# Patient Record
Sex: FEMALE | Race: WHITE | NOT HISPANIC OR LATINO | Employment: UNEMPLOYED | URBAN - METROPOLITAN AREA
[De-identification: names, ages, dates, MRNs, and addresses within clinical notes are randomized per-mention and may not be internally consistent; named-entity substitution may affect disease eponyms.]

---

## 2017-03-20 ENCOUNTER — HOSPITAL ENCOUNTER (OUTPATIENT)
Dept: RADIOLOGY | Facility: OTHER | Age: 62
Discharge: HOME | End: 2017-03-20
Payer: COMMERCIAL

## 2017-03-20 ENCOUNTER — ALLSCRIPTS OFFICE VISIT (OUTPATIENT)
Dept: OTHER | Facility: OTHER | Age: 62
End: 2017-03-20

## 2017-03-20 DIAGNOSIS — M25.551 PAIN IN RIGHT HIP: ICD-10-CM

## 2017-03-20 DIAGNOSIS — M22.2X1 PATELLOFEMORAL DISORDER OF RIGHT KNEE: ICD-10-CM

## 2017-03-20 DIAGNOSIS — M76.31 ILIOTIBIAL BAND SYNDROME OF RIGHT SIDE: ICD-10-CM

## 2017-03-20 DIAGNOSIS — M70.61 TROCHANTERIC BURSITIS OF RIGHT HIP: ICD-10-CM

## 2017-03-20 PROCEDURE — 73502 X-RAY EXAM HIP UNI 2-3 VIEWS: CPT

## 2017-03-27 ENCOUNTER — APPOINTMENT (OUTPATIENT)
Dept: PHYSICAL THERAPY | Age: 62
End: 2017-03-27
Payer: COMMERCIAL

## 2017-03-27 ENCOUNTER — GENERIC CONVERSION - ENCOUNTER (OUTPATIENT)
Dept: OTHER | Facility: OTHER | Age: 62
End: 2017-03-27

## 2017-03-27 DIAGNOSIS — M76.31 ILIOTIBIAL BAND SYNDROME OF RIGHT SIDE: ICD-10-CM

## 2017-03-27 DIAGNOSIS — M22.2X1 PATELLOFEMORAL DISORDER OF RIGHT KNEE: ICD-10-CM

## 2017-03-27 DIAGNOSIS — M70.61 TROCHANTERIC BURSITIS OF RIGHT HIP: ICD-10-CM

## 2017-03-27 DIAGNOSIS — M25.551 PAIN IN RIGHT HIP: ICD-10-CM

## 2017-03-27 PROCEDURE — 97162 PT EVAL MOD COMPLEX 30 MIN: CPT

## 2017-03-30 ENCOUNTER — APPOINTMENT (OUTPATIENT)
Dept: PHYSICAL THERAPY | Age: 62
End: 2017-03-30
Payer: COMMERCIAL

## 2017-03-30 PROCEDURE — 97110 THERAPEUTIC EXERCISES: CPT

## 2017-04-03 ENCOUNTER — APPOINTMENT (OUTPATIENT)
Dept: PHYSICAL THERAPY | Age: 62
End: 2017-04-03
Payer: COMMERCIAL

## 2017-04-03 PROCEDURE — 97110 THERAPEUTIC EXERCISES: CPT

## 2017-04-06 ENCOUNTER — APPOINTMENT (OUTPATIENT)
Dept: PHYSICAL THERAPY | Age: 62
End: 2017-04-06
Payer: COMMERCIAL

## 2017-04-11 ENCOUNTER — APPOINTMENT (OUTPATIENT)
Dept: PHYSICAL THERAPY | Age: 62
End: 2017-04-11
Payer: COMMERCIAL

## 2017-04-11 PROCEDURE — 97110 THERAPEUTIC EXERCISES: CPT

## 2017-04-13 ENCOUNTER — APPOINTMENT (OUTPATIENT)
Dept: PHYSICAL THERAPY | Age: 62
End: 2017-04-13
Payer: COMMERCIAL

## 2017-04-18 ENCOUNTER — APPOINTMENT (OUTPATIENT)
Dept: PHYSICAL THERAPY | Age: 62
End: 2017-04-18
Payer: COMMERCIAL

## 2017-04-18 ENCOUNTER — GENERIC CONVERSION - ENCOUNTER (OUTPATIENT)
Dept: OTHER | Facility: OTHER | Age: 62
End: 2017-04-18

## 2017-04-18 PROCEDURE — 97110 THERAPEUTIC EXERCISES: CPT

## 2017-04-20 ENCOUNTER — APPOINTMENT (OUTPATIENT)
Dept: PHYSICAL THERAPY | Age: 62
End: 2017-04-20
Payer: COMMERCIAL

## 2017-04-25 ENCOUNTER — GENERIC CONVERSION - ENCOUNTER (OUTPATIENT)
Dept: OTHER | Facility: OTHER | Age: 62
End: 2017-04-25

## 2017-04-25 ENCOUNTER — APPOINTMENT (OUTPATIENT)
Dept: PHYSICAL THERAPY | Age: 62
End: 2017-04-25
Payer: COMMERCIAL

## 2017-04-25 PROCEDURE — 97110 THERAPEUTIC EXERCISES: CPT

## 2017-04-27 ENCOUNTER — APPOINTMENT (OUTPATIENT)
Dept: PHYSICAL THERAPY | Age: 62
End: 2017-04-27
Payer: COMMERCIAL

## 2017-05-02 ENCOUNTER — APPOINTMENT (OUTPATIENT)
Dept: PHYSICAL THERAPY | Age: 62
End: 2017-05-02
Payer: COMMERCIAL

## 2017-05-02 PROCEDURE — 97110 THERAPEUTIC EXERCISES: CPT

## 2017-05-09 ENCOUNTER — APPOINTMENT (OUTPATIENT)
Dept: PHYSICAL THERAPY | Age: 62
End: 2017-05-09
Payer: COMMERCIAL

## 2017-05-09 PROCEDURE — 97110 THERAPEUTIC EXERCISES: CPT

## 2017-05-14 ENCOUNTER — OFFICE VISIT (OUTPATIENT)
Dept: URGENT CARE | Age: 62
End: 2017-05-14
Payer: COMMERCIAL

## 2017-05-14 ENCOUNTER — TRANSCRIBE ORDERS (OUTPATIENT)
Dept: URGENT CARE | Age: 62
End: 2017-05-14

## 2017-05-14 ENCOUNTER — HOSPITAL ENCOUNTER (OUTPATIENT)
Dept: RADIOLOGY | Age: 62
Discharge: HOME | End: 2017-05-14
Attending: PHYSICIAN ASSISTANT | Admitting: FAMILY MEDICINE
Payer: COMMERCIAL

## 2017-05-14 DIAGNOSIS — R07.81 PLEURODYNIA: ICD-10-CM

## 2017-05-14 PROCEDURE — 99203 OFFICE O/P NEW LOW 30 MIN: CPT | Performed by: FAMILY MEDICINE

## 2017-05-14 PROCEDURE — 71101 X-RAY EXAM UNILAT RIBS/CHEST: CPT

## 2017-05-22 ENCOUNTER — GENERIC CONVERSION - ENCOUNTER (OUTPATIENT)
Dept: OTHER | Facility: OTHER | Age: 62
End: 2017-05-22

## 2017-06-08 ENCOUNTER — ALLSCRIPTS OFFICE VISIT (OUTPATIENT)
Dept: OTHER | Facility: OTHER | Age: 62
End: 2017-06-08

## 2017-08-08 ENCOUNTER — ALLSCRIPTS OFFICE VISIT (OUTPATIENT)
Dept: OTHER | Facility: OTHER | Age: 62
End: 2017-08-08

## 2017-09-26 DIAGNOSIS — I10 ESSENTIAL (PRIMARY) HYPERTENSION: ICD-10-CM

## 2017-09-26 DIAGNOSIS — E78.5 HYPERLIPIDEMIA: ICD-10-CM

## 2017-09-26 DIAGNOSIS — R53.83 OTHER FATIGUE: ICD-10-CM

## 2017-09-26 DIAGNOSIS — M81.0 AGE-RELATED OSTEOPOROSIS WITHOUT CURRENT PATHOLOGICAL FRACTURE: ICD-10-CM

## 2017-09-30 ENCOUNTER — APPOINTMENT (OUTPATIENT)
Dept: LAB | Age: 62
End: 2017-09-30
Payer: COMMERCIAL

## 2017-09-30 ENCOUNTER — TRANSCRIBE ORDERS (OUTPATIENT)
Dept: ADMINISTRATIVE | Age: 62
End: 2017-09-30

## 2017-09-30 DIAGNOSIS — E78.5 HYPERLIPIDEMIA: ICD-10-CM

## 2017-09-30 DIAGNOSIS — I10 ESSENTIAL (PRIMARY) HYPERTENSION: ICD-10-CM

## 2017-09-30 DIAGNOSIS — R53.83 OTHER FATIGUE: ICD-10-CM

## 2017-09-30 LAB
ALBUMIN SERPL BCP-MCNC: 3.8 G/DL (ref 3.5–5)
ALP SERPL-CCNC: 30 U/L (ref 46–116)
ALT SERPL W P-5'-P-CCNC: 30 U/L (ref 12–78)
ANION GAP SERPL CALCULATED.3IONS-SCNC: 6 MMOL/L (ref 4–13)
AST SERPL W P-5'-P-CCNC: 29 U/L (ref 5–45)
BILIRUB SERPL-MCNC: 0.37 MG/DL (ref 0.2–1)
BUN SERPL-MCNC: 16 MG/DL (ref 5–25)
CALCIUM SERPL-MCNC: 9.7 MG/DL (ref 8.3–10.1)
CHLORIDE SERPL-SCNC: 107 MMOL/L (ref 100–108)
CHOLEST SERPL-MCNC: 159 MG/DL (ref 50–200)
CO2 SERPL-SCNC: 28 MMOL/L (ref 21–32)
CREAT SERPL-MCNC: 0.88 MG/DL (ref 0.6–1.3)
ERYTHROCYTE [DISTWIDTH] IN BLOOD BY AUTOMATED COUNT: 15.4 % (ref 11.6–15.1)
GFR SERPL CREATININE-BSD FRML MDRD: 70 ML/MIN/1.73SQ M
GLUCOSE P FAST SERPL-MCNC: 94 MG/DL (ref 65–99)
HCT VFR BLD AUTO: 40.5 % (ref 34.8–46.1)
HDLC SERPL-MCNC: 57 MG/DL (ref 40–60)
HGB BLD-MCNC: 12.7 G/DL (ref 11.5–15.4)
LDLC SERPL CALC-MCNC: 90 MG/DL (ref 0–100)
MCH RBC QN AUTO: 26.1 PG (ref 26.8–34.3)
MCHC RBC AUTO-ENTMCNC: 31.4 G/DL (ref 31.4–37.4)
MCV RBC AUTO: 83 FL (ref 82–98)
PLATELET # BLD AUTO: 397 THOUSANDS/UL (ref 149–390)
PMV BLD AUTO: 9.9 FL (ref 8.9–12.7)
POTASSIUM SERPL-SCNC: 4.4 MMOL/L (ref 3.5–5.3)
PROT SERPL-MCNC: 7.6 G/DL (ref 6.4–8.2)
RBC # BLD AUTO: 4.86 MILLION/UL (ref 3.81–5.12)
SODIUM SERPL-SCNC: 141 MMOL/L (ref 136–145)
TRIGL SERPL-MCNC: 62 MG/DL
TSH SERPL DL<=0.05 MIU/L-ACNC: 1.08 UIU/ML (ref 0.36–3.74)
WBC # BLD AUTO: 4.72 THOUSAND/UL (ref 4.31–10.16)

## 2017-09-30 PROCEDURE — 80061 LIPID PANEL: CPT

## 2017-09-30 PROCEDURE — 36415 COLL VENOUS BLD VENIPUNCTURE: CPT

## 2017-09-30 PROCEDURE — 80053 COMPREHEN METABOLIC PANEL: CPT

## 2017-09-30 PROCEDURE — 84443 ASSAY THYROID STIM HORMONE: CPT

## 2017-09-30 PROCEDURE — 85027 COMPLETE CBC AUTOMATED: CPT

## 2017-10-09 ENCOUNTER — ALLSCRIPTS OFFICE VISIT (OUTPATIENT)
Dept: OTHER | Facility: OTHER | Age: 62
End: 2017-10-09

## 2017-10-10 NOTE — PROGRESS NOTES
Assessment  1  Encounter for immunization (V03 89) (Z23)   2  Hyperlipidemia (272 4) (E78 5)   3  Hypercholesteremia (272 0) (E78 00)   4  Hypertension (401 9) (I10)   5  Osteoporosis (733 00) (M81 0)    Plan  Encounter for immunization    · Fluzone Quadrivalent Intramuscular Suspension  Osteoporosis    · GNP Calcium 600 +D3 600-800 MG-UNIT Oral Tablet; 2 tabs qd   · * DXA BONE DENSITY SPINE HIP AND PELVIS; Status:Hold For - Scheduling; Requested  for:09Oct2017;     Discussion/Summary    I'll see the patient back in 6 months but I will call her with the results of her DEXA scan  Possible side effects of new medications were reviewed with the patient/guardian today  The treatment plan was reviewed with the patient/guardian  The patient/guardian understands and agrees with the treatment plan      Chief Complaint  YEARLY WELL CHECK VISIT - OSTEOPETROSIS HTN   Patient is here today for follow up of chronic conditions described in HPI  History of Present Illness  The patient presents for follow-up of hypertension and hypercholesterolemia  She's also due for a repeat DEXA scan to determine mother her osteoporosis has been successfully treated or not  Review of Systems    Constitutional: No fever, no chills, feels well, no tiredness, no recent weight gain or weight loss  Eyes: No complaints of eye pain, no red eyes, no eyesight problems, no discharge, no dry eyes, no itching of eyes  ENT: no complaints of earache, no loss of hearing, no nose bleeds, no nasal discharge, no sore throat, no hoarseness  Cardiovascular: No complaints of slow heart rate, no fast heart rate, no chest pain, no palpitations, no leg claudication, no lower extremity edema  Respiratory: No complaints of shortness of breath, no wheezing, no cough, no SOB on exertion, no orthopnea, no PND  Gastrointestinal: No complaints of abdominal pain, no constipation, no nausea or vomiting, no diarrhea, no bloody stools     Genitourinary: No complaints of dysuria, no incontinence, no pelvic pain, no dysmenorrhea, no vaginal discharge or bleeding  Musculoskeletal: No complaints of arthralgias, no myalgias, no joint swelling or stiffness, no limb pain or swelling  Integumentary: No complaints of skin rash or lesions, no itching, no skin wounds, no breast pain or lump  Neurological: No complaints of headache, no confusion, no convulsions, no numbness, no dizziness or fainting, no tingling, no limb weakness, no difficulty walking  Psychiatric: Not suicidal, no sleep disturbance, no anxiety or depression, no change in personality, no emotional problems  Endocrine: No complaints of proptosis, no hot flashes, no muscle weakness, no deepening of the voice, no feelings of weakness  Hematologic/Lymphatic: No complaints of swollen glands, no swollen glands in the neck, does not bleed easily, does not bruise easily  Active Problems  1  Abnormality of rib determined by X-ray (756 3) (Q76 6)   2  Actinic keratosis (702 0) (L57 0)   3  Chest wall pain (786 52) (R07 89)   4  Cough, persistent (786 2) (R05)   5  Depression (311) (F32 9)   6  Encounter for gynecological examination without abnormal finding (V72 31) (Z01 419)   7  Encounter for imaging to assess osteoporosis (733 00) (Z13 820)   8  Encounter for screening mammogram for malignant neoplasm of breast (V76 12)   (Z12 31)   9  Fatigue (780 79) (R53 83)   10  Greater trochanteric bursitis of right hip (726 5) (M70 61)   11  Hypercholesteremia (272 0) (E78 00)   12  Hyperlipidemia (272 4) (E78 5)   13  Hypertension (401 9) (I10)   14  It band syndrome, right (728 89) (M76 31)   15  Lipoma (214 9) (D17 9)   16  Osteoporosis (733 00) (M81 0)   17  Patellofemoral syndrome, right (719 46) (M22 2X1)   18  Rib pain on right side (786 50) (R07 81)   19  Right hip pain (719 45) (M25 551)    Past Medical History  1  History of acute bronchitis (V12 69) (Z87 09)   2   History of urinary tract infection (V13 02) (Z87 440)   3  History of Sore throat (462) (J02 9)   4  History of Trapezius muscle strain (840 8) (M91 106Q)    The active problems and past medical history were reviewed and updated today  Surgical History  1  History of Eye Surgery   2  History of Tubal Ligation    The surgical history was reviewed and updated today  Family History  Mother    1  Family history of Breast Cancer (V16 3)   2  Family history of Colon Cancer (V16 0)  Father    3  Family history of Hypertension (V17 49)    The family history was reviewed and updated today  Social History   · Denied: History of Alcohol Use (History)   · Denied: History of Daily Coffee Consumption (___ Cups/Day)   · Daily Cola Consumption (___ Cans/Day)   · Daily Tea Consumption (___ Cups/Day)   · Denied: History of Drug Use   · Marital History - Currently    · Never A Smoker  The social history was reviewed and updated today  The social history was reviewed and is unchanged  Current Meds   1  Azo Tabs 95 MG Oral Tablet; Therapy: (Recorded:20Mar2017) to Recorded   2  [employer] Vitamin D3 1000 UNIT CAPS; TAKE 1 CAPSULE Daily Recorded   3  Fenofibrate 145 MG Oral Tablet; take 1 tablet every day; Therapy: 88LHH5280 to (Radha Recinos)  Requested for: 97NEY6698; Last   Rx:13Oct2016 Ordered   4  HydroCHLOROthiazide 25 MG Oral Tablet; take 1 tablet every day; Therapy: 57THP2156 to (Evaluate:04Lsy7924)  Requested for: 85HGM7179; Last   Rx:21Etr2105 Ordered   5  Ibandronate Sodium 150 MG Oral Tablet; TAKE 1 TAB PER MONTH W/ 8-10 OZ OF   WATER, REMAIN UPRIGHT & DONT EAT/DRINK X 1 HOUR;   Therapy: 98KJR6676 to (Evaluate:56Cks2175)  Requested for: 86Fgb2388; Last   Rx:95Cdb7727 Ordered   6  Nortriptyline HCl - 10 MG Oral Capsule; TAKE 3 CAPSULES EVERY DAY; Therapy: 21Nov2010 to (Radha Recinos)  Requested for: 75FUK6628; Last   Rx:09Oct2016 Ordered   7  Probiotic CAPS; TAKE 1 CAPSULE Daily Recorded   8   Turmeric Curcumin Oral Capsule; Therapy: (Recorded:20Mar2017) to Recorded    The medication list was reviewed and updated today  Allergies  1  Iodine SOLN    Vitals  Vital Signs    Recorded: 08LYH1731 01:00PM   Temperature 97 7 F   Heart Rate 76   Respiration 14   Systolic 470   Diastolic 74   Height 5 ft 2 in   Weight 146 lb    BMI Calculated 26 7   BSA Calculated 1 67     Physical Exam    Constitutional   General appearance: No acute distress, well appearing and well nourished  Head and Face   Head and face: Normal     Palpation of the face and sinuses: No sinus tenderness  Eyes   Conjunctiva and lids: No swelling, erythema or discharge  Pupils and irises: Equal, round, reactive to light  Ophthalmoscopic examination: Normal fundi and optic discs  Ears, Nose, Mouth, and Throat   External inspection of ears and nose: Normal     Otoscopic examination: Tympanic membranes translucent with normal light reflex  Canals patent without erythema  Hearing: Normal     Nasal mucosa, septum, and turbinates: Normal without edema or erythema  Lips, teeth, and gums: Normal, good dentition  Oropharynx: Normal with no erythema, edema, exudate or lesions  Neck   Neck: Supple, symmetric, trachea midline, no masses  Thyroid: Normal, no thyromegaly  Pulmonary   Percussion of chest: Normal     Auscultation of lungs: Clear to auscultation  Cardiovascular   Auscultation of heart: Normal rate and rhythm, normal S1 and S2, no murmurs  Carotid pulses: 2+ bilaterally  Pedal pulses: 2+ bilaterally  Peripheral vascular exam: Normal     Examination of extremities for edema and/or varicosities: Normal     Abdomen   Abdomen: Non-tender, no masses  Liver and spleen: No hepatomegaly or splenomegaly  Lymphatic   Palpation of lymph nodes in neck: No lymphadenopathy  Musculoskeletal   Digits and nails: Normal without clubbing or cyanosis  Neurologic   Reflexes: 2+ and symmetric      Psychiatric   Judgment and insight: Normal     Orientation to person, place, and time: Normal     Recent and remote memory: Intact      Mood and affect: Normal        Future Appointments    Date/Time Provider Specialty Site   08/10/2018 10:00 AM Sahil [facility] Obstetrics/Gynecology [facility]     Signatures   Electronically signed by : Sabra Lange DO; Oct  9 2017  3:17PM EST                       (Author)

## 2017-11-17 ENCOUNTER — HOSPITAL ENCOUNTER (OUTPATIENT)
Dept: RADIOLOGY | Age: 62
Discharge: HOME | End: 2017-11-17
Payer: COMMERCIAL

## 2017-11-17 DIAGNOSIS — M81.0 AGE-RELATED OSTEOPOROSIS WITHOUT CURRENT PATHOLOGICAL FRACTURE: ICD-10-CM

## 2017-11-17 PROCEDURE — 77080 DXA BONE DENSITY AXIAL: CPT

## 2017-11-20 ENCOUNTER — ALLSCRIPTS OFFICE VISIT (OUTPATIENT)
Dept: OTHER | Facility: OTHER | Age: 62
End: 2017-11-20

## 2018-01-01 ENCOUNTER — APPOINTMENT (EMERGENCY)
Dept: CT IMAGING | Facility: HOSPITAL | Age: 63
End: 2018-01-01
Payer: COMMERCIAL

## 2018-01-01 ENCOUNTER — HOSPITAL ENCOUNTER (EMERGENCY)
Facility: HOSPITAL | Age: 63
Discharge: HOME | End: 2018-01-01
Attending: EMERGENCY MEDICINE | Admitting: EMERGENCY MEDICINE
Payer: COMMERCIAL

## 2018-01-01 VITALS
HEART RATE: 74 BPM | SYSTOLIC BLOOD PRESSURE: 175 MMHG | OXYGEN SATURATION: 95 % | DIASTOLIC BLOOD PRESSURE: 79 MMHG | RESPIRATION RATE: 16 BRPM | TEMPERATURE: 97.7 F

## 2018-01-01 DIAGNOSIS — R42 VERTIGO: Primary | ICD-10-CM

## 2018-01-01 LAB
ANION GAP SERPL CALCULATED.3IONS-SCNC: 9 MMOL/L (ref 4–13)
ATRIAL RATE: 73 BPM
BASOPHILS # BLD AUTO: 0.02 THOUSANDS/ΜL (ref 0–0.1)
BASOPHILS NFR BLD AUTO: 0 % (ref 0–1)
BUN SERPL-MCNC: 22 MG/DL (ref 5–25)
CALCIUM SERPL-MCNC: 9.5 MG/DL (ref 8.3–10.1)
CHLORIDE SERPL-SCNC: 102 MMOL/L (ref 100–108)
CO2 SERPL-SCNC: 27 MMOL/L (ref 21–32)
CREAT SERPL-MCNC: 0.84 MG/DL (ref 0.6–1.3)
EOSINOPHIL # BLD AUTO: 0.21 THOUSAND/ΜL (ref 0–0.61)
EOSINOPHIL NFR BLD AUTO: 2 % (ref 0–6)
ERYTHROCYTE [DISTWIDTH] IN BLOOD BY AUTOMATED COUNT: 15.6 % (ref 11.6–15.1)
GFR SERPL CREATININE-BSD FRML MDRD: 74 ML/MIN/1.73SQ M
GLUCOSE SERPL-MCNC: 106 MG/DL (ref 65–140)
HCT VFR BLD AUTO: 41 % (ref 34.8–46.1)
HGB BLD-MCNC: 12.9 G/DL (ref 11.5–15.4)
LYMPHOCYTES # BLD AUTO: 0.93 THOUSANDS/ΜL (ref 0.6–4.47)
LYMPHOCYTES NFR BLD AUTO: 9 % (ref 14–44)
MCH RBC QN AUTO: 26.1 PG (ref 26.8–34.3)
MCHC RBC AUTO-ENTMCNC: 31.5 G/DL (ref 31.4–37.4)
MCV RBC AUTO: 83 FL (ref 82–98)
MONOCYTES # BLD AUTO: 0.42 THOUSAND/ΜL (ref 0.17–1.22)
MONOCYTES NFR BLD AUTO: 4 % (ref 4–12)
NEUTROPHILS # BLD AUTO: 8.67 THOUSANDS/ΜL (ref 1.85–7.62)
NEUTS SEG NFR BLD AUTO: 85 % (ref 43–75)
P AXIS: 21 DEGREES
PLATELET # BLD AUTO: 397 THOUSANDS/UL (ref 149–390)
PMV BLD AUTO: 9.8 FL (ref 8.9–12.7)
POTASSIUM SERPL-SCNC: 4.4 MMOL/L (ref 3.5–5.3)
PR INTERVAL: 156 MS
QRS AXIS: -3 DEGREES
QRSD INTERVAL: 90 MS
QT INTERVAL: 390 MS
QTC INTERVAL: 408 MS
RBC # BLD AUTO: 4.95 MILLION/UL (ref 3.81–5.12)
SODIUM SERPL-SCNC: 138 MMOL/L (ref 136–145)
T WAVE AXIS: 34 DEGREES
TROPONIN I SERPL-MCNC: <0.02 NG/ML
VENTRICULAR RATE: 66 BPM
WBC # BLD AUTO: 10.25 THOUSAND/UL (ref 4.31–10.16)

## 2018-01-01 PROCEDURE — 93005 ELECTROCARDIOGRAM TRACING: CPT

## 2018-01-01 PROCEDURE — 85025 COMPLETE CBC W/AUTO DIFF WBC: CPT | Performed by: EMERGENCY MEDICINE

## 2018-01-01 PROCEDURE — 93005 ELECTROCARDIOGRAM TRACING: CPT | Performed by: EMERGENCY MEDICINE

## 2018-01-01 PROCEDURE — 84484 ASSAY OF TROPONIN QUANT: CPT | Performed by: EMERGENCY MEDICINE

## 2018-01-01 PROCEDURE — 70450 CT HEAD/BRAIN W/O DYE: CPT

## 2018-01-01 PROCEDURE — 36415 COLL VENOUS BLD VENIPUNCTURE: CPT | Performed by: EMERGENCY MEDICINE

## 2018-01-01 PROCEDURE — 99284 EMERGENCY DEPT VISIT MOD MDM: CPT

## 2018-01-01 PROCEDURE — 96374 THER/PROPH/DIAG INJ IV PUSH: CPT

## 2018-01-01 PROCEDURE — 80048 BASIC METABOLIC PNL TOTAL CA: CPT | Performed by: EMERGENCY MEDICINE

## 2018-01-01 PROCEDURE — 96361 HYDRATE IV INFUSION ADD-ON: CPT

## 2018-01-01 RX ORDER — MECLIZINE HYDROCHLORIDE 25 MG/1
50 TABLET ORAL EVERY 8 HOURS PRN
Qty: 30 TABLET | Refills: 0 | Status: SHIPPED | OUTPATIENT
Start: 2018-01-01 | End: 2020-10-05 | Stop reason: SDUPTHER

## 2018-01-01 RX ORDER — DIAZEPAM 5 MG/ML
2.5 INJECTION, SOLUTION INTRAMUSCULAR; INTRAVENOUS ONCE
Status: COMPLETED | OUTPATIENT
Start: 2018-01-01 | End: 2018-01-01

## 2018-01-01 RX ORDER — MECLIZINE HCL 12.5 MG/1
50 TABLET ORAL ONCE
Status: COMPLETED | OUTPATIENT
Start: 2018-01-01 | End: 2018-01-01

## 2018-01-01 RX ADMIN — MECLIZINE 50 MG: 12.5 TABLET ORAL at 11:48

## 2018-01-01 RX ADMIN — DIAZEPAM 2.5 MG: 5 INJECTION, SOLUTION INTRAMUSCULAR; INTRAVENOUS at 13:06

## 2018-01-01 RX ADMIN — SODIUM CHLORIDE 1000 ML: 0.9 INJECTION, SOLUTION INTRAVENOUS at 11:50

## 2018-01-01 NOTE — DISCHARGE INSTRUCTIONS
Dizziness, Ambulatory Care   GENERAL INFORMATION:   Dizziness  is a term used to describe a feeling of being off balance or unsteady  Common causes of dizziness are an inner ear fluid imbalance or a lack of oxygen in your blood  Your dizziness may be acute (lasts 3 days or less) or chronic (lasts longer than 3 days)  You may have dizzy spells that last from seconds to a few hours  Common symptoms related to dizziness include the following:   · A feeling that your surroundings are moving even though you are standing still    · Ringing in your ears or hearing loss     · Feeling faint or lightheaded     · Weakness or unsteadiness     · Double vision or eye movements you cannot control    · Nausea or vomiting     · Confusion  Seek immediate care for the following symptoms:   · You have a headache that does not go away with medicine  · You have shaking chills and a fever  · You vomit over and over with no relief  · Your vomit or bowel movements are red or black  · You have pain in your chest, back, or abdomen  · You have numbness, especially in your face, arms, or legs  · You have trouble moving your arms or legs  Treatment for dizziness  depends on the cause of your dizziness  You may need medicines to decrease your dizziness or nausea  You may need to be admitted to the hospital for treatment  Manage your symptoms:  Get up slowly from sitting or lying down  Do not drive or operate machinery when you are dizzy  Follow up with your healthcare provider as directed:  Write down your questions so you remember to ask them during your visits  CARE AGREEMENT:   You have the right to help plan your care  Learn about your health condition and how it may be treated  Discuss treatment options with your caregivers to decide what care you want to receive  You always have the right to refuse treatment  The above information is an  only   It is not intended as medical advice for individual conditions or treatments  Talk to your doctor, nurse or pharmacist before following any medical regimen to see if it is safe and effective for you  © 2014 [address] is for End User's use only and may not be sold, redistributed or otherwise used for commercial purposes  All illustrations and images included in CareNotes® are the copyrighted property of A D A M , Inc  or [employer] Jw  Dizziness   WHAT YOU NEED TO KNOW:   Dizziness is a feeling of being off balance or unsteady  Common causes of dizziness are an inner ear fluid imbalance or a lack of oxygen in your blood  Dizziness may be acute (lasts 3 days or less) or chronic (lasts longer than 3 days)  You may have dizzy spells that last from seconds to a few hours  DISCHARGE INSTRUCTIONS:   Return to the emergency department if:   · You have a headache and a stiff neck  · You have shaking chills and a fever  · You vomit over and over with no relief  · Your vomit or bowel movements are red or black  · You have pain in your chest, back, or abdomen  · You have numbness, especially in your face, arms, or legs  · You have trouble moving your arms or legs  · You are confused  Contact your healthcare provider if:   · You have a fever  · Your symptoms do not get better with treatment  · You have questions or concerns about your condition or care  Manage your symptoms:   · Do not drive  or operate heavy machinery when you are dizzy  · Get up slowly  from sitting or lying down  · Drink plenty of liquids  Liquids help prevent dehydration  Ask how much liquid to drink each day and which liquids are best for you  Follow up with your healthcare provider as directed:  Write down your questions so you remember to ask them during your visits     © 2017 [address] Information is for End User's use only and may not be sold, redistributed or otherwise used for commercial purposes  All illustrations and images included in CareNotes® are the copyrighted property of A D A M , Inc  or Juan Luis Escalera  The above information is an  only  It is not intended as medical advice for individual conditions or treatments  Talk to your doctor, nurse or pharmacist before following any medical regimen to see if it is safe and effective for you

## 2018-01-01 NOTE — ED NOTES
Pt ambulated, reports "dizziness is gone" will relay to Dr Dacia Argueta        [id number] , RN  01/01/18 7522

## 2018-01-01 NOTE — ED PROVIDER NOTES
History  Chief Complaint   Patient presents with    Dizziness     pt started with dizziness this yesterday  continued with dizziness this morning and vomitted  History provided by:  Patient  Dizziness   Quality:  Room spinning and vertigo  Severity:  Moderate  Onset quality:  Sudden  Duration:  2 days  Timing:  Constant  Progression:  Worsening  Chronicity:  New  Context: head movement    Relieved by:  Nothing  Worsened by:  Standing up and turning head  Ineffective treatments:  Being still  Associated symptoms: nausea    Associated symptoms: no chest pain, no diarrhea, no headaches, no shortness of breath and no weakness        None       Past Medical History:   Diagnosis Date    Hyperlipidemia     Hypertension        Past Surgical History:   Procedure Laterality Date    TUBAL LIGATION         History reviewed  No pertinent family history  I have reviewed and agree with the history as documented  Social History   Substance Use Topics    Smoking status: Never Smoker    Smokeless tobacco: Never Used    Alcohol use No        Review of Systems   Constitutional: Negative for chills and fever  HENT: Negative for rhinorrhea, sore throat and trouble swallowing  Eyes: Negative for pain  Respiratory: Negative for cough, shortness of breath, wheezing and stridor  Cardiovascular: Negative for chest pain and leg swelling  Gastrointestinal: Positive for nausea  Negative for abdominal pain and diarrhea  Endocrine: Negative for polyuria  Genitourinary: Negative for dysuria, flank pain and urgency  Musculoskeletal: Negative for joint swelling, myalgias and neck stiffness  Skin: Negative for rash  Allergic/Immunologic: Negative for immunocompromised state  Neurological: Positive for dizziness  Negative for syncope, weakness, numbness and headaches  Psychiatric/Behavioral: Negative for confusion and suicidal ideas  All other systems reviewed and are negative        Physical Exam  ED Triage Vitals [01/01/18 1104]   Temperature Pulse Respirations Blood Pressure SpO2   97 7 °F (36 5 °C) 76 18 (!) 197/88 98 %      Temp Source Heart Rate Source Patient Position - Orthostatic VS BP Location FiO2 (%)   Oral Monitor Sitting Left arm --      Pain Score       No Pain           Orthostatic Vital Signs  Vitals:    01/01/18 1104 01/01/18 1203 01/01/18 1259 01/01/18 1330   BP: (!) 197/88 167/77 167/77 (!) 175/79   Pulse: 76 68 68 74   Patient Position - Orthostatic VS: Sitting Lying Lying        Physical Exam   Constitutional: She is oriented to person, place, and time  She appears well-developed and well-nourished  HENT:   Head: Normocephalic and atraumatic  Eyes: EOM are normal  Pupils are equal, round, and reactive to light  Neck: Normal range of motion  Neck supple  Cardiovascular: Normal rate and regular rhythm  Exam reveals no friction rub  No murmur heard  Pulmonary/Chest: Breath sounds normal  No respiratory distress  She has no wheezes  She has no rales  Abdominal: Soft  Bowel sounds are normal  She exhibits no distension  There is no tenderness  Musculoskeletal: Normal range of motion  She exhibits no edema or tenderness  Neurological: She is alert and oriented to person, place, and time  GCS 15  AAOx4  No focal neuro deficits  CN II-XII intact  PERRL  EOMI  [place] No pronator drift   strength 5/5 bilaterally  B/L UE strength 5/5 throughout  Finger to nose normal  Cerebellar function normal  Ambulates without difficulty  B/L LE strength 5/5 throughout  Gross sensation to b/l upper and lower extremities intact  Skin: Skin is warm  No rash noted  Psychiatric: She has a normal mood and affect  Nursing note and vitals reviewed        ED Medications  Medications   sodium chloride 0 9 % bolus 1,000 mL (0 mL Intravenous Stopped 1/1/18 1352)   meclizine (ANTIVERT) tablet 50 mg (50 mg Oral Given 1/1/18 1148)   diazepam (VALIUM) injection 2 5 mg (2 5 mg Intravenous Given 1/1/18 1306)       Diagnostic Studies  Results Reviewed     Procedure Component Value Units Date/Time    Troponin I [[id number]]  (Normal) Collected:  01/01/18 1149    Lab Status:  Final result Specimen:  Blood from Arm, Right Updated:  01/01/18 1219     Troponin I <0 02 ng/mL     Narrative:         [employer] Chemistry analyzer 99% cutoff is > 0 04 ng/mL in network labs    o cTnI 99% cutoff is useful only when applied to patients in the clinical setting of myocardial ischemia  o cTnI 99% cutoff should be interpreted in the context of clinical history, ECG findings and possibly cardiac imaging to establish correct diagnosis  o cTnI 99% cutoff may be suggestive but clearly not indicative of a coronary event without the clinical setting of myocardial ischemia  Basic metabolic panel [[id number]]  (Normal) Collected:  01/01/18 1149    Lab Status:  Final result Specimen:  Blood from Arm, Right Updated:  01/01/18 1216     Sodium 138 mmol/L      Potassium 4 4 mmol/L      Chloride 102 mmol/L      CO2 27 mmol/L      Anion Gap 9 mmol/L      BUN 22 mg/dL      Creatinine 0 84 mg/dL      Glucose 106 mg/dL      Calcium 9 5 mg/dL      eGFR 74 ml/min/1 73sq m     Narrative:         National Kidney Disease Education Program recommendations are as follows:  GFR calculation is accurate only with a steady state creatinine  Chronic Kidney disease less than 60 ml/min/1 73 sq  meters  Kidney failure less than 15 ml/min/1 73 sq  meters      CBC and differential [[id number]]  (Abnormal) Collected:  01/01/18 1149    Lab Status:  Final result Specimen:  Blood from Arm, Right Updated:  01/01/18 1157     WBC 10 25 (H) Thousand/uL      RBC 4 95 Million/uL      Hemoglobin 12 9 g/dL      Hematocrit 41 0 %      MCV 83 fL      MCH 26 1 (L) pg      MCHC 31 5 g/dL      RDW 15 6 (H) %      MPV 9 8 fL      Platelets 397 (H) Thousands/uL      Neutrophils Relative 85 (H) %      Lymphocytes Relative 9 (L) %      Monocytes Relative 4 %      Eosinophils Relative 2 % Basophils Relative 0 %      Neutrophils Absolute 8 67 (H) Thousands/µL      Lymphocytes Absolute 0 93 Thousands/µL      Monocytes Absolute 0 42 Thousand/µL      Eosinophils Absolute 0 21 Thousand/µL      Basophils Absolute 0 02 Thousands/µL                  CT head without contrast   Final Result by Odilia Goodwin MD (01/01 1240)      No acute intracranial abnormality  Workstation performed: [id number]                    Procedures  Procedures       Phone Contacts  ED Phone Contact    ED Course  ED Course                                MDM  Number of Diagnoses or Management Options  Vertigo: new and requires workup  Diagnosis management comments: 28-year-old female presents emergency department sudden onset of vertigo symptoms that presents started yesterday  Worsened for the past 2 days  Noted with nausea vomiting  No headache  No other focal neurological dust signs or deficits no evidence of nystagmus on examination EKG is unremarkable  Laboratory studies as well as CT of the head done in the emergency department unremarkable suspect peripheral vertigo  Plan on outpatient management improvement with meclizine as well as  Valium in the emergency department  Plan outpatient management with meclizine given strict instructions when to return back to the emergency department  Pt re-examined and evaluated after testing and treatment  Spoke with the patient and feeling improved and sxs have resolved  Will discharge home with close f/u with pcp and instructed to return to the ED if sxs worsen or continue  Pt agrees with the plan for discharge and feels comfortable to go home with proper f/u  Advised to return for worsening or additional problems  Diagnostic tests were reviewed and questions answered  Diagnosis, care plan and treatment options were discussed  The patient understand instructions and will follow up as directed           Amount and/or Complexity of Data Reviewed  Clinical lab tests: ordered and reviewed  Tests in the radiology section of CPT®: ordered and reviewed  Review and summarize past medical records: yes      CritCare Time    Disposition  Final diagnoses:   Vertigo     Time reflects when diagnosis was documented in both MDM as applicable and the Disposition within this note     Time User Action Codes Description Comment    1/1/2018  1:50 PM Monica Pennington Add [R42] Vertigo       ED Disposition     ED Disposition Condition Comment    Discharge  Venson Clear discharge to home/self care  Condition at discharge: Stable        Follow-up Information     Follow up With Specialties Details Why DO [facility] Call in 2 days If symptoms worsen [address]  [address]  [phone]          Discharge Medication List as of 1/1/2018  1:51 PM      START taking these medications    Details   meclizine (ANTIVERT) 25 mg tablet Take 2 tablets by mouth every 8 (eight) hours as needed for dizziness, Starting Mon 1/1/2018, Print           No discharge procedures on file      ED Provider  Electronically Signed by           Izzy Ireland DO  01/02/18 1529

## 2018-01-02 ENCOUNTER — ALLSCRIPTS OFFICE VISIT (OUTPATIENT)
Dept: OTHER | Facility: OTHER | Age: 63
End: 2018-01-02

## 2018-01-02 LAB
BILIRUB UR QL STRIP: NORMAL
CLARITY UR: NORMAL
COLOR UR: YELLOW
GLUCOSE (HISTORICAL): NORMAL
HGB UR QL STRIP.AUTO: NORMAL
KETONES UR STRIP-MCNC: NORMAL MG/DL
LEUKOCYTE ESTERASE UR QL STRIP: NORMAL
NITRITE UR QL STRIP: NORMAL
PH UR STRIP.AUTO: 5 [PH]
PROT UR STRIP-MCNC: NORMAL MG/DL
SP GR UR STRIP.AUTO: 1.02
UROBILINOGEN UR QL STRIP.AUTO: 0.2

## 2018-01-13 VITALS
SYSTOLIC BLOOD PRESSURE: 130 MMHG | DIASTOLIC BLOOD PRESSURE: 80 MMHG | HEIGHT: 62 IN | BODY MASS INDEX: 26.87 KG/M2 | WEIGHT: 146 LBS

## 2018-01-13 VITALS
DIASTOLIC BLOOD PRESSURE: 85 MMHG | HEIGHT: 62 IN | SYSTOLIC BLOOD PRESSURE: 166 MMHG | WEIGHT: 149.38 LBS | BODY MASS INDEX: 27.49 KG/M2 | HEART RATE: 85 BPM

## 2018-01-13 VITALS
HEART RATE: 79 BPM | HEIGHT: 62 IN | WEIGHT: 152 LBS | DIASTOLIC BLOOD PRESSURE: 83 MMHG | BODY MASS INDEX: 27.97 KG/M2 | SYSTOLIC BLOOD PRESSURE: 153 MMHG

## 2018-01-14 VITALS
HEIGHT: 62 IN | SYSTOLIC BLOOD PRESSURE: 132 MMHG | BODY MASS INDEX: 26.87 KG/M2 | TEMPERATURE: 97.7 F | DIASTOLIC BLOOD PRESSURE: 74 MMHG | WEIGHT: 146 LBS | HEART RATE: 76 BPM | RESPIRATION RATE: 14 BRPM

## 2018-01-16 NOTE — PROGRESS NOTES
Chief Complaint  Pt is here for N/V U/A dipstick due to urine frequency and burning  Pt's results show +Leukocytes & +Blood, per Dr Melendez pt is being rx'd Cipro 250mg 1bid #20-pt aware to call our office if sx not resolved/return upon completion of abx  Active Problems    1  Abnormality of rib determined by X-ray (756 3) (Q76 6)   2  Actinic keratosis (702 0) (L57 0)   3  Chest wall pain (786 52) (R07 89)   4  Cough, persistent (786 2) (R05)   5  Depression (311) (F32 9)   6  Encounter for gynecological examination without abnormal finding (V72 31) (Z01 419)   7  Encounter for imaging to assess osteoporosis (733 00) (Z13 820)   8  Encounter for immunization (V03 89) (Z23)   9  Encounter for screening mammogram for malignant neoplasm of breast (V76 12)   (Z12 31)   10  Fatigue (780 79) (R53 83)   11  Greater trochanteric bursitis of right hip (726 5) (M70 61)   12  Hypercholesteremia (272 0) (E78 00)   13  Hyperlipidemia (272 4) (E78 5)   14  Hypertension (401 9) (I10)   15  It band syndrome, right (728 89) (M76 31)   16  Lipoma (214 9) (D17 9)   17  Osteoporosis (733 00) (M81 0)   18  Patellofemoral syndrome, right (719 46) (M22 2X1)   19  Rib pain on right side (786 50) (R07 81)   20  Right hip pain (719 45) (M25 551)   21  UTI (urinary tract infection) (599 0) (N39 0)    Current Meds   1  Azo Tabs 95 MG Oral Tablet; Therapy: (Recorded:20Mar2017) to Recorded   2  [employer]S Vitamin D3 1000 UNIT CAPS; TAKE 1 CAPSULE Daily Recorded   3  Fenofibrate 145 MG Oral Tablet; take 1 tablet every day; Therapy: 46ZSA1892 to (Jacque Princeton)  Requested for: 02MZB4351; Last   Rx:11Oct2017 Ordered   4  GNP Calcium 600 +D3 600-800 MG-UNIT Oral Tablet; 2 tabs qd; Therapy: 33GIU0089 to (Last Rx:09Oct2017) Ordered   5  HydroCHLOROthiazide 25 MG Oral Tablet; take 1 tablet every day; Therapy: 08FNU3410 to (Evaluate:19Egx9775)  Requested for: 55IQI0432; Last   Rx:70Qqe1355 Ordered   6   Ibandronate Sodium 150 MG Oral Tablet; TAKE 1 TAB PER MONTH W/ 8-10 OZ OF   WATER, REMAIN UPRIGHT & DONT EAT/DRINK X 1 HOUR;   Therapy: 31VJI4001 to (Evaluate:78Wub7718)  Requested for: 48Clx1205; Last   Rx:28Aug2017 Ordered   7  Nortriptyline HCl - 10 MG Oral Capsule; TAKE 3 CAPSULES EVERY DAY; Therapy: 21Nov2010 to (Malia Donaldson)  Requested for: 92SKP2349; Last   Rx:11Oct2017 Ordered   8  Probiotic CAPS; TAKE 1 CAPSULE Daily Recorded   9  Turmeric Curcumin Oral Capsule; Therapy: (Recorded:20Mar2017) to Recorded    Allergies    1  Iodine SOLN    Plan  UTI (urinary tract infection)    · Ciprofloxacin HCl - 250 MG Oral Tablet (Cipro); Take 1 tablet twice daily    Future Appointments    Date/Time Provider Specialty Site   08/10/2018 10:00 AM Corey Fung [facility] Obstetrics/Gynecology [facility] OB     Signatures   Electronically signed by :  Alistair Nelson), ; Nov 20 2017 11:31AM EST                       (Author)    Electronically signed by : Fadi Keene DO; Nov 20 2017  1:06PM EST                       (Author)

## 2018-01-17 NOTE — PROGRESS NOTES
Chief Complaint  Patient was seen today for a urine check she was having pain and discomfort for the past 3 days  After testing her urine it was positive for a urine infection she was given medication and told to drink plenty of fluids and if symptoms get worse to give us a call  Active Problems    1  Depression (311) (F32 9)   2  Encounter for imaging to assess osteoporosis (733 00) (Z13 820,Z01 89)   3  Encounter for routine gynecological examination (V72 31) (Z01 419)   4  Hyperlipidemia (272 4) (E78 5)   5  Hypertension (401 9) (I10)   6  Lipoma (214 9) (D17 9)   7  Need for influenza vaccination (V04 81) (Z23)   8  Neoplasm of uncertain behavior of skin (238 2) (D48 5)   9  Osteoporosis (733 00) (M81 0)   10  Trapezius muscle strain (840 8) (S46 819A)   11  Urinary tract infection (599 0) (N39 0)    Current Meds   1  Azo Tabs TABS; Take 1 tablet twice daily Recorded   2  Calcium 600 MG Oral Tablet; Take 1 tablet twice daily; Last Rx:09Sep2015 Ordered   3  Ciprofloxacin HCl - 250 MG Oral Tablet Recorded   4  CVS Vitamin D3 1000 UNIT CAPS; TAKE 1 CAPSULE Daily Recorded   5  Fenofibrate 145 MG Oral Tablet; take 1 tablet every day; Therapy: 53ZLU4381 to (Hussein Sanchez)  Requested for: 0490 51 30 85; Last   Rx:21Oct2015 Ordered   6  Hydrochlorothiazide 25 MG Oral Tablet; TAKE ONE TABLET BY MOUTH HONORIO DAY; Therapy: 94IDU5216 to (Evaluate:22Jun2016)  Requested for: 51IDH2907; Last   Rx:28Jun2015 Ordered   7  Ibandronate Sodium 150 MG Oral Tablet; TAKE 1 TABLET ONCE MONTHLY WITH 8 TO   10 OUNCES OF WATER   STAY UPRIGHT AND DO NOT EAT OR DRINK FOR 1 HOUR;   Therapy: 98XVQ1262 to (Renew:03Sep2016)  Requested for: 49WUI1842; Last   Rx:09Sep2015 Ordered   8  Nortriptyline HCl - 10 MG Oral Capsule; TAKE 3 CAPSULES EVERY DAY; Therapy: 21Nov2010 to (Hussein Sanchez)  Requested for: 0490 51 30 85; Last   Rx:21Oct2015 Ordered   9  Probiotic CAPS; TAKE 1 CAPSULE Daily Recorded    Allergies    1   Iodine SOLN    Results/Data  Urine Dip Non-Automated- POC 20Jan2016 12:00AM Yarely Berry     Test Name Result Flag Reference   Color Yellow     Clarity Cloudy     Leukocytes ++     Nitrite NEG     Blood TRACE     Bilirubin NEG     Urobilinogen 0 2     Protein +     Ph 7 5     Specific Gravity 1 005     Ketone NEG     Glucose NEG         Plan  Urinary tract infection    · Levofloxacin 250 MG Oral Tablet; TAKE 1 TABLET DAILY AS DIRECTED   · Urine Dip Non-Automated- POC; Status:Complete - Retrospective By Protocol  Authorization;   Done: 82GRN5352 12:00AM    Future Appointments    Date/Time Provider Specialty Site   08/03/2016 09:20 AM BLANCA Soto Obstetrics/Gynecology [facility] [address] [place] [address]     Signatures   Electronically signed by :  Dom Gordillo, ; Jan 20 2016 11:03AM EST                       (Author)    Electronically signed by : Denny Medeiros DO; Jan 20 2016 11:11AM EST                       (Author)

## 2018-01-23 NOTE — PROGRESS NOTES
Chief Complaint  PT PRESENTED WITH SYMPTOMS OF DYSURIA ,AND URINARY FREQUENCY ,AN URINE DIP WAS DONE AND PT WAS POSITIVE FOR UTI , PT WILL BE TX'ED WITH CIPRO 250 MG WHICH WAS SENT TO PHARM AND PT AWARE      Active Problems    1  Abnormality of rib determined by X-ray (756 3) (Q76 6)   2  Actinic keratosis (702 0) (L57 0)   3  Chest wall pain (786 52) (R07 89)   4  Cough, persistent (786 2) (R05)   5  Depression (311) (F32 9)   6  Encounter for gynecological examination without abnormal finding (V72 31) (Z01 419)   7  Encounter for imaging to assess osteoporosis (733 00) (Z13 820)   8  Encounter for immunization (V03 89) (Z23)   9  Encounter for screening mammogram for malignant neoplasm of breast (V76 12)   (Z12 31)   10  Fatigue (780 79) (R53 83)   11  Greater trochanteric bursitis of right hip (726 5) (M70 61)   12  Hypercholesteremia (272 0) (E78 00)   13  Hyperlipidemia (272 4) (E78 5)   14  Hypertension (401 9) (I10)   15  It band syndrome, right (728 89) (M76 31)   16  Lipoma (214 9) (D17 9)   17  Osteoporosis (733 00) (M81 0)   18  Patellofemoral syndrome, right (719 46) (M22 2X1)   19  Rib pain on right side (786 50) (R07 81)   20  Right hip pain (719 45) (M25 551)   21  UTI (urinary tract infection) (599 0) (N39 0)    Current Meds   1  Azo Tabs 95 MG Oral Tablet; Therapy: (Recorded:20Mar2017) to Recorded   2  CVS Vitamin D3 1000 UNIT CAPS; TAKE 1 CAPSULE Daily Recorded   3  Fenofibrate 145 MG Oral Tablet; take 1 tablet every day; Therapy: 61QHV6661 to (Reba Roche)  Requested for: 17EOC4250; Last   Rx:11Oct2017 Ordered   4  GNP Calcium 600 +D3 600-800 MG-UNIT Oral Tablet; 2 tabs qd; Therapy: 84YLL9749 to (Last Rx:09Oct2017) Ordered   5  HydroCHLOROthiazide 25 MG Oral Tablet; take 1 tablet every day; Therapy: 39EDM9612 to (Evaluate:93Nvj6813)  Requested for: 08BXM3365; Last   Rx:14Hiy2976 Ordered   6   Ibandronate Sodium 150 MG Oral Tablet; TAKE 1 TAB PER MONTH W/ 8-10 OZ OF   WATER, REMAIN UPRIGHT & DONT EAT/DRINK X 1 HOUR;   Therapy: 76MFX5536 to (Evaluate:46Wxy6095)  Requested for: 37Eim2333; Last   Rx:31Lkd4840 Ordered   7  Nortriptyline HCl - 10 MG Oral Capsule; TAKE 3 CAPSULES EVERY DAY; Therapy: 21Nov2010 to (Mayme Peer)  Requested for: 87PHN8273; Last   Rx:11Oct2017 Ordered   8  Probiotic CAPS; TAKE 1 CAPSULE Daily Recorded   9  Turmeric Curcumin Oral Capsule; Therapy: (Recorded:20Mar2017) to Recorded    Allergies    1   Iodine SOLN    Results/Data  Urine Dip Automated- POC 31PYG4718 12:00AM Renee Grippe     Test Name Result Flag Reference   Color Yellow     Clarity Cloudy     Leukocytes 3 +++     Nitrite NEG     Blood 3+++     Bilirubin NEG     Urobilinogen 0 2     Protein 30+     Ph 5 0     Specific Gravity 1 025     Ketone NEG     Glucose NEG         Plan  UTI (urinary tract infection)    · Ciprofloxacin HCl - 250 MG Oral Tablet; 1 TAB BID   · Urine Dip Automated- POC; Status:Complete - Retrospective By Protocol Authorization;    Done: 32OEK5265 12:00AM    Future Appointments    Date/Time Provider Specialty Site   08/10/2018 10:00 AM Mati Vick [facility] Obstetrics/Gynecology [facility] OB     Signatures   Electronically signed by : Tushar Cui, ; Jan 2 2018  2:44PM EST                       (Author)    Electronically signed by : Nito Patel DO; Jan 2 2018  2:48PM EST                       (Author)

## 2018-01-27 ENCOUNTER — APPOINTMENT (OUTPATIENT)
Dept: LAB | Age: 63
End: 2018-01-27
Payer: COMMERCIAL

## 2018-01-27 ENCOUNTER — TRANSCRIBE ORDERS (OUTPATIENT)
Dept: ADMINISTRATIVE | Age: 63
End: 2018-01-27

## 2018-01-27 DIAGNOSIS — E55.9 AVITAMINOSIS D: ICD-10-CM

## 2018-01-27 DIAGNOSIS — M87.00 ASEPTIC NECROSIS BONE (HCC): Primary | ICD-10-CM

## 2018-01-27 DIAGNOSIS — Z79.899 NEED FOR PROPHYLACTIC CHEMOTHERAPY: ICD-10-CM

## 2018-01-27 DIAGNOSIS — M87.00 ASEPTIC NECROSIS BONE (HCC): ICD-10-CM

## 2018-01-27 DIAGNOSIS — M15.0 PRIMARY GENERALIZED HYPERTROPHIC OSTEOARTHROSIS: ICD-10-CM

## 2018-01-27 LAB
25(OH)D3 SERPL-MCNC: 24.2 NG/ML (ref 30–100)
ALBUMIN SERPL BCP-MCNC: 4.3 G/DL (ref 3.5–5)
ALP SERPL-CCNC: 30 U/L (ref 46–116)
ALT SERPL W P-5'-P-CCNC: 33 U/L (ref 12–78)
ANION GAP SERPL CALCULATED.3IONS-SCNC: 5 MMOL/L (ref 4–13)
AST SERPL W P-5'-P-CCNC: 36 U/L (ref 5–45)
BACTERIA UR QL AUTO: ABNORMAL /HPF
BASOPHILS # BLD AUTO: 0.03 THOUSANDS/ΜL (ref 0–0.1)
BASOPHILS NFR BLD AUTO: 1 % (ref 0–1)
BILIRUB SERPL-MCNC: 0.56 MG/DL (ref 0.2–1)
BILIRUB UR QL STRIP: NEGATIVE
BUN SERPL-MCNC: 19 MG/DL (ref 5–25)
CALCIUM SERPL-MCNC: 9.6 MG/DL (ref 8.3–10.1)
CHLORIDE SERPL-SCNC: 102 MMOL/L (ref 100–108)
CLARITY UR: CLEAR
CO2 SERPL-SCNC: 30 MMOL/L (ref 21–32)
COLOR UR: YELLOW
CREAT SERPL-MCNC: 0.88 MG/DL (ref 0.6–1.3)
EOSINOPHIL # BLD AUTO: 0.28 THOUSAND/ΜL (ref 0–0.61)
EOSINOPHIL NFR BLD AUTO: 7 % (ref 0–6)
ERYTHROCYTE [DISTWIDTH] IN BLOOD BY AUTOMATED COUNT: 15.6 % (ref 11.6–15.1)
ERYTHROCYTE [SEDIMENTATION RATE] IN BLOOD: 13 MM/HOUR (ref 0–20)
GFR SERPL CREATININE-BSD FRML MDRD: 70 ML/MIN/1.73SQ M
GLUCOSE P FAST SERPL-MCNC: 90 MG/DL (ref 65–99)
GLUCOSE UR STRIP-MCNC: NEGATIVE MG/DL
HCT VFR BLD AUTO: 35.8 % (ref 34.8–46.1)
HGB BLD-MCNC: 11.4 G/DL (ref 11.5–15.4)
HGB UR QL STRIP.AUTO: NEGATIVE
KETONES UR STRIP-MCNC: NEGATIVE MG/DL
LEUKOCYTE ESTERASE UR QL STRIP: ABNORMAL
LYMPHOCYTES # BLD AUTO: 0.76 THOUSANDS/ΜL (ref 0.6–4.47)
LYMPHOCYTES NFR BLD AUTO: 20 % (ref 14–44)
MCH RBC QN AUTO: 26.5 PG (ref 26.8–34.3)
MCHC RBC AUTO-ENTMCNC: 31.8 G/DL (ref 31.4–37.4)
MCV RBC AUTO: 83 FL (ref 82–98)
MONOCYTES # BLD AUTO: 0.33 THOUSAND/ΜL (ref 0.17–1.22)
MONOCYTES NFR BLD AUTO: 9 % (ref 4–12)
NEUTROPHILS # BLD AUTO: 2.38 THOUSANDS/ΜL (ref 1.85–7.62)
NEUTS SEG NFR BLD AUTO: 63 % (ref 43–75)
NITRITE UR QL STRIP: NEGATIVE
NON-SQ EPI CELLS URNS QL MICRO: ABNORMAL /HPF
NRBC BLD AUTO-RTO: 0 /100 WBCS
PH UR STRIP.AUTO: 7.5 [PH] (ref 4.5–8)
PLATELET # BLD AUTO: 474 THOUSANDS/UL (ref 149–390)
PMV BLD AUTO: 9.5 FL (ref 8.9–12.7)
POTASSIUM SERPL-SCNC: 3.4 MMOL/L (ref 3.5–5.3)
PROT SERPL-MCNC: 8.1 G/DL (ref 6.4–8.2)
PROT UR STRIP-MCNC: NEGATIVE MG/DL
PTH-INTACT SERPL-MCNC: 14.2 PG/ML (ref 14–72)
RBC # BLD AUTO: 4.31 MILLION/UL (ref 3.81–5.12)
RBC #/AREA URNS AUTO: ABNORMAL /HPF
SODIUM SERPL-SCNC: 137 MMOL/L (ref 136–145)
SP GR UR STRIP.AUTO: 1 (ref 1–1.03)
TSH SERPL DL<=0.05 MIU/L-ACNC: 1.28 UIU/ML (ref 0.36–3.74)
UROBILINOGEN UR QL STRIP.AUTO: 0.2 E.U./DL
WBC # BLD AUTO: 3.79 THOUSAND/UL (ref 4.31–10.16)
WBC #/AREA URNS AUTO: ABNORMAL /HPF

## 2018-01-27 PROCEDURE — 84165 PROTEIN E-PHORESIS SERUM: CPT

## 2018-01-27 PROCEDURE — 83516 IMMUNOASSAY NONANTIBODY: CPT

## 2018-01-27 PROCEDURE — 83970 ASSAY OF PARATHORMONE: CPT

## 2018-01-27 PROCEDURE — 85652 RBC SED RATE AUTOMATED: CPT

## 2018-01-27 PROCEDURE — 80053 COMPREHEN METABOLIC PANEL: CPT

## 2018-01-27 PROCEDURE — 36415 COLL VENOUS BLD VENIPUNCTURE: CPT

## 2018-01-27 PROCEDURE — 84080 ASSAY ALKALINE PHOSPHATASES: CPT

## 2018-01-27 PROCEDURE — 81001 URINALYSIS AUTO W/SCOPE: CPT | Performed by: INTERNAL MEDICINE

## 2018-01-27 PROCEDURE — 85025 COMPLETE CBC W/AUTO DIFF WBC: CPT

## 2018-01-27 PROCEDURE — 82306 VITAMIN D 25 HYDROXY: CPT

## 2018-01-27 PROCEDURE — 84443 ASSAY THYROID STIM HORMONE: CPT

## 2018-01-27 PROCEDURE — 82523 COLLAGEN CROSSLINKS: CPT

## 2018-01-27 PROCEDURE — 84165 PROTEIN E-PHORESIS SERUM: CPT | Performed by: PATHOLOGY

## 2018-01-29 ENCOUNTER — TELEPHONE (OUTPATIENT)
Dept: FAMILY MEDICINE CLINIC | Facility: CLINIC | Age: 63
End: 2018-01-29

## 2018-01-29 LAB
ALBUMIN SERPL ELPH-MCNC: 4.8 G/DL (ref 3.5–5)
ALBUMIN SERPL ELPH-MCNC: 60.8 % (ref 52–65)
ALPHA1 GLOB SERPL ELPH-MCNC: 0.3 G/DL (ref 0.1–0.4)
ALPHA1 GLOB SERPL ELPH-MCNC: 3.8 % (ref 2.5–5)
ALPHA2 GLOB SERPL ELPH-MCNC: 0.66 G/DL (ref 0.4–1.2)
ALPHA2 GLOB SERPL ELPH-MCNC: 8.3 % (ref 7–13)
BETA GLOB ABNORMAL SERPL ELPH-MCNC: 0.66 G/DL (ref 0.4–0.8)
BETA1 GLOB SERPL ELPH-MCNC: 8.4 % (ref 5–13)
BETA2 GLOB SERPL ELPH-MCNC: 5.9 % (ref 2–8)
BETA2+GAMMA GLOB SERPL ELPH-MCNC: 0.47 G/DL (ref 0.2–0.5)
GAMMA GLOB ABNORMAL SERPL ELPH-MCNC: 1.01 G/DL (ref 0.5–1.6)
GAMMA GLOB SERPL ELPH-MCNC: 12.8 % (ref 12–22)
IGG/ALB SER: 1.55 {RATIO} (ref 1.1–1.8)
PROT PATTERN SERPL ELPH-IMP: NORMAL
PROT SERPL-MCNC: 7.9 G/DL (ref 6.4–8.2)
TTG IGA SER-ACNC: <2 U/ML (ref 0–3)
TTG IGG SER-ACNC: 3 U/ML (ref 0–5)

## 2018-01-29 NOTE — TELEPHONE ENCOUNTER
There were some wbc in urine, but if pt has no dysuria, fever, or low back pain, then no need to be treated

## 2018-01-29 NOTE — TELEPHONE ENCOUNTER
DR CÁRDENAS ORDERED BL WK AND URINE AT [facility], SHE REC'D A CALL FROM DR CÁRDENAS OFFICE SAYING HER WHITE BLOOD COUNT ON URINE WAS HIGH AND WAS SHE HAVING ANY UTI SYMP, THE PT SAID No, THEY DIDN'T GIVE ANY OTHER DIRECTION, DR CÁRDENAS IS OUT ON A FAMILY EMERGENCY LEAVE,   CAN YOU LOOK AT RESULTS AND GIVE DIRECTION

## 2018-01-30 LAB — COLLAGEN CTX SERPL-MCNC: 148 PG/ML

## 2018-02-01 LAB — ALP BONE SERPL-MCNC: 6.6 UG/L

## 2018-02-10 ENCOUNTER — TRANSCRIBE ORDERS (OUTPATIENT)
Dept: ADMINISTRATIVE | Age: 63
End: 2018-02-10

## 2018-02-10 ENCOUNTER — APPOINTMENT (OUTPATIENT)
Dept: LAB | Age: 63
End: 2018-02-10
Payer: COMMERCIAL

## 2018-02-10 DIAGNOSIS — E87.6 HYPOPOTASSEMIA: Primary | ICD-10-CM

## 2018-02-10 DIAGNOSIS — E87.6 HYPOPOTASSEMIA: ICD-10-CM

## 2018-02-10 LAB — POTASSIUM SERPL-SCNC: 3.7 MMOL/L (ref 3.5–5.3)

## 2018-02-10 PROCEDURE — 84132 ASSAY OF SERUM POTASSIUM: CPT

## 2018-02-10 PROCEDURE — 36415 COLL VENOUS BLD VENIPUNCTURE: CPT

## 2018-08-10 ENCOUNTER — ANNUAL EXAM (OUTPATIENT)
Dept: OBGYN CLINIC | Facility: CLINIC | Age: 63
End: 2018-08-10
Payer: COMMERCIAL

## 2018-08-10 VITALS — WEIGHT: 145 LBS | SYSTOLIC BLOOD PRESSURE: 146 MMHG | DIASTOLIC BLOOD PRESSURE: 72 MMHG | BODY MASS INDEX: 26.52 KG/M2

## 2018-08-10 DIAGNOSIS — Z12.4 CERVICAL CANCER SCREENING: ICD-10-CM

## 2018-08-10 DIAGNOSIS — Z01.419 ENCOUNTER FOR GYNECOLOGICAL EXAMINATION WITHOUT ABNORMAL FINDING: Primary | ICD-10-CM

## 2018-08-10 DIAGNOSIS — Z11.51 ENCOUNTER FOR SCREENING FOR HUMAN PAPILLOMAVIRUS (HPV): ICD-10-CM

## 2018-08-10 DIAGNOSIS — Z12.39 ENCOUNTER FOR SCREENING FOR MALIGNANT NEOPLASM OF BREAST: ICD-10-CM

## 2018-08-10 PROCEDURE — S0612 ANNUAL GYNECOLOGICAL EXAMINA: HCPCS | Performed by: PHYSICIAN ASSISTANT

## 2018-08-10 PROCEDURE — G0145 SCR C/V CYTO,THINLAYER,RESCR: HCPCS | Performed by: PHYSICIAN ASSISTANT

## 2018-08-10 PROCEDURE — 87624 HPV HI-RISK TYP POOLED RSLT: CPT | Performed by: PHYSICIAN ASSISTANT

## 2018-08-10 RX ORDER — HYDROCHLOROTHIAZIDE 25 MG/1
TABLET ORAL
Refills: 3 | COMMUNITY
Start: 2018-06-28 | End: 2019-02-18 | Stop reason: ALTCHOICE

## 2018-08-10 RX ORDER — IBANDRONATE SODIUM 150 MG/1
TABLET, FILM COATED ORAL
Refills: 3 | COMMUNITY
Start: 2018-05-26 | End: 2019-02-18 | Stop reason: ALTCHOICE

## 2018-08-10 RX ORDER — PHENAZOPYRIDINE HYDROCHLORIDE 95 MG/1
TABLET ORAL
COMMUNITY
End: 2019-08-07 | Stop reason: ALTCHOICE

## 2018-08-10 RX ORDER — MULTIVITAMIN
1 CAPSULE ORAL DAILY
COMMUNITY
End: 2022-03-16

## 2018-08-10 RX ORDER — FENOFIBRATE 145 MG/1
145 TABLET, COATED ORAL DAILY
Refills: 3 | COMMUNITY
Start: 2018-07-10 | End: 2018-10-08 | Stop reason: SDUPTHER

## 2018-08-10 RX ORDER — NORTRIPTYLINE HYDROCHLORIDE 10 MG/1
30 CAPSULE ORAL DAILY
Refills: 3 | COMMUNITY
Start: 2018-07-10 | End: 2018-10-08 | Stop reason: SDUPTHER

## 2018-08-10 NOTE — PROGRESS NOTES
Syed Barrier   1955    CC:  Yearly exam    S:  58 y o  female here for yearly exam  She is postmenopausal and has had no vaginal bleeding  She denies vaginal discharge, itching, odor or dryness  She is sexually active and is tearful talking about severe pain with intercourse  This has been going on for quite some time but she never mentioned it before  She had a DEXA in 2017 and was told by her PCP to see rheumatology; she saw Dr Genie Foster who recommended high dose vitamin D but no further treatment (she has been on Boniva for the past 3 years) - she doesn't know why she was sent to rheumatology  I reviewed her DEXA from 11/2017 which shows some improvement from her previous DEXA, so I recommended continuing her Boniva for now       Last Pap 7/23/2013 neg (at [facility])  Last Mammo 7/20/16 neg  Last DEXA 2017 osteoporosis  Last Colonoscopy 2014 neg      Current Outpatient Prescriptions:     Calcium Carb-Cholecalciferol 600-800 MG-UNIT TABS, Take 2 tablets by mouth daily, Disp: , Rfl:     Multiple Vitamin (MULTIVITAMIN) capsule, Take 1 capsule by mouth daily, Disp: , Rfl:     Cholecalciferol (CVS VITAMIN D3) 1000 units capsule, Take 1 capsule by mouth daily, Disp: , Rfl:     fenofibrate (TRICOR) 145 mg tablet, Take 145 mg by mouth daily, Disp: , Rfl: 3    hydrochlorothiazide (HYDRODIURIL) 25 mg tablet, TAKE ONE TABLET BY MOUTH HONORIO DAY, Disp: , Rfl: 3    ibandronate (BONIVA) 150 MG tablet, TAKE 1 TAB PER MONTH W/ 8-10 OZ OF WATER, REMAIN UPRIGHT & DONT EAT/DRINK X 1 HOUR, Disp: , Rfl: 3    meclizine (ANTIVERT) 25 mg tablet, Take 2 tablets by mouth every 8 (eight) hours as needed for dizziness, Disp: 30 tablet, Rfl: 0    nortriptyline (PAMELOR) 10 mg capsule, Take 30 mg by mouth daily, Disp: , Rfl: 3    phenazopyridine (PYRIDIUM) 95 MG tablet, Take by mouth, Disp: , Rfl:     Probiotic Product (PROBIOTIC-10 PO), Take 1 capsule by mouth daily, Disp: , Rfl:     TURMERIC CURCUMIN PO, Take by mouth, Disp: , Rfl:   Social History     Social History    Marital status: /Civil Union     Spouse name: N/A    Number of children: N/A    Years of education: N/A     Occupational History    Not on file  Social History Main Topics    Smoking status: Never Smoker    Smokeless tobacco: Never Used    Alcohol use No    Drug use: No    Sexual activity: Yes     Partners: Male     Other Topics Concern    Not on file     Social History Narrative    No narrative on file     Family History   Problem Relation Age of Onset    Colon cancer Mother     Breast cancer Mother     Heart attack Father      Past Medical History:   Diagnosis Date    Hyperlipidemia     Hypertension     Hypertension         O:  Blood pressure 146/72, weight 65 8 kg (145 lb), not currently breastfeeding  Patient appears well and is not in distress  Neck is supple without masses  Breasts are symmetrical without mass, tenderness, nipple discharge, skin changes or adenopathy  Abdomen is soft and nontender without masses  External genitals are normal without lesions or rashes  Vagina is normal without discharge or bleeding  Cervix is normal without discharge or lesion  Uterus is normal, mobile, nontender without palpable mass  Adnexa are normal, nontender, without palpable mass  A:  Yearly exam      P:   Pap and HPV today   Mammo slip given   Moderate atrophy seen on exam today - suggested    silicone based lubricants - if not effective then    Estrace cream   RTO one year for yearly exam or sooner as needed

## 2018-08-15 LAB
HPV RRNA GENITAL QL NAA+PROBE: NORMAL
LAB AP GYN PRIMARY INTERPRETATION: NORMAL
Lab: NORMAL

## 2018-09-13 DIAGNOSIS — M81.0 OSTEOPOROSIS, UNSPECIFIED OSTEOPOROSIS TYPE, UNSPECIFIED PATHOLOGICAL FRACTURE PRESENCE: Primary | ICD-10-CM

## 2018-09-13 RX ORDER — IBANDRONATE SODIUM 150 MG/1
150 TABLET, FILM COATED ORAL
Qty: 3 TABLET | Refills: 3 | Status: SHIPPED | OUTPATIENT
Start: 2018-09-13 | End: 2018-11-13 | Stop reason: SDUPTHER

## 2018-10-08 DIAGNOSIS — F32.A DEPRESSION, UNSPECIFIED DEPRESSION TYPE: Primary | ICD-10-CM

## 2018-10-08 DIAGNOSIS — E78.00 HYPERCHOLESTEROLEMIA: ICD-10-CM

## 2018-10-09 RX ORDER — NORTRIPTYLINE HYDROCHLORIDE 10 MG/1
CAPSULE ORAL
Qty: 270 CAPSULE | Refills: 3 | Status: SHIPPED | OUTPATIENT
Start: 2018-10-09 | End: 2018-11-13 | Stop reason: SDUPTHER

## 2018-10-09 RX ORDER — FENOFIBRATE 145 MG/1
TABLET, COATED ORAL
Qty: 90 TABLET | Refills: 3 | Status: SHIPPED | OUTPATIENT
Start: 2018-10-09 | End: 2018-11-13 | Stop reason: SDUPTHER

## 2018-10-11 ENCOUNTER — OFFICE VISIT (OUTPATIENT)
Dept: FAMILY MEDICINE CLINIC | Facility: CLINIC | Age: 63
End: 2018-10-11
Payer: COMMERCIAL

## 2018-10-11 VITALS
SYSTOLIC BLOOD PRESSURE: 150 MMHG | HEART RATE: 74 BPM | HEIGHT: 63 IN | DIASTOLIC BLOOD PRESSURE: 80 MMHG | WEIGHT: 147 LBS | BODY MASS INDEX: 26.05 KG/M2 | TEMPERATURE: 97.5 F

## 2018-10-11 DIAGNOSIS — E78.00 HYPERCHOLESTEROLEMIA: ICD-10-CM

## 2018-10-11 DIAGNOSIS — Z00.00 WELL ADULT ON ROUTINE HEALTH CHECK: Primary | ICD-10-CM

## 2018-10-11 DIAGNOSIS — R53.83 OTHER FATIGUE: ICD-10-CM

## 2018-10-11 DIAGNOSIS — Z23 NEED FOR VACCINATION: ICD-10-CM

## 2018-10-11 DIAGNOSIS — E55.9 VITAMIN D DEFICIENCY: ICD-10-CM

## 2018-10-11 DIAGNOSIS — Z23 NEED FOR INFLUENZA VACCINATION: ICD-10-CM

## 2018-10-11 PROCEDURE — 99396 PREV VISIT EST AGE 40-64: CPT | Performed by: FAMILY MEDICINE

## 2018-10-11 PROCEDURE — 90471 IMMUNIZATION ADMIN: CPT

## 2018-10-11 PROCEDURE — 90686 IIV4 VACC NO PRSV 0.5 ML IM: CPT

## 2018-10-11 NOTE — PROGRESS NOTES
Patient ID: Toni Rosen is a 58 y o  female  HPI: 58 y  o female presenting for her annual check up  She is being followed by rheumatology for osteoporosis and is due for labs to check on her vitaim D deficiency and cholesterol  Also, she needs a flu shot  Due for colonoscopy next year  SUBJECTIVE    Family History   Problem Relation Age of Onset    Colon cancer Mother     Breast cancer Mother     Heart attack Father      Social History     Social History    Marital status: /Civil Union     Spouse name: N/A    Number of children: N/A    Years of education: N/A     Occupational History    Not on file       Social History Main Topics    Smoking status: Never Smoker    Smokeless tobacco: Never Used    Alcohol use No    Drug use: No    Sexual activity: Yes     Partners: Male     Other Topics Concern    Not on file     Social History Narrative    No narrative on file     Past Medical History:   Diagnosis Date    Hyperlipidemia     Hypertension     Hypertension      Past Surgical History:   Procedure Laterality Date    EYE SURGERY      TUBAL LIGATION       Allergies   Allergen Reactions    Iodine Rash       Current Outpatient Prescriptions:     Calcium Carb-Cholecalciferol 600-800 MG-UNIT TABS, Take 2 tablets by mouth daily, Disp: , Rfl:     Cholecalciferol (CVS VITAMIN D3) 1000 units capsule, Take 1 capsule by mouth daily, Disp: , Rfl:     fenofibrate (TRICOR) 145 mg tablet, TAKE 1 TABLET EVERY DAY, Disp: 90 tablet, Rfl: 3    hydrochlorothiazide (HYDRODIURIL) 25 mg tablet, TAKE ONE TABLET BY MOUTH HONORIO DAY, Disp: , Rfl: 3    ibandronate (BONIVA) 150 MG tablet, TAKE 1 TAB PER MONTH W/ 8-10 OZ OF WATER, REMAIN UPRIGHT & DONT EAT/DRINK X 1 HOUR, Disp: , Rfl: 3    ibandronate (BONIVA) 150 MG tablet, Take 1 tablet (150 mg total) by mouth every 30 (thirty) days, Disp: 3 tablet, Rfl: 3    meclizine (ANTIVERT) 25 mg tablet, Take 2 tablets by mouth every 8 (eight) hours as needed for dizziness, Disp: 30 tablet, Rfl: 0    Multiple Vitamin (MULTIVITAMIN) capsule, Take 1 capsule by mouth daily, Disp: , Rfl:     nortriptyline (PAMELOR) 10 mg capsule, TAKE 3 CAPSULES EVERY DAY, Disp: 270 capsule, Rfl: 3    phenazopyridine (PYRIDIUM) 95 MG tablet, Take by mouth, Disp: , Rfl:     Probiotic Product (PROBIOTIC-10 PO), Take 1 capsule by mouth daily, Disp: , Rfl:     TURMERIC CURCUMIN PO, Take by mouth, Disp: , Rfl:     Review of Systems  Constitutional:     Denies fever, chills ,fatigue ,weakness ,weight loss, weight gain     ENT: Denies earache ,loss of hearing ,nosebleed, nasal discharge,nasal congestion ,sore throat ,hoarseness  Pulmonary: Denies shortness of breath ,cough  ,dyspnea on exertion, orthopnea  ,PND   Cardiovascular:  Denies bradycardia , tachycardia  ,palpations, lower extremity edema leg, claudication  Breast:  Denies new or changing breast lumps ,nipple discharge ,nipple changes  Abdomen:  Denies abdominal pain , anorexia , indigestion, nausea, vomiting, constipation, diarrhea  Musculoskeletal: Denies myalgias, arthralgias, joint swelling, joint stiffness , limb pain, limb swelling  Gu: denies dysuria, polyuria  Skin: Denies skin rash, skin lesion, skin wound, itching, dry skin  Neuro: Denies headache, numbness, tingling, confusion, loss of consciousness, dizziness, vertigo  Psychiatric: Denies feelings of depression, suicidal ideation, anxiety, sleep disturbances    OBJECTIVE  /80   Pulse 74   Temp 97 5 °F (36 4 °C)   Ht 5' 2 5" (1 588 m)   Wt 66 7 kg (147 lb)   LMP  (LMP Unknown)   BMI 26 46 kg/m²   Constitutional:   NAD, well appearing and well nourished      ENT:   Conjunctiva and lids: no injection, edema, or discharge     Pupils and iris: LOS bilaterally    External inspection of ears and nose: normal without deformities or discharge  Otoscopic exam: Canals patent without erythema         Nasal mucosa, septum and turbinates: Normal or edema or discharge Oropharynx:  Moist mucosa, normal tongue and tonsils without lesions  No erythema        Pulmonary:Respiratory effort normal rate and rhythm, no increased work of breathing  Auscultation of lungs:  Clear bilaterally with no adventitious breath sounds       Cardiovascular: regular rate and rhythm, S1 and S2, no murmur, no edema and/or varicosities of LE      Abdomen: Soft and non-distended     Positive bowel sounds      No heptomegaly or splenomegaly      Gu: no suprapubic tenderness or CVA tenderness, no urethral discharge  Lymphatic:  No anterior or posterior cervical lymphadenopathy         Musculoskeletal:  Gait and station: Normal gait      Digits and nails normal without clubbing or cyanosis       Inspection/palpation of joints, bones, and muscles:  No joint tenderness, swelling, full active and passive range of motion       Skin: Normal skin turgor and no rashes      Neuro:   Normal reflexes     Psych:   alert and oriented to person, place and time     normal mood and affect       Assessment/Plan:Diagnoses and all orders for this visit:    Well adult on routine health check    Vitamin D deficiency  -     Vitamin D 25 hydroxy; Future    Other fatigue  -     Comprehensive metabolic panel; Future  -     TSH, 3rd generation; Future    Hypercholesterolemia  -     Lipid Panel with Direct LDL reflex; Future    Need for vaccination  -     Cancel: influenza vaccine, [id number], quadrivalent, recombinant, PF, 0 5 mL, for patients 18 yr+ (FLUBLOK)    Need for influenza vaccination  -     SYRINGE/SINGLE-DOSE VIAL: influenza vaccine, [id number], quadrivalent, 0 5 mL, preservative-free, for patients 3+ yr ([facility])    Other orders  -     Cancel: CT colonoscopy diagnostic wo contrast; Future  -     Cancel: Ambulatory referral to Colorectal Surgery; Future      I will see her back in one year or sooner prn

## 2018-10-20 ENCOUNTER — APPOINTMENT (OUTPATIENT)
Dept: LAB | Age: 63
End: 2018-10-20
Payer: COMMERCIAL

## 2018-10-20 DIAGNOSIS — R53.83 OTHER FATIGUE: ICD-10-CM

## 2018-10-20 DIAGNOSIS — E78.00 HYPERCHOLESTEROLEMIA: ICD-10-CM

## 2018-10-20 DIAGNOSIS — E55.9 VITAMIN D DEFICIENCY: ICD-10-CM

## 2018-10-20 LAB
25(OH)D3 SERPL-MCNC: 41.5 NG/ML (ref 30–100)
ALBUMIN SERPL BCP-MCNC: 3.8 G/DL (ref 3.5–5)
ALP SERPL-CCNC: 26 U/L (ref 46–116)
ALT SERPL W P-5'-P-CCNC: 34 U/L (ref 12–78)
ANION GAP SERPL CALCULATED.3IONS-SCNC: 4 MMOL/L (ref 4–13)
AST SERPL W P-5'-P-CCNC: 33 U/L (ref 5–45)
BILIRUB SERPL-MCNC: 0.34 MG/DL (ref 0.2–1)
BUN SERPL-MCNC: 19 MG/DL (ref 5–25)
CALCIUM SERPL-MCNC: 9 MG/DL (ref 8.3–10.1)
CHLORIDE SERPL-SCNC: 104 MMOL/L (ref 100–108)
CHOLEST SERPL-MCNC: 142 MG/DL (ref 50–200)
CO2 SERPL-SCNC: 29 MMOL/L (ref 21–32)
CREAT SERPL-MCNC: 0.9 MG/DL (ref 0.6–1.3)
GFR SERPL CREATININE-BSD FRML MDRD: 68 ML/MIN/1.73SQ M
GLUCOSE P FAST SERPL-MCNC: 93 MG/DL (ref 65–99)
HDLC SERPL-MCNC: 40 MG/DL (ref 40–60)
LDLC SERPL CALC-MCNC: 86 MG/DL (ref 0–100)
POTASSIUM SERPL-SCNC: 4.1 MMOL/L (ref 3.5–5.3)
PROT SERPL-MCNC: 7.4 G/DL (ref 6.4–8.2)
SODIUM SERPL-SCNC: 137 MMOL/L (ref 136–145)
TRIGL SERPL-MCNC: 80 MG/DL
TSH SERPL DL<=0.05 MIU/L-ACNC: 1.19 UIU/ML (ref 0.36–3.74)

## 2018-10-20 PROCEDURE — 82306 VITAMIN D 25 HYDROXY: CPT

## 2018-10-20 PROCEDURE — 80061 LIPID PANEL: CPT

## 2018-10-20 PROCEDURE — 36415 COLL VENOUS BLD VENIPUNCTURE: CPT

## 2018-10-20 PROCEDURE — 80053 COMPREHEN METABOLIC PANEL: CPT

## 2018-10-20 PROCEDURE — 84443 ASSAY THYROID STIM HORMONE: CPT

## 2018-11-13 ENCOUNTER — TELEPHONE (OUTPATIENT)
Dept: FAMILY MEDICINE CLINIC | Facility: CLINIC | Age: 63
End: 2018-11-13

## 2018-11-13 DIAGNOSIS — M81.0 OSTEOPOROSIS, UNSPECIFIED OSTEOPOROSIS TYPE, UNSPECIFIED PATHOLOGICAL FRACTURE PRESENCE: ICD-10-CM

## 2018-11-13 DIAGNOSIS — F32.A DEPRESSION, UNSPECIFIED DEPRESSION TYPE: ICD-10-CM

## 2018-11-13 DIAGNOSIS — E78.00 HYPERCHOLESTEROLEMIA: ICD-10-CM

## 2018-11-13 DIAGNOSIS — I10 ESSENTIAL HYPERTENSION: Primary | ICD-10-CM

## 2018-11-13 RX ORDER — NORTRIPTYLINE HYDROCHLORIDE 10 MG/1
30 CAPSULE ORAL DAILY
Qty: 270 CAPSULE | Refills: 0 | Status: CANCELLED | OUTPATIENT
Start: 2018-11-13

## 2018-11-13 RX ORDER — IBANDRONATE SODIUM 150 MG/1
150 TABLET, FILM COATED ORAL
Qty: 3 TABLET | Refills: 0 | Status: CANCELLED | OUTPATIENT
Start: 2018-11-13

## 2018-11-13 RX ORDER — HYDROCHLOROTHIAZIDE 25 MG/1
25 TABLET ORAL DAILY
Qty: 90 TABLET | Refills: 0 | Status: CANCELLED | OUTPATIENT
Start: 2018-11-13

## 2018-11-13 RX ORDER — FENOFIBRATE 145 MG/1
145 TABLET, COATED ORAL DAILY
Qty: 90 TABLET | Refills: 3 | Status: CANCELLED | OUTPATIENT
Start: 2018-11-13

## 2018-11-13 RX ORDER — IBANDRONATE SODIUM 150 MG/1
150 TABLET, FILM COATED ORAL
Qty: 3 TABLET | Refills: 3 | Status: SHIPPED | OUTPATIENT
Start: 2018-11-13 | End: 2019-02-11 | Stop reason: SDUPTHER

## 2018-11-13 RX ORDER — FENOFIBRATE 145 MG/1
145 TABLET, COATED ORAL DAILY
Qty: 90 TABLET | Refills: 3 | Status: SHIPPED | OUTPATIENT
Start: 2018-11-13 | End: 2019-02-11 | Stop reason: SDUPTHER

## 2018-11-13 RX ORDER — NORTRIPTYLINE HYDROCHLORIDE 10 MG/1
30 CAPSULE ORAL DAILY
Qty: 270 CAPSULE | Refills: 3 | Status: SHIPPED | OUTPATIENT
Start: 2018-11-13 | End: 2019-02-11 | Stop reason: SDUPTHER

## 2018-11-13 RX ORDER — HYDROCHLOROTHIAZIDE 25 MG/1
25 TABLET ORAL DAILY
Qty: 90 TABLET | Refills: 3 | Status: SHIPPED | OUTPATIENT
Start: 2018-11-13 | End: 2019-02-11 | Stop reason: SDUPTHER

## 2018-11-13 NOTE — TELEPHONE ENCOUNTER
1)  She took Boniva on Sept 15 the in Oct she forgot to take it until Oct 23, when should she take it this month? Pl adv     2) she has different insurance, she wants all new scripts for 90 day to [facility]      Boniva 150 mg  Finofibrate 145 mg  Hydrochlorothiazide 25 mg  Nortriptyline Hcl 10 mg  tid

## 2019-02-11 DIAGNOSIS — M81.0 OSTEOPOROSIS, UNSPECIFIED OSTEOPOROSIS TYPE, UNSPECIFIED PATHOLOGICAL FRACTURE PRESENCE: ICD-10-CM

## 2019-02-11 DIAGNOSIS — F32.A DEPRESSION, UNSPECIFIED DEPRESSION TYPE: ICD-10-CM

## 2019-02-11 DIAGNOSIS — I10 ESSENTIAL HYPERTENSION: ICD-10-CM

## 2019-02-11 DIAGNOSIS — E78.00 HYPERCHOLESTEROLEMIA: ICD-10-CM

## 2019-02-11 RX ORDER — FENOFIBRATE 145 MG/1
145 TABLET, COATED ORAL DAILY
Qty: 90 TABLET | Refills: 3 | Status: SHIPPED | OUTPATIENT
Start: 2019-02-11 | End: 2019-07-31 | Stop reason: SDUPTHER

## 2019-02-11 RX ORDER — HYDROCHLOROTHIAZIDE 25 MG/1
25 TABLET ORAL DAILY
Qty: 90 TABLET | Refills: 3 | Status: SHIPPED | OUTPATIENT
Start: 2019-02-11 | End: 2019-02-18 | Stop reason: ALTCHOICE

## 2019-02-11 RX ORDER — NORTRIPTYLINE HYDROCHLORIDE 10 MG/1
30 CAPSULE ORAL DAILY
Qty: 270 CAPSULE | Refills: 3 | Status: SHIPPED | OUTPATIENT
Start: 2019-02-11 | End: 2020-04-15

## 2019-02-11 RX ORDER — IBANDRONATE SODIUM 150 MG/1
150 TABLET, FILM COATED ORAL
Qty: 3 TABLET | Refills: 3 | Status: SHIPPED | OUTPATIENT
Start: 2019-02-11 | End: 2020-01-08

## 2019-02-18 ENCOUNTER — TELEPHONE (OUTPATIENT)
Dept: FAMILY MEDICINE CLINIC | Facility: CLINIC | Age: 64
End: 2019-02-18

## 2019-02-18 ENCOUNTER — CLINICAL SUPPORT (OUTPATIENT)
Dept: FAMILY MEDICINE CLINIC | Facility: CLINIC | Age: 64
End: 2019-02-18
Payer: MEDICARE

## 2019-02-18 DIAGNOSIS — R31.9 URINARY TRACT INFECTION WITH HEMATURIA, SITE UNSPECIFIED: Primary | ICD-10-CM

## 2019-02-18 DIAGNOSIS — N30.00 ACUTE CYSTITIS WITHOUT HEMATURIA: Primary | ICD-10-CM

## 2019-02-18 DIAGNOSIS — N39.0 URINARY TRACT INFECTION WITH HEMATURIA, SITE UNSPECIFIED: Primary | ICD-10-CM

## 2019-02-18 LAB
SL AMB  POCT GLUCOSE, UA: NEGATIVE
SL AMB LEUKOCYTE ESTERASE,UA: ABNORMAL
SL AMB POCT BILIRUBIN,UA: NEGATIVE
SL AMB POCT BLOOD,UA: ABNORMAL
SL AMB POCT CLARITY,UA: ABNORMAL
SL AMB POCT COLOR,UA: YELLOW
SL AMB POCT KETONES,UA: NEGATIVE
SL AMB POCT NITRITE,UA: NEGATIVE
SL AMB POCT PH,UA: 7
SL AMB POCT SPECIFIC GRAVITY,UA: 1.01
SL AMB POCT URINE PROTEIN: ABNORMAL
SL AMB POCT UROBILINOGEN: 0.2

## 2019-02-18 PROCEDURE — 81002 URINALYSIS NONAUTO W/O SCOPE: CPT | Performed by: FAMILY MEDICINE

## 2019-02-18 RX ORDER — CIPROFLOXACIN 250 MG/1
500 TABLET, FILM COATED ORAL EVERY 12 HOURS SCHEDULED
Qty: 10 TABLET | Refills: 0 | Status: SHIPPED | OUTPATIENT
Start: 2019-02-18 | End: 2019-02-23

## 2019-02-18 NOTE — PROGRESS NOTES
Pt is here for urine test, she has complaints of burning and blood in urine  Per Dr Seth Wells, there is a large amount of blood and there is an infection  Will send antibiotic to pharmacy, pt aware  Also aware to call with any questions or concerns

## 2019-02-19 ENCOUNTER — TELEPHONE (OUTPATIENT)
Dept: FAMILY MEDICINE CLINIC | Facility: CLINIC | Age: 64
End: 2019-02-19

## 2019-02-19 NOTE — TELEPHONE ENCOUNTER
Was seen yesterday for UTI     She picked up her meds and it says take 2 tabs every 12 hrs for 5 days     Pharm only gave her 10 pills and said that's what was called in     Please call the pharm to fix this

## 2019-05-28 ENCOUNTER — TRANSCRIBE ORDERS (OUTPATIENT)
Dept: ADMINISTRATIVE | Facility: HOSPITAL | Age: 64
End: 2019-05-28

## 2019-05-28 DIAGNOSIS — M81.0 SENILE OSTEOPOROSIS: Primary | ICD-10-CM

## 2019-07-31 DIAGNOSIS — E78.00 HYPERCHOLESTEROLEMIA: ICD-10-CM

## 2019-07-31 RX ORDER — FENOFIBRATE 145 MG/1
145 TABLET, COATED ORAL DAILY
Qty: 90 TABLET | Refills: 3 | Status: SHIPPED | OUTPATIENT
Start: 2019-07-31 | End: 2020-10-19

## 2019-08-07 ENCOUNTER — OFFICE VISIT (OUTPATIENT)
Dept: FAMILY MEDICINE CLINIC | Facility: CLINIC | Age: 64
End: 2019-08-07
Payer: MEDICARE

## 2019-08-07 VITALS
TEMPERATURE: 98.4 F | SYSTOLIC BLOOD PRESSURE: 132 MMHG | HEART RATE: 74 BPM | BODY MASS INDEX: 26.44 KG/M2 | DIASTOLIC BLOOD PRESSURE: 76 MMHG | WEIGHT: 149.2 LBS | HEIGHT: 63 IN

## 2019-08-07 DIAGNOSIS — E55.9 VITAMIN D DEFICIENCY: ICD-10-CM

## 2019-08-07 DIAGNOSIS — Z23 NEED FOR VACCINATION: ICD-10-CM

## 2019-08-07 DIAGNOSIS — I10 ESSENTIAL HYPERTENSION: ICD-10-CM

## 2019-08-07 DIAGNOSIS — Z12.39 BREAST CANCER SCREENING: ICD-10-CM

## 2019-08-07 DIAGNOSIS — Z00.00 WELCOME TO MEDICARE PREVENTIVE VISIT: Primary | ICD-10-CM

## 2019-08-07 DIAGNOSIS — E78.00 HYPERCHOLESTEROLEMIA: ICD-10-CM

## 2019-08-07 PROBLEM — M87.00: Status: ACTIVE | Noted: 2019-08-07

## 2019-08-07 PROCEDURE — G0402 INITIAL PREVENTIVE EXAM: HCPCS | Performed by: FAMILY MEDICINE

## 2019-08-07 PROCEDURE — G0403 EKG FOR INITIAL PREVENT EXAM: HCPCS | Performed by: FAMILY MEDICINE

## 2019-08-07 PROCEDURE — G0009 ADMIN PNEUMOCOCCAL VACCINE: HCPCS | Performed by: FAMILY MEDICINE

## 2019-08-07 PROCEDURE — 99213 OFFICE O/P EST LOW 20 MIN: CPT | Performed by: FAMILY MEDICINE

## 2019-08-07 PROCEDURE — 90670 PCV13 VACCINE IM: CPT | Performed by: FAMILY MEDICINE

## 2019-08-07 RX ORDER — HYDROCHLOROTHIAZIDE 25 MG/1
25 TABLET ORAL DAILY
COMMUNITY
End: 2020-01-08

## 2019-08-07 NOTE — PROGRESS NOTES
Assessment and Plan:     Problem List Items Addressed This Visit     None         History of Present Illness:     [facility]  She is up to date with dexa,  Imnmunizations, colon screening, but needs a mammogram      Patient Care Team:  Eren Mansfield DO as PCP - MD Clarisa Dempsey MD     Problem List:     There is no problem list on file for this patient       Past Medical and Surgical History:     Past Medical History:   Diagnosis Date    Hyperlipidemia     Hypertension     Hypertension      Past Surgical History:   Procedure Laterality Date    EYE SURGERY      TUBAL LIGATION        Family History:     Family History   Problem Relation Age of Onset    Colon cancer Mother    [de-identified] Breast cancer Mother     Heart attack Father       Social History:     Social History     Tobacco Use   Smoking Status Never Smoker   Smokeless Tobacco Never Used     Social History     Substance and Sexual Activity   Alcohol Use No     Social History     Substance and Sexual Activity   Drug Use No      Medications and Allergies:     Current Outpatient Medications   Medication Sig Dispense Refill    Calcium Carb-Cholecalciferol 600-800 MG-UNIT TABS Take 2 tablets by mouth daily      Cholecalciferol ([employer] VITAMIN D3) 1000 units capsule Take 1 capsule by mouth daily      fenofibrate (TRICOR) 145 mg tablet Take 1 tablet (145 mg total) by mouth daily 90 tablet 3    ibandronate (BONIVA) 150 MG tablet Take 1 tablet (150 mg total) by mouth every 30 (thirty) days 3 tablet 3    meclizine (ANTIVERT) 25 mg tablet Take 2 tablets by mouth every 8 (eight) hours as needed for dizziness 30 tablet 0    Multiple Vitamin (MULTIVITAMIN) capsule Take 1 capsule by mouth daily      nortriptyline (PAMELOR) 10 mg capsule Take 3 capsules (30 mg total) by mouth daily 270 capsule 3    phenazopyridine (PYRIDIUM) 95 MG tablet Take by mouth      Probiotic Product (PROBIOTIC-10 PO) Take 1 capsule by mouth daily      TURMERIC CURCUMIN PO Take by mouth       No current facility-administered medications for this visit  Allergies   Allergen Reactions    Iodine Rash      Immunizations:     Immunization History   Administered Date(s) Administered    INFLUENZA 09/26/2013    Influenza Quadrivalent, 6-35 Months IM 10/14/2014, 11/19/2015, 11/16/2016, 10/09/2017    Influenza TIV (IM) 12/14/2012    Influenza, injectable, quadrivalent, preservative free 0 5 mL 10/11/2018    Tdap 07/18/2012    Tuberculin Skin Test-PPD Intradermal 11/15/2000    Zoster 11/22/2013      Medicare Screening Tests and Risk Assessments:     Clemente Amato is here for her Welcome to Medicare visit  Health Risk Assessment:  Patient rates overall health as excellent  Patient feels that their physical health rating is Much better  Eyesight was rated as Same  Hearing was rated as Same  Patient feels that their emotional and mental health rating is Much better  Pain experienced by patient in the last 7 days has been None  Patient states that she has experienced no weight loss or gain in last 6 months  Emotional/Mental Health:  Patient has not been feeling nervous/anxious  PHQ-9 Depression Screening:    Frequency of the following problems over the past two weeks:      1  Little interest or pleasure in doing things: 0 - not at all      2  Feeling down, depressed, or hopeless: 0 - not at all  PHQ-2 Score: 0          Broken Bones/Falls: Fall Risk Assessment:    In the past year, patient has experienced: No history of falling in past year          Bladder/Bowel:  Patient has not leaked urine accidently in the last six months  Patient reports no loss of bowel control  Immunizations:  Patient has had a flu vaccination within the last year  Patient has received a pneumonia shot  Patient has received a shingles shot  Patient has received tetanus/diphtheria shot   Date of tetanus/diphtheria shot: 7/18/2012    Home Safety:  Patient does not have trouble with stairs inside or outside of their home  Patient currently reports that there are no safety hazards present in home, working smoke alarms, working carbon monoxide detectors  Preventative Screenings:   Breast cancer screening performed, 8/15/2018  colon cancer screen completed, 7/12/2019  cholesterol screen completed, 10/20/2018  glaucoma eye exam completed,     Nutrition:  Current diet: Regular and Limited junk food with servings of the following:    Medications:  Patient is currently taking over-the-counter supplements  Patient is able to manage medications  Lifestyle Choices:  Patient reports no tobacco use  Patient has not smoked or used tobacco in the past   Patient reports no alcohol use  Patient drives a vehicle  Patient wears seat belt  Current level of exercise of physical activity described by patient as: moderate   Activities of Daily Living:  Can get out of bed by his or her self, able to dress self, able to make own meals, able to do own shopping, able to bathe self, can do own laundry/housekeeping, can manage own money, pay bills and track expenses    Previous Hospitalizations:  No hospitalization or ED visit in past 12 months        Advanced Directives:  Patient has decided on a power of   Patient has spoken to designated power of   Patient has not completed advanced directive          Preventative Screening/Counseling:      Cardiovascular:      General: Screening Current          Diabetes:      General: Screening Current          Colorectal Cancer:      General: Screening Current          Cervical Cancer:      General: Screening Current          Osteoporosis:      General: Screening Current          AAA:      General: Screening Not Indicated          Glaucoma:      General: Screening Current          HIV:      General: Screening Not Indicated          Hepatitis C:      General: Screening Not Indicated        Advanced Directives:   Patient has living will for healthcare, has durable POA for healthcare, patient has an advanced directive  Information on ACP and/or AD provided  5 wishes given  End of life assessment reviewed with patient  Provider agrees with end of life decisions   Immunizations:      Influenza: Influenza UTD This Year      Pneumococcal: Risks & Benefits Discussed      Shingrix: Risks & Benefits Discussed      Hepatitis B (Low risk patients): Series Not Indicated      Hepatitis B (Medium to high risk patients): Patient Declines      Zostavax: Zostavax Vaccine UTD      TDAP: Tdap Vaccine UTD          BMI Counseling: Body mass index is 26 85 kg/m²  Discussed the patient's BMI with her  The BMI is above average  BMI counseling and education was provided to the patient  Nutrition recommendations include 3-5 servings of fruits/vegetables daily

## 2019-08-07 NOTE — PATIENT INSTRUCTIONS
Obesity   AMBULATORY CARE:   Obesity  is when your body mass index (BMI) is greater than 30  Your healthcare provider will use your height and weight to measure your BMI  The risks of obesity include  many health problems, such as injuries or physical disability  You may need tests to check for the following:  · Diabetes     · High blood pressure or high cholesterol     · Heart disease     · Gallbladder or liver disease     · Cancer of the colon, breast, prostate, liver, or kidney     · Sleep apnea     · Arthritis or gout  Seek care immediately if:   · You have a severe headache, confusion, or difficulty speaking  · You have weakness on one side of your body  · You have chest pain, sweating, or shortness of breath  Contact your healthcare provider if:   · You have symptoms of gallbladder or liver disease, such as pain in your upper abdomen  · You have knee or hip pain and discomfort while walking  · You have symptoms of diabetes, such as intense hunger and thirst, and frequent urination  · You have symptoms of sleep apnea, such as snoring or daytime sleepiness  · You have questions or concerns about your condition or care  Treatment for obesity  focuses on helping you lose weight to improve your health  Even a small decrease in BMI can reduce the risk for many health problems  Your healthcare provider will help you set a weight-loss goal   · Lifestyle changes  are the first step in treating obesity  These include making healthy food choices and getting regular physical activity  Your healthcare provider may suggest a weight-loss program that involves coaching, education, and therapy  · Medicine  may help you lose weight when it is used with a healthy diet and physical activity  · Surgery  can help you lose weight if you are very obese and have other health problems  There are several types of weight-loss surgery  Ask your healthcare provider for more information    Be successful losing weight:   · Set small, realistic goals  An example of a small goal is to walk for 20 minutes 5 days a week  Anther goal is to lose 5% of your body weight  · Tell friends, family members, and coworkers about your goals  and ask for their support  Ask a friend to lose weight with you, or join a weight-loss support group  · Identify foods or triggers that may cause you to overeat , and find ways to avoid them  Remove tempting high-calorie foods from your home and workplace  Place a bowl of fresh fruit on your kitchen counter  If stress causes you to eat, then find other ways to cope with stress  · Keep a diary to track what you eat and drink  Also write down how many minutes of physical activity you do each day  Weigh yourself once a week and record it in your diary  Eating changes: You will need to eat 500 to 1,000 fewer calories each day than you currently eat to lose 1 to 2 pounds a week  The following changes will help you cut calories:  · Eat smaller portions  Use small plates, no larger than 9 inches in diameter  Fill your plate half full of fruits and vegetables  Measure your food using measuring cups until you know what a serving size looks like  · Eat 3 meals and 1 or 2 snacks each day  Plan your meals in advance  [place] and eat at home most of the time  Eat slowly  · Eat fruits and vegetables at every meal   They are low in calories and high in fiber, which makes you feel full  Do not add butter, margarine, or cream sauce to vegetables  Use herbs to season steamed vegetables  · Eat less fat and fewer fried foods  Eat more baked or grilled chicken and fish  These protein sources are lower in calories and fat than red meat  Limit fast food  Dress your salads with olive oil and vinegar instead of bottled dressing  · Limit the amount of sugar you eat  Do not drink sugary beverages  Limit alcohol  Activity changes:  Physical activity is good for your body in many ways   It helps you burn calories and build strong muscles  It decreases stress and depression, and improves your mood  It can also help you sleep better  Talk to your healthcare provider before you begin an exercise program   · Exercise for at least 30 minutes 5 days a week  Start slowly  Set aside time each day for physical activity that you enjoy and that is convenient for you  It is best to do both weight training and an activity that increases your heart rate, such as walking, bicycling, or swimming  · Find ways to be more active  Do yard work and housecleaning  Walk up the stairs instead of using elevators  Spend your leisure time going to events that require walking, such as outdoor festivals or fairs  This extra physical activity can help you lose weight and keep it off  Follow up with your healthcare provider as directed: You may need to meet with a dietitian  Write down your questions so you remember to ask them during your visits  © 2017 [facility] Information is for End User's use only and may not be sold, redistributed or otherwise used for commercial purposes  All illustrations and images included in CareNotes® are the copyrighted property of [employer]  or Juan Luis Escalera  The above information is an  only  It is not intended as medical advice for individual conditions or treatments  Talk to your doctor, nurse or pharmacist before following any medical regimen to see if it is safe and effective for you  Urinary Incontinence   WHAT YOU NEED TO KNOW:   What is urinary incontinence? Urinary incontinence (UI) is when you lose control of your bladder  What causes UI? UI occurs because your bladder cannot store or empty urine properly  The following are the most common types of UI:  · Stress incontinence  is when you leak urine due to increased bladder pressure  This may happen when you cough, sneeze, or exercise       · Urge incontinence  is when you feel the need to urinate right away and leak urine accidentally  · Mixed incontinence  is when you have both stress and urge UI  What are the signs and symptoms of UI?   · You feel like your bladder does not empty completely when you urinate  · You urinate often and need to urinate immediately  · You leak urine when you sleep, or you wake up with the urge to urinate  · You leak urine when you cough, sneeze, exercise, or laugh  How is UI diagnosed? Your healthcare provider will ask how often you leak urine and whether you have stress or urge symptoms  Tell him which medicines you take, how often you urinate, and how much liquid you drink each day  You may need any of the following tests:  · Urine tests  may show infection or kidney function  · A pelvic exam  may be done to check for blockages  A pelvic exam will also show if your bladder, uterus, or other organs have moved out of place  · An x-ray, ultrasound, or CT  may show problems with parts of your urinary system  You may be given contrast liquid to help your organs show up better in the pictures  Tell the healthcare provider if you have ever had an allergic reaction to contrast liquid  Do not enter the MRI room with anything metal  Metal can cause serious injury  Tell the healthcare provider if you have any metal in or on your body  · A bladder scan  will show how much urine is left in your bladder after you urinate  You will be asked to urinate and then healthcare providers will use a small ultrasound machine to check the urine left in your bladder  · Cystometry  is used to check the function of your urinary system  Your healthcare provider checks the pressure in your bladder while filling it with fluid  Your bladder pressure may also be tested when your bladder is full and while you urinate  How is UI treated? · Medicines  can help strengthen your bladder control      · Electrical stimulation  is used to send a small amount of electrical energy to your pelvic floor muscles  This helps control your bladder function  Electrodes may be placed outside your body or in your rectum  For women, the electrodes may be placed in the vagina  · A bulking agent  may be injected into the wall of your urethra to make it thicker  This helps keep your urethra closed and decreases urine leakage  · Devices  such as a clamp, pessary, or tampon may help stop urine leaks  Ask your healthcare provider for more information about these and other devices  · Surgery  may be needed if other treatments do not work  Several types of surgery can help improve your bladder control  Ask your healthcare provider for more information about the surgery you may need  How can I manage my symptoms? · Do pelvic muscle exercises often  Your pelvic muscles help you stop urinating  Squeeze these muscles tight for 5 seconds, then relax for 5 seconds  Gradually work up to squeezing for 10 seconds  Do 3 sets of 15 repetitions a day, or as directed  This will help strengthen your pelvic muscles and improve bladder control  · A catheter  may be used to help empty your bladder  A catheter is a tiny, plastic tube that is put into your bladder to drain your urine  Your healthcare provider may tell you to use a catheter to prevent your bladder from getting too full and leaking urine  · Keep a UI record  Write down how often you leak urine and how much you leak  Make a note of what you were doing when you leaked urine  · Train your bladder  Go to the bathroom at set times, such as every 2 hours, even if you do not feel the urge to go  You can also try to hold your urine when you feel the urge to go  For example, hold your urine for 5 minutes when you feel the urge to go  As that becomes easier, hold your urine for 10 minutes  · Drink liquids as directed  Ask your healthcare provider how much liquid to drink each day and which liquids are best for you   You may need to limit the amount of liquid you drink to help control your urine leakage  Limit or do not have drinks that contain caffeine or alcohol  Do not drink any liquid right before you go to bed  · Prevent constipation  Eat a variety of high-fiber foods  Good examples are high-fiber cereals, beans, vegetables, and whole-grain breads  Prune juice may help make your bowel movement softer  Walking is the best way to trigger your intestines to have a bowel movement  · Exercise regularly and maintain a healthy weight  Ask your healthcare provider how much you should weigh and about the best exercise plan for you  Weight loss and exercise will decrease pressure on your bladder and help you control your leakage  Ask him to help you create a weight loss plan if you are overweight  When should I seek immediate care? · You have severe pain  · You are confused or cannot think clearly  When should I contact my healthcare provider? · You have a fever  · You see blood in your urine  · You have pain when you urinate  · You have new or worse pain, even after treatment  · Your mouth feels dry or you have vision changes  · Your urine is cloudy or smells bad  · You have questions or concerns about your condition or care  CARE AGREEMENT:   You have the right to help plan your care  Learn about your health condition and how it may be treated  Discuss treatment options with your caregivers to decide what care you want to receive  You always have the right to refuse treatment  The above information is an  only  It is not intended as medical advice for individual conditions or treatments  Talk to your doctor, nurse or pharmacist before following any medical regimen to see if it is safe and effective for you  © 2017 [address] [employer] Information is for End User's use only and may not be sold, redistributed or otherwise used for commercial purposes   All illustrations and images included in CareNotes® are the copyrighted property of [employer] M [employer] Inc  or Juan Luis Escalera  Cigarette Smoking and Your Health   AMBULATORY CARE:   Risks to your health if you smoke:  Nicotine and other chemicals found in tobacco damage every cell in your body  Even if you are a light smoker, you have an increased risk for cancer, heart disease, and lung disease  If you are pregnant or have diabetes, smoking increases your risk for complications  Benefits to your health if you stop smoking:   · You decrease respiratory symptoms such as coughing, wheezing, and shortness of breath  · You reduce your risk for cancers of the lung, mouth, throat, kidney, bladder, pancreas, stomach, and cervix  If you already have cancer, you increase the benefits of chemotherapy  You also reduce your risk for cancer returning or a second cancer from developing  · You reduce your risk for heart disease, blood clots, heart attack, and stroke  · You reduce your risk for lung infections, and diseases such as pneumonia, asthma, chronic bronchitis, and emphysema  · Your circulation improves  More oxygen can be delivered to your body  If you have diabetes, you lower your risk for complications, such as kidney, artery, and eye diseases  You also lower your risk for nerve damage  Nerve damage can lead to amputations, poor vision, and blindness  · You improve your body's ability to heal and to fight infections  Benefits to the health of others if you stop smoking:  Tobacco is harmful to nonsmokers who breathe in your secondhand smoke  The following are ways the health of others around you may improve when you stop smoking:  · You lower the risks for lung cancer and heart disease in nonsmoking adults  · If you are pregnant, you lower the risk for miscarriage, early delivery, low birth weight, and stillbirth  You also lower your baby's risk for SIDS, obesity, developmental delay, and neurobehavioral problems, such as ADHD  · If you have children, you lower their risk for ear infections, colds, pneumonia, bronchitis, and asthma  For more information and support to stop smoking:   · [employer]  Phone: 2- 051 - [phone]  Web Address: www smokefree  gov  Follow up with your healthcare provider as directed:  Write down your questions so you remember to ask them during your visits  © 2017 [facility] Information is for End User's use only and may not be sold, redistributed or otherwise used for commercial purposes  All illustrations and images included in CareNotes® are the copyrighted property of [employer] M [employer] Inc  or Juan Luis Escalera  The above information is an  only  It is not intended as medical advice for individual conditions or treatments  Talk to your doctor, nurse or pharmacist before following any medical regimen to see if it is safe and effective for you  Fall Prevention   AMBULATORY CARE:   Fall prevention  includes ways to make your home and other areas safer  It also includes ways you can move more carefully to prevent a fall  Health conditions that cause changes in your blood pressure, vision, or muscle strength and coordination may increase your risk for falls  Medicines may also increase your risk for falls if they make you dizzy, weak, or sleepy  Call 911 or have someone else call if:   · You have fallen and are unconscious  · You have fallen and cannot move part of your body  Contact your healthcare provider if:   · You have fallen and have pain or a headache  · You have questions or concerns about your condition or care  Fall prevention tips:   · Stand or sit up slowly  This may help you keep your balance and prevent falls  · Use assistive devices as directed  Your healthcare provider may suggest that you use a cane or walker to help you keep your balance  You may need to have grab bars put in your bathroom near the toilet or in the shower      · Wear shoes that fit well and have soles that   Wear shoes both inside and outside  Use slippers with good   Do not wear shoes with high heels  · Wear a personal alarm  This is a device that allows you to call 911 if you fall and need help  Ask your healthcare provider for more information  · Stay active  Exercise can help strengthen your muscles and improve your balance  Your healthcare provider may recommend water aerobics or walking  He or she may also recommend physical therapy to improve your coordination  Never start an exercise program without talking to your healthcare provider first      · Manage your medical conditions  Keep all appointments with your healthcare providers  Visit your eye doctor as directed  Home safety tips:   · Add items to prevent falls in the bathroom  Put nonslip strips on your bath or shower floor to prevent you from slipping  Use a bath mat if you do not have carpet in the bathroom  This will prevent you from falling when you step out of the bath or shower  Use a shower seat so you do not need to stand while you shower  Sit on the toilet or a chair in your bathroom to dry yourself and put on clothing  This will prevent you from losing your balance from drying or dressing yourself while you are standing  · Keep paths clear  Remove books, shoes, and other objects from walkways and stairs  Place cords for telephones and lamps out of the way so that you do not need to walk over them  Tape them down if you cannot move them  Remove small rugs  If you cannot remove a rug, secure it with double-sided tape  This will prevent you from tripping  · Install bright lights in your home  Use night lights to help light paths to the bathroom or kitchen  Always turn on the light before you start walking  · Keep items you use often on shelves within reach  Do not use a step stool to help you reach an item  · Paint or place reflective tape on the edges of your stairs    This will help you see the stairs better  Follow up with your healthcare provider as directed:  Write down your questions so you remember to ask them during your visits  © 2017 [address] [employer] Information is for End User's use only and may not be sold, redistributed or otherwise used for commercial purposes  All illustrations and images included in CareNotes® are the copyrighted property of [employer] M [employer] Inc  or Juan Luis Escalera  The above information is an  only  It is not intended as medical advice for individual conditions or treatments  Talk to your doctor, nurse or pharmacist before following any medical regimen to see if it is safe and effective for you  Advance Directives   WHAT YOU NEED TO KNOW:   What are advance directives? Advance directives are legal documents that state your wishes and plans for medical care  These plans are made ahead of time in case you lose your ability to make decisions for yourself  Advance directives can apply to any medical decision, such as the treatments you want, and if you want to donate organs  What are the types of advance directives? There are many types of advance directives, and each state has rules about how to use them  You may choose a combination of any of the following:  · Living will: This is a written record of the treatment you want  You can also choose which treatments you do not want, which to limit, and which to stop at a certain time  This includes surgery, medicine, IV fluid, and tube feedings  · Durable power of  for healthcare [facility]): This is a written record that states who you want to make healthcare choices for you when you are unable to make them for yourself  This person, called a proxy, is usually a family member or a friend  You may choose more than 1 proxy  · Do not resuscitate (DNR) order:  A DNR order is used in case your heart stops beating or you stop breathing   It is a request not to have certain forms of treatment, such as CPR  A DNR order may be included in other types of advance directives  · Medical directive: This covers the care that you want if you are in a coma, near death, or unable to make decisions for yourself  You can list the treatments you want for each condition  Treatment may include pain medicine, surgery, blood transfusions, dialysis, IV or tube feedings, and a ventilator (breathing machine)  · Values history: This document has questions about your views, beliefs, and how you feel and think about life  This information can help others choose the care that you would choose  Why are advance directives important? An advance directive helps you control your care  Although spoken wishes may be used, it is better to have your wishes written down  Spoken wishes can be misunderstood, or not followed  Treatments may be given even if you do not want them  An advance directive may make it easier for your family to make difficult choices about your care  How do I decide what to put in my advance directives? · Make informed decisions:  Make sure you fully understand treatments or care you may receive  Think about the benefits and problems your decisions could cause for you or your family  Talk to healthcare providers if you have concerns or questions before you write down your wishes  You may also want to talk with your [employer] or , or a   Check your state laws to make sure that what you put in your advance directive is legal      · Sign all forms:  Sign and date your advance directive when you have finished  You may also need 2 witnesses to sign the forms  Witnesses cannot be your doctor or his staff, your spouse, heirs or beneficiaries, people you owe money to, or your chosen proxy  Talk to your family, proxy, and healthcare providers about your advance directive  Give each person a copy, and keep one for yourself in a place you can get to easily   Do not keep it hidden or locked away  · Review and revise your plans: You can revise your advance directive at any time, as long as you are able to make decisions  Review your plan every year, and when there are changes in your life, or your health  When you make changes, let your family, proxy, and healthcare providers know  Give each a new copy  Where can I find more information? · [employer]  [address] , [place]  Phone: 6- 291 - [phone]  Phone: 4- 374 [phone]  Web Address: http://www  aafp org  · [address] [facility])  [address] [address] [facility], [address]  Phone: [phone]  Phone: [phone]  Web Address: Delbert wyman  CARE AGREEMENT:   You have the right to help plan your care  To help with this plan, you must learn about your health condition and treatment options  You must also learn about advance directives and how they are used  Work with your healthcare providers to decide what care will be used to treat you  You always have the right to refuse treatment  The above information is an  only  It is not intended as medical advice for individual conditions or treatments  Talk to your doctor, nurse or pharmacist before following any medical regimen to see if it is safe and effective for you  © 2017 [address] [employer] Information is for End User's use only and may not be sold, redistributed or otherwise used for commercial purposes  All illustrations and images included in CareNotes® are the copyrighted property of [employer] M [employer]  or Juan Luis Escalera

## 2019-08-08 PROBLEM — I10 HYPERTENSION: Status: ACTIVE | Noted: 2019-08-08

## 2019-08-08 NOTE — PROGRESS NOTES
Patient ID: Kimber Lora is a 71 y o  female  HPI: 71 y  o female presenting with chief complaint of hypertension, and hypercholesterolemia  Both are well controlled at this time        SUBJECTIVE    Family History   Problem Relation Age of Onset    Colon cancer Mother     Breast cancer Mother     Heart attack Father      Social History     Socioeconomic History    Marital status: /Civil Union     Spouse name: Not on file    Number of children: Not on file    Years of education: Not on file    Highest education level: Not on file   Occupational History    Not on file   Social Needs    Financial resource strain: Not on file    Food insecurity:     Worry: Not on file     Inability: Not on file    Transportation needs:     Medical: Not on file     Non-medical: Not on file   Tobacco Use    Smoking status: Never Smoker    Smokeless tobacco: Never Used   Substance and Sexual Activity    Alcohol use: No    Drug use: No    Sexual activity: Yes     Partners: Male   Lifestyle    Physical activity:     Days per week: Not on file     Minutes per session: Not on file    Stress: Not on file   Relationships    Social connections:     Talks on phone: Not on file     Gets together: Not on file     Attends Judaism service: Not on file     Active member of club or organization: Not on file     Attends meetings of clubs or organizations: Not on file     Relationship status: Not on file    Intimate partner violence:     Fear of current or ex partner: Not on file     Emotionally abused: Not on file     Physically abused: Not on file     Forced sexual activity: Not on file   Other Topics Concern    Not on file   Social History Narrative    Not on file     Past Medical History:   Diagnosis Date    Hyperlipidemia     Hypertension     Hypertension      Past Surgical History:   Procedure Laterality Date    EYE SURGERY      TUBAL LIGATION       Allergies   Allergen Reactions    Iodine Rash Current Outpatient Medications:     Calcium Carb-Cholecalciferol 600-800 MG-UNIT TABS, Take 2 tablets by mouth daily, Disp: , Rfl:     Cholecalciferol ([employer]S VITAMIN D3) 1000 units capsule, Take 1 capsule by mouth daily, Disp: , Rfl:     Cranberry-Vitamin C-Probiotic (AZO CRANBERRY PO), Take by mouth 2 (two) times a day, Disp: , Rfl:     fenofibrate (TRICOR) 145 mg tablet, Take 1 tablet (145 mg total) by mouth daily, Disp: 90 tablet, Rfl: 3    hydrochlorothiazide (HYDRODIURIL) 25 mg tablet, Take 25 mg by mouth daily, Disp: , Rfl:     ibandronate (BONIVA) 150 MG tablet, Take 1 tablet (150 mg total) by mouth every 30 (thirty) days, Disp: 3 tablet, Rfl: 3    meclizine (ANTIVERT) 25 mg tablet, Take 2 tablets by mouth every 8 (eight) hours as needed for dizziness, Disp: 30 tablet, Rfl: 0    Multiple Vitamin (MULTIVITAMIN) capsule, Take 1 capsule by mouth daily, Disp: , Rfl:     Multiple Vitamins-Minerals (HAIR SKIN NAILS PO), Take by mouth daily, Disp: , Rfl:     nortriptyline (PAMELOR) 10 mg capsule, Take 3 capsules (30 mg total) by mouth daily, Disp: 270 capsule, Rfl: 3    Probiotic Product (PROBIOTIC-10 PO), Take 1 capsule by mouth daily, Disp: , Rfl:     TURMERIC CURCUMIN PO, Take by mouth, Disp: , Rfl:     Review of Systems  Constitutional:     Denies fever, chills ,fatigue ,weakness ,weight loss, weight gain     ENT: Denies earache ,loss of hearing ,nosebleed, nasal discharge,nasal congestion ,sore throat ,hoarseness  Pulmonary: Denies shortness of breath ,cough  ,dyspnea on exertion, orthopnea  ,PND   Cardiovascular:  Denies bradycardia , tachycardia  ,palpations, lower extremity edema leg, claudication  Breast:  Denies new or changing breast lumps ,nipple discharge ,nipple changes  Abdomen:  Denies abdominal pain , anorexia , indigestion, nausea, vomiting, constipation, diarrhea  Musculoskeletal: Denies myalgias, arthralgias, joint swelling, joint stiffness , limb pain, limb swelling  Gu: denies dysuria, polyuria  Skin: Denies skin rash, skin lesion, skin wound, itching, dry skin  Neuro: Denies headache, numbness, tingling, confusion, loss of consciousness, dizziness, vertigo  Psychiatric: Denies feelings of depression, suicidal ideation, anxiety, sleep disturbances    OBJECTIVE  /76   Pulse 74   Temp 98 4 °F (36 9 °C)   Ht 5' 2 5" (1 588 m)   Wt 67 7 kg (149 lb 3 2 oz)   LMP  (LMP Unknown)   BMI 26 85 kg/m²   Constitutional:   NAD, well appearing and well nourished      ENT:   Conjunctiva and lids: no injection, edema, or discharge     Pupils and iris: LOS bilaterally    External inspection of ears and nose: normal without deformities or discharge  Otoscopic exam: Canals patent without erythema  Nasal mucosa, septum and turbinates: Normal or edema or discharge         Oropharynx:  Moist mucosa, normal tongue and tonsils without lesions  No erythema        Pulmonary:Respiratory effort normal rate and rhythm, no increased work of breathing   Auscultation of lungs:  Clear bilaterally with no adventitious breath sounds       Cardiovascular: regular rate and rhythm, S1 and S2, no murmur, no edema and/or varicosities of LE      Abdomen: Soft and non-distended     Positive bowel sounds      No heptomegaly or splenomegaly      Gu: no suprapubic tenderness or CVA tenderness, no urethral discharge  Lymphatic:  No anterior or posterior cervical lymphadenopathy         Musculoskeletal:  Gait and station: Normal gait      Digits and nails normal without clubbing or cyanosis       Inspection/palpation of joints, bones, and muscles:  No joint tenderness, swelling, full active and passive range of motion       Skin: Normal skin turgor and no rashes      Neuro:       Normal reflexes      Psych:   alert and oriented to person, place and time     normal mood and affect       Assessment/Plan:Diagnoses and all orders for this visit:    Breast cancer screening  -     Mammo screening bilateral w 3d & cad; Future    Hypercholesterolemia  -     Lipid Panel with Direct LDL reflex; Future    Essential hypertension  -     Comprehensive metabolic panel; Future    Vitamin D deficiency  -     Vitamin D 25 hydroxy; Future    Need for vaccination  -     PNEUMOCOCCAL CONJUGATE VACCINE 13-VALENT GREATER THAN 6 MONTHS    Other orders  -     hydrochlorothiazide (HYDRODIURIL) 25 mg tablet; Take 25 mg by mouth daily  -     Cranberry-Vitamin C-Probiotic (AZO CRANBERRY PO); Take by mouth 2 (two) times a day  -     Multiple Vitamins-Minerals (HAIR SKIN NAILS PO); Take by mouth daily        Reviewed with patient plan to treat with above plan      Patient instructed to call in 72 hours if not feeling better or if symptoms worsen

## 2019-08-10 ENCOUNTER — APPOINTMENT (OUTPATIENT)
Dept: LAB | Age: 64
End: 2019-08-10
Payer: MEDICARE

## 2019-08-10 DIAGNOSIS — I10 ESSENTIAL HYPERTENSION: ICD-10-CM

## 2019-08-10 DIAGNOSIS — E78.00 HYPERCHOLESTEROLEMIA: ICD-10-CM

## 2019-08-10 DIAGNOSIS — E55.9 VITAMIN D DEFICIENCY: ICD-10-CM

## 2019-08-10 LAB
25(OH)D3 SERPL-MCNC: 42 NG/ML (ref 30–100)
ALBUMIN SERPL BCP-MCNC: 3.9 G/DL (ref 3.5–5)
ALP SERPL-CCNC: 28 U/L (ref 46–116)
ALT SERPL W P-5'-P-CCNC: 29 U/L (ref 12–78)
ANION GAP SERPL CALCULATED.3IONS-SCNC: 3 MMOL/L (ref 4–13)
AST SERPL W P-5'-P-CCNC: 30 U/L (ref 5–45)
BILIRUB SERPL-MCNC: 0.31 MG/DL (ref 0.2–1)
BUN SERPL-MCNC: 18 MG/DL (ref 5–25)
CALCIUM SERPL-MCNC: 9.8 MG/DL (ref 8.3–10.1)
CHLORIDE SERPL-SCNC: 106 MMOL/L (ref 100–108)
CHOLEST SERPL-MCNC: 155 MG/DL (ref 50–200)
CO2 SERPL-SCNC: 30 MMOL/L (ref 21–32)
CREAT SERPL-MCNC: 0.97 MG/DL (ref 0.6–1.3)
GFR SERPL CREATININE-BSD FRML MDRD: 61 ML/MIN/1.73SQ M
GLUCOSE P FAST SERPL-MCNC: 93 MG/DL (ref 65–99)
HDLC SERPL-MCNC: 44 MG/DL (ref 40–60)
LDLC SERPL CALC-MCNC: 99 MG/DL (ref 0–100)
POTASSIUM SERPL-SCNC: 4.4 MMOL/L (ref 3.5–5.3)
PROT SERPL-MCNC: 7.7 G/DL (ref 6.4–8.2)
SODIUM SERPL-SCNC: 139 MMOL/L (ref 136–145)
TRIGL SERPL-MCNC: 61 MG/DL

## 2019-08-10 PROCEDURE — 36415 COLL VENOUS BLD VENIPUNCTURE: CPT

## 2019-08-10 PROCEDURE — 80061 LIPID PANEL: CPT

## 2019-08-10 PROCEDURE — 82306 VITAMIN D 25 HYDROXY: CPT

## 2019-08-10 PROCEDURE — 80053 COMPREHEN METABOLIC PANEL: CPT

## 2019-08-16 ENCOUNTER — HOSPITAL ENCOUNTER (OUTPATIENT)
Dept: RADIOLOGY | Age: 64
Discharge: HOME | End: 2019-08-16
Payer: MEDICARE

## 2019-08-16 VITALS — BODY MASS INDEX: 27.05 KG/M2 | WEIGHT: 147 LBS | HEIGHT: 62 IN

## 2019-08-16 DIAGNOSIS — Z12.39 BREAST CANCER SCREENING: ICD-10-CM

## 2019-08-16 PROCEDURE — 77063 BREAST TOMOSYNTHESIS BI: CPT

## 2019-08-16 PROCEDURE — 77067 SCR MAMMO BI INCL CAD: CPT

## 2019-09-11 ENCOUNTER — ANNUAL EXAM (OUTPATIENT)
Dept: OBGYN CLINIC | Facility: CLINIC | Age: 64
End: 2019-09-11
Payer: MEDICARE

## 2019-09-11 VITALS
DIASTOLIC BLOOD PRESSURE: 80 MMHG | HEIGHT: 62 IN | WEIGHT: 149 LBS | BODY MASS INDEX: 27.42 KG/M2 | SYSTOLIC BLOOD PRESSURE: 128 MMHG

## 2019-09-11 DIAGNOSIS — N95.2 ATROPHIC VAGINITIS: ICD-10-CM

## 2019-09-11 DIAGNOSIS — Z12.31 ENCOUNTER FOR SCREENING MAMMOGRAM FOR MALIGNANT NEOPLASM OF BREAST: ICD-10-CM

## 2019-09-11 DIAGNOSIS — M81.0 AGE-RELATED OSTEOPOROSIS WITHOUT CURRENT PATHOLOGICAL FRACTURE: Primary | ICD-10-CM

## 2019-09-11 PROBLEM — M76.31 IT BAND SYNDROME, RIGHT: Status: ACTIVE | Noted: 2017-03-20

## 2019-09-11 PROBLEM — M22.2X1 PATELLOFEMORAL SYNDROME, RIGHT: Status: ACTIVE | Noted: 2017-03-20

## 2019-09-11 PROBLEM — M70.61 GREATER TROCHANTERIC BURSITIS OF RIGHT HIP: Status: ACTIVE | Noted: 2017-03-20

## 2019-09-11 PROCEDURE — 99213 OFFICE O/P EST LOW 20 MIN: CPT | Performed by: PHYSICIAN ASSISTANT

## 2019-09-11 NOTE — PROGRESS NOTES
Ilana Martines  1955    S:  71 y o  female here for follow-up of her vaginal atrophy  When seen last year she was having significant dyspareunia related to dryness  She has since starting using Astroglide lubricant with excellent results and she now has no discomfort or bleeding with intercourse whatsoever  She is sexually active with her   She is postmenopausal and has had no vaginal bleeding  She is up to date on her mammogram and colonoscopy    She has osteoporosis which is managed with Boniva       Past Medical History:   Diagnosis Date    Hyperlipidemia     Hypertension     Hypertension      Family History   Problem Relation Age of Onset    Colon cancer Mother 46    Breast cancer Mother 48    Heart attack Father     Prostate cancer Father 79    Heart attack Sister     No Known Problems Daughter     No Known Problems Maternal Grandmother     No Known Problems Maternal Grandfather     No Known Problems Paternal Grandmother     No Known Problems Paternal Grandfather     No Known Problems Sister     No Known Problems Daughter     No Known Problems Maternal Aunt     No Known Problems Maternal Aunt     No Known Problems Maternal Aunt      Social History     Socioeconomic History    Marital status: /Civil Union     Spouse name: None    Number of children: None    Years of education: None    Highest education level: None   Occupational History    None   Social Needs    Financial resource strain: None    Food insecurity:     Worry: None     Inability: None    Transportation needs:     Medical: None     Non-medical: None   Tobacco Use    Smoking status: Never Smoker    Smokeless tobacco: Never Used   Substance and Sexual Activity    Alcohol use: No    Drug use: No    Sexual activity: Yes     Partners: Male   Lifestyle    Physical activity:     Days per week: None     Minutes per session: None    Stress: None   Relationships    Social connections:     Talks on phone: None     Gets together: None     Attends Scientology service: None     Active member of club or organization: None     Attends meetings of clubs or organizations: None     Relationship status: None    Intimate partner violence:     Fear of current or ex partner: None     Emotionally abused: None     Physically abused: None     Forced sexual activity: None   Other Topics Concern    None   Social History Narrative    None       Review of Systems   Respiratory: Negative  Cardiovascular: Negative  Gastrointestinal: Negative for constipation and diarrhea  Genitourinary: Negative for difficulty urinating, pelvic pain, vaginal bleeding, vaginal discharge, itching or odor  O:  /80 (BP Location: Right arm, Patient Position: Sitting, Cuff Size: Standard)   Ht 5' 2 21" (1 58 m)   Wt 67 6 kg (149 lb)   LMP  (LMP Unknown)   BMI 27 07 kg/m²   She appears well and is in no distress  Abdomen is soft and nontender  Left breast is normal without mass, tenderness, nipple discharge or adenopathy  Right breast is normal without mass, tenderness, nipple discharge, or adenopathy  External genitals are normal without lesions or rashes  Vagina is normal, no discharge or bleeding noted  Cervix is normal, no lesions or discharge  Uterus is nontender, no masses  Adnexa are nontender, no pelvic masses appreciated    A/P:  Vaginal atrophy  Continue Astroglide lubricant PRN  Call if this becomes ineffective    Osteoporosis  Continue Boniva, calcium, vitamin D, weight bearing exercise       RTO one year, sooner PRN

## 2019-10-24 ENCOUNTER — IMMUNIZATIONS (OUTPATIENT)
Dept: FAMILY MEDICINE CLINIC | Facility: CLINIC | Age: 64
End: 2019-10-24
Payer: MEDICARE

## 2019-10-24 DIAGNOSIS — Z23 ENCOUNTER FOR IMMUNIZATION: ICD-10-CM

## 2019-10-24 PROCEDURE — 90662 IIV NO PRSV INCREASED AG IM: CPT | Performed by: FAMILY MEDICINE

## 2019-10-24 PROCEDURE — G0008 ADMIN INFLUENZA VIRUS VAC: HCPCS | Performed by: FAMILY MEDICINE

## 2019-12-02 ENCOUNTER — HOSPITAL ENCOUNTER (OUTPATIENT)
Dept: RADIOLOGY | Age: 64
Discharge: HOME | End: 2019-12-02
Payer: MEDICARE

## 2019-12-02 DIAGNOSIS — M81.0 SENILE OSTEOPOROSIS: ICD-10-CM

## 2019-12-02 PROCEDURE — 77080 DXA BONE DENSITY AXIAL: CPT

## 2020-01-06 ENCOUNTER — TELEPHONE (OUTPATIENT)
Dept: FAMILY MEDICINE CLINIC | Facility: CLINIC | Age: 65
End: 2020-01-06

## 2020-01-06 NOTE — TELEPHONE ENCOUNTER
She wants to know when she first started Graves,  Dr Jus Marinelli discussed taking her off of it  Pl adv

## 2020-01-08 DIAGNOSIS — M81.0 OSTEOPOROSIS, UNSPECIFIED OSTEOPOROSIS TYPE, UNSPECIFIED PATHOLOGICAL FRACTURE PRESENCE: ICD-10-CM

## 2020-01-08 DIAGNOSIS — I10 ESSENTIAL HYPERTENSION: Primary | ICD-10-CM

## 2020-01-08 RX ORDER — HYDROCHLOROTHIAZIDE 25 MG/1
TABLET ORAL
Qty: 90 TABLET | Refills: 0 | Status: SHIPPED | OUTPATIENT
Start: 2020-01-08 | End: 2020-05-27

## 2020-01-08 RX ORDER — IBANDRONATE SODIUM 150 MG/1
TABLET, FILM COATED ORAL
Qty: 3 TABLET | Refills: 0 | Status: SHIPPED | OUTPATIENT
Start: 2020-01-08 | End: 2020-07-27

## 2020-04-15 DIAGNOSIS — F32.A DEPRESSION, UNSPECIFIED DEPRESSION TYPE: ICD-10-CM

## 2020-04-15 RX ORDER — NORTRIPTYLINE HYDROCHLORIDE 10 MG/1
CAPSULE ORAL
Qty: 270 CAPSULE | Refills: 3 | Status: SHIPPED | OUTPATIENT
Start: 2020-04-15 | End: 2021-02-08 | Stop reason: SDUPTHER

## 2020-05-27 DIAGNOSIS — I10 ESSENTIAL HYPERTENSION: ICD-10-CM

## 2020-05-27 RX ORDER — HYDROCHLOROTHIAZIDE 25 MG/1
TABLET ORAL
Qty: 90 TABLET | Refills: 0 | Status: SHIPPED | OUTPATIENT
Start: 2020-05-27 | End: 2020-07-27

## 2020-07-01 ENCOUNTER — TELEPHONE (OUTPATIENT)
Dept: FAMILY MEDICINE CLINIC | Facility: CLINIC | Age: 65
End: 2020-07-01

## 2020-07-01 DIAGNOSIS — Z12.39 BREAST CANCER SCREENING: Primary | ICD-10-CM

## 2020-07-07 ENCOUNTER — APPOINTMENT (OUTPATIENT)
Dept: RADIOLOGY | Age: 65
End: 2020-07-07
Payer: MEDICARE

## 2020-07-07 ENCOUNTER — OFFICE VISIT (OUTPATIENT)
Dept: URGENT CARE | Age: 65
End: 2020-07-07
Payer: MEDICARE

## 2020-07-07 VITALS
WEIGHT: 150 LBS | TEMPERATURE: 98.2 F | HEART RATE: 93 BPM | RESPIRATION RATE: 16 BRPM | BODY MASS INDEX: 27.6 KG/M2 | HEIGHT: 62 IN | OXYGEN SATURATION: 99 % | DIASTOLIC BLOOD PRESSURE: 88 MMHG | SYSTOLIC BLOOD PRESSURE: 154 MMHG

## 2020-07-07 DIAGNOSIS — S93.401A SPRAIN OF RIGHT ANKLE, UNSPECIFIED LIGAMENT, INITIAL ENCOUNTER: Primary | ICD-10-CM

## 2020-07-07 DIAGNOSIS — R52 PAIN: ICD-10-CM

## 2020-07-07 PROCEDURE — 99213 OFFICE O/P EST LOW 20 MIN: CPT | Performed by: NURSE PRACTITIONER

## 2020-07-07 PROCEDURE — 73610 X-RAY EXAM OF ANKLE: CPT

## 2020-07-07 PROCEDURE — G0463 HOSPITAL OUTPT CLINIC VISIT: HCPCS | Performed by: NURSE PRACTITIONER

## 2020-07-07 NOTE — PROGRESS NOTES
[facility]        NAME: Henry Lei is a 76 y o  female  : 1955    MRN: [id number]  DATE: 2020  TIME: 4:16 PM    Assessment and Plan   Sprain of right ankle, unspecified ligament, initial encounter [S93 401A]  1  Sprain of right ankle, unspecified ligament, initial encounter  XR ankle 3+ vw right    Brace         Patient Instructions     Denies crutches  Given brace of R ankle  NSAID OTC prn for pain  Cold compress, decrease weight bearing  Follow up with PCP in 1-3 days  Proceed to  ER if symptoms worsen  Chief Complaint     Chief Complaint   Patient presents with    Fall     Pt fell down a stair at the pool 2 hours ago  She's complaining of R ankle pain and swelling, an abraision to her L knee and an abraision to her nose  Denies hitting her head  History of Present Illness       HPI   Presents to clinic with complaint of right ankle pain  States she was going towards a swimming pool, missed a step and fell forward twisting the right ankle  Currently in mild to moderate pain  States the swelling has gone down a little bit  Achy pain  No radiation  No weakness of the right ankle  Denies trauma to the head  Review of Systems   Review of Systems   Musculoskeletal: Positive for gait problem and joint swelling ( right ankle)  Skin: Negative for wound  Neurological: Negative for weakness and numbness  Hematological: Does not bruise/bleed easily           Current Medications       Current Outpatient Medications:     Calcium Carb-Cholecalciferol 600-800 MG-UNIT TABS, Take 2 tablets by mouth daily, Disp: , Rfl:     Cholecalciferol (CVS VITAMIN D3) 1000 units capsule, Take 1 capsule by mouth daily, Disp: , Rfl:     Cranberry-Vitamin C-Probiotic (AZO CRANBERRY PO), Take by mouth 2 (two) times a day, Disp: , Rfl:     fenofibrate (TRICOR) 145 mg tablet, Take 1 tablet (145 mg total) by mouth daily, Disp: 90 tablet, Rfl: 3    hydrochlorothiazide (HYDRODIURIL) 25 mg tablet, TAKE 1 TABLET DAILY, Disp: 90 tablet, Rfl: 0    ibandronate (BONIVA) 150 MG tablet, TAKE 1 TABLET EVERY 30 DAYS, Disp: 3 tablet, Rfl: 0    meclizine (ANTIVERT) 25 mg tablet, Take 2 tablets by mouth every 8 (eight) hours as needed for dizziness, Disp: 30 tablet, Rfl: 0    Multiple Vitamin (MULTIVITAMIN) capsule, Take 1 capsule by mouth daily, Disp: , Rfl:     nortriptyline (PAMELOR) 10 mg capsule, TAKE 3 CAPSULES (=30MG)    DAILY, Disp: 270 capsule, Rfl: 3    Probiotic Product (PROBIOTIC-10 PO), Take 1 capsule by mouth daily, Disp: , Rfl:     TURMERIC CURCUMIN PO, Take by mouth, Disp: , Rfl:     Current Allergies     Allergies as of 07/07/2020 - Reviewed 07/07/2020   Allergen Reaction Noted    Iodine Rash 04/09/2013            The following portions of the patient's history were reviewed and updated as appropriate: allergies, current medications, past family history, past medical history, past social history, past surgical history and problem list      Past Medical History:   Diagnosis Date    Hyperlipidemia     Hypertension     Hypertension        Past Surgical History:   Procedure Laterality Date    EYE SURGERY      TUBAL LIGATION         Family History   Problem Relation Age of Onset    Colon cancer Mother 46    Breast cancer Mother 48    Heart attack Father     Prostate cancer Father 79    Heart attack Sister     No Known Problems Daughter     No Known Problems Maternal Grandmother     No Known Problems Maternal Grandfather     No Known Problems Paternal Grandmother     No Known Problems Paternal Grandfather     No Known Problems Sister     No Known Problems Daughter     No Known Problems Maternal Aunt     No Known Problems Maternal Aunt     No Known Problems Maternal Aunt          Medications have been verified          Objective   /88 (BP Location: Left arm, Patient Position: Sitting)   Pulse 93   Temp 98 2 °F (36 8 °C) (Temporal)   Resp 16   Ht 5' 2" (1 575 m)   Wt 68 kg (150 lb)   LMP  (LMP Unknown)   SpO2 99%   BMI 27 44 kg/m²        Physical Exam     Physical Exam   Constitutional: She appears well-developed  No distress  Musculoskeletal: She exhibits edema (Lateral aspect of the right ankle has moderate swelling  No redness  No skin break  Tender to palpation  Medial aspect of the right ankle is normal )  Full range of motion of the right ankle but with pain during internal rotation  No footdrop  Capillary refills of all the toes are normal  Limping, favoring the R ankle   Neurological: No sensory deficit

## 2020-07-07 NOTE — PATIENT INSTRUCTIONS

## 2020-07-26 DIAGNOSIS — M81.0 OSTEOPOROSIS, UNSPECIFIED OSTEOPOROSIS TYPE, UNSPECIFIED PATHOLOGICAL FRACTURE PRESENCE: ICD-10-CM

## 2020-07-26 DIAGNOSIS — I10 ESSENTIAL HYPERTENSION: ICD-10-CM

## 2020-07-27 RX ORDER — IBANDRONATE SODIUM 150 MG/1
TABLET, FILM COATED ORAL
Qty: 3 TABLET | Refills: 0 | Status: SHIPPED | OUTPATIENT
Start: 2020-07-27 | End: 2020-10-19

## 2020-07-27 RX ORDER — HYDROCHLOROTHIAZIDE 25 MG/1
TABLET ORAL
Qty: 90 TABLET | Refills: 0 | Status: SHIPPED | OUTPATIENT
Start: 2020-07-27 | End: 2020-10-19

## 2020-08-05 ENCOUNTER — TELEPHONE (OUTPATIENT)
Dept: FAMILY MEDICINE CLINIC | Facility: CLINIC | Age: 65
End: 2020-08-05

## 2020-08-05 DIAGNOSIS — Z12.31 BREAST CANCER SCREENING BY MAMMOGRAM: Primary | ICD-10-CM

## 2020-08-31 ENCOUNTER — HOSPITAL ENCOUNTER (OUTPATIENT)
Dept: RADIOLOGY | Age: 65
Discharge: HOME | End: 2020-08-31
Payer: MEDICARE

## 2020-08-31 VITALS — WEIGHT: 150 LBS | HEIGHT: 62 IN | BODY MASS INDEX: 27.6 KG/M2

## 2020-08-31 DIAGNOSIS — Z12.31 ENCOUNTER FOR SCREENING MAMMOGRAM FOR MALIGNANT NEOPLASM OF BREAST: ICD-10-CM

## 2020-08-31 DIAGNOSIS — Z12.39 BREAST CANCER SCREENING: ICD-10-CM

## 2020-08-31 PROCEDURE — 77067 SCR MAMMO BI INCL CAD: CPT

## 2020-08-31 PROCEDURE — 77063 BREAST TOMOSYNTHESIS BI: CPT

## 2020-09-30 ENCOUNTER — ANNUAL EXAM (OUTPATIENT)
Dept: OBGYN CLINIC | Facility: CLINIC | Age: 65
End: 2020-09-30
Payer: MEDICARE

## 2020-09-30 VITALS
SYSTOLIC BLOOD PRESSURE: 134 MMHG | BODY MASS INDEX: 27.97 KG/M2 | HEIGHT: 62 IN | DIASTOLIC BLOOD PRESSURE: 82 MMHG | WEIGHT: 152 LBS

## 2020-09-30 DIAGNOSIS — Z12.31 ENCOUNTER FOR SCREENING MAMMOGRAM FOR MALIGNANT NEOPLASM OF BREAST: ICD-10-CM

## 2020-09-30 DIAGNOSIS — Z01.419 ENCNTR FOR GYN EXAM (GENERAL) (ROUTINE) W/O ABN FINDINGS: Primary | ICD-10-CM

## 2020-09-30 PROCEDURE — G0101 CA SCREEN;PELVIC/BREAST EXAM: HCPCS | Performed by: PHYSICIAN ASSISTANT

## 2020-09-30 NOTE — PROGRESS NOTES
Elijah Jacobo   1955    CC:  Yearly exam    S:  76 y o  female here for yearly exam  She is postmenopausal and has had no vaginal bleeding  She denies vaginal discharge, itching, odor or dryness  Sexual activity: She is sexually active without pain, bleeding or dryness  Last Pap: 8/10/18 neg/neg  Last Mammo:  8/31/2020 neg  Last Colonoscopy:  2014 neg  Last DEXA: 12/2/19 osteoporosis     We reviewed [facility] guidelines for Pap testing       Family hx of breast cancer: mother  Family hx of ovarian cancer: no  Family hx of colon cancer: mother      Current Outpatient Medications:     Calcium Carb-Cholecalciferol 600-800 MG-UNIT TABS, Take 2 tablets by mouth daily, Disp: , Rfl:     Cholecalciferol (CVS VITAMIN D3) 1000 units capsule, Take 1 capsule by mouth daily, Disp: , Rfl:     Cranberry-Vitamin C-Probiotic (AZO CRANBERRY PO), Take by mouth 2 (two) times a day, Disp: , Rfl:     fenofibrate (TRICOR) 145 mg tablet, Take 1 tablet (145 mg total) by mouth daily, Disp: 90 tablet, Rfl: 3    hydrochlorothiazide (HYDRODIURIL) 25 mg tablet, TAKE 1 TABLET DAILY, Disp: 90 tablet, Rfl: 0    ibandronate (BONIVA) 150 MG tablet, TAKE 1 TABLET EVERY 30 DAYS, Disp: 3 tablet, Rfl: 0    meclizine (ANTIVERT) 25 mg tablet, Take 2 tablets by mouth every 8 (eight) hours as needed for dizziness, Disp: 30 tablet, Rfl: 0    Multiple Vitamin (MULTIVITAMIN) capsule, Take 1 capsule by mouth daily, Disp: , Rfl:     nortriptyline (PAMELOR) 10 mg capsule, TAKE 3 CAPSULES (=30MG)    DAILY, Disp: 270 capsule, Rfl: 3    Probiotic Product (PROBIOTIC-10 PO), Take 1 capsule by mouth daily, Disp: , Rfl:     TURMERIC CURCUMIN PO, Take by mouth, Disp: , Rfl:   Social History     Socioeconomic History    Marital status: /Civil Union     Spouse name: Not on file    Number of children: Not on file    Years of education: Not on file    Highest education level: Not on file   Occupational History    Not on file   Social Needs  Financial resource strain: Not on file   [address] insecurity     Worry: Not on file     Inability: Not on file   [employer] needs     Medical: Not on file     Non-medical: Not on file   Tobacco Use    Smoking status: Never Smoker    Smokeless tobacco: Never Used   Substance and Sexual Activity    Alcohol use: No    Drug use: No    Sexual activity: Yes     Partners: Male   Lifestyle    Physical activity     Days per week: Not on file     Minutes per session: Not on file    Stress: Not on file   Relationships    Social connections     Talks on phone: Not on file     Gets together: Not on file     Attends Worship service: Not on file     Active member of club or organization: Not on file     Attends meetings of clubs or organizations: Not on file     Relationship status: Not on file    Intimate partner violence     Fear of current or ex partner: Not on file     Emotionally abused: Not on file     Physically abused: Not on file     Forced sexual activity: Not on file   Other Topics Concern    Not on file   Social History Narrative    Not on file     Family History   Problem Relation Age of Onset    Colon cancer Mother 46    Breast cancer Mother 48    Heart attack Father     Prostate cancer Father 79    Heart attack Sister     No Known Problems Daughter     No Known Problems Maternal Grandmother     No Known Problems Maternal Grandfather     No Known Problems Paternal Grandmother     No Known Problems Paternal Grandfather     No Known Problems Sister     No Known Problems Daughter     No Known Problems Maternal Aunt     No Known Problems Maternal Aunt     No Known Problems Maternal Aunt     No Known Problems Son      Past Medical History:   Diagnosis Date    Hyperlipidemia     Hypertension     Hypertension         Review of Systems   Respiratory: Negative  Cardiovascular: Negative  Gastrointestinal: Negative for constipation and diarrhea     Genitourinary: Negative for difficulty urinating, pelvic pain, vaginal bleeding, vaginal discharge, itching or odor  O:  Blood pressure 134/82, height 5' 2 21" (1 58 m), weight 68 9 kg (152 lb), not currently breastfeeding  Patient appears well and is not in distress  Neck is supple without masses  Breasts are symmetrical without mass, tenderness, nipple discharge, skin changes or adenopathy  Abdomen is soft and nontender without masses  External genitals are normal without lesions or rashes  Urethral meatus and urethra are normal  Bladder is normal to palpation  Vagina is normal without discharge or bleeding  Cervix is normal without discharge or lesion  Uterus is normal, mobile, nontender without palpable mass  Adnexa are normal, nontender, without palpable mass  A:   Yearly exam     Osteoporosis    P:   Pap no longer indicated   Mammo slip given   Colonoscopy due   DEXA up to date    RTO one year for yearly exam or sooner as needed

## 2020-10-05 ENCOUNTER — OFFICE VISIT (OUTPATIENT)
Dept: FAMILY MEDICINE CLINIC | Facility: CLINIC | Age: 65
End: 2020-10-05
Payer: MEDICARE

## 2020-10-05 ENCOUNTER — TELEPHONE (OUTPATIENT)
Dept: FAMILY MEDICINE CLINIC | Facility: CLINIC | Age: 65
End: 2020-10-05

## 2020-10-05 VITALS
TEMPERATURE: 98.7 F | WEIGHT: 151 LBS | BODY MASS INDEX: 27.79 KG/M2 | DIASTOLIC BLOOD PRESSURE: 78 MMHG | SYSTOLIC BLOOD PRESSURE: 118 MMHG | HEIGHT: 62 IN | HEART RATE: 76 BPM

## 2020-10-05 DIAGNOSIS — R42 VERTIGO: ICD-10-CM

## 2020-10-05 DIAGNOSIS — L57.0 ACTINIC KERATOSIS: Primary | ICD-10-CM

## 2020-10-05 PROCEDURE — 17000 DESTRUCT PREMALG LESION: CPT | Performed by: FAMILY MEDICINE

## 2020-10-05 PROCEDURE — 99213 OFFICE O/P EST LOW 20 MIN: CPT | Performed by: FAMILY MEDICINE

## 2020-10-05 RX ORDER — MECLIZINE HYDROCHLORIDE 25 MG/1
50 TABLET ORAL EVERY 8 HOURS PRN
Qty: 30 TABLET | Refills: 2 | Status: SHIPPED | OUTPATIENT
Start: 2020-10-05 | End: 2022-07-21

## 2020-10-16 DIAGNOSIS — E78.00 HYPERCHOLESTEROLEMIA: ICD-10-CM

## 2020-10-19 DIAGNOSIS — M81.0 OSTEOPOROSIS, UNSPECIFIED OSTEOPOROSIS TYPE, UNSPECIFIED PATHOLOGICAL FRACTURE PRESENCE: ICD-10-CM

## 2020-10-19 DIAGNOSIS — I10 ESSENTIAL HYPERTENSION: ICD-10-CM

## 2020-10-19 RX ORDER — IBANDRONATE SODIUM 150 MG/1
TABLET, FILM COATED ORAL
Qty: 3 TABLET | Refills: 0 | Status: SHIPPED | OUTPATIENT
Start: 2020-10-19 | End: 2021-11-17 | Stop reason: ALTCHOICE

## 2020-10-19 RX ORDER — FENOFIBRATE 145 MG/1
TABLET, COATED ORAL
Qty: 90 TABLET | Refills: 3 | Status: SHIPPED | OUTPATIENT
Start: 2020-10-19 | End: 2021-10-01

## 2020-10-19 RX ORDER — HYDROCHLOROTHIAZIDE 25 MG/1
TABLET ORAL
Qty: 90 TABLET | Refills: 0 | Status: SHIPPED | OUTPATIENT
Start: 2020-10-19 | End: 2020-12-31

## 2020-10-21 ENCOUNTER — IMMUNIZATIONS (OUTPATIENT)
Dept: FAMILY MEDICINE CLINIC | Facility: CLINIC | Age: 65
End: 2020-10-21
Payer: MEDICARE

## 2020-10-21 DIAGNOSIS — Z23 ENCOUNTER FOR IMMUNIZATION: ICD-10-CM

## 2020-10-21 PROCEDURE — 90662 IIV NO PRSV INCREASED AG IM: CPT | Performed by: NURSE PRACTITIONER

## 2020-10-21 PROCEDURE — G0008 ADMIN INFLUENZA VIRUS VAC: HCPCS | Performed by: NURSE PRACTITIONER

## 2020-12-09 ENCOUNTER — OFFICE VISIT (OUTPATIENT)
Dept: FAMILY MEDICINE CLINIC | Facility: CLINIC | Age: 65
End: 2020-12-09
Payer: MEDICARE

## 2020-12-09 ENCOUNTER — TELEPHONE (OUTPATIENT)
Dept: FAMILY MEDICINE CLINIC | Facility: CLINIC | Age: 65
End: 2020-12-09

## 2020-12-09 VITALS
DIASTOLIC BLOOD PRESSURE: 82 MMHG | BODY MASS INDEX: 27.6 KG/M2 | SYSTOLIC BLOOD PRESSURE: 160 MMHG | OXYGEN SATURATION: 98 % | HEART RATE: 110 BPM | HEIGHT: 62 IN | TEMPERATURE: 97.3 F | WEIGHT: 150 LBS

## 2020-12-09 DIAGNOSIS — M76.32 ILIOTIBIAL BAND SYNDROME AFFECTING LEFT LOWER LEG: ICD-10-CM

## 2020-12-09 DIAGNOSIS — E78.00 HYPERCHOLESTEROLEMIA: ICD-10-CM

## 2020-12-09 DIAGNOSIS — I10 ESSENTIAL HYPERTENSION: ICD-10-CM

## 2020-12-09 DIAGNOSIS — Z23 NEED FOR VACCINATION: ICD-10-CM

## 2020-12-09 DIAGNOSIS — D49.2 SKIN NEOPLASM: ICD-10-CM

## 2020-12-09 DIAGNOSIS — Z00.00 MEDICARE ANNUAL WELLNESS VISIT, SUBSEQUENT: Primary | ICD-10-CM

## 2020-12-09 PROCEDURE — 99214 OFFICE O/P EST MOD 30 MIN: CPT | Performed by: FAMILY MEDICINE

## 2020-12-09 PROCEDURE — G0009 ADMIN PNEUMOCOCCAL VACCINE: HCPCS | Performed by: FAMILY MEDICINE

## 2020-12-09 PROCEDURE — 90732 PPSV23 VACC 2 YRS+ SUBQ/IM: CPT | Performed by: FAMILY MEDICINE

## 2020-12-09 PROCEDURE — 1123F ACP DISCUSS/DSCN MKR DOCD: CPT | Performed by: FAMILY MEDICINE

## 2020-12-09 PROCEDURE — G0439 PPPS, SUBSEQ VISIT: HCPCS | Performed by: FAMILY MEDICINE

## 2020-12-09 RX ORDER — METHYLPREDNISOLONE 4 MG/1
TABLET ORAL
Qty: 21 EACH | Refills: 0 | Status: SHIPPED | OUTPATIENT
Start: 2020-12-09 | End: 2021-11-16 | Stop reason: ALTCHOICE

## 2020-12-09 RX ORDER — METHOCARBAMOL 500 MG/1
500 TABLET, FILM COATED ORAL
Qty: 10 TABLET | Refills: 0 | Status: SHIPPED | OUTPATIENT
Start: 2020-12-09 | End: 2021-11-16 | Stop reason: ALTCHOICE

## 2020-12-10 ENCOUNTER — TRANSCRIBE ORDERS (OUTPATIENT)
Dept: ADMINISTRATIVE | Age: 65
End: 2020-12-10

## 2020-12-10 ENCOUNTER — APPOINTMENT (OUTPATIENT)
Dept: LAB | Age: 65
End: 2020-12-10
Payer: MEDICARE

## 2020-12-10 DIAGNOSIS — M81.0 SENILE OSTEOPOROSIS: ICD-10-CM

## 2020-12-10 DIAGNOSIS — E55.9 VITAMIN D DEFICIENCY DISEASE: ICD-10-CM

## 2020-12-10 DIAGNOSIS — E78.00 HYPERCHOLESTEROLEMIA: ICD-10-CM

## 2020-12-10 DIAGNOSIS — Z79.899 ENCOUNTER FOR LONG-TERM (CURRENT) USE OF OTHER MEDICATIONS: ICD-10-CM

## 2020-12-10 DIAGNOSIS — I10 ESSENTIAL HYPERTENSION: ICD-10-CM

## 2020-12-10 DIAGNOSIS — M81.0 SENILE OSTEOPOROSIS: Primary | ICD-10-CM

## 2020-12-10 LAB
25(OH)D3 SERPL-MCNC: 52.6 NG/ML (ref 30–100)
ALBUMIN SERPL BCP-MCNC: 4 G/DL (ref 3.5–5)
ALP SERPL-CCNC: 29 U/L (ref 46–116)
ALT SERPL W P-5'-P-CCNC: 28 U/L (ref 12–78)
ANION GAP SERPL CALCULATED.3IONS-SCNC: 7 MMOL/L (ref 4–13)
AST SERPL W P-5'-P-CCNC: 28 U/L (ref 5–45)
BILIRUB SERPL-MCNC: 0.4 MG/DL (ref 0.2–1)
BUN SERPL-MCNC: 18 MG/DL (ref 5–25)
CALCIUM SERPL-MCNC: 10.2 MG/DL (ref 8.3–10.1)
CHLORIDE SERPL-SCNC: 105 MMOL/L (ref 100–108)
CO2 SERPL-SCNC: 29 MMOL/L (ref 21–32)
CREAT SERPL-MCNC: 0.93 MG/DL (ref 0.6–1.3)
GFR SERPL CREATININE-BSD FRML MDRD: 64 ML/MIN/1.73SQ M
GLUCOSE P FAST SERPL-MCNC: 89 MG/DL (ref 65–99)
POTASSIUM SERPL-SCNC: 3.3 MMOL/L (ref 3.5–5.3)
PROT SERPL-MCNC: 7.6 G/DL (ref 6.4–8.2)
SODIUM SERPL-SCNC: 141 MMOL/L (ref 136–145)

## 2020-12-10 PROCEDURE — 36415 COLL VENOUS BLD VENIPUNCTURE: CPT

## 2020-12-10 PROCEDURE — 82306 VITAMIN D 25 HYDROXY: CPT

## 2020-12-10 PROCEDURE — 80053 COMPREHEN METABOLIC PANEL: CPT

## 2020-12-30 ENCOUNTER — LAB (OUTPATIENT)
Dept: LAB | Age: 65
End: 2020-12-30
Payer: MEDICARE

## 2020-12-30 DIAGNOSIS — I10 ESSENTIAL HYPERTENSION: ICD-10-CM

## 2020-12-30 LAB
ALBUMIN SERPL BCP-MCNC: 4.1 G/DL (ref 3.5–5)
ALP SERPL-CCNC: 31 U/L (ref 46–116)
ALT SERPL W P-5'-P-CCNC: 33 U/L (ref 12–78)
ANION GAP SERPL CALCULATED.3IONS-SCNC: 5 MMOL/L (ref 4–13)
AST SERPL W P-5'-P-CCNC: 35 U/L (ref 5–45)
BILIRUB SERPL-MCNC: 0.39 MG/DL (ref 0.2–1)
BUN SERPL-MCNC: 20 MG/DL (ref 5–25)
CALCIUM SERPL-MCNC: 10.2 MG/DL (ref 8.3–10.1)
CHLORIDE SERPL-SCNC: 108 MMOL/L (ref 100–108)
CHOLEST SERPL-MCNC: 183 MG/DL (ref 50–200)
CO2 SERPL-SCNC: 30 MMOL/L (ref 21–32)
CREAT SERPL-MCNC: 1.05 MG/DL (ref 0.6–1.3)
GFR SERPL CREATININE-BSD FRML MDRD: 55 ML/MIN/1.73SQ M
GLUCOSE P FAST SERPL-MCNC: 100 MG/DL (ref 65–99)
HDLC SERPL-MCNC: 33 MG/DL
LDLC SERPL CALC-MCNC: 120 MG/DL (ref 0–100)
POTASSIUM SERPL-SCNC: 3.7 MMOL/L (ref 3.5–5.3)
PROT SERPL-MCNC: 7.6 G/DL (ref 6.4–8.2)
SODIUM SERPL-SCNC: 143 MMOL/L (ref 136–145)
TRIGL SERPL-MCNC: 151 MG/DL

## 2020-12-30 PROCEDURE — 80053 COMPREHEN METABOLIC PANEL: CPT

## 2020-12-30 PROCEDURE — 80061 LIPID PANEL: CPT

## 2020-12-30 PROCEDURE — 36415 COLL VENOUS BLD VENIPUNCTURE: CPT

## 2020-12-31 RX ORDER — HYDROCHLOROTHIAZIDE 25 MG/1
TABLET ORAL
Qty: 90 TABLET | Refills: 0 | Status: SHIPPED | OUTPATIENT
Start: 2020-12-31 | End: 2021-04-01

## 2021-01-23 ENCOUNTER — TELEPHONE (OUTPATIENT)
Dept: OTHER | Facility: OTHER | Age: 66
End: 2021-01-23

## 2021-01-23 ENCOUNTER — TELEMEDICINE (OUTPATIENT)
Dept: FAMILY MEDICINE CLINIC | Facility: CLINIC | Age: 66
End: 2021-01-23
Payer: MEDICARE

## 2021-01-23 DIAGNOSIS — B34.9 VIRAL INFECTION, UNSPECIFIED: Primary | ICD-10-CM

## 2021-01-23 DIAGNOSIS — B34.9 VIRAL INFECTION, UNSPECIFIED: ICD-10-CM

## 2021-01-23 PROCEDURE — U0005 INFEC AGEN DETEC AMPLI PROBE: HCPCS | Performed by: PHYSICIAN ASSISTANT

## 2021-01-23 PROCEDURE — G2012 BRIEF CHECK IN BY MD/QHP: HCPCS | Performed by: PHYSICIAN ASSISTANT

## 2021-01-23 PROCEDURE — U0003 INFECTIOUS AGENT DETECTION BY NUCLEIC ACID (DNA OR RNA); SEVERE ACUTE RESPIRATORY SYNDROME CORONAVIRUS 2 (SARS-COV-2) (CORONAVIRUS DISEASE [COVID-19]), AMPLIFIED PROBE TECHNIQUE, MAKING USE OF HIGH THROUGHPUT TECHNOLOGIES AS DESCRIBED BY CMS-2020-01-R: HCPCS | Performed by: PHYSICIAN ASSISTANT

## 2021-01-23 NOTE — PROGRESS NOTES
COVID-19 Virtual Visit     Assessment/Plan:    Problem List Items Addressed This Visit     None      Visit Diagnoses     Viral infection, unspecified    -  Primary    Relevant Orders    Novel Coronavirus (Covid-19),PCR [facility] - Collected at [facility] or [facility]       started with symptoms 2 weeks ago  He has been on 2 different Abx with only partial improvement  Disposition:     I recommended self-quarantine for 14 days and to call back for worsening symptoms or development of shortness of breath  I referred patient to one of our centralized sites for a COVID-19 swab  I have spent 6 minutes directly with the patient  Greater than 50% of this time was spent in counseling/coordination of care regarding: prognosis, patient and family education and importance of treatment compliance  Encounter provider Jarocho Weber PA-C    Provider located at [place] [address]  [phone]    Recent Visits  No visits were found meeting these conditions  Showing recent visits within past 7 days and meeting all other requirements     Today's Visits  Date Type Provider Dept   01/23/21 Telemedicine SOL[facility] Samuels [facility]   Showing today's visits and meeting all other requirements     Future Appointments  No visits were found meeting these conditions  Showing future appointments within next 150 days and meeting all other requirements        Patient agrees to participate in a virtual check in via telephone or video visit instead of presenting to the office to address urgent/immediate medical needs  Patient is aware this is a billable service  After connecting through Telephone, the patient was identified by name and date of birth  Darnell Roe was informed that this was a telemedicine visit and that the exam was being conducted confidentially over secure lines  My office door was closed  No one else was in the room   Darnell Roe acknowledged consent and understanding of privacy and security of the telemedicine visit  I informed the patient that I have reviewed her record in Epic and presented the opportunity for her to ask any questions regarding the visit today  The patient agreed to participate  Subjective:   Jayant John is a 76 y o  female who is concerned about COVID-19  Patient's symptoms include fever, fatigue, nasal congestion, cough, chest tightness and myalgias       Date of symptom onset: 1/20/2021  Date of exposure: 1/7/2021    Exposure:   Contact with a person who is under investigation (PUI) for or who is positive for COVID-19 within the last 14 days?: Yes    Hospitalized recently for fever and/or lower respiratory symptoms?: No      Currently a healthcare worker that is involved in direct patient care?: No      Works in a special setting where the risk of COVID-19 transmission may be high? (this may include long-term care, correctional and alf facilities; homeless shelters; assisted-living facilities and group homes ): No      Resident in a special setting where the risk of COVID-19 transmission may be high? (this may include long-term care, correctional and alf facilities; homeless shelters; assisted-living facilities and group homes ): No      mucinex dm    No results found for: Michaela Dang, [address] [place] [address], [address] [place] [address],[address] [address]  Past Medical History:   Diagnosis Date    Hyperlipidemia     Hypertension     Hypertension      Past Surgical History:   Procedure Laterality Date    EYE SURGERY      TUBAL LIGATION       Current Outpatient Medications   Medication Sig Dispense Refill    Calcium Carb-Cholecalciferol 600-800 MG-UNIT TABS Take 2 tablets by mouth daily      Cholecalciferol (CVS VITAMIN D3) 1000 units capsule Take 1 capsule by mouth daily      Cranberry-Vitamin C-Probiotic (AZO CRANBERRY PO) Take by mouth 2 (two) times a day      fenofibrate (TRICOR) 145 mg tablet TAKE 1 TABLET BY MOUTH DAILY 90 tablet 3    hydrochlorothiazide (HYDRODIURIL) 25 mg tablet TAKE 1 TABLET DAILY 90 tablet 0    ibandronate (BONIVA) 150 MG tablet TAKE 1 TABLET EVERY 30 DAYS 3 tablet 0    meclizine (ANTIVERT) 25 mg tablet Take 2 tablets (50 mg total) by mouth every 8 (eight) hours as needed for dizziness 30 tablet 2    methocarbamol (ROBAXIN) 500 mg tablet Take 1 tablet (500 mg total) by mouth daily at bedtime 10 tablet 0    methylPREDNISolone 4 MG tablet therapy pack Use as directed on package 21 each 0    Multiple Vitamin (MULTIVITAMIN) capsule Take 1 capsule by mouth daily      nortriptyline (PAMELOR) 10 mg capsule TAKE 3 CAPSULES (=30MG)    DAILY 270 capsule 3    Probiotic Product (PROBIOTIC-10 PO) Take 1 capsule by mouth daily      TURMERIC CURCUMIN PO Take by mouth       No current facility-administered medications for this visit  Allergies   Allergen Reactions    Iodine Rash       Review of Systems   Constitutional: Positive for fatigue and fever  HENT: Positive for congestion  Respiratory: Positive for cough and chest tightness  Musculoskeletal: Positive for myalgias  Objective: There were no vitals filed for this visit  Physical Exam  VIRTUAL VISIT DISCLAIMER    Elo Rivero acknowledges that she has consented to an online visit or consultation  She understands that the online visit is based solely on information provided by her, and that, in the absence of a face-to-face physical evaluation by the physician, the diagnosis she receives is both limited and provisional in terms of accuracy and completeness  This is not intended to replace a full medical face-to-face evaluation by the physician  Elo Rivero understands and accepts these terms

## 2021-01-23 NOTE — TELEPHONE ENCOUNTER
[phone]/Symptomatic/PT:  Nura Echevarria/ 1955/ Patient has the following covid symptoms:  Fever (oral) 100 0, dizzy, congestion, body aches, fatigue  Patient is requesting a call back for care advice

## 2021-01-25 ENCOUNTER — TELEPHONE (OUTPATIENT)
Dept: URGENT CARE | Age: 66
End: 2021-01-25

## 2021-01-25 ENCOUNTER — TELEPHONE (OUTPATIENT)
Dept: FAMILY MEDICINE CLINIC | Facility: CLINIC | Age: 66
End: 2021-01-25
Payer: MEDICARE

## 2021-01-25 ENCOUNTER — TELEMEDICINE (OUTPATIENT)
Dept: FAMILY MEDICINE CLINIC | Facility: CLINIC | Age: 66
End: 2021-01-25

## 2021-01-25 DIAGNOSIS — U07.1 COVID-19: Primary | ICD-10-CM

## 2021-01-25 LAB — SARS-COV-2 RNA RESP QL NAA+PROBE: POSITIVE

## 2021-01-25 PROCEDURE — 99213 OFFICE O/P EST LOW 20 MIN: CPT | Performed by: FAMILY MEDICINE

## 2021-01-25 RX ORDER — SODIUM CHLORIDE 9 MG/ML
20 INJECTION, SOLUTION INTRAVENOUS ONCE
Status: CANCELLED | OUTPATIENT
Start: 2021-01-25

## 2021-01-25 RX ORDER — ALBUTEROL SULFATE 90 UG/1
3 AEROSOL, METERED RESPIRATORY (INHALATION) ONCE AS NEEDED
Status: CANCELLED | OUTPATIENT
Start: 2021-01-25

## 2021-01-25 RX ORDER — ACETAMINOPHEN 325 MG/1
650 TABLET ORAL ONCE AS NEEDED
Status: CANCELLED | OUTPATIENT
Start: 2021-01-25

## 2021-01-25 NOTE — PROGRESS NOTES
COVID-19 Virtual Visit     Assessment/Plan:    Problem List Items Addressed This Visit        Other    COVID-19 - Primary         Disposition:     I recommended self-quarantine for 10 days and to watch for symptoms until 14 days after exposure  If patient were to develop symptoms, they should self isolate and call our office for further guidance  Patient meets criteria for Casirivimab/Imdevimab infusion  They were counseled in regards to risks, benefits, and side effects of this infusion  Casirivimab and imdevimab are investigational medicines used to treat mild to moderate symptoms of COVID-19 in non-hospitalized adults and adolescents (15years of age and older who weigh at least 80 pounds (40 kg)), and who are at high risk for developing severe COVID-19 symptoms or the need for hospitalization  Casirivimab and imdevimab are investigational because they are still being studied  There is limited information known about the safety and effectiveness of using casirivimab and imdevimab to treat people with COVID-19  The FDA has authorized the emergency use of casirivimab and imdevimab for the treatment of COVID-19 under an Emergency Use Authorization (EUA)  Possible side effects of casirivimab and imdevimab: Allergic reactions can happen during and after infusion with casirivimab and imdevimab  Possible reactions include: fever, chills, nausea, headache, shortness of breath, low blood pressure, wheezing, swelling of your lips, face, or throat, rash including hives, itching, muscle aches, and dizziness  The side effects of getting any medicine by vein may include brief pain, bleeding, bruising of the skin, soreness, swelling, and possible infection at the infusion site  These are not all the possible side effects of casirivimab and imdevimab  Not a lot of people have been given casirivimab and imdevimab  Serious and unexpected side effects may happen   Casirivimab and imdevimab are still being studied so it is possible that all of the risks are not known at this time  It is possible that casirivimab and imdevimab could interfere with your body's own ability to fight off a future infection of SARS-CoV-2  Similarly, casirivimab and imdevimab may reduce your body's immune response to a vaccine for SARS-CoV-2  Specific studies have not been conducted to address these possible risks  Emergency Use Authorization:    The [facility] FDA has made casirivimab and imdevimab available under an emergency access mechanism called an EUA  The EUA is supported by a  of Health and Human Service ([facility]) declaration that circumstances exist to justify the emergency use of drugs and biological products during the COVID-19 pandemic  Casirivimab and imdevimab have not undergone the same type of review as an FDA-approved or cleared product  The FDA may issue an EUA when certain criteria are met, which includes that there are no adequate, approved, available alternatives  In addition, the FDA decision is based on the totality of scientific evidence available showing that it is reasonable to believe that the product meets certain criteria for safety, performance, and labeling and may be effective in treatment of patients during the COVID-19 pandemic  All of these criteria must be met to allow for the product to be used in the treatment of patients during the COVID-19 pandemic  The EUA for casirivimab and imdevimab is in effect for the duration of the COVID-19 declaration justifying emergency use of these products, unless terminated or revoked (after which the products may no longer be used)  Regarding COVID-19 Vaccination:    Currently there is no data or safety or efficacy of COVID-19 vaccination in persons who received monoclonal antibodies as part of COVID-19 treatment   Treatment should be deferred for at least 90 days to avoid interference of the treatment with vaccine-induced immune responses (this is based on estimated half-life of therapies and evidence suggesting reinfection is uncommon within 90 days of initial infection)  The patient consents to proceed with casirivimab and imdevimab infusion  I have spent 15 minutes directly with the patient  Greater than 50% of this time was spent in counseling/coordination of care regarding: diagnostic results, prognosis, risks and benefits of treatment options, instructions for management, patient and family education, importance of treatment compliance, risk factor reductions and impressions  Encounter provider Prashant London MD    Provider located at [address]    Recent Visits  No visits were found meeting these conditions  Showing recent visits within past 7 days and meeting all other requirements     Today's Visits  Date Type Provider Dept   01/25/21 Telemedicine Prashant London MD [facility]   01/25/21 Telephone Asim Rivera,  [employer] [address] today's visits and meeting all other requirements     Future Appointments  No visits were found meeting these conditions  Showing future appointments within next 150 days and meeting all other requirements      This virtual check-in was done via Microsoft Teams and patient was informed that this is a secure, HIPAA-compliant platform  She agrees to proceed  Patient agrees to participate in a virtual check in via telephone or video visit instead of presenting to the office to address urgent/immediate medical needs  Patient is aware this is a billable service  After connecting through [facility], the patient was identified by name and date of birth  Harinder Loera was informed that this was a telemedicine visit and that the exam was being conducted confidentially over secure lines  My office door was closed  No one else was in the room   Harinder Loera acknowledged consent and understanding of privacy and security of the telemedicine visit  I informed the patient that I have reviewed her record in Epic and presented the opportunity for her to ask any questions regarding the visit today  The patient agreed to participate  Subjective:   Dorina Mayfield is a 76 y o  female who is concerned about COVID-19  Patient's symptoms include fever, fatigue, malaise, cough, chest tightness and myalgias  Patient denies chills, congestion, rhinorrhea, sore throat, anosmia, loss of taste, shortness of breath, abdominal pain, nausea, vomiting, diarrhea and headaches       Date of symptom onset: 1/20/2021    Exposure:   Contact with a person who is under investigation (PUI) for or who is positive for COVID-19 within the last 14 days?: No    Hospitalized recently for fever and/or lower respiratory symptoms?: No      Currently a healthcare worker that is involved in direct patient care?: No      Works in a special setting where the risk of COVID-19 transmission may be high? (this may include long-term care, correctional and penitentiary facilities; homeless shelters; assisted-living facilities and group homes ): No      Resident in a special setting where the risk of COVID-19 transmission may be high? (this may include long-term care, correctional and penitentiary facilities; homeless shelters; assisted-living facilities and group homes ): No      Lab Results   Component Value Date    SARSCOV2 Positive (A) 01/23/2021     Past Medical History:   Diagnosis Date    Hyperlipidemia     Hypertension     Hypertension      Past Surgical History:   Procedure Laterality Date    EYE SURGERY      TUBAL LIGATION       Current Outpatient Medications   Medication Sig Dispense Refill    Calcium Carb-Cholecalciferol 600-800 MG-UNIT TABS Take 2 tablets by mouth daily      Cholecalciferol (CVS VITAMIN D3) 1000 units capsule Take 1 capsule by mouth daily      Cranberry-Vitamin C-Probiotic (AZO CRANBERRY PO) Take by mouth 2 (two) times a day      fenofibrate (TRICOR) 145 mg tablet TAKE 1 TABLET BY MOUTH DAILY 90 tablet 3    hydrochlorothiazide (HYDRODIURIL) 25 mg tablet TAKE 1 TABLET DAILY 90 tablet 0    ibandronate (BONIVA) 150 MG tablet TAKE 1 TABLET EVERY 30 DAYS 3 tablet 0    meclizine (ANTIVERT) 25 mg tablet Take 2 tablets (50 mg total) by mouth every 8 (eight) hours as needed for dizziness 30 tablet 2    methocarbamol (ROBAXIN) 500 mg tablet Take 1 tablet (500 mg total) by mouth daily at bedtime 10 tablet 0    methylPREDNISolone 4 MG tablet therapy pack Use as directed on package 21 each 0    Multiple Vitamin (MULTIVITAMIN) capsule Take 1 capsule by mouth daily      nortriptyline (PAMELOR) 10 mg capsule TAKE 3 CAPSULES (=30MG)    DAILY 270 capsule 3    Probiotic Product (PROBIOTIC-10 PO) Take 1 capsule by mouth daily      TURMERIC CURCUMIN PO Take by mouth       No current facility-administered medications for this visit  Allergies   Allergen Reactions    Iodine Rash       Review of Systems   Constitutional: Positive for fatigue and fever  Negative for chills  HENT: Negative for congestion, rhinorrhea and sore throat  Respiratory: Positive for cough and chest tightness  Negative for shortness of breath  Gastrointestinal: Negative for abdominal pain, diarrhea, nausea and vomiting  Musculoskeletal: Positive for myalgias  Neurological: Negative for headaches  All other systems reviewed and are negative  Objective:  VIRTUAL VISIT DISCLAIMER    Linda Weir acknowledges that she has consented to an online visit or consultation  She understands that the online visit is based solely on information provided by her, and that, in the absence of a face-to-face physical evaluation by the physician, the diagnosis she receives is both limited and provisional in terms of accuracy and completeness  This is not intended to replace a full medical face-to-face evaluation by the physician   Linda Weir understands and accepts these terms

## 2021-01-25 NOTE — TELEPHONE ENCOUNTER
Patient called stating we never called her regarding their covid results  She called [facility] and was told she was positive for COVID  She would like a call back

## 2021-01-26 ENCOUNTER — HOSPITAL ENCOUNTER (OUTPATIENT)
Dept: INFUSION CENTER | Facility: HOSPITAL | Age: 66
Discharge: HOME | End: 2021-01-26
Payer: MEDICARE

## 2021-01-26 ENCOUNTER — TELEPHONE (OUTPATIENT)
Dept: FAMILY MEDICINE CLINIC | Facility: CLINIC | Age: 66
End: 2021-01-26

## 2021-01-26 VITALS
SYSTOLIC BLOOD PRESSURE: 145 MMHG | RESPIRATION RATE: 12 BRPM | OXYGEN SATURATION: 96 % | HEART RATE: 82 BPM | DIASTOLIC BLOOD PRESSURE: 61 MMHG | TEMPERATURE: 99.1 F

## 2021-01-26 DIAGNOSIS — U07.1 COVID-19: Primary | ICD-10-CM

## 2021-01-26 PROCEDURE — M0243 CASIRIVI AND IMDEVI INFUSION: HCPCS

## 2021-01-26 RX ORDER — ACETAMINOPHEN 325 MG/1
650 TABLET ORAL ONCE AS NEEDED
Status: DISCONTINUED | OUTPATIENT
Start: 2021-01-26 | End: 2021-01-29 | Stop reason: HOSPADM

## 2021-01-26 RX ORDER — SODIUM CHLORIDE 9 MG/ML
20 INJECTION, SOLUTION INTRAVENOUS ONCE
Status: CANCELLED | OUTPATIENT
Start: 2021-01-26

## 2021-01-26 RX ORDER — ALBUTEROL SULFATE 90 UG/1
3 AEROSOL, METERED RESPIRATORY (INHALATION) ONCE AS NEEDED
Status: DISCONTINUED | OUTPATIENT
Start: 2021-01-26 | End: 2021-01-29 | Stop reason: HOSPADM

## 2021-01-26 RX ORDER — ALBUTEROL SULFATE 90 UG/1
3 AEROSOL, METERED RESPIRATORY (INHALATION) ONCE AS NEEDED
Status: CANCELLED | OUTPATIENT
Start: 2021-01-26

## 2021-01-26 RX ORDER — SODIUM CHLORIDE 9 MG/ML
20 INJECTION, SOLUTION INTRAVENOUS ONCE
Status: COMPLETED | OUTPATIENT
Start: 2021-01-26 | End: 2021-01-26

## 2021-01-26 RX ORDER — ACETAMINOPHEN 325 MG/1
650 TABLET ORAL ONCE AS NEEDED
Status: CANCELLED | OUTPATIENT
Start: 2021-01-26

## 2021-01-26 RX ADMIN — IMDEVIMAB: 300 INJECTION, SOLUTION, CONCENTRATE INTRAVENOUS at 12:53

## 2021-01-26 RX ADMIN — SODIUM CHLORIDE 20 ML/HR: 0.9 INJECTION, SOLUTION INTRAVENOUS at 12:11

## 2021-01-26 NOTE — PROGRESS NOTES
Patient is here today for their Regeneron (Carsirivimab and Imdevimab) infusion  Reviewed EUA with patient  Patient verbalized understanding of EUA  Copy of EUA given to patient  All questions answered to patients satisfaction  IV started, good blood return, flushes freely  Patient tolerated well

## 2021-01-27 ENCOUNTER — TELEMEDICINE (OUTPATIENT)
Dept: FAMILY MEDICINE CLINIC | Facility: CLINIC | Age: 66
End: 2021-01-27
Payer: MEDICARE

## 2021-01-27 DIAGNOSIS — U07.1 COVID-19: Primary | ICD-10-CM

## 2021-01-27 PROCEDURE — 99442 PR PHYS/QHP TELEPHONE EVALUATION 11-20 MIN: CPT | Performed by: FAMILY MEDICINE

## 2021-01-28 NOTE — PROGRESS NOTES
COVID-19 Virtual Visit     Assessment/Plan:    Problem List Items Addressed This Visit     None         Disposition:     I recommended continued isolation until at least 24 hours have passed since recovery defined as resolution of fever without the use of fever-reducing medications AND improvement in COVID symptoms AND 10 days have passed since onset of symptoms (or 10 days have passed since date of first positive viral diagnostic test for asymptomatic patients)  Pt did well with the antibody transfusion yesterday  I have spent 15 minutes directly with the patient  Greater than 50% of this time was spent in counseling/coordination of care regarding: diagnostic results, risks and benefits of treatment options, patient and family education and importance of treatment compliance  Encounter provider Nanci Ormond, DO    Provider located at [address] [address]    Recent Visits  Date Type Provider Dept   01/26/21 Telephone Jocelyn Capellan DO [address] [place] [address]   01/25/21 [facility], MD [address] [place] [address]   01/25/21 Telephone DO Ramiro Ham recent visits within past 7 days and meeting all other requirements     Future Appointments  No visits were found meeting these conditions  Showing future appointments within next 150 days and meeting all other requirements        Patient agrees to participate in a virtual check in via telephone or video visit instead of presenting to the office to address urgent/immediate medical needs  Patient is aware this is a billable service  After connecting through Telephone, the patient was identified by name and date of birth  Nelson Ventura was informed that this was a telemedicine visit and that the exam was being conducted confidentially over secure lines  My office door was closed  No one else was in the room   Nelson Ventura acknowledged consent and understanding of privacy and security of the telemedicine visit  I informed the patient that I have reviewed her record in Epic and presented the opportunity for her to ask any questions regarding the visit today  The patient agreed to participate  It was my intent to perform this visit via video technology but the patient was not able to do a video connection so the visit was completed via audio telephone only  Subjective:   Margareth Jean is a 76 y o  female who has been screened for COVID-19  Symptom change since last report: improving  Patient's symptoms include fatigue, malaise and nasal congestion  Patient denies fever, chills, rhinorrhea, sore throat, anosmia, loss of taste, cough, shortness of breath, chest tightness, abdominal pain, nausea, vomiting, diarrhea, myalgias and headaches  Aria Bliss has been staying home and has isolated themselves in her home  She is taking care to not share personal items and is cleaning all surfaces that are touched often, like counters, tabletops, and doorknobs using household cleaning sprays or wipes  She is wearing a mask when she leaves her room       Date of symptom onset: 1/22/2021  Date of positive COVID-19 PCR: 1/23/2021    Monoclonal Antibody Follow-up Symptom Questionnaire  I feel overall: much better  My breathing is: much better  My fever is: better  My fatigue is: similar    Lab Results   Component Value Date    SARSCOV2 Positive (A) 01/23/2021     Past Medical History:   Diagnosis Date    Hyperlipidemia     Hypertension     Hypertension      Past Surgical History:   Procedure Laterality Date    EYE SURGERY      TUBAL LIGATION       Current Outpatient Medications   Medication Sig Dispense Refill    Calcium Carb-Cholecalciferol 600-800 MG-UNIT TABS Take 2 tablets by mouth daily      Cholecalciferol (CVS VITAMIN D3) 1000 units capsule Take 1 capsule by mouth daily      Cranberry-Vitamin C-Probiotic (AZO CRANBERRY PO) Take by mouth 2 (two) times a day      fenofibrate (TRICOR) 145 mg tablet TAKE 1 TABLET BY MOUTH DAILY 90 tablet 3    hydrochlorothiazide (HYDRODIURIL) 25 mg tablet TAKE 1 TABLET DAILY 90 tablet 0    ibandronate (BONIVA) 150 MG tablet TAKE 1 TABLET EVERY 30 DAYS 3 tablet 0    meclizine (ANTIVERT) 25 mg tablet Take 2 tablets (50 mg total) by mouth every 8 (eight) hours as needed for dizziness 30 tablet 2    methocarbamol (ROBAXIN) 500 mg tablet Take 1 tablet (500 mg total) by mouth daily at bedtime 10 tablet 0    methylPREDNISolone 4 MG tablet therapy pack Use as directed on package 21 each 0    Multiple Vitamin (MULTIVITAMIN) capsule Take 1 capsule by mouth daily      nortriptyline (PAMELOR) 10 mg capsule TAKE 3 CAPSULES (=30MG)    DAILY 270 capsule 3    Probiotic Product (PROBIOTIC-10 PO) Take 1 capsule by mouth daily      TURMERIC CURCUMIN PO Take by mouth       No current facility-administered medications for this visit  Facility-Administered Medications Ordered in Other Visits   Medication Dose Route Frequency Provider Last Rate Last Admin    acetaminophen (TYLENOL) tablet 650 mg  650 mg Oral Once PRN Nato Brantley MD        albuterol (PROVENTIL HFA,VENTOLIN HFA) inhaler 3 puff  3 puff Inhalation Once PRN Nato Brantley MD         Allergies   Allergen Reactions    Iodine Rash       Review of Systems   Constitutional: Positive for fatigue  Negative for chills and fever  HENT: Positive for congestion  Negative for rhinorrhea and sore throat  Respiratory: Negative for cough, chest tightness, shortness of breath, wheezing and stridor  Cardiovascular: Negative for chest pain  Gastrointestinal: Negative for abdominal pain, diarrhea, nausea and vomiting  Musculoskeletal: Negative for arthralgias and myalgias  Neurological: Negative for headaches  All other systems reviewed and are negative  Objective: There were no vitals filed for this visit  Physical Exam  Vitals signs reviewed  Neurological:      Mental Status: She is oriented to person, place, and time  Psychiatric:         Mood and Affect: Mood normal          Behavior: Behavior normal          Thought Content: Thought content normal          Judgment: Judgment normal        VIRTUAL VISIT DISCLAIMER    Elvi Ochoa acknowledges that she has consented to an online visit or consultation  She understands that the online visit is based solely on information provided by her, and that, in the absence of a face-to-face physical evaluation by the physician, the diagnosis she receives is both limited and provisional in terms of accuracy and completeness  This is not intended to replace a full medical face-to-face evaluation by the physician  Elvi Ochoa understands and accepts these terms

## 2021-02-08 DIAGNOSIS — F32.A DEPRESSION, UNSPECIFIED DEPRESSION TYPE: ICD-10-CM

## 2021-02-08 RX ORDER — NORTRIPTYLINE HYDROCHLORIDE 10 MG/1
10 CAPSULE ORAL 3 TIMES DAILY
Qty: 42 CAPSULE | Refills: 0 | Status: SHIPPED | OUTPATIENT
Start: 2021-02-08 | End: 2021-05-07 | Stop reason: SDUPTHER

## 2021-02-13 DIAGNOSIS — Z23 ENCOUNTER FOR IMMUNIZATION: ICD-10-CM

## 2021-02-17 ENCOUNTER — TRANSCRIBE ORDERS (OUTPATIENT)
Dept: ADMINISTRATIVE | Age: 66
End: 2021-02-17

## 2021-02-17 ENCOUNTER — LAB (OUTPATIENT)
Dept: LAB | Age: 66
End: 2021-02-17
Payer: MEDICARE

## 2021-02-17 DIAGNOSIS — E87.6 HYPOPOTASSEMIA: ICD-10-CM

## 2021-02-17 DIAGNOSIS — Z79.899 ENCOUNTER FOR LONG-TERM (CURRENT) USE OF OTHER MEDICATIONS: ICD-10-CM

## 2021-02-17 DIAGNOSIS — E87.6 HYPOPOTASSEMIA: Primary | ICD-10-CM

## 2021-02-17 LAB
ANION GAP SERPL CALCULATED.3IONS-SCNC: 6 MMOL/L (ref 4–13)
BUN SERPL-MCNC: 24 MG/DL (ref 5–25)
CALCIUM SERPL-MCNC: 11.8 MG/DL (ref 8.3–10.1)
CHLORIDE SERPL-SCNC: 109 MMOL/L (ref 100–108)
CO2 SERPL-SCNC: 29 MMOL/L (ref 21–32)
CREAT SERPL-MCNC: 1.16 MG/DL (ref 0.6–1.3)
GFR SERPL CREATININE-BSD FRML MDRD: 49 ML/MIN/1.73SQ M
GLUCOSE SERPL-MCNC: 113 MG/DL (ref 65–140)
POTASSIUM SERPL-SCNC: 3.9 MMOL/L (ref 3.5–5.3)
SODIUM SERPL-SCNC: 144 MMOL/L (ref 136–145)

## 2021-02-17 PROCEDURE — 36415 COLL VENOUS BLD VENIPUNCTURE: CPT

## 2021-02-17 PROCEDURE — 80048 BASIC METABOLIC PNL TOTAL CA: CPT

## 2021-02-28 ENCOUNTER — LAB (OUTPATIENT)
Dept: LAB | Age: 66
End: 2021-02-28
Payer: MEDICARE

## 2021-02-28 DIAGNOSIS — E83.52 HYPERCALCEMIA: ICD-10-CM

## 2021-02-28 LAB
ANION GAP SERPL CALCULATED.3IONS-SCNC: 4 MMOL/L (ref 4–13)
BUN SERPL-MCNC: 25 MG/DL (ref 5–25)
CA-I BLD-SCNC: 1.26 MMOL/L (ref 1.12–1.32)
CALCIUM SERPL-MCNC: 9.6 MG/DL (ref 8.3–10.1)
CHLORIDE SERPL-SCNC: 108 MMOL/L (ref 100–108)
CO2 SERPL-SCNC: 29 MMOL/L (ref 21–32)
CREAT SERPL-MCNC: 1.07 MG/DL (ref 0.6–1.3)
GFR SERPL CREATININE-BSD FRML MDRD: 54 ML/MIN/1.73SQ M
GLUCOSE P FAST SERPL-MCNC: 91 MG/DL (ref 65–99)
POTASSIUM SERPL-SCNC: 3.6 MMOL/L (ref 3.5–5.3)
PTH-INTACT SERPL-MCNC: 16.7 PG/ML (ref 18.4–80.1)
SODIUM SERPL-SCNC: 141 MMOL/L (ref 136–145)

## 2021-02-28 PROCEDURE — 36415 COLL VENOUS BLD VENIPUNCTURE: CPT

## 2021-02-28 PROCEDURE — 82330 ASSAY OF CALCIUM: CPT

## 2021-02-28 PROCEDURE — 80048 BASIC METABOLIC PNL TOTAL CA: CPT

## 2021-02-28 PROCEDURE — 83970 ASSAY OF PARATHORMONE: CPT

## 2021-03-25 ENCOUNTER — IMMUNIZATIONS (OUTPATIENT)
Dept: FAMILY MEDICINE CLINIC | Facility: HOSPITAL | Age: 66
End: 2021-03-25

## 2021-03-25 DIAGNOSIS — Z23 ENCOUNTER FOR IMMUNIZATION: Primary | ICD-10-CM

## 2021-03-25 PROCEDURE — 91300 SARS-COV-2 / COVID-19 MRNA VACCINE (PFIZER-BIONTECH) 30 MCG: CPT

## 2021-03-25 PROCEDURE — 0001A SARS-COV-2 / COVID-19 MRNA VACCINE (PFIZER-BIONTECH) 30 MCG: CPT

## 2021-03-31 DIAGNOSIS — I10 ESSENTIAL HYPERTENSION: ICD-10-CM

## 2021-04-01 RX ORDER — HYDROCHLOROTHIAZIDE 25 MG/1
TABLET ORAL
Qty: 90 TABLET | Refills: 0 | Status: SHIPPED | OUTPATIENT
Start: 2021-04-01 | End: 2021-06-15

## 2021-04-15 ENCOUNTER — IMMUNIZATIONS (OUTPATIENT)
Dept: FAMILY MEDICINE CLINIC | Facility: HOSPITAL | Age: 66
End: 2021-04-15

## 2021-04-15 DIAGNOSIS — Z23 ENCOUNTER FOR IMMUNIZATION: Primary | ICD-10-CM

## 2021-04-15 PROCEDURE — 91300 SARS-COV-2 / COVID-19 MRNA VACCINE (PFIZER-BIONTECH) 30 MCG: CPT

## 2021-04-15 PROCEDURE — 0002A SARS-COV-2 / COVID-19 MRNA VACCINE (PFIZER-BIONTECH) 30 MCG: CPT

## 2021-04-23 ENCOUNTER — TELEPHONE (OUTPATIENT)
Dept: DERMATOLOGY | Facility: CLINIC | Age: 66
End: 2021-04-23

## 2021-04-23 NOTE — TELEPHONE ENCOUNTER
Returned pt's call  Left message asking her to call back and to confirm why Dr Ayesha Saab is referring her to us

## 2021-04-26 ENCOUNTER — TELEPHONE (OUTPATIENT)
Dept: DERMATOLOGY | Facility: CLINIC | Age: 66
End: 2021-04-26

## 2021-04-26 NOTE — TELEPHONE ENCOUNTER
Patient called to make an appt, has been on the wait list since 12/9/20, called back to schedule, left [facility] to call back

## 2021-05-07 DIAGNOSIS — F32.A DEPRESSION, UNSPECIFIED DEPRESSION TYPE: ICD-10-CM

## 2021-05-07 RX ORDER — NORTRIPTYLINE HYDROCHLORIDE 10 MG/1
10 CAPSULE ORAL 3 TIMES DAILY
Qty: 270 CAPSULE | Refills: 1 | Status: SHIPPED | OUTPATIENT
Start: 2021-05-07 | End: 2021-11-11

## 2021-05-11 NOTE — PROGRESS NOTES
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[address] )     4  Quadriceps tendonitis     5  Bone lesion  MRI knee left  wo contrast     Orders Placed This Encounter   Procedures    XR knee bilateral ap standing    XR knee 4+ vw left injury    XR knee 4+ vw right injury    MRI knee left  wo contrast        Imaging Studies (I personally reviewed images in PACS and report):    Bilateral hip/pelvis XR 05/13/2021:  Mild bilateral hip osteoarthritis    Bilateral knee XR 05/13/2021:  Mild bilateral knee osteoarthritis  Left tibia osteochondroma  Past diagnostics reviewed for today:  XR hip/pelv 2-3 vws right 03/20/2017:   Very mild OA of the bilateral hips    IMPRESSION:  · Subacute traumatic right hip pain secondary to traumatic flare of hip osteoarthritis  · Right knee traumatic quadriceps tendonitis--mild; stable   · Left tibial bone lesion (likely osteochondroma)  · Above imaging reviewed with patient and patient's  today   · Referral to physical therapy  · Schedule right knee USG CS injection   · Left knee MRI to rule out malignant neoplasm of incidental bone lesion appreciated on plain films today      Repeat X-ray next visit:   No       Return for Schedule for right hip USG CS injection; will call with MRI results  There are no Patient Instructions on file for this visit  CHIEF COMPLAINT:  New patient right hip pain    HPI:  Kelsi Castellano is a 78 y o  female with a history of HTN and hyperlipidemia who presents as a new patient for acute traumatic right hip pain and bilateral knee pain  Right hip pain--started after her fall 1 month ago  Localizes pain to the groin area and radiates down the anterior portion of the thigh   Going up stairs, rolling over in bed, getting up and down in the chair makes the right hip pain worse  Denies any clicking or popping in the hip  Pain is described as intermittent sharp pain that is made worse with the aforementioned activities  Rest tends to improve her symptoms  Left knee-distant history of fall onto the knee  Pain intermittently since  Localizes to anterior aspect of the knee  Increased activity makes the pain worse  Rest improves the pain  When she fell 1 month ago when getting out of the tub she landed directly onto the knee and exacerbated this as well as the right knee  Right knee-Pain since she fell  Pain is directly localizes to anterior aspect  Pain in the knees is associated with sensation of "things moving in there"  Denies any weakness or sensation of instability  Review of Systems   Constitutional: Negative for chills, fever and unexpected weight change  HENT: Negative for hearing loss, nosebleeds and sore throat  Eyes: Negative for pain, redness and visual disturbance  Respiratory: Negative for cough, shortness of breath and wheezing  Cardiovascular: Negative for chest pain, palpitations and leg swelling  Gastrointestinal: Negative for abdominal pain, nausea and vomiting  Endocrine: Negative for polydipsia and polyuria  Genitourinary: Negative for dysuria and hematuria  Skin: Negative for rash and wound  Neurological: Negative for dizziness, numbness and headaches  Psychiatric/Behavioral: Negative for decreased concentration, dysphoric mood and suicidal ideas  The patient is not nervous/anxious            Following history reviewed and update:    Past Medical History:   Diagnosis Date    Hyperlipidemia     Hypertension     Hypertension      Past Surgical History:   Procedure Laterality Date    EYE SURGERY      TOOTH EXTRACTION      TUBAL LIGATION       Social History   Social History     Substance and Sexual Activity   Alcohol Use No     Social History     Substance and Sexual Activity   Drug Use No Social History     Tobacco Use   Smoking Status Never Smoker   Smokeless Tobacco Never Used     Family History   Problem Relation Age of Onset    Colon cancer Mother 46    Breast cancer Mother 48    Heart attack Father     Prostate cancer Father 79    Heart attack Sister     No Known Problems Daughter     No Known Problems Maternal Grandmother     No Known Problems Maternal Grandfather     No Known Problems Paternal Grandmother     No Known Problems Paternal Grandfather     No Known Problems Sister     No Known Problems Daughter     No Known Problems Maternal Aunt     No Known Problems Maternal Aunt     No Known Problems Maternal Aunt     No Known Problems Son      Allergies   Allergen Reactions    Iodine - Food Allergy Rash          Physical Exam  /82 (BP Location: Right arm, Patient Position: Sitting, Cuff Size: Adult)   Pulse 73   Ht 5' 2" (1 575 m)   Wt 70 3 kg (155 lb)   LMP  (LMP Unknown)   BMI 28 35 kg/m²     Constitutional:  see vital signs  Gen: well-developed, normocephalic/atraumatic, well-groomed  Eyes: No inflammation or discharge of conjunctiva or lids; sclera clear   Pharynx: no inflammation, lesion, or mass of lips  Neck: supple, no masses, non-distended  MSK: no inflammation, lesion, mass, or clubbing of nails and digits except for other than mentioned below  SKIN: no visible rashes or skin lesions  Pulmonary/Chest: Effort normal  No respiratory distress     NEURO: cranial nerves grossly intact  PSYCH:  Alert and oriented to person, place, and time; recent and remote memory intact; mood normal, no depression, anxiety, or agitation, judgment and insight good and intact     Ortho Exam  BACK EXAM:  Gait: antalgic gait    BACK TENDERNESS:  Spinous Processes: no  Paraspinal Muscles: no  SI Joint: no  Sacrum: no    ROM:  Flexion: intact  Extension: intact  Sidebending: intact    DERMATOMAL SENSATION:  L1: normal   L2: normal   L3: normal   L4: normal   L5: normal   S1: normal    STRENGTH (bilateral):  Knee Extension: 5/5  Knee Flexion: 5/5  Foot Dorsiflexion: 5/5  Great Toe Extension: 5/5  Foot Plantarflexion: 5/5  Hip Flexion: 5/5  Hip Abduction: 5/5    REFLEXES:  Patellar: 2+ bilateral  Achilles: 2+ bilateral  Clonus: negative bilateral    BACK:   SUPINE STRAIGHT LEG: negative  PRONE STRAIGHT LEG: not tested  SLUMP: negative    LEFT HIP:  LOG ROLL: negative  RADHA: negative  FADIR: negative    RIGHT HIP:  LOG ROLL: positive   RADHA: positive   FADIR: strongly positive     SI JOINT:  ASIS COMPRESSION TEST: negative   GAENSLIN'S TEST: not tested  STORK TEST: not tested  FORTON'S FINGER: negative   RADHA SI PAIN: negative     BILATERAL KNEE:  Erythema: no  Swelling: no  Increased Warmth: no  Tenderness: TTP over b/l medial joint line; TTP over left proximal tibial soft tissue mass  Flexion: intact  Extension: intact  Patellar Displacement:  Patellar Tilt:  Patellar Apprehension: negative  Patellar Grind Marlow's: negative  Lachman's: negative  Drawer: negative  Varus laxity: negative  Valgus laxity: negative  Iva: negative       Procedures

## 2021-05-13 ENCOUNTER — OFFICE VISIT (OUTPATIENT)
Dept: OBGYN CLINIC | Facility: OTHER | Age: 66
End: 2021-05-13
Payer: MEDICARE

## 2021-05-13 ENCOUNTER — APPOINTMENT (OUTPATIENT)
Dept: RADIOLOGY | Facility: OTHER | Age: 66
End: 2021-05-13
Payer: MEDICARE

## 2021-05-13 VITALS
SYSTOLIC BLOOD PRESSURE: 159 MMHG | WEIGHT: 155 LBS | DIASTOLIC BLOOD PRESSURE: 82 MMHG | HEART RATE: 73 BPM | HEIGHT: 62 IN | BODY MASS INDEX: 28.52 KG/M2

## 2021-05-13 DIAGNOSIS — M25.562 CHRONIC PAIN OF BOTH KNEES: ICD-10-CM

## 2021-05-13 DIAGNOSIS — M25.551 PAIN IN RIGHT HIP: Primary | ICD-10-CM

## 2021-05-13 DIAGNOSIS — M76.899 QUADRICEPS TENDONITIS: ICD-10-CM

## 2021-05-13 DIAGNOSIS — M25.551 PAIN IN RIGHT HIP: ICD-10-CM

## 2021-05-13 DIAGNOSIS — M25.561 CHRONIC PAIN OF BOTH KNEES: ICD-10-CM

## 2021-05-13 DIAGNOSIS — G89.29 CHRONIC PAIN OF BOTH KNEES: ICD-10-CM

## 2021-05-13 DIAGNOSIS — M87.00 ASEPTIC NECROSIS BONE (HCC): ICD-10-CM

## 2021-05-13 DIAGNOSIS — M89.9 BONE LESION: ICD-10-CM

## 2021-05-13 PROCEDURE — 73502 X-RAY EXAM HIP UNI 2-3 VIEWS: CPT

## 2021-05-13 PROCEDURE — 99204 OFFICE O/P NEW MOD 45 MIN: CPT | Performed by: FAMILY MEDICINE

## 2021-05-13 PROCEDURE — 73564 X-RAY EXAM KNEE 4 OR MORE: CPT

## 2021-05-17 ENCOUNTER — EVALUATION (OUTPATIENT)
Dept: PHYSICAL THERAPY | Age: 66
End: 2021-05-17
Payer: MEDICARE

## 2021-05-17 DIAGNOSIS — M25.551 PAIN IN RIGHT HIP: Primary | ICD-10-CM

## 2021-05-17 PROCEDURE — 97110 THERAPEUTIC EXERCISES: CPT | Performed by: PHYSICAL THERAPIST

## 2021-05-17 PROCEDURE — 97162 PT EVAL MOD COMPLEX 30 MIN: CPT | Performed by: PHYSICAL THERAPIST

## 2021-05-17 NOTE — PROGRESS NOTES
PT Evaluation     Today's date: 2021  Patient name: Lupe Carvalho  : 1955  MRN: [id number]  Referring provider: Kal Crain  Dx:   Encounter Diagnosis     ICD-10-CM    1  Pain in right hip  M25 [id number] Ambulatory referral to Physical Therapy                  Assessment  Assessment details: Patient seen for PT evaluation for R hip pain  Patient presents with decreased AROM, decreased strength, increased pain at rest and with activity, no formal HEP to address deficits, decreased balance, gait dysfunction  PT initiated HEP for gentle stretches, recommended a daily walking - but to keep distances shorter and to not walk at a fast pace  Impairments: abnormal gait, abnormal or restricted ROM, activity intolerance, impaired balance, impaired physical strength, lacks appropriate home exercise program, pain with function and poor body mechanics  Functional limitations: Patient reports moderate-severe limitations in regards to bending, lifting, walking, sit<>stand transfers, IADLs and recreational activities  Understanding of Dx/Px/POC: good   Prognosis: good    Goals  Impairment Goals to be met within 4 weeks  - Decrease pain by 25%   - Improve ROM by 5-10 degrees R hip grossly throughout  - Increase strength to 4/5 throughout       Functional Goals to be met within 4-6 weeks  - Return to Prior Level of Function  - Increase Functional Status Measure to: expected  - Patient will be independent with HEP  - Improve stride length during gait  - Patient to be able to tolerate walking x 10 min  - Patient to be able to ascend/descend stairs with reciprocal pattern         Plan  Patient would benefit from: skilled physical therapy  Planned modality interventions: cryotherapy and thermotherapy: hydrocollator packs  Planned therapy interventions: activity modification, joint mobilization, manual therapy, neuromuscular re-education, balance, patient education, postural training, strengthening, stretching, therapeutic activities, therapeutic exercise, home exercise program and body mechanics training  Frequency: 2x week  Duration in weeks: 6  Treatment plan discussed with: patient        Subjective Evaluation    History of Present Illness  Date of onset: 2/17/2021  Mechanism of injury: Patient reports that she had sudden increase in R hip pain after falling at home, getting out of the tub  Patient reports that she was just trying to clean the tub  Patient reports that this happened about 1+ month ago, but took almost 3 weeks to get an appt with ortho  Patient was referred to PT and will be getting an ultrasound injection the 25th  X rays taken, did show a bone deformity in the L knee and patient will be having a MRI  Patient's R hip shows aseptic necrosis  Patient reports that she was diagnosed with osteoporosis  Patient is done taking her medications for osteoporosis and only takes OTC supplements  Patient does follow up with the rheumatologist 1x/year       Pain  Location: R hip  Aggravating factors: sitting, standing, walking, stair climbing and lifting  Progression: no change    Social Support  Steps to enter house: yes  Stairs in house: yes   Lives in: multiple-level home  Lives with: spouse    Employment status: not working    Diagnostic Tests  X-ray: abnormal  Treatments  Previous treatment: physical therapy  Patient Goals  Patient goals for therapy: decreased pain, improved balance, increased motion, increased strength and return to sport/leisure activities          Objective     Active Range of Motion     Lumbar   Flexion: 75 degrees   Extension: 12 degrees  with pain  Left lateral flexion: 25 degrees       Right lateral flexion: 20 degrees   Left rotation: 15 degrees   Right rotation: 20 degrees   Left Hip   Normal active range of motion    Right Hip   Flexion: 90 degrees with pain  Abduction: 16 degrees with pain  Left Knee   Normal active range of motion    Right Knee   Normal active range of motion    Strength/Myotome Testing     Left Hip   Normal muscle strength    Right Hip   Planes of Motion   Flexion: 3-  Abduction: 3-  Adduction: 3+    Left Knee   Flexion: 4-  Extension: 4-    Right Knee   Flexion: 3+  Extension: 3+    Left Ankle/Foot   Dorsiflexion: 5    Right Ankle/Foot   Dorsiflexion: 5    Ambulation     Observational Gait   Gait: antalgic   Decreased walking speed, stride length, right stance time, right swing time and right step length  Right foot contact pattern: foot flat    Functional Assessment        Single Leg Stance   Right: 0 seconds             Precautions: L hip aseptic necrosis      Manuals 5/17                                       Neuro Re-Ed                Side stepping                                                Ther Ex                Nu step                Heel slides        bridges        SAQ        LAQ                SLR x 3, stand        Ham curls        HR                 Leg press?                 Squats - elevated (> knee height)                                Ther Activity                        Gait Training        Gym laps                Modalities

## 2021-05-24 ENCOUNTER — CONSULT (OUTPATIENT)
Dept: DERMATOLOGY | Facility: CLINIC | Age: 66
End: 2021-05-24
Payer: MEDICARE

## 2021-05-24 VITALS — HEIGHT: 62 IN | WEIGHT: 150 LBS | TEMPERATURE: 97.9 F | BODY MASS INDEX: 27.6 KG/M2

## 2021-05-24 DIAGNOSIS — L82.1 SEBORRHEIC KERATOSIS: ICD-10-CM

## 2021-05-24 DIAGNOSIS — D48.5 NEOPLASM OF UNCERTAIN BEHAVIOR OF SKIN: ICD-10-CM

## 2021-05-24 DIAGNOSIS — D22.60 MULTIPLE BENIGN MELANOCYTIC NEVI OF UPPER AND LOWER EXTREMITIES AND TRUNK: Primary | ICD-10-CM

## 2021-05-24 DIAGNOSIS — D22.70 MULTIPLE BENIGN MELANOCYTIC NEVI OF UPPER AND LOWER EXTREMITIES AND TRUNK: Primary | ICD-10-CM

## 2021-05-24 DIAGNOSIS — L81.4 LENTIGO: ICD-10-CM

## 2021-05-24 DIAGNOSIS — D22.5 MULTIPLE BENIGN MELANOCYTIC NEVI OF UPPER AND LOWER EXTREMITIES AND TRUNK: Primary | ICD-10-CM

## 2021-05-24 DIAGNOSIS — L82.0 SEBORRHEIC KERATOSES, INFLAMED: ICD-10-CM

## 2021-05-24 PROCEDURE — 99203 OFFICE O/P NEW LOW 30 MIN: CPT | Performed by: DERMATOLOGY

## 2021-05-24 PROCEDURE — 17110 DESTRUCTION B9 LES UP TO 14: CPT | Performed by: DERMATOLOGY

## 2021-05-24 NOTE — PROGRESS NOTES
[facility] Note     Patient Name: Susan Douglas  Encounter Date: 05/24/21     Have you been cared for by a [facility] Dermatologist in the last 3 years and, if so, which one? No    · Have you traveled outside of the [address] [place] [address] in the past 3 months or outside of the [facility] area in the last 2 weeks? No     May we call your Preferred Phone number to discuss your specific medical information? Yes     May we leave a detailed message that includes your specific medical information? Yes      Today's Chief Concerns:   Concern #1:  Growth on left leg      Past Medical History:  Have you personally ever had or currently have any of the following? · Skin cancer (such as Melanoma, Basal Cell Carcinoma, Squamous Cell Carcinoma? (If Yes, please provide more detail)- No  · Eczema: No  · Psoriasis: No  · HIV/AIDS: No  · Hepatitis B or C: No  · Tuberculosis: No  · Systemic Immunosuppression such as Diabetes, Biologic or Immunotherapy, Chemotherapy, Organ Transplantation, Bone Marrow Transplantation (If YES, please provide more detail): No  · Radiation Treatment (If YES, please provide more detail): No  · Any other major medical conditions/concerns? (If Yes, which types)- No    Social History:     What is/was your primary occupation? retired     What are your hobbies/past-times? reading    Family History:  Have any of your "first degree relatives" (parent, brother, sister, or child) had any of the following       · Skin cancer such as Melanoma or Merkel Cell Carcinoma or Pancreatic Cancer? No  · Eczema, Asthma, Hay Fever or Seasonal Allergies: No  · Psoriasis or Psoriatic Arthritis: No  · Do any other medical conditions seem to run in your family? If Yes, what condition and which relatives?   YES, mother had hx of breast and colon cancer, father has hx of hypertention, heart attack, prostate cancer, brother has hx of stroke    Current Medications:       Current Outpatient Medications:     Calcium Carb-Cholecalciferol 600-800 MG-UNIT TABS, Take 2 tablets by mouth daily, Disp: , Rfl:     Cholecalciferol ([employer] VITAMIN D3) 1000 units capsule, Take 1 capsule by mouth daily, Disp: , Rfl:     Cranberry-Vitamin C-Probiotic (AZO CRANBERRY PO), Take by mouth 2 (two) times a day, Disp: , Rfl:     fenofibrate (TRICOR) 145 mg tablet, TAKE 1 TABLET BY MOUTH DAILY, Disp: 90 tablet, Rfl: 3    hydrochlorothiazide (HYDRODIURIL) 25 mg tablet, TAKE 1 TABLET DAILY, Disp: 90 tablet, Rfl: 0    meclizine (ANTIVERT) 25 mg tablet, Take 2 tablets (50 mg total) by mouth every 8 (eight) hours as needed for dizziness, Disp: 30 tablet, Rfl: 2    Multiple Vitamin (MULTIVITAMIN) capsule, Take 1 capsule by mouth daily, Disp: , Rfl:     nortriptyline (PAMELOR) 10 mg capsule, Take 1 capsule (10 mg total) by mouth 3 (three) times a day, Disp: 270 capsule, Rfl: 1    Probiotic Product (PROBIOTIC-10 PO), Take 1 capsule by mouth daily, Disp: , Rfl:     TURMERIC CURCUMIN PO, Take by mouth, Disp: , Rfl:     ibandronate (BONIVA) 150 MG tablet, TAKE 1 TABLET EVERY 30 DAYS (Patient not taking: Reported on 5/24/2021), Disp: 3 tablet, Rfl: 0    methocarbamol (ROBAXIN) 500 mg tablet, Take 1 tablet (500 mg total) by mouth daily at bedtime (Patient not taking: Reported on 5/24/2021), Disp: 10 tablet, Rfl: 0    methylPREDNISolone 4 MG tablet therapy pack, Use as directed on package (Patient not taking: Reported on 5/24/2021), Disp: 21 each, Rfl: 0      Review of Systems:  Have you recently had or currently have any of the following? If YES, what are you doing for the problem? · Fever, chills or unintended weight loss: No  · Sudden loss or change in your vision: No  · Nausea, vomiting or blood in your stool: No  · Painful or swollen joints: No  · Wheezing or cough: No  · Changing mole or non-healing wound: No  · Nosebleeds: No  · Excessive sweating: No  · Easy or prolonged bleeding?   No  · Over the last 2 weeks, how often have you been bothered by the following problems? · Taking little interest or pleasure in doing things: 1 - Not at All  · Feeling down, depressed, or hopeless: 1 - Not at All  · Rapid heartbeat with epinephrine:  No    · FEMALES ONLY:    · Are you pregnant or planning to become pregnant? No  · Are you currently or planning to be nursing or breast feeding? No    · Any known allergies? Allergies   Allergen Reactions    Iodine - Food Allergy Rash   ·       Physical Exam:     Was a chaperone (Derm Clinical Assistant) present throughout the entire Physical Exam? Yes     Did the Dermatology Team specifically  the patient on the importance of a Full Skin Exam to be sure that nothing is missed clinically?  Yes}  o Did the patient ultimately request or accept a Full Skin Exam?  Yes  o Did the patient specifically refuse to have the areas "under-the-bra" examined by the Dermatologist? No  o Did the patient specifically refuse to have the areas "under-the-underwear" examined by the Dermatologist? No    CONSTITUTIONAL:   Vitals:    05/24/21 1003   Temp: 97 9 °F (36 6 °C)   TempSrc: Temporal   Weight: 68 kg (150 lb)   Height: 5' 2" (1 575 m)       PSYCH: Normal mood and affect  EYES: Normal conjunctiva  ENT: Normal lips and oral mucosa  CARDIOVASCULAR: No edema  RESPIRATORY: Normal respirations  HEME/LYMPH/IMMUNO:  No regional lymphadenopathy except as noted below in "ASSESSMENT AND PLAN BY DIAGNOSIS"    SKIN:  FULL ORGAN SYSTEM EXAM   Hair, Scalp, Ears, Face Normal except as noted below in Assessment   Neck, Cervical Chain Nodes Normal except as noted below in Assessment   Right Arm/Hand/Fingers Normal except as noted below in Assessment   Left Arm/Hand/Fingers Normal except as noted below in Assessment   Chest/Breasts/Axillae Viewed areas Normal except as noted below in Assessment   Abdomen, Umbilicus Normal except as noted below in Assessment   Back/Spine Normal except as noted below in Assessment   Groin/Genitalia/Buttocks NOT EXAMINED   Right Leg, Foot, Toes Normal except as noted below in Assessment   Left Leg, Foot, Toes Normal except as noted below in Assessment        Assessment and Plan by Diagnosis:    History of Present Condition:     Duration:  How long has this been an issue for you?    o  had about 2 years   Location Affected:  Where on the body is this affecting you?    o  left leg and left ankle   Quality:  Is there any bleeding, pain, itch, burning/irritation, or redness associated with the skin lesion? o  denies   Severity:  Describe any bleeding, pain, itch, burning/irritation, or redness on a scale of 1 to 10 (with 10 being the worst)  o  denies   Timing:  Does this condition seem to be there pretty constantly or do you notice it more at specific times throughout the day? o  constantly   Context:  Have you ever noticed that this condition seems to be associated with specific activities you do?    o  denies   Modifying Factors:    o Anything that seems to make the condition worse?    -  denies  o What have you tried to do to make the condition better?    -  denies   Associated Signs and Symptoms:  Does this skin lesion seem to be associated with any of the following:      MELANOCYTIC NEVI ("Moles")    Physical Exam:   Anatomic Location Affected:   Mostly on sun-exposed areas of the face   Morphological Description:  Scattered, 1-4mm round to ovoid, symmetrical-appearing, even bordered, skin colored to dark brown macules/papules, mostly in sun-exposed areas   Pertinent Positives:   Pertinent Negatives:     Additional History of Present Condition:  Patient has had for many years    Assessment and Plan:  Based on a thorough discussion of this condition and the management approach to it (including a comprehensive discussion of the known risks, side effects and potential benefits of treatment), the patient (family) agrees to implement the following specific plan:   Reassured benging     Melanocytic Nevi  Melanocytic nevi ("moles") are tan or brown, raised or flat areas of the skin which have an increased number of melanocytes  Melanocytes are the cells in our body which make pigment and account for skin color  Some moles are present at birth (I e , "congenital nevi"), while others come up later in life (i e , "acquired nevi")  The sun can stimulate the body to make more moles  Sunburns are not the only thing that triggers more moles  Chronic sun exposure can do it too  Clinically distinguishing a healthy mole from melanoma may be difficult, even for experienced dermatologists  The "ABCDE's" of moles have been suggested as a means of helping to alert a person to a suspicious mole and the possible increased risk of melanoma  The suggestions for raising alert are as follows:    Asymmetry: Healthy moles tend to be symmetric, while melanomas are often asymmetric  Asymmetry means if you draw a line through the mole, the two halves do not match in color, size, shape, or surface texture  Asymmetry can be a result of rapid enlargement of a mole, the development of a raised area on a previously flat lesion, scaling, ulceration, bleeding or scabbing within the mole  Any mole that starts to demonstrate "asymmetry" should be examined promptly by a board certified dermatologist      Border: Healthy moles tend to have discrete, even borders  The border of a melanoma often blends into the normal skin and does not sharply delineate the mole from normal skin  Any mole that starts to demonstrate "uneven borders" should be examined promptly by a board certified dermatologist      Color: Healthy moles tend to be one color throughout  Melanomas tend to be made up of different colors ranging from dark black, blue, white, or red    Any mole that demonstrates a color change should be examined promptly by a board certified dermatologist      Diameter: Healthy moles tend to be smaller than 0 6 cm in size; an exception are "congenital nevi" that can be larger  Melanomas tend to grow and can often be greater than 0 6 cm (1/4 of an inch, or the size of a pencil eraser)  This is only a guideline, and many normal moles may be larger than 0 6 cm without being unhealthy  Any mole that starts to change in size (small to bigger or bigger to smaller) should be examined promptly by a board certified dermatologist      Evolving: Healthy moles tend to "stay the same "  Melanomas may often show signs of change or evolution such as a change in size, shape, color, or elevation  Any mole that starts to itch, bleed, crust, burn, hurt, or ulcerate or demonstrate a change or evolution should be examined promptly by a board certified dermatologist       Dysplastic Nevi  Dysplastic moles are moles that fit the ABCDE rules of melanoma but are not identified as melanomas when examined under the microscope  They may indicate an increased risk of melanoma in that person  If there is a family history of melanoma, most experts agree that the person may be at an increased risk for developing a melanoma  Experts still do not agree on what dysplastic moles mean in patients without a personal or family history of melanoma  Dysplastic moles are usually larger than common moles and have different colors within it with irregular borders  The appearance can be very similar to a melanoma  Biopsies of dysplastic moles may show abnormalities which are different from a regular mole  Melanoma  Malignant melanoma is a type of skin cancer that can be deadly if it spreads throughout the body  The incidence of melanoma in the [place] is growing faster than any other cancer  Melanoma usually grows near the surface of the skin for a period of time, and then begins to grow deeper into the skin  Once it grows deeper into the skin, the risk of spread to other organs greatly increases   Therefore, early detection and removal of a malignant melanoma may result in a better chance at a complete cure; removal after the tumor has spread may not be as effective, leading to worse clinical outcomes such as death  The true rate of nevus transformation into a melanoma is unknown  It has been estimated that the lifetime risk for any acquired melanocytic nevus on any 21year-old individual transforming into melanoma by age [de-identified] is 0 03% (1 in 3,164) for men and 0 009% (1 in 10,800) for women  The appearance of a "new mole" remains one of the most reliable methods for identifying a malignant melanoma  Occasionally, melanomas appear as rapidly growing, blue-black, dome-shaped bumps within a previous mole or previous area of normal skin  Other times, melanomas are suspected when a mole suddenly appears or changes  Itching, burning, or pain in a pigmented lesion should increase suspicion, but most patients with early melanoma have no skin discomfort whatsoever  Melanoma can occur anywhere on the skin, including areas that are difficult for self-examination  Many melanomas are first noticed by other family members  Suspicious-looking moles may be removed for microscopic examination  You may be able to prevent death from melanoma by doing two simple things:    1  Try to avoid unnecessary sun exposure and protect your skin when it is exposed to the sun  People who live near the equator, people who have intermittent exposures to large amounts of sun, and people who have had sunburns in childhood or adolescence have an increased risk for melanoma  Sun sense and vigilant sun protection may be keys to helping to prevent melanoma  We recommend wearing UPF-rated sun protective clothing and sunglasses whenever possible and applying a moisturizer-sunscreen combination product (SPF 50+) such as Neutrogena Daily Defense to sun exposed areas of skin at least three times a day      2  Have your moles regularly examined by a board certified dermatologist AND by yourself or a family member/friend at home  We recommend that you have your moles examined at least once a year by a board certified dermatologist   Use your birthday as an annual reminder to have your "Birthday Suit" (I e , your skin) examined; it is a nice birthday gift to yourself to know that your skin is healthy appearing! Additionally, at-home self examinations may be helpful for detecting a possible melanoma  Use the ABCDEs we discussed and check your moles once a month at home  SEBORRHEIC KERATOSIS; NON-INFLAMED    Physical Exam:   Anatomic Location Affected: Face   Morphological Description:  Flat and raised, waxy, smooth to warty textured, yellow to brownish-grey to dark brown to blackish, discrete, "stuck-on" appearing papules   Pertinent Positives:   Pertinent Negatives: Additional History of Present Condition:  Patient reports new bumps on the skin  Denies itch, burn, pain, bleeding or ulceration  Present constantly; nothing seems to make it worse or better  No prior treatment  Assessment and Plan:  Based on a thorough discussion of this condition and the management approach to it (including a comprehensive discussion of the known risks, side effects and potential benefits of treatment), the patient (family) agrees to implement the following specific plan:   Reassured benign     Seborrheic Keratosis  A seborrheic keratosis is a harmless warty spot that appears during adult life as a common sign of skin aging  Seborrheic keratoses can arise on any area of skin, covered or uncovered, with the usual exception of the palms and soles  They do not arise from mucous membranes  Seborrheic keratoses can have highly variable appearance  Seborrheic keratoses are extremely common  It has been estimated that over 90% of adults over the age of 61 years have one or more of them   They occur in males and females of all races, typically beginning to erupt in the 35s or 40s  They are uncommon under the age of 21 years  The precise cause of seborrhoeic keratoses is not known  Seborrhoeic keratoses are considered degenerative in nature  As time goes by, seborrheic keratoses tend to become more numerous  Some people inherit a tendency to develop a very large number of them; some people may have hundreds of them  The name "seborrheic keratosis" is misleading, because these lesions are not limited to a seborrhoeic distribution (scalp, mid-face, chest, upper back), nor are they formed from sebaceous glands, nor are they associated with sebum -- which is greasy  Seborrheic keratosis may also be called "SK," "Seb K," "basal cell papilloma," "senile wart," or "barnacle "      Researchers have noted:   Eruptive seborrhoeic keratoses can follow sunburn or dermatitis   Skin friction may be the reason they appear in body folds   Viral cause (e g , human papillomavirus) seems unlikely   Stable and clonal mutations or activation of FRFR3, PIK3CA, DAVID, AKT1 and EGFR genes are found in seborrhoeic keratoses   Seborrhoeic keratosis can arise from solar lentigo   FRFR3 mutations also arise in solar lentigines  These mutations are associated with increased age and location on the head and neck, suggesting a role of ultraviolet radiation in these lesions   Seborrheic keratoses do not harbour tumour suppressor gene mutations   Epidermal growth factor receptor inhibitors, which are used to treat some cancers, often result in an increase in verrucal (warty) keratoses  There is no easy way to remove multiple lesions on a single occasion  Unless a specific lesion is "inflamed" and is causing pain or stinging/burning or is bleeding, most insurance companies do not authorize treatment      NEOPLASM OF UNCERTAIN BEHAVIOR OF SKIN    Physical Exam:   (Anatomic Location); (Size and Morphological Description); (Differential Diagnosis):  o Left thigh keratotic red papule; inflamed seborrheic vs squamous cell carcinoma   Pertinent Positives:   Pertinent Negatives: Additional History of Present Condition:  Growth present for about 2 years; treated with freezing by PCP but didn't go away    Assessment and Plan:   I have discussed with the patient that a sample of skin via a "skin biopsy would be potentially helpful to further make a specific diagnosis under the microscope   Based on a thorough discussion of this condition and the management approach to it (including a comprehensive discussion of the known risks, side effects and potential benefits of treatment), the patient (family) agrees to implement the following specific plan:     Scheduled shave biopsy for left thigh on July 19th at 1 :20 pm       o Procedure:  Skin Biopsy  After a thorough discussion of treatment options and risk/benefits/alternatives (including but not limited to local pain, scarring, dyspigmentation, blistering, possible superinfection, and inability to confirm a diagnosis via histopathology), verbal and written consent were obtained and portion of the rash was biopsied for tissue sample  See below for consent that was obtained from patient and subsequent Procedure Note  SEBORRHEIC DERMATITIS- (INFLAMED)    Physical Exam:   Anatomic Location Affected:  right posterior ankle   Morphological Description:  Round crusted macule   Pertinent Positives:   Pertinent Negatives: Additional History of Present Condition:  Patient had about 2 years; hasn't noticed any  change in growth    Assessment and Plan:  Based on a thorough discussion of this condition and the management approach to it (including a comprehensive discussion of the known risks, side effects and potential benefits of treatment), the patient (family) agrees to implement the following specific plan:     Liquid nitrogen was applied for 10-12 seconds to the skin lesion and the expected blistering or scabbing reaction explained  Do not pick at the area   Patient reminded to expect hypopigmented scars from the procedure  Return if lesion fails to fully resolve  Seborrheic Dermatitis   Seborrheic dermatitis is a common, chronic or relapsing form of eczema/dermatitis that mainly affects the sebaceous, gland-rich regions of the scalp, face, and trunk  There are infantile and adult forms of seborrhoeic dermatitis  It is sometimes associated with psoriasis and, in that clinical scenario, may be referred to as "sebo-psoriasis "  Seborrheic dermatitis is also known as "seborrheic eczema "  Dandruff (also called "pityriasis capitis") is an uninflamed form of seborrhoeic dermatitis  Dandruff presents as bran-like scaly patches scattered within hair-bearing areas of the scalp  In an infant, this condition may be referred to as "cradle cap "  The cause of seborrheic dermatitis is not completely understood  It is associated with proliferation of various species of the skin commensal Malassezia, in its yeast (non-pathogenic) form  Its metabolites (such as the fatty acids oleic acid, malssezin, and indole-3-carbaldehyde) may cause an inflammatory reaction  Differences in skin barrier lipid content and function may account for individual presentations  Infantile Seborrheic Dermatitis  Infantile seborrheic dermatitis affects babies under the age of 1 months and usually resolves by 1012 months of age  Infantile seborrheic dermatitis causes "cradle cap" (diffuse, greasy scaling on scalp)  The rash may spread to affect armpit and groin folds (a type of "napkin dermatitis")  There may be associated salmon-pink colored patches that may flake or peel  The rash in this case is usually not especially itchy, so the baby often appears undisturbed by the rash, even when more generalized  Adult Seborrheic Dermatitis  Adult seborrheic dermatitis tends to begin in late adolescence; prevalence is greatest in young adults and in the elderly  It is more common in males than in females      The following factors are sometimes associated with severe adult seborrheic dermatitis:   Oily skin   Familial tendency to seborrhoeic dermatitis or a family history of psoriasis   Immunosuppression: organ transplant recipient, human immunodeficiency virus (HIV) infection and patients with lymphoma   Neurological and psychiatric diseases: Parkinson disease, tardive dyskinesia, depression, epilepsy, facial nerve palsy, spinal cord injury and congenital disorders such as Down syndrome   Treatment for psoriasis with psoralen and ultraviolet A (PUVA) therapy   Lack of sleep   Stressful events  In adults, seborrheic dermatitis may typically affect the scalp, face (creases around the nose, behind ears, within eyebrows) and upper trunk  Typical clinical features include:   Winter flares, improving in summer following sun exposure   Minimal itch most of the time   Combination oily and dry mid-facial skin   Ill-defined localized scaly patches or diffuse scale in the scalp   Blepharitis; scaly red eyelid margins   [place]-pink, thin, scaly, and ill-defined plaques in skin folds on both sides of the face   Petal or ring-shaped flaky patches on hair-line and on anterior chest   Rash in armpits, under the breasts, in the groin folds and genital creases   Superficial folliculitis (inflamed hair follicles) on cheeks and upper trunk    Seborrheic dermatitis is diagnosed by its clinical appearance and behavior  Skin biopsy may be helpful but is rarely necessary to make this diagnosis  PROCEDURE:  DESTRUCTION OF PRE-MALIGNANT LESIONS  After a thorough discussion of treatment options and risk/benefits/alternatives (including but not limited to local pain, scarring, dyspigmentation, blistering, and possible superinfection), verbal and written consent were obtained and the aforementioned lesions were treated on with cryotherapy using liquid nitrogen x 1 cycle for 5-10 seconds       TOTAL NUMBER of 1 pre-malignant lesions were treated today on the ANATOMIC LOCATION: left ankle  The patient tolerated the procedure well, and after-care instructions were provided  LENTIGO    Physical Exam:   Anatomic Location Affected:  nose   Morphological Description:  [place]  patch   Pertinent Positives:   Pertinent Negatives: Additional History of Present Condition:  Discovered upon skin exam    Assessment and Plan:  Based on a thorough discussion of this condition and the management approach to it (including a comprehensive discussion of the known risks, side effects and potential benefits of treatment), the patient (family) agrees to implement the following specific plan:   Sending RX thru [facility] for bleaching cream for nose    What is a lentigo? A lentigo is a pigmented flat or slightly raised lesion with a clearly defined edge  Unlike an ephelis (freckle), it does not fade in the winter months  There are several kinds of lentigo  The name lentigo originally referred to its appearance resembling a small lentil  The plural of lentigo is lentigines, although lentigos is also in common use  Who gets lentigines? Lentigines can affect males and females of all ages and races  Solar lentigines are especially prevalent in fair skinned adults  Lentigines associated with syndromes are present at birth or arise during childhood  What causes lentigines? Common forms of lentigo are due to exposure to ultraviolet radiation:   Sun damage including sunburn    Indoor tanning    Phototherapy, especially photochemotherapy (PUVA)    Ionizing radiation, eg radiation therapy, can also cause lentigines  Several familial syndromes associated with widespread lentigines originate from mutations in Tu-MAP kinase, mTOR signaling and PTEN pathways  What are the clinical features of lentigines?   Lentigines have been classified into several different types depending on what they look like, where they appear on the body, causative factors, and whether they are associated to other diseases or conditions  Lentigines may be solitary or more often, multiple  Most lentigines are smaller than 5 mm in diameter      Lentigo simplex   A precursor to junctional naevus    Arises during childhood and early adult life    Found on trunk and limbs    Small brown round or oval macule or thin plaque    Jagged or smooth edge    May have a dry surface    May disappear in time  Solar lentigo   A precursor to seborrhoeic keratosis    Found on chronically sun exposed sites such as hands, face, lower legs    May also follow sunburn to shoulders    Yellow, light or dark brown regular or irregular macule or thin plaque    May have a dry surface    Often has moth-eaten outline    Can slowly enlarge to several centimeters in diameter    May disappear, often through the process known as lichenoid keratosis    When atypical in appearance, may be difficult to distinguish from melanoma in situ  Ink spot lentigo   Also known as reticulated lentigo    Few in number compared to solar lentigines    Follows sunburn in very fair skinned individuals    Dark brown to black irregular ink spot-like macule  PUVA lentigo   Similar to ink spot lentigo but follows photochemotherapy (PUVA)    Location anywhere exposed to PUVA  Tanning bed lentigo   Similar to ink spot lentigo but follows indoor tanning    Location anywhere exposed to tanning bed  Radiation lentigo   Occurs in site of irradiation (accidental or therapeutic)    Associated with late-stage radiation dermatitis: epidermal atrophy, subcutaneous fibrosis, keratosis, telangiectasias  Melanotic macule   Mucosal surfaces or adjacent glabrous skin eg lip, vulva, penis, anus    Light to dark brown    Also called mucosal melanosis  Generalised lentigines   Found on any exposed or covered site from early childhood    Small macules may merge to form larger patches    Not associated with a syndrome    Also called lentigines profusa, multiple lentigines  Agminated lentigines   Naevoid eruption of lentigos confined to a single segmental area    Sharp demarcation in midline    May have associated neurological and developmental abnormalities  Patterned lentigines   Inherited tendency to lentigines on face, lips, buttocks, palms, soles    Recognised mainly in people of  ethnicity  Centrofacial neurodysraphic lentiginosis  Associated with mental retardation  Lentiginosis syndromes   Syndromes include LEOPARD/Hiller, Peutz-Jeghers, Laugier-Hunziker, Moynahan, Xeroderma pigmentosum, myxoma syndromes (RAMSEY, NAME, Lozano), Ruvalcaba-Myhre-Osorio, Bannayan-Zonnana syndrome, Cowden disease (multiple hamartoma syndrome )    Inheritance is autosomal dominant; sporadic cases common    Widespread lentigines present at birth or arise in early childhood    Associated with neural, endocrine, and mesenchymal tumors    How is the diagnosis made? Lentigines are usually diagnosed clinically by their typical appearance  Concern regarding possibility of melanoma may lead to:   Dermatoscopy    Diagnostic excision biopsy    Histopathology of a lentigo shows:   Thickened epidermis    An increased number of melanocytes along the basal layer of epidermis    Unlike junctional melanocytic naevus, there are no nests of melanocytes    Increased melanin pigment within the keratinocytes    Additional features depending on type of lentigo    In contrast, an ephelis (freckle) shows sun-induced increased melanin within the keratinocytes, without an increase in number of cells  What is the treatment for lentigines? Most lentigines are left alone  Attempts to lighten them may not be successful   The following approaches are used:   SPF 50+ broad-spectrum sunscreen    Hydroquinone bleaching cream    Alpha hydroxy acids    Vitamin C    Retinoids    Azelaic acid    Chemical peels  Individual lesions can be permanently removed using:   Cryotherapy    Intense pulsed light    Pigment lasers    How can lentigines be prevented? Lentigines associated with exposure ultraviolet radiation can be prevented by very careful sun protection  Clothing is more successful at preventing new lentigines than are sunscreens  What is the outlook for lentigines? Lentigines usually persist  They may increase in number with age and sun exposure  Some in sun-protected sites may fade and disappear      Scribe Attestation    I,:  Meggan Baker am acting as a scribe while in the presence of the attending physician :       I,:  Jason Kay MD personally performed the services described in this documentation    as scribed in my presence :

## 2021-05-24 NOTE — PATIENT INSTRUCTIONS
SEBORRHEIC KERATOSIS; NON-INFLAMED    Assessment and Plan:  Based on a thorough discussion of this condition and the management approach to it (including a comprehensive discussion of the known risks, side effects and potential benefits of treatment), the patient (family) agrees to implement the following specific plan:   Reassured benign     Seborrheic Keratosis  A seborrheic keratosis is a harmless warty spot that appears during adult life as a common sign of skin aging  Seborrheic keratoses can arise on any area of skin, covered or uncovered, with the usual exception of the palms and soles  They do not arise from mucous membranes  Seborrheic keratoses can have highly variable appearance  Seborrheic keratoses are extremely common  It has been estimated that over 90% of adults over the age of 61 years have one or more of them  They occur in males and females of all races, typically beginning to erupt in the 35s or 45s  They are uncommon under the age of 21 years  The precise cause of seborrhoeic keratoses is not known  Seborrhoeic keratoses are considered degenerative in nature  As time goes by, seborrheic keratoses tend to become more numerous  Some people inherit a tendency to develop a very large number of them; some people may have hundreds of them  The name "seborrheic keratosis" is misleading, because these lesions are not limited to a seborrhoeic distribution (scalp, mid-face, chest, upper back), nor are they formed from sebaceous glands, nor are they associated with sebum -- which is greasy    Seborrheic keratosis may also be called "SK," "Seb K," "basal cell papilloma," "senile wart," or "barnacle "      Researchers have noted:   Eruptive seborrhoeic keratoses can follow sunburn or dermatitis   Skin friction may be the reason they appear in body folds   Viral cause (e g , human papillomavirus) seems unlikely   Stable and clonal mutations or activation of FRFR3, PIK3CA, DAVID, AKT1 and EGFR genes are found in seborrhoeic keratoses   Seborrhoeic keratosis can arise from solar lentigo   FRFR3 mutations also arise in solar lentigines  These mutations are associated with increased age and location on the head and neck, suggesting a role of ultraviolet radiation in these lesions   Seborrheic keratoses do not harbour tumour suppressor gene mutations   Epidermal growth factor receptor inhibitors, which are used to treat some cancers, often result in an increase in verrucal (warty) keratoses  There is no easy way to remove multiple lesions on a single occasion  Unless a specific lesion is "inflamed" and is causing pain or stinging/burning or is bleeding, most insurance companies do not authorize treatment  NEOPLASM OF UNCERTAIN BEHAVIOR OF SKIN    Assessment and Plan:   I have discussed with the patient that a sample of skin via a "skin biopsy would be potentially helpful to further make a specific diagnosis under the microscope   Based on a thorough discussion of this condition and the management approach to it (including a comprehensive discussion of the known risks, side effects and potential benefits of treatment), the patient (family) agrees to implement the following specific plan:     Scheduled shave biopsy for left thigh on July 19th at 1 :20 pm       o Procedure:  Skin Biopsy  After a thorough discussion of treatment options and risk/benefits/alternatives (including but not limited to local pain, scarring, dyspigmentation, blistering, possible superinfection, and inability to confirm a diagnosis via histopathology), verbal and written consent were obtained and portion of the rash was biopsied for tissue sample  See below for consent that was obtained from patient and subsequent Procedure Note      SEBORRHEIC DERMATITIS- (INFLAMED)    Assessment and Plan:  Based on a thorough discussion of this condition and the management approach to it (including a comprehensive discussion of the known risks, side effects and potential benefits of treatment), the patient (family) agrees to implement the following specific plan:     Liquid nitrogen was applied for 10-12 seconds to the skin lesion and the expected blistering or scabbing reaction explained  Do not pick at the area  Patient reminded to expect hypopigmented scars from the procedure  Return if lesion fails to fully resolve  Seborrheic Dermatitis   Seborrheic dermatitis is a common, chronic or relapsing form of eczema/dermatitis that mainly affects the sebaceous, gland-rich regions of the scalp, face, and trunk  There are infantile and adult forms of seborrhoeic dermatitis  It is sometimes associated with psoriasis and, in that clinical scenario, may be referred to as "sebo-psoriasis "  Seborrheic dermatitis is also known as "seborrheic eczema "  Dandruff (also called "pityriasis capitis") is an uninflamed form of seborrhoeic dermatitis  Dandruff presents as bran-like scaly patches scattered within hair-bearing areas of the scalp  In an infant, this condition may be referred to as "cradle cap "  The cause of seborrheic dermatitis is not completely understood  It is associated with proliferation of various species of the skin commensal Malassezia, in its yeast (non-pathogenic) form  Its metabolites (such as the fatty acids oleic acid, malssezin, and indole-3-carbaldehyde) may cause an inflammatory reaction  Differences in skin barrier lipid content and function may account for individual presentations  Infantile Seborrheic Dermatitis  Infantile seborrheic dermatitis affects babies under the age of 1 months and usually resolves by 1012 months of age  Infantile seborrheic dermatitis causes "cradle cap" (diffuse, greasy scaling on scalp)  The rash may spread to affect armpit and groin folds (a type of "napkin dermatitis")  There may be associated salmon-pink colored patches that may flake or peel    The rash in this case is usually not especially itchy, so the baby often appears undisturbed by the rash, even when more generalized  Adult Seborrheic Dermatitis  Adult seborrheic dermatitis tends to begin in late adolescence; prevalence is greatest in young adults and in the elderly  It is more common in males than in females  The following factors are sometimes associated with severe adult seborrheic dermatitis:   Oily skin   Familial tendency to seborrhoeic dermatitis or a family history of psoriasis   Immunosuppression: organ transplant recipient, human immunodeficiency virus (HIV) infection and patients with lymphoma   Neurological and psychiatric diseases: Parkinson disease, tardive dyskinesia, depression, epilepsy, facial nerve palsy, spinal cord injury and congenital disorders such as Down syndrome   Treatment for psoriasis with psoralen and ultraviolet A (PUVA) therapy   Lack of sleep   Stressful events  In adults, seborrheic dermatitis may typically affect the scalp, face (creases around the nose, behind ears, within eyebrows) and upper trunk  Typical clinical features include:   Winter flares, improving in summer following sun exposure   Minimal itch most of the time   Combination oily and dry mid-facial skin   Ill-defined localized scaly patches or diffuse scale in the scalp   Blepharitis; scaly red eyelid margins   [place]-pink, thin, scaly, and ill-defined plaques in skin folds on both sides of the face   Petal or ring-shaped flaky patches on hair-line and on anterior chest   Rash in armpits, under the breasts, in the groin folds and genital creases   Superficial folliculitis (inflamed hair follicles) on cheeks and upper trunk    Seborrheic dermatitis is diagnosed by its clinical appearance and behavior  Skin biopsy may be helpful but is rarely necessary to make this diagnosis      LENTIGO    Assessment and Plan:  Based on a thorough discussion of this condition and the management approach to it (including a comprehensive discussion of the known risks, side effects and potential benefits of treatment), the patient (family) agrees to implement the following specific plan:   Sending RX thru [facility] for bleaching cream for nose    What is a lentigo? A lentigo is a pigmented flat or slightly raised lesion with a clearly defined edge  Unlike an ephelis (freckle), it does not fade in the winter months  There are several kinds of lentigo  The name lentigo originally referred to its appearance resembling a small lentil  The plural of lentigo is lentigines, although lentigos is also in common use  Who gets lentigines? Lentigines can affect males and females of all ages and races  Solar lentigines are especially prevalent in fair skinned adults  Lentigines associated with syndromes are present at birth or arise during childhood  What causes lentigines? Common forms of lentigo are due to exposure to ultraviolet radiation:   Sun damage including sunburn    Indoor tanning    Phototherapy, especially photochemotherapy (PUVA)    Ionizing radiation, eg radiation therapy, can also cause lentigines  Several familial syndromes associated with widespread lentigines originate from mutations in Tu-MAP kinase, mTOR signaling and PTEN pathways  What are the clinical features of lentigines? Lentigines have been classified into several different types depending on what they look like, where they appear on the body, causative factors, and whether they are associated to other diseases or conditions  Lentigines may be solitary or more often, multiple  Most lentigines are smaller than 5 mm in diameter      Lentigo simplex   A precursor to junctional naevus    Arises during childhood and early adult life    Found on trunk and limbs    Small brown round or oval macule or thin plaque    Jagged or smooth edge    May have a dry surface    May disappear in time  Solar lentigo   A precursor to seborrhoeic keratosis  Found on chronically sun exposed sites such as hands, face, lower legs    May also follow sunburn to shoulders    Yellow, light or dark brown regular or irregular macule or thin plaque    May have a dry surface    Often has moth-eaten outline    Can slowly enlarge to several centimeters in diameter    May disappear, often through the process known as lichenoid keratosis    When atypical in appearance, may be difficult to distinguish from melanoma in situ  Ink spot lentigo   Also known as reticulated lentigo    Few in number compared to solar lentigines    Follows sunburn in very fair skinned individuals    Dark brown to black irregular ink spot-like macule  PUVA lentigo   Similar to ink spot lentigo but follows photochemotherapy (PUVA)    Location anywhere exposed to PUVA  Tanning bed lentigo   Similar to ink spot lentigo but follows indoor tanning    Location anywhere exposed to tanning bed  Radiation lentigo   Occurs in site of irradiation (accidental or therapeutic)    Associated with late-stage radiation dermatitis: epidermal atrophy, subcutaneous fibrosis, keratosis, telangiectasias  Melanotic macule   Mucosal surfaces or adjacent glabrous skin eg lip, vulva, penis, anus    Light to dark brown    Also called mucosal melanosis  Generalised lentigines   Found on any exposed or covered site from early childhood    Small macules may merge to form larger patches    Not associated with a syndrome    Also called lentigines profusa, multiple lentigines  Agminated lentigines   Naevoid eruption of lentigos confined to a single segmental area    Sharp demarcation in midline    May have associated neurological and developmental abnormalities  Patterned lentigines   Inherited tendency to lentigines on face, lips, buttocks, palms, soles    Recognised mainly in people of  ethnicity  Centrofacial neurodysraphic lentiginosis  Associated with mental retardation  Lentiginosis syndromes   Syndromes include LEOPARD/Elsy, Peutz-Jeghers, Laugier-Hunziker, Moynahan, Xeroderma pigmentosum, myxoma syndromes (RAMSEY, NAME, Lozano), Ruvalcaba-Myhre-Osorio, Bannayan-Zonnana syndrome, Cowden disease (multiple hamartoma syndrome )    Inheritance is autosomal dominant; sporadic cases common    Widespread lentigines present at birth or arise in early childhood    Associated with neural, endocrine, and mesenchymal tumors    How is the diagnosis made? Lentigines are usually diagnosed clinically by their typical appearance  Concern regarding possibility of melanoma may lead to:   Dermatoscopy    Diagnostic excision biopsy    Histopathology of a lentigo shows:   Thickened epidermis    An increased number of melanocytes along the basal layer of epidermis    Unlike junctional melanocytic naevus, there are no nests of melanocytes    Increased melanin pigment within the keratinocytes    Additional features depending on type of lentigo    In contrast, an ephelis (freckle) shows sun-induced increased melanin within the keratinocytes, without an increase in number of cells  What is the treatment for lentigines? Most lentigines are left alone  Attempts to lighten them may not be successful  The following approaches are used:   SPF 50+ broad-spectrum sunscreen    Hydroquinone bleaching cream    Alpha hydroxy acids    Vitamin C    Retinoids    Azelaic acid    Chemical peels  Individual lesions can be permanently removed using:   Cryotherapy    Intense pulsed light    Pigment lasers    How can lentigines be prevented? Lentigines associated with exposure ultraviolet radiation can be prevented by very careful sun protection  Clothing is more successful at preventing new lentigines than are sunscreens  What is the outlook for lentigines? Lentigines usually persist  They may increase in number with age and sun exposure  Some in sun-protected sites may fade and disappear

## 2021-05-24 NOTE — PROGRESS NOTES
1  Pain in right hip  CT lower extremity wo contrast right   2  Injury of right hip, subsequent encounter     3  Arthritis of right hip       Orders Placed This Encounter   Procedures    CT lower extremity wo contrast right        Imaging Studies (I personally reviewed images in PACS and report):    IMPRESSION:  · Acute traumatic right hip pain--significantly worse  · Right knee traumatic quadriceps tendonitis--mild; improving  · Left tibial bone lesion (likely osteochondroma)--MRI ordered    Lieutenant Best returns today with acutely worsening right hip pain over the weekend  Now experiencing more pain with simple movements  Given the history of minor trauma when slipping and falling onto her knees a few months ago, now requesting stat CT scan to r/o occult fracture of the right hip  She will remain NWB until this is reviewed with the patient over the phone  Repeat X-ray next visit:   No       Return if symptoms worsen or fail to improve, for Follow-up after CT of right hip  Patient Instructions   Due to increased pain in the setting of trauma and very minor OA on XR will order CT scan to rule out occult proximal femoral fracture  Remain NWB until we are able to call with results  CHIEF COMPLAINT:  Follow-up right hip pain    HPI:  Edwina Cadena is a 78 y o  female with a history of HTN and hyperlipidemia who presents in follow-up for USG right hip injection  She was previously seen as a new patient in this capacity on 05/13/2021  Reported right hip pain which started after a fall about a month prior  Localizes the pain to the groin area  Radiates down the anterior portion of the thigh  Pain was exacerbated by climbing stairs and getting out of chairs  This is not accompanied by clicking, popping  Rest improves her symptoms  TODAY:  Patient presents with increased pain over the weekend  Now experiencing pain with all ranges of motion   Denies fevers, chills, night sweats, redness at the joint, swelling  Pain is exacerbated by any movement or weight bearing  Rest improves her symptoms  Denies any new injuries or previous surgeries  Review of Systems   Constitutional: Negative for chills, fever and unexpected weight change  HENT: Negative for hearing loss, nosebleeds and sore throat  Eyes: Negative for pain, redness and visual disturbance  Respiratory: Negative for cough, shortness of breath and wheezing  Cardiovascular: Negative for chest pain, palpitations and leg swelling  Gastrointestinal: Negative for abdominal pain, nausea and vomiting  Endocrine: Negative for polydipsia and polyuria  Genitourinary: Negative for dysuria and hematuria  Skin: Negative for rash and wound  Neurological: Negative for dizziness, numbness and headaches  Psychiatric/Behavioral: Negative for decreased concentration, dysphoric mood and suicidal ideas  The patient is not nervous/anxious            Following history reviewed and update:    Past Medical History:   Diagnosis Date    Hyperlipidemia     Hypertension     Hypertension      Past Surgical History:   Procedure Laterality Date    EYE SURGERY      TOOTH EXTRACTION      TUBAL LIGATION       Social History   Social History     Substance and Sexual Activity   Alcohol Use No     Social History     Substance and Sexual Activity   Drug Use No     Social History     Tobacco Use   Smoking Status Never Smoker   Smokeless Tobacco Never Used     Family History   Problem Relation Age of Onset    Colon cancer Mother 46    Breast cancer Mother 48    Cancer Mother     Heart attack Father     Prostate cancer Father 79    Hypertension Father     Heart attack Sister     No Known Problems Daughter     No Known Problems Maternal Grandmother     No Known Problems Maternal Grandfather     No Known Problems Paternal Grandmother     No Known Problems Paternal Grandfather     No Known Problems Sister     No Known Problems Daughter     No Known Problems Maternal Aunt     No Known Problems Maternal Aunt     No Known Problems Maternal Aunt     No Known Problems Son     Stroke Brother      Allergies   Allergen Reactions    Iodine - Food Allergy Rash          Physical Exam  /87 (BP Location: Left arm, Patient Position: Sitting, Cuff Size: Adult)   Pulse 82   Wt 67 1 kg (148 lb)   LMP  (LMP Unknown)   BMI 27 07 kg/m²     Constitutional:  see vital signs  Gen: well-developed, normocephalic/atraumatic, well-groomed  Eyes: No inflammation or discharge of conjunctiva or lids; sclera clear   Pharynx: no inflammation, lesion, or mass of lips  Neck: supple, no masses, non-distended  MSK: no inflammation, lesion, mass, or clubbing of nails and digits except for other than mentioned below  SKIN: no visible rashes or skin lesions  Pulmonary/Chest: Effort normal  No respiratory distress     NEURO: cranial nerves grossly intact  PSYCH:  Alert and oriented to person, place, and time; recent and remote memory intact; mood normal, no depression, anxiety, or agitation, judgment and insight good and intact     Ortho Exam  BACK EXAM:  Gait: normal, no trendelenberg gait, no antalgic gait    BACK TENDERNESS:  Spinous Processes: no  Paraspinal Muscles: no  SI Joint: no  Sacrum: no    ROM:  Flexion: intact  Extension: intact  Sidebending: intact    DERMATOMAL SENSATION:  L1: normal   L2: normal   L3: normal   L4: normal   L5: normal   S1: normal    STRENGTH (bilateral):  Knee Extension: 5/5  Knee Flexion: 5/5  Foot Dorsiflexion: 5/5  Great Toe Extension: 5/5  Foot Plantarflexion: 5/5  Hip Flexion: 5/5  Hip Abduction: 5/5    REFLEXES:  Patellar: 2+ bilateral  Achilles: 2+ bilateral  Clonus: negative bilateral    BACK:   SUPINE STRAIGHT LEG: negative  PRONE STRAIGHT LEG:  SLUMP: negative    RIGHT HIP:  LOG ROLL: STRONGLY POSITIVE   RADHA: STRONGLY POSITIVE   FADIR: STRONGLY POSITIVE     LEFT HIP:  LOG ROLL: negative  RADHA: negative  FADIR: negative    SI JOINT:  ASIS COMPRESSION TEST: negative   GAENSLIN'S TEST: not tested  STORK TEST: not tested  FORTON'S FINGER: negative   RADHA SI PAIN: negative       Procedures

## 2021-05-25 ENCOUNTER — HOSPITAL ENCOUNTER (OUTPATIENT)
Dept: RADIOLOGY | Age: 66
Discharge: HOME | End: 2021-05-25
Payer: MEDICARE

## 2021-05-25 ENCOUNTER — OFFICE VISIT (OUTPATIENT)
Dept: OBGYN CLINIC | Facility: OTHER | Age: 66
End: 2021-05-25
Payer: MEDICARE

## 2021-05-25 VITALS
HEART RATE: 82 BPM | WEIGHT: 148 LBS | BODY MASS INDEX: 27.07 KG/M2 | SYSTOLIC BLOOD PRESSURE: 161 MMHG | DIASTOLIC BLOOD PRESSURE: 87 MMHG

## 2021-05-25 DIAGNOSIS — M25.551 PAIN IN RIGHT HIP: Primary | ICD-10-CM

## 2021-05-25 DIAGNOSIS — M16.11 ARTHRITIS OF RIGHT HIP: ICD-10-CM

## 2021-05-25 DIAGNOSIS — M25.551 PAIN IN RIGHT HIP: ICD-10-CM

## 2021-05-25 DIAGNOSIS — S79.911D INJURY OF RIGHT HIP, SUBSEQUENT ENCOUNTER: ICD-10-CM

## 2021-05-25 PROCEDURE — 73700 CT LOWER EXTREMITY W/O DYE: CPT

## 2021-05-25 PROCEDURE — 99214 OFFICE O/P EST MOD 30 MIN: CPT | Performed by: FAMILY MEDICINE

## 2021-05-26 ENCOUNTER — TELEPHONE (OUTPATIENT)
Dept: OBGYN CLINIC | Facility: MEDICAL CENTER | Age: 66
End: 2021-05-26

## 2021-05-26 NOTE — TELEPHONE ENCOUNTER
Patient sees Dr Kash Ferguson  Patient is calling to schedule her Visco Ultra sound guided injection  She is asking if she can have it scheduled this Friday  Please give her a call relating this      CB # [phone]

## 2021-05-27 ENCOUNTER — OFFICE VISIT (OUTPATIENT)
Dept: OBGYN CLINIC | Facility: OTHER | Age: 66
End: 2021-05-27
Payer: MEDICARE

## 2021-05-27 VITALS
WEIGHT: 149 LBS | DIASTOLIC BLOOD PRESSURE: 85 MMHG | BODY MASS INDEX: 27.25 KG/M2 | HEART RATE: 90 BPM | SYSTOLIC BLOOD PRESSURE: 160 MMHG

## 2021-05-27 DIAGNOSIS — M25.551 PAIN IN RIGHT HIP: ICD-10-CM

## 2021-05-27 DIAGNOSIS — S79.911D INJURY OF RIGHT HIP, SUBSEQUENT ENCOUNTER: ICD-10-CM

## 2021-05-27 DIAGNOSIS — M16.11 ARTHRITIS OF RIGHT HIP: Primary | ICD-10-CM

## 2021-05-27 PROCEDURE — 99213 OFFICE O/P EST LOW 20 MIN: CPT | Performed by: FAMILY MEDICINE

## 2021-05-27 PROCEDURE — 20611 DRAIN/INJ JOINT/BURSA W/US: CPT | Performed by: FAMILY MEDICINE

## 2021-05-27 RX ORDER — LIDOCAINE HYDROCHLORIDE 10 MG/ML
4 INJECTION, SOLUTION INFILTRATION; PERINEURAL
Status: COMPLETED | OUTPATIENT
Start: 2021-05-27 | End: 2021-05-27

## 2021-05-27 RX ORDER — TRIAMCINOLONE ACETONIDE 40 MG/ML
40 INJECTION, SUSPENSION INTRA-ARTICULAR; INTRAMUSCULAR
Status: COMPLETED | OUTPATIENT
Start: 2021-05-27 | End: 2021-05-27

## 2021-05-27 RX ADMIN — TRIAMCINOLONE ACETONIDE 40 MG: 40 INJECTION, SUSPENSION INTRA-ARTICULAR; INTRAMUSCULAR at 21:52

## 2021-05-27 RX ADMIN — LIDOCAINE HYDROCHLORIDE 4 ML: 10 INJECTION, SOLUTION INFILTRATION; PERINEURAL at 21:52

## 2021-05-27 NOTE — PROGRESS NOTES
1  Arthritis of right hip     2  Pain in right hip     3  Injury of right hip, subsequent encounter       No orders of the defined types were placed in this encounter  Imaging Studies (I personally reviewed images in PACS and report):    Right hip CT 05/25/21:  Negative for fracture    IMPRESSION:  · Osteoarthritis right hip--in acute exacerbation  · USG CS injection   · Post injection instructions given      Repeat X-ray next visit:   No       No follow-ups on file  There are no Patient Instructions on file for this visit  CHIEF COMPLAINT:  Follow-up right hip pain    HPI:  Yara Perez is a 78 y o  female with a history of HTN, hyperlipidemia who presents for follow-up right hip pain  Last seen and evaluated in this capacity earlier this week  At that time was scheduled to undergo USG right hip CS injection  However, was complaining of acutely worsening pain in the right hip over the weekend; an issue she did not have prior to her fall several months ago  Given this worsening pain in the setting of trauma, a CT scan was ordered to rule out occult fracture  The stat CT was read as negative  Mary Kiser was called and notified that she may continue her NWB status later that day  Today she returns for USG CS of the right hip  Visit 5/27/2021 :  Today presents with persistent right hip pain  Localized to groin region and radiates down 1/3 of medial aspect of inner thigh  Worse with prolonged ambulation and deep squats  Denies any accompanied back pain or lateral hip pain  Denies any fevers, chills, night sweats  S/p right hip CT  This was reviewed with the patient and negative for occult fracture  [place] is making an informed decision to proceed with USG CS injection today  Review of Systems   Constitutional: Negative for chills, fever and unexpected weight change  HENT: Negative for hearing loss, nosebleeds and sore throat      Eyes: Negative for pain, redness and visual disturbance  Respiratory: Negative for cough, shortness of breath and wheezing  Cardiovascular: Negative for chest pain, palpitations and leg swelling  Gastrointestinal: Negative for abdominal pain, nausea and vomiting  Endocrine: Negative for polydipsia and polyuria  Genitourinary: Negative for dysuria and hematuria  Skin: Negative for rash and wound  Neurological: Negative for dizziness, numbness and headaches  Psychiatric/Behavioral: Negative for decreased concentration, dysphoric mood and suicidal ideas  The patient is not nervous/anxious            Following history reviewed and update:    Past Medical History:   Diagnosis Date    Hyperlipidemia     Hypertension     Hypertension      Past Surgical History:   Procedure Laterality Date    EYE SURGERY      TOOTH EXTRACTION      TUBAL LIGATION       Social History   Social History     Substance and Sexual Activity   Alcohol Use No     Social History     Substance and Sexual Activity   Drug Use No     Social History     Tobacco Use   Smoking Status Never Smoker   Smokeless Tobacco Never Used     Family History   Problem Relation Age of Onset    Colon cancer Mother 46    Breast cancer Mother 48    Cancer Mother     Heart attack Father     Prostate cancer Father 79    Hypertension Father     Heart attack Sister     No Known Problems Daughter     No Known Problems Maternal Grandmother     No Known Problems Maternal Grandfather     No Known Problems Paternal Grandmother     No Known Problems Paternal Grandfather     No Known Problems Sister     No Known Problems Daughter     No Known Problems Maternal Aunt     No Known Problems Maternal Aunt     No Known Problems Maternal Aunt     No Known Problems Son     Stroke Brother      Allergies   Allergen Reactions    Iodine - Food Allergy Rash          Physical Exam  /85 (BP Location: Left arm, Patient Position: Sitting, Cuff Size: Adult)   Pulse 90   Wt 67 6 kg (149 lb)   LMP (LMP Unknown)   BMI 27 25 kg/m²     Constitutional:  see vital signs  Gen: well-developed, normocephalic/atraumatic, well-groomed  Eyes: No inflammation or discharge of conjunctiva or lids; sclera clear   Pharynx: no inflammation, lesion, or mass of lips  Neck: supple, no masses, non-distended  MSK: no inflammation, lesion, mass, or clubbing of nails and digits except for other than mentioned below  SKIN: no visible rashes or skin lesions  Pulmonary/Chest: Effort normal  No respiratory distress  NEURO: cranial nerves grossly intact  PSYCH:  Alert and oriented to person, place, and time; recent and remote memory intact; mood normal, no depression, anxiety, or agitation, judgment and insight good and intact     Ortho Exam  BACK EXAM:  Gait: normal, no trendelenberg gait, no antalgic gait    BACK TENDERNESS:  Spinous Processes: no  Paraspinal Muscles: no  SI Joint: no  Sacrum: no    ROM:  Flexion: intact  Extension: intact  Sidebending: intact    DERMATOMAL SENSATION:  L1: normal   L2: normal   L3: normal   L4: normal   L5: normal   S1: normal    STRENGTH (bilateral):  Knee Extension: 5/5  Knee Flexion: 5/5  Foot Dorsiflexion: 5/5  Great Toe Extension: 5/5  Foot Plantarflexion: 5/5  Hip Flexion: 5/5  Hip Abduction: 5/5    REFLEXES:  Patellar: 2+ bilateral  Achilles: 2+ bilateral  Clonus: negative bilateral    BACK:   SUPINE STRAIGHT LEG: negative  PRONE STRAIGHT LEG:  SLUMP: negative    RIGHT HIP:  LOG ROLL: positive   RADHA: positive   FADIR: positive     LEFT HIP:  LOG ROLL: negative  RADHA: negative  FADIR: negative      Large joint arthrocentesis: R hip joint  Universal Protocol:  Procedure performed by: (Ayaz Maravilla DO; PGY-4)  Consent: Verbal consent obtained    Risks and benefits: risks, benefits and alternatives were discussed  Consent given by: patient  Time out: Immediately prior to procedure a "time out" was called to verify the correct patient, procedure, equipment, support staff and site/side marked as required  Timeout called at: 5/27/2021 2:45 PM   Patient understanding: patient states understanding of the procedure being performed  Site marked: the operative site was marked  Radiology Images displayed and confirmed   If images not available, report reviewed: imaging studies available  Patient identity confirmed: verbally with patient    Supporting Documentation  Indications: pain   Procedure Details  Location: hip - R hip joint  Preparation: Patient was prepped and draped in the usual sterile fashion  Needle size: 22 G  Ultrasound guidance: yes  Approach: anterior  Medications administered: 4 mL lidocaine 1 %; 40 mg triamcinolone acetonide 40 mg/mL    Aspirate amount: 0 mL    Patient tolerance: patient tolerated the procedure well with no immediate complications  Dressing:  Sterile dressing applied

## 2021-05-28 ENCOUNTER — APPOINTMENT (OUTPATIENT)
Dept: PHYSICAL THERAPY | Age: 66
End: 2021-05-28
Payer: MEDICARE

## 2021-06-01 ENCOUNTER — OFFICE VISIT (OUTPATIENT)
Dept: PHYSICAL THERAPY | Age: 66
End: 2021-06-01
Payer: MEDICARE

## 2021-06-01 DIAGNOSIS — M25.551 PAIN IN RIGHT HIP: Primary | ICD-10-CM

## 2021-06-01 PROCEDURE — 97110 THERAPEUTIC EXERCISES: CPT | Performed by: PHYSICAL THERAPIST

## 2021-06-01 NOTE — PROGRESS NOTES
Daily Note     Today's date: 2021  Patient name: Toni Rosen  : 1955  MRN: [id number]  Referring provider: Valencia Barajas  Dx:   Encounter Diagnosis     ICD-10-CM    1  Pain in right hip  M25 551                Pt consented to work with MORENO Amaya under the direct supervision of Russell Pierre, PT, DPT  Subjective: Pt reports that she has no pain in her R hip today  Pt was supposed to get an injection on 19 but was unable to until pt had CT scan done to confirm there was no fx of the femoral head/acetabulum  Pt had an injection on 21 and experienced immediate relief  Pt stated that she has been taking it easy the past few days because she was scared to do too much  Pt getting MRI on L knee on Friday  Objective: See treatment diary below      Assessment: Tolerated treatment well  Pt reported that she feels pinching in the front of her hip when performing the hamstring stretch, not painful  Pt had 6/10 pain with standing on R leg during hip abduction  Pain subsides with rest  No pain while standing on R leg during hip flexion  Progress leg press weight next visit  Plan: Continue per plan of care        Precautions: L hip aseptic necrosis      Manuals                                       Neuro Re-Ed                Side stepping                                                Ther Ex                Nu step  10 min              Heel slides  3x10       bridges  3x10       SAQ  3x10 1 5#      LAQ                SLR x 3, stand  20x      Ham curls  30x      HR   3x10               Leg press  20x 10#              Squats - elevated (> knee height)  20x              Hamstring Stretch   5x:10              Ther Activity                        Gait Training        Gym laps                Modalities

## 2021-06-03 ENCOUNTER — OFFICE VISIT (OUTPATIENT)
Dept: PHYSICAL THERAPY | Age: 66
End: 2021-06-03
Payer: MEDICARE

## 2021-06-03 DIAGNOSIS — M25.551 PAIN IN RIGHT HIP: Primary | ICD-10-CM

## 2021-06-03 PROCEDURE — 97110 THERAPEUTIC EXERCISES: CPT

## 2021-06-03 NOTE — PROGRESS NOTES
Daily Note     Today's date: 6/3/2021  Patient name: Omayra Hodges  : 1955  MRN: [id number]  Referring provider: Anjel Barrett  Dx:   Encounter Diagnosis     ICD-10-CM    1  Pain in right hip  M25 551                     Subjective: Pt reports that she has no pain in her R hip today  Pt was supposed to get an injection on 19 but was unable to until pt had CT scan done to confirm there was no fx of the femoral head/acetabulum  Pt had an injection on 21 and experienced immediate relief  Injection has been effective as pt has not been having pain over past few days  Pt getting MRI on L knee on Friday  Objective: See treatment diary below      Assessment: Tolerated treatment well  Modified ham stretch to 90/90 style so that strap did not create stress on R hip  Pt had good session without pain or pinching in R hip  Increased weight slightly with leg press and will progress to weight in standing if diagnostic testing is appropriate to progress  Liked heel toe addition as it challenged her balance which pt feels she needs      Plan: Continue per plan of care        Precautions: L hip aseptic necrosis      Manuals 5/17 6/1 6/3                                     Neuro Re-Ed                Side stepping   x5     Heel toe   x5                                     Ther Ex                Nu step  10 min x10 min             Heel slides  3x10  3x10     bridges  3x10  3x10     SAQ  3x10 1 5# 1 5# 3x10     LAQ   1 5# 3x10             SLR x 3, stand  20x 30x     Ham curls  30x 30x     HR   3x10  30x             Leg press  20x 10# 30# 3x10             Squats - elevated (> knee height)  20x 20 at bars             Hamstring Stretch   5x:10 90/90 [id number]             Ther Activity                        Gait Training        Gym laps                Modalities

## 2021-06-04 ENCOUNTER — HOSPITAL ENCOUNTER (OUTPATIENT)
Dept: RADIOLOGY | Age: 66
Discharge: HOME | End: 2021-06-04
Payer: MEDICARE

## 2021-06-04 DIAGNOSIS — M89.9 BONE LESION: ICD-10-CM

## 2021-06-04 PROCEDURE — G1004 CDSM NDSC: HCPCS

## 2021-06-04 PROCEDURE — 73721 MRI JNT OF LWR EXTRE W/O DYE: CPT

## 2021-06-07 ENCOUNTER — OFFICE VISIT (OUTPATIENT)
Dept: PHYSICAL THERAPY | Age: 66
End: 2021-06-07
Payer: MEDICARE

## 2021-06-07 DIAGNOSIS — M25.551 PAIN IN RIGHT HIP: Primary | ICD-10-CM

## 2021-06-07 PROCEDURE — 97110 THERAPEUTIC EXERCISES: CPT

## 2021-06-07 NOTE — PROGRESS NOTES
Daily Note     Today's date: 2021  Patient name: Seamus Wilhelm  : 1955  MRN: [id number]  Referring provider: Maria Walker  Dx:   Encounter Diagnosis     ICD-10-CM    1  Pain in right hip  M25 551                     Subjective: Pt reports that she has no pain in her R hip today  Pt feels injection has helped  No ill effects with last tx      Objective: See treatment diary below      Assessment: Tolerated treatment well  Modified ham stretch to 90/90 style so that strap did not create stress on R hip  Pt had good session without pain or pinching in R hip  Liked heel toe addition as it challenged her balance which pt feels she needs  No pain issues with weights added to exercise  Will adjust ex as needed      Plan: Continue per plan of care        Precautions: L hip aseptic necrosis      Manuals 5/17 6/1 6/3 6/7                                    Neuro Re-Ed                Side stepping   x5 x5    Heel toe   x5 x5                                    Ther Ex                Nu step  10 min x10 min x10m            Heel slides  3x10  3x10 1 5# 3x10    bridges  3x10  3x10 3x10    SAQ  3x10 1 5# 1 5# 3x10 1 5# 3x10    LAQ   1 5# 3x10 1 5# 3x10            SLR x 3, stand  20x 30x 1 5# 3x10    Ham curls  30x 30x 1 5# 3x10    HR   3x10  30x         1 5# 3x10    Leg press  20x 10# 30# 3x10 40# 3x10            Squats - elevated (> knee height)  20x 20 at bars x30 at bars            Hamstring Stretch   5x:10 90/90 [id number] 90/90 [id number]    Step ups fwd/lat    x15    Ther Activity                        Gait Training        Gym laps                Modalities

## 2021-06-10 ENCOUNTER — OFFICE VISIT (OUTPATIENT)
Dept: PHYSICAL THERAPY | Age: 66
End: 2021-06-10
Payer: MEDICARE

## 2021-06-10 DIAGNOSIS — M25.551 PAIN IN RIGHT HIP: Primary | ICD-10-CM

## 2021-06-10 PROCEDURE — 97110 THERAPEUTIC EXERCISES: CPT | Performed by: PHYSICAL THERAPIST

## 2021-06-10 NOTE — PROGRESS NOTES
Daily Note     Today's date: 6/10/2021  Patient name: Ashly Humphreys  : 1955  MRN: [id number]  Referring provider: Leighann Golden  Dx:   Encounter Diagnosis     ICD-10-CM    1  Pain in right hip  M25 551                Pt consented to work with MORENO Juan under the direct supervision of Deloris Calderon, PT, DPT  Subjective: Pt said that she is feeling better after last visit  Her legs are not sore today and she has minimal pain in the L hip  Pt says that she feels the most pain when going up and down the stairs  Objective: See treatment diary below      Assessment: Tolerated treatment well  Adjusted treatment to Pt tolerance today  Pt had soreness in quadriceps with mini-squats  Pt fatigued post treatment but did not have increased pain  Plan: Continue per plan of care        Precautions: L hip aseptic necrosis      Manuals 5/17 6/1 6/3 6/7 6/10                                   Neuro Re-Ed                Side stepping   x5 x5    Heel toe   x5 x5                                    Ther Ex                Nu step  10 min x10 min x10m 10 min            Heel slides  3x10  3x10 1 5# 3x10 1 5# 3x10    bridges  3x10  3x10 3x10 3x10    SAQ  3x10 1 5# 1 5# 3x10 1 5# 3x10 1 5# 3x10    LAQ   1 5# 3x10 1 5# 3x10 1 5# 3x10            SLR x 3, stand  20x 30x 1 5# 3x10 1 5# 3x10    Ham curls  30x 30x 1 5# 3x10 1 5# 3x10    HR   3x10  30x  1 5# 3x10       1 5# 3x10    Leg press  20x 10# 30# 3x10 40# 3x10            Squats - elevated (> knee height)  20x 20 at bars x30 at bars x20 at bars            Hamstring Stretch   5x:10 90/90 [id number] 90/90 [id number] 90/90 15x:05   Step ups fwd/lat    x15 NT   Ther Activity                        Gait Training        Gym laps                Modalities

## 2021-06-11 ENCOUNTER — TELEPHONE (OUTPATIENT)
Dept: FAMILY MEDICINE CLINIC | Facility: CLINIC | Age: 66
End: 2021-06-11

## 2021-06-11 DIAGNOSIS — N30.00 ACUTE CYSTITIS WITHOUT HEMATURIA: Primary | ICD-10-CM

## 2021-06-11 RX ORDER — SULFAMETHOXAZOLE AND TRIMETHOPRIM 800; 160 MG/1; MG/1
1 TABLET ORAL 2 TIMES DAILY
Qty: 6 TABLET | Refills: 0 | Status: SHIPPED | OUTPATIENT
Start: 2021-06-11 | End: 2021-06-14

## 2021-06-11 NOTE — TELEPHONE ENCOUNTER
Patient called  She is having painful, frequent urination   She is asking if something can be called into [facility]

## 2021-06-14 ENCOUNTER — APPOINTMENT (OUTPATIENT)
Dept: PHYSICAL THERAPY | Age: 66
End: 2021-06-14
Payer: MEDICARE

## 2021-06-15 ENCOUNTER — EVALUATION (OUTPATIENT)
Dept: PHYSICAL THERAPY | Age: 66
End: 2021-06-15
Payer: MEDICARE

## 2021-06-15 DIAGNOSIS — I10 ESSENTIAL HYPERTENSION: ICD-10-CM

## 2021-06-15 DIAGNOSIS — M25.551 PAIN IN RIGHT HIP: Primary | ICD-10-CM

## 2021-06-15 PROCEDURE — 97110 THERAPEUTIC EXERCISES: CPT | Performed by: PHYSICAL THERAPIST

## 2021-06-15 RX ORDER — HYDROCHLOROTHIAZIDE 25 MG/1
TABLET ORAL
Qty: 90 TABLET | Refills: 0 | Status: SHIPPED | OUTPATIENT
Start: 2021-06-15 | End: 2021-08-17

## 2021-06-15 NOTE — PROGRESS NOTES
PT Re-Evaluation  and PT Discharge    Today's date: 6/15/2021  Patient name: Edwina Cadena  : 1955  MRN: [id number]  Referring provider: Delano Johnston  Dx:    Encounter Diagnosis     ICD-10-CM    1  Pain in right hip  M25 [id number]                Pt consented to work with MORENO Downey under the direct supervision of Sherman Malin, PT, DPT  Assessment  Assessment details: Pt seen for PT re-evaluation today  Pt demonstrates increased strength, increased ROM, and decreased pain  Will d/c pt with appropriate HEP today  Impairments: abnormal gait, abnormal or restricted ROM, activity intolerance, impaired balance, impaired physical strength, lacks appropriate home exercise program, pain with function and poor body mechanics  Functional limitations: Pt reports that if she bends from the waist, she can start to feel a slight pain in her low back Understanding of Dx/Px/POC: good   Prognosis: good    Goals  Impairment Goals to be met within 4 weeks  - Decrease pain by 25% - goal met   - Improve ROM by 5-10 degrees R hip grossly throughout - goal met   - Increase strength to 4/5 throughout - goal met        Functional Goals to be met within 4-6 weeks     - Return to Prior Level of Function - goal met   - Increase Functional Status Measure to: expected - goal met   - Patient will be independent with HEP - goal met   - Improve stride length during gait - progressing   - Patient to be able to tolerate walking x 10 min - goal met   - Patient to be able to ascend/descend stairs with reciprocal pattern - goal met      Plan  Patient would benefit from: skilled physical therapy  Planned modality interventions: cryotherapy and thermotherapy: hydrocollator packs  Planned therapy interventions: activity modification, joint mobilization, manual therapy, neuromuscular re-education, balance, patient education, postural training, strengthening, stretching, therapeutic activities, therapeutic exercise, home exercise program and body mechanics training  Treatment plan discussed with: patient        Subjective Evaluation    History of Present Illness  Date of onset: 2021  Mechanism of injury: Pt reports that she does not have any pain  She only starts to feel it if she bends over from the waist and not using the knees  Pt reports that PT is helping and that she is always feels great when she goes home  Pt says that she is able to tolerate more activity and do more functional exercises  Pt has been going on two walks a day around the block     Pain  Current pain ratin  At best pain ratin  At worst pain ratin  Location: R hip  Aggravating factors: lifting  Progression: no change    Social Support  Steps to enter house: yes  Stairs in house: yes   Lives in: multiple-level home  Lives with: spouse    Employment status: not working    Diagnostic Tests  X-ray: abnormal  Treatments  Previous treatment: physical therapy  Current treatment: physical therapy  Patient Goals  Patient goals for therapy: decreased pain, improved balance, increased motion, increased strength and return to sport/leisure activities          Objective     Active Range of Motion     Lumbar   Flexion: 80 degrees   Extension: 50 degrees   Left lateral flexion: 29 degrees       Right lateral flexion: 26 degrees   Left rotation: 31 degrees   Right rotation: 31 degrees   Left Hip   Normal active range of motion    Right Hip   Flexion: 90 degrees with pain  Abduction: 16 degrees with pain  Left Knee   Normal active range of motion    Right Knee   Normal active range of motion    Strength/Myotome Testing     Left Hip   Normal muscle strength  Planes of Motion   Flexion: 4  Abduction: 4  Adduction: 4    Right Hip   Planes of Motion   Flexion: 4  Abduction: 4  Adduction: 4    Left Knee   Flexion: 4  Extension: 4    Right Knee   Flexion: 4  Extension: 4    Left Ankle/Foot   Dorsiflexion: 5    Right Ankle/Foot   Dorsiflexion: 5    Ambulation Observational Gait   Gait: antalgic   Right stance time, right swing time and right step length within functional limits  Decreased walking speed and stride length     Right foot contact pattern: foot flat    Functional Assessment        Single Leg Stance   Right: 0 seconds             Precautions: L hip aseptic necrosis      Manuals 6/15 6/1 6/3 6/7 6/10                                   Neuro Re-Ed                Side stepping   x5 x5    Heel toe   x5 x5                                    Ther Ex                Nu step 10 min  10 min x10 min x10m 10 min            Heel slides  3x10  3x10 1 5# 3x10 1 5# 3x10    bridges  3x10  3x10 3x10 3x10    SAQ  3x10 1 5# 1 5# 3x10 1 5# 3x10 1 5# 3x10    LAQ   1 5# 3x10 1 5# 3x10 1 5# 3x10            SLR x 3, stand  20x 30x 1 5# 3x10 1 5# 3x10    Ham curls  30x 30x 1 5# 3x10 1 5# 3x10    HR   3x10  30x  1 5# 3x10       1 5# 3x10    Leg press  20x 10# 30# 3x10 40# 3x10            Squats - elevated (> knee height) 20x at bars  20x 20 at bars x30 at bars x20 at bars            Hamstring Stretch   5x:10 90/90 [id number] 90/90 [id number] 90/90 15x:05   Step ups fwd/lat    x15 NT   Ther Activity                        Gait Training        Gym laps                Modalities

## 2021-06-17 ENCOUNTER — APPOINTMENT (OUTPATIENT)
Dept: PHYSICAL THERAPY | Age: 66
End: 2021-06-17
Payer: MEDICARE

## 2021-07-19 ENCOUNTER — OFFICE VISIT (OUTPATIENT)
Dept: DERMATOLOGY | Facility: CLINIC | Age: 66
End: 2021-07-19
Payer: MEDICARE

## 2021-07-19 VITALS — BODY MASS INDEX: 26.68 KG/M2 | HEIGHT: 62 IN | WEIGHT: 145 LBS | TEMPERATURE: 98.1 F

## 2021-07-19 DIAGNOSIS — D48.5 NEOPLASM OF UNCERTAIN BEHAVIOR OF SKIN: Primary | ICD-10-CM

## 2021-07-19 PROCEDURE — 11102 TANGNTL BX SKIN SINGLE LES: CPT | Performed by: DERMATOLOGY

## 2021-07-19 PROCEDURE — 88305 TISSUE EXAM BY PATHOLOGIST: CPT | Performed by: STUDENT IN AN ORGANIZED HEALTH CARE EDUCATION/TRAINING PROGRAM

## 2021-07-19 PROCEDURE — 99213 OFFICE O/P EST LOW 20 MIN: CPT | Performed by: DERMATOLOGY

## 2021-07-19 NOTE — PATIENT INSTRUCTIONS
NEOPLASM OF UNCERTAIN BEHAVIOR OF SKIN    Assessment and Plan:   I have discussed with the patient that a sample of skin via a "skin biopsy would be potentially helpful to further make a specific diagnosis under the microscope   Based on a thorough discussion of this condition and the management approach to it (including a comprehensive discussion of the known risks, side effects and potential benefits of treatment), the patient (family) agrees to implement the following specific plan:    o Procedure:  Skin Biopsy  After a thorough discussion of treatment options and risk/benefits/alternatives (including but not limited to local pain, scarring, dyspigmentation, blistering, possible superinfection, and inability to confirm a diagnosis via histopathology), verbal and written consent were obtained and portion of the rash was biopsied for tissue sample  See below for consent that was obtained from patient and subsequent Procedure Note  INFORMED CONSENT DISCUSSION AND POST-OPERATIVE INSTRUCTIONS FOR PATIENT    I   RATIONALE FOR PROCEDURE  I understand that a skin biopsy allows the Dermatologist to test a lesion or rash under the microscope to obtain a diagnosis  It usually involves numbing the area with numbing medication and removing a small piece of skin; sometimes the area will be closed with sutures  In this specific procedure, sutures are not usually needed  If any sutures are placed, then they are usually need to be removed in 2 weeks or less  I understand that my Dermatologist recommends that a skin "shave" biopsy be performed today  A local anesthetic, similar to the kind that a dentist uses when filling a cavity, will be injected with a very small needle into the skin area to be sampled  The injected skin and tissue underneath "will go to sleep and become numb so no pain should be felt afterwards    An instrument shaped like a tiny "razor blade" (shave biopsy instrument) will be used to cut a small piece of tissue and skin from the area so that a sample of tissue can be taken and examined more closely under the microscope  A slight amount of bleeding will occur, but it will be stopped with direct pressure and a pressure bandage and any other appropriate methods  I understands that a scar will form where the wound was created  Surgical ointment will be applied to help protect the wound  Sutures are not usually needed  II   RISKS AND POTENTIAL COMPLICATIONS   I understand the risks and potential complications of a skin biopsy include but are not limited to the following:   Bleeding   Infection   Pain   Scar/keloid   Skin discoloration   Incomplete Removal   Recurrence   Nerve Damage/Numbness/Loss of Function   Allergic Reaction to Anesthesia   Biopsies are diagnostic procedures and based on findings additional treatment or evaluation may be required   Loss or destruction of specimen resulting in no additional findings    My Dermatologist has explained to me the nature of the condition, the nature of the procedure, and the benefits to be reasonably expected compared with alternative approaches  My Dermatologist has discussed the likelihood of major risks or complications of this procedure including the specific risks listed above, such as bleeding, infection, and scarring/keloid  I understand that a scar is expected after this procedure  I understand that my physician cannot predict if the scar will form a "keloid," which extends beyond the borders of the wound that is created  A keloid is a thick, painful, and bumpy scar  A keloid can be difficult to treat, as it does not always respond well to therapy, which includes injecting cortisone directly into the keloid every few weeks  While this usually reduces the pain and size of the scar, it does not eliminate it  I understand that photographs may be taken before and after the procedure    These will be maintained as part of the medical providers confidential records and may not be made available to me  I further authorize the medical provider to use the photographs for teaching purposes or to illustrate scientific papers, books, or lectures if in his/her judgment, medical research, education, or science may benefit from its use  I have had an opportunity to fully inquire about the risks and benefits of this procedure and its alternatives  I have been given ample time and opportunity to ask questions and to seek a second opinion if I wished to do so  I acknowledge that there have specifically been no guarantees as to the cosmetic results from the procedure  I am aware that with any procedure there is always the possibility of an unexpected complication  III  POST-PROCEDURAL CARE (WHAT YOU WILL NEED TO DO "AFTER THE BIOPSY" TO OPTIMIZE HEALING)     Keep the area clean and dry  Try NOT to remove the bandage or get it wet for the first 24 hours   Gently clean the area and apply surgical ointment (such as Vaseline petrolatum ointment, which is available "over the counter" and not a prescription) to the biopsy site for up to 2 weeks straight  This acts to protect the wound from the outside world   Sutures are not usually placed in this procedure  If any sutures were placed, return for suture removal as instructed (generally 1 week for the face, 2 weeks for the body)   Take Acetaminophen (Tylenol) for discomfort, if no contraindications  Ibuprofen or aspirin could make bleeding worse   Call our office immediately for signs of infection: fever, chills, increased redness, warmth, tenderness, discomfort/pain, or pus or foul smell coming from the wound  WHAT TO DO IF THERE IS ANY BLEEDING? If a small amount of bleeding is noticed, place a clean cloth over the area and apply firm pressure for ten minutes  Check the wound after 10 minutes of direct pressure    If bleeding persists, try one more time for an additional 10 minutes of direct pressure on the area  If the bleeding becomes heavier or does not stop after the second attempt, or if you have any other questions about this procedure, then please call your [facility] by calling [phone] (SKIN)  I hereby acknowledge that I have reviewed and verified the site with my Dermatologist and have requested and authorized my Dermatologist to proceed with the procedure

## 2021-07-19 NOTE — PROGRESS NOTES
[facility] Follow Up Note    Patient Name: Ilana Martines  Encounter Date: 07/19/21    Today's Chief Concerns:  Lizzie Tuttle Concern #1:  Spot on left thigh      Current Medications:    Current Outpatient Medications:     Calcium Carb-Cholecalciferol 600-800 MG-UNIT TABS, Take 2 tablets by mouth daily, Disp: , Rfl:     Cholecalciferol ([employer] VITAMIN D3) 1000 units capsule, Take 1 capsule by mouth daily, Disp: , Rfl:     Cranberry-Vitamin C-Probiotic (AZO CRANBERRY PO), Take by mouth 2 (two) times a day, Disp: , Rfl:     fenofibrate (TRICOR) 145 mg tablet, TAKE 1 TABLET BY MOUTH DAILY, Disp: 90 tablet, Rfl: 3    hydrochlorothiazide (HYDRODIURIL) 25 mg tablet, TAKE 1 TABLET DAILY, Disp: 90 tablet, Rfl: 0    meclizine (ANTIVERT) 25 mg tablet, Take 2 tablets (50 mg total) by mouth every 8 (eight) hours as needed for dizziness, Disp: 30 tablet, Rfl: 2    Multiple Vitamin (MULTIVITAMIN) capsule, Take 1 capsule by mouth daily, Disp: , Rfl:     nortriptyline (PAMELOR) 10 mg capsule, Take 1 capsule (10 mg total) by mouth 3 (three) times a day, Disp: 270 capsule, Rfl: 1    Probiotic Product (PROBIOTIC-10 PO), Take 1 capsule by mouth daily, Disp: , Rfl:     TURMERIC CURCUMIN PO, Take by mouth, Disp: , Rfl:     ibandronate (BONIVA) 150 MG tablet, TAKE 1 TABLET EVERY 30 DAYS (Patient not taking: Reported on 7/19/2021), Disp: 3 tablet, Rfl: 0    methocarbamol (ROBAXIN) 500 mg tablet, Take 1 tablet (500 mg total) by mouth daily at bedtime (Patient not taking: Reported on 7/19/2021), Disp: 10 tablet, Rfl: 0    methylPREDNISolone 4 MG tablet therapy pack, Use as directed on package (Patient not taking: Reported on 7/19/2021), Disp: 21 each, Rfl: 0    CONSTITUTIONAL:   Vitals:    07/19/21 1306   Temp: 98 1 °F (36 7 °C)   TempSrc: Temporal   Weight: 65 8 kg (145 lb)   Height: 5' 2" (1 575 m)       Specific Alerts:    Have you been seen by a [facility] Dermatologist in the last 3 years?   YES    Are you pregnant or planning to become pregnant? No    Are you currently or planning to be nursing or breast feeding? No    Allergies   Allergen Reactions    Iodine - Food Allergy Rash       May we call your Preferred Phone number to discuss your specific medical information? YES    May we leave a detailed message that includes your specific medical information? YES    Have you traveled outside of the [facility] in the past 3 months? No    Do you currently have a pacemaker or defibrillator? No    Do you have any artificial heart valves, joints, plates, screws, rods, stents, pins, etc? No   - If Yes, were any placed within the last 2 years? Do you require any medications prior to a surgical procedure? No    Are you taking any medications that cause you to bleed more easily ("blood thinners") No    Have you ever experienced a rapid heartbeat with epinephrine? No    Have you ever been treated with "gold" (gold sodium thiomalate) therapy? No    [facility] can help with wrinkles, "laugh lines," facial volume loss, "double chin," "love handles," age spots, and more  Are you interested in learning today about some of the skin enhancement procedures that we offer? (If Yes, please provide more detail) No    Review of Systems:  Have you recently had or currently have any of the following?     · Fever or chills: No  · Night Sweats: No  · Headaches: No  · Weight Gain: No  · Weight Loss: No  · Blurry Vision: No  · Nausea: No  · Vomiting: No  · Diarrhea: No  · Blood in Stool: No  · Abdominal Pain: No  · Itchy Skin: No  · Painful Joints: No  · Swollen Joints: No  · Muscle Pain: No  · Irregular Mole: No  · Sun Burn: No  · Dry Skin: No  · Skin Color Changes: No  · Scar or Keloid: No  · Cold Sores/Fever Blisters: No  · Bacterial Infections/MRSA: No  · Anxiety: No  · Depression: No  · Suicidal or Homicidal Thoughts: No      PSYCH: Normal mood and affect  EYES: Normal conjunctiva  ENT: Normal lips and oral mucosa  CARDIOVASCULAR: No edema  RESPIRATORY: Normal respirations  HEME/LYMPH/IMMUNO:  No regional lymphadenopathy except as noted below in ASSESSMENT AND PLAN BY DIAGNOSIS    FOCUSED ORGAN SYSTEM SKIN EXAM (SKIN)   Left Leg Normal except as noted below in Assessment       NEOPLASM OF UNCERTAIN BEHAVIOR OF SKIN    Physical Exam:   (Anatomic Location); (Size and Morphological Description); (Differential Diagnosis):  o Left thigh; skin; shave biopsy; 78year old female with a 0 6 mm pink warty papule; Rule out: Squamous cell carcinoma versus inflamed veruca   Pertinent Positives:   Pertinent Negatives: Additional History of Present Condition:  Patient has had small growth for a couple of years; noticed no change    Assessment and Plan:   I have discussed with the patient that a sample of skin via a "skin biopsy would be potentially helpful to further make a specific diagnosis under the microscope   Based on a thorough discussion of this condition and the management approach to it (including a comprehensive discussion of the known risks, side effects and potential benefits of treatment), the patient (family) agrees to implement the following specific plan:    o Procedure:  Skin Biopsy  After a thorough discussion of treatment options and risk/benefits/alternatives (including but not limited to local pain, scarring, dyspigmentation, blistering, possible superinfection, and inability to confirm a diagnosis via histopathology), verbal and written consent were obtained and portion of the rash was biopsied for tissue sample  See below for consent that was obtained from patient and subsequent Procedure Note      PROCEDURE SHAVE BIOPSY NOTE:     Performing Physician: Oly Driver Anatomic Location; Clinical Description with size (cm); Pre-Op Diagnosis:   o Specimen AL Left inner thigh; skin; shave biopsy; 78year old female with a 0 6 mm pink warty papule; Rule out: Squamous cell carcinoma versus inflamed emre    Post-op diagnosis: Same      Local anesthesia: 1% xylocaine with epi       Topical anesthesia: None     Hemostasis: Aluminum chloride       After obtaining informed consent  at which time there was a discussion about the purpose of biopsy  and low risks of infection and bleeding  The area was prepped and draped in the usual fashion  Anesthesia was obtained with 1% lidocaine with epinephrine  A shave biopsy to an appropriate sampling depth was obtained with a sterile blade (such as a 15-blade or DermaBlade)  The resulting wound was covered with surgical ointment and bandaged appropriately  The patient tolerated the procedure well without complications and was without signs of functional compromise  Specimen has been sent for review by Dermatopathology  Standard post-procedure care has been explained and has been included in written form within the patient's copy of Informed Consent  INFORMED CONSENT DISCUSSION AND POST-OPERATIVE INSTRUCTIONS FOR PATIENT    I   RATIONALE FOR PROCEDURE  I understand that a skin biopsy allows the Dermatologist to test a lesion or rash under the microscope to obtain a diagnosis  It usually involves numbing the area with numbing medication and removing a small piece of skin; sometimes the area will be closed with sutures  In this specific procedure, sutures are not usually needed  If any sutures are placed, then they are usually need to be removed in 2 weeks or less  I understand that my Dermatologist recommends that a skin "shave" biopsy be performed today  A local anesthetic, similar to the kind that a dentist uses when filling a cavity, will be injected with a very small needle into the skin area to be sampled  The injected skin and tissue underneath "will go to sleep and become numb so no pain should be felt afterwards    An instrument shaped like a tiny "razor blade" (shave biopsy instrument) will be used to cut a small piece of tissue and skin from the area so that a sample of tissue can be taken and examined more closely under the microscope  A slight amount of bleeding will occur, but it will be stopped with direct pressure and a pressure bandage and any other appropriate methods  I understands that a scar will form where the wound was created  Surgical ointment will be applied to help protect the wound  Sutures are not usually needed  II   RISKS AND POTENTIAL COMPLICATIONS   I understand the risks and potential complications of a skin biopsy include but are not limited to the following:   Bleeding   Infection   Pain   Scar/keloid   Skin discoloration   Incomplete Removal   Recurrence   Nerve Damage/Numbness/Loss of Function   Allergic Reaction to Anesthesia   Biopsies are diagnostic procedures and based on findings additional treatment or evaluation may be required   Loss or destruction of specimen resulting in no additional findings    My Dermatologist has explained to me the nature of the condition, the nature of the procedure, and the benefits to be reasonably expected compared with alternative approaches  My Dermatologist has discussed the likelihood of major risks or complications of this procedure including the specific risks listed above, such as bleeding, infection, and scarring/keloid  I understand that a scar is expected after this procedure  I understand that my physician cannot predict if the scar will form a "keloid," which extends beyond the borders of the wound that is created  A keloid is a thick, painful, and bumpy scar  A keloid can be difficult to treat, as it does not always respond well to therapy, which includes injecting cortisone directly into the keloid every few weeks  While this usually reduces the pain and size of the scar, it does not eliminate it  I understand that photographs may be taken before and after the procedure    These will be maintained as part of the medical providers confidential records and may not be made available to me  I further authorize the medical provider to use the photographs for teaching purposes or to illustrate scientific papers, books, or lectures if in his/her judgment, medical research, education, or science may benefit from its use  I have had an opportunity to fully inquire about the risks and benefits of this procedure and its alternatives  I have been given ample time and opportunity to ask questions and to seek a second opinion if I wished to do so  I acknowledge that there have specifically been no guarantees as to the cosmetic results from the procedure  I am aware that with any procedure there is always the possibility of an unexpected complication  III  POST-PROCEDURAL CARE (WHAT YOU WILL NEED TO DO "AFTER THE BIOPSY" TO OPTIMIZE HEALING)     Keep the area clean and dry  Try NOT to remove the bandage or get it wet for the first 24 hours   Gently clean the area and apply surgical ointment (such as Vaseline petrolatum ointment, which is available "over the counter" and not a prescription) to the biopsy site for up to 2 weeks straight  This acts to protect the wound from the outside world   Sutures are not usually placed in this procedure  If any sutures were placed, return for suture removal as instructed (generally 1 week for the face, 2 weeks for the body)   Take Acetaminophen (Tylenol) for discomfort, if no contraindications  Ibuprofen or aspirin could make bleeding worse   Call our office immediately for signs of infection: fever, chills, increased redness, warmth, tenderness, discomfort/pain, or pus or foul smell coming from the wound  WHAT TO DO IF THERE IS ANY BLEEDING? If a small amount of bleeding is noticed, place a clean cloth over the area and apply firm pressure for ten minutes  Check the wound after 10 minutes of direct pressure    If bleeding persists, try one more time for an additional 10 minutes of direct pressure on the area  If the bleeding becomes heavier or does not stop after the second attempt, or if you have any other questions about this procedure, then please call your [facility] by calling [phone] (SKIN)  I hereby acknowledge that I have reviewed and verified the site with my Dermatologist and have requested and authorized my Dermatologist to proceed with the procedure        Scribe Attestation    I,:  Radha Ambrocio am acting as a scribe while in the presence of the attending physician :       I,:  Lanny Eden MD personally performed the services described in this documentation    as scribed in my presence :

## 2021-07-30 ENCOUNTER — OFFICE VISIT (OUTPATIENT)
Dept: DERMATOLOGY | Facility: CLINIC | Age: 66
End: 2021-07-30
Payer: MEDICARE

## 2021-07-30 DIAGNOSIS — Z51.89 VISIT FOR WOUND CHECK: Primary | ICD-10-CM

## 2021-07-30 PROCEDURE — 99212 OFFICE O/P EST SF 10 MIN: CPT | Performed by: DERMATOLOGY

## 2021-07-30 NOTE — PROGRESS NOTES
WOUND CHECK    Physical Exam:   Anatomic Location Affected:  Left thigh    Description of wound: well healing biopsy site       Additional History of Present Condition:  Patient concerned wound was not healing correctly and wanted to be seen today      Assessment and Plan:  Based on a thorough discussion of this condition and the management approach to it (including a comprehensive discussion of the known risks, side effects and potential benefits of treatment), the patient (family) agrees to implement the following specific plan:   Patient was reassured biopsy site is healing well with no signs of infection   Patient was advised to continue applying Vaseline and sunscreen when outside

## 2021-08-17 DIAGNOSIS — I10 ESSENTIAL HYPERTENSION: ICD-10-CM

## 2021-08-17 RX ORDER — HYDROCHLOROTHIAZIDE 25 MG/1
TABLET ORAL
Qty: 90 TABLET | Refills: 0 | Status: SHIPPED | OUTPATIENT
Start: 2021-08-17 | End: 2022-02-10

## 2021-09-20 ENCOUNTER — HOSPITAL ENCOUNTER (OUTPATIENT)
Dept: RADIOLOGY | Age: 66
Discharge: HOME | End: 2021-09-20
Payer: MEDICARE

## 2021-09-20 VITALS — HEIGHT: 62 IN | WEIGHT: 145 LBS | BODY MASS INDEX: 26.68 KG/M2

## 2021-09-20 DIAGNOSIS — Z12.31 BREAST CANCER SCREENING BY MAMMOGRAM: ICD-10-CM

## 2021-09-20 PROCEDURE — 77067 SCR MAMMO BI INCL CAD: CPT

## 2021-09-20 PROCEDURE — 77063 BREAST TOMOSYNTHESIS BI: CPT

## 2021-09-28 ENCOUNTER — TELEPHONE (OUTPATIENT)
Dept: FAMILY MEDICINE CLINIC | Facility: CLINIC | Age: 66
End: 2021-09-28

## 2021-09-28 DIAGNOSIS — F41.9 ANXIETY: Primary | ICD-10-CM

## 2021-09-28 RX ORDER — ALPRAZOLAM 0.25 MG/1
0.25 TABLET ORAL 3 TIMES DAILY PRN
Qty: 50 TABLET | Refills: 0 | Status: SHIPPED | OUTPATIENT
Start: 2021-09-28 | End: 2022-07-21

## 2021-09-28 NOTE — TELEPHONE ENCOUNTER
She said you were sending rx to pharm  She doesn't know what it was, sleep or depression problem, she was here with her  today    She will ck with pharm if no cb

## 2021-10-01 DIAGNOSIS — E78.00 HYPERCHOLESTEROLEMIA: ICD-10-CM

## 2021-10-01 RX ORDER — FENOFIBRATE 145 MG/1
TABLET, COATED ORAL
Qty: 90 TABLET | Refills: 3 | Status: SHIPPED | OUTPATIENT
Start: 2021-10-01 | End: 2022-07-06

## 2021-11-11 DIAGNOSIS — F32.A DEPRESSION, UNSPECIFIED DEPRESSION TYPE: ICD-10-CM

## 2021-11-11 RX ORDER — NORTRIPTYLINE HYDROCHLORIDE 10 MG/1
CAPSULE ORAL
Qty: 270 CAPSULE | Refills: 1 | Status: SHIPPED | OUTPATIENT
Start: 2021-11-11 | End: 2022-05-09

## 2021-11-17 ENCOUNTER — IMMUNIZATIONS (OUTPATIENT)
Dept: FAMILY MEDICINE CLINIC | Facility: CLINIC | Age: 66
End: 2021-11-17
Payer: MEDICARE

## 2021-11-17 ENCOUNTER — ANNUAL EXAM (OUTPATIENT)
Dept: OBGYN CLINIC | Facility: CLINIC | Age: 66
End: 2021-11-17
Payer: MEDICARE

## 2021-11-17 VITALS — BODY MASS INDEX: 25.79 KG/M2 | WEIGHT: 141 LBS

## 2021-11-17 DIAGNOSIS — Z23 ENCOUNTER FOR IMMUNIZATION: Primary | ICD-10-CM

## 2021-11-17 DIAGNOSIS — N95.2 ATROPHIC VAGINITIS: Primary | ICD-10-CM

## 2021-11-17 DIAGNOSIS — M81.0 AGE-RELATED OSTEOPOROSIS WITHOUT CURRENT PATHOLOGICAL FRACTURE: ICD-10-CM

## 2021-11-17 DIAGNOSIS — Z12.31 ENCOUNTER FOR SCREENING MAMMOGRAM FOR MALIGNANT NEOPLASM OF BREAST: ICD-10-CM

## 2021-11-17 DIAGNOSIS — Z01.419 ENCNTR FOR GYN EXAM (GENERAL) (ROUTINE) W/O ABN FINDINGS: ICD-10-CM

## 2021-11-17 PROBLEM — I10 HYPERTENSION: Status: RESOLVED | Noted: 2019-08-08 | Resolved: 2021-11-17

## 2021-11-17 PROCEDURE — 90682 RIV4 VACC RECOMBINANT DNA IM: CPT

## 2021-11-17 PROCEDURE — 99213 OFFICE O/P EST LOW 20 MIN: CPT | Performed by: PHYSICIAN ASSISTANT

## 2021-11-17 PROCEDURE — G0008 ADMIN INFLUENZA VIRUS VAC: HCPCS

## 2021-11-30 ENCOUNTER — OFFICE VISIT (OUTPATIENT)
Dept: FAMILY MEDICINE CLINIC | Facility: CLINIC | Age: 66
End: 2021-11-30
Payer: MEDICARE

## 2021-11-30 VITALS
BODY MASS INDEX: 25.58 KG/M2 | HEART RATE: 74 BPM | TEMPERATURE: 98.5 F | WEIGHT: 139 LBS | SYSTOLIC BLOOD PRESSURE: 124 MMHG | HEIGHT: 62 IN | DIASTOLIC BLOOD PRESSURE: 80 MMHG

## 2021-11-30 DIAGNOSIS — H00.011 HORDEOLUM EXTERNUM OF RIGHT UPPER EYELID: Primary | ICD-10-CM

## 2021-11-30 PROCEDURE — 99213 OFFICE O/P EST LOW 20 MIN: CPT | Performed by: FAMILY MEDICINE

## 2021-11-30 RX ORDER — TOBRAMYCIN 3 MG/ML
1 SOLUTION/ DROPS OPHTHALMIC 4 TIMES DAILY
Qty: 5 ML | Refills: 0 | Status: SHIPPED | OUTPATIENT
Start: 2021-11-30 | End: 2021-12-10

## 2021-12-04 ENCOUNTER — IMMUNIZATIONS (OUTPATIENT)
Dept: FAMILY MEDICINE CLINIC | Facility: HOSPITAL | Age: 66
End: 2021-12-04

## 2021-12-04 DIAGNOSIS — Z23 ENCOUNTER FOR IMMUNIZATION: Primary | ICD-10-CM

## 2021-12-04 PROCEDURE — 91300 COVID-19 PFIZER VACC 0.3 ML: CPT

## 2021-12-04 PROCEDURE — 0001A COVID-19 PFIZER VACC 0.3 ML: CPT

## 2021-12-14 ENCOUNTER — APPOINTMENT (OUTPATIENT)
Dept: LAB | Age: 66
End: 2021-12-14
Payer: MEDICARE

## 2021-12-14 DIAGNOSIS — Z79.899 ENCOUNTER FOR LONG-TERM (CURRENT) USE OF OTHER MEDICATIONS: ICD-10-CM

## 2021-12-14 DIAGNOSIS — M87.00 IDIOPATHIC AVASCULAR NECROSIS (HCC): ICD-10-CM

## 2021-12-14 DIAGNOSIS — E55.9 VITAMIN D DEFICIENCY: ICD-10-CM

## 2021-12-14 LAB
25(OH)D3 SERPL-MCNC: 81.3 NG/ML (ref 30–100)
ANION GAP SERPL CALCULATED.3IONS-SCNC: 3 MMOL/L (ref 4–13)
BUN SERPL-MCNC: 23 MG/DL (ref 5–25)
CALCIUM SERPL-MCNC: 10.3 MG/DL (ref 8.3–10.1)
CHLORIDE SERPL-SCNC: 107 MMOL/L (ref 100–108)
CO2 SERPL-SCNC: 30 MMOL/L (ref 21–32)
CREAT SERPL-MCNC: 1.14 MG/DL (ref 0.6–1.3)
GFR SERPL CREATININE-BSD FRML MDRD: 49 ML/MIN/1.73SQ M
GLUCOSE P FAST SERPL-MCNC: 88 MG/DL (ref 65–99)
POTASSIUM SERPL-SCNC: 4.2 MMOL/L (ref 3.5–5.3)
SODIUM SERPL-SCNC: 140 MMOL/L (ref 136–145)

## 2021-12-14 PROCEDURE — 80048 BASIC METABOLIC PNL TOTAL CA: CPT

## 2021-12-14 PROCEDURE — 36415 COLL VENOUS BLD VENIPUNCTURE: CPT

## 2021-12-14 PROCEDURE — 82306 VITAMIN D 25 HYDROXY: CPT

## 2021-12-14 PROCEDURE — 82523 COLLAGEN CROSSLINKS: CPT

## 2021-12-16 ENCOUNTER — HOSPITAL ENCOUNTER (OUTPATIENT)
Dept: RADIOLOGY | Age: 66
Discharge: HOME | End: 2021-12-16
Payer: MEDICARE

## 2021-12-16 DIAGNOSIS — Z13.820 ENCOUNTER FOR SCREENING FOR OSTEOPOROSIS: ICD-10-CM

## 2021-12-16 PROCEDURE — 77080 DXA BONE DENSITY AXIAL: CPT

## 2021-12-20 LAB — COLLAGEN CTX SERPL-MCNC: 293 PG/ML

## 2021-12-22 PROBLEM — R42 VERTIGO: Status: ACTIVE | Noted: 2021-12-22

## 2021-12-22 PROBLEM — E83.52 HYPERCALCEMIA: Status: ACTIVE | Noted: 2021-12-22

## 2021-12-22 PROBLEM — M15.0 PRIMARY GENERALIZED (OSTEO)ARTHRITIS: Status: ACTIVE | Noted: 2021-12-22

## 2021-12-22 PROBLEM — G43.109 MIGRAINE WITH AURA AND WITHOUT STATUS MIGRAINOSUS, NOT INTRACTABLE: Status: ACTIVE | Noted: 2021-12-22

## 2021-12-22 PROBLEM — F41.9 ANXIETY: Status: ACTIVE | Noted: 2021-12-22

## 2021-12-22 PROBLEM — R09.89 LABILE HYPERTENSION: Status: ACTIVE | Noted: 2021-12-22

## 2021-12-30 ENCOUNTER — TELEPHONE (OUTPATIENT)
Dept: FAMILY MEDICINE CLINIC | Facility: CLINIC | Age: 66
End: 2021-12-30

## 2021-12-30 NOTE — TELEPHONE ENCOUNTER
Pt aware, she said she is already on medication, she doesn't think has high bp, her bp was taken after she told them that her  almost , she said sorry to bother and she hung up on me

## 2021-12-30 NOTE — TELEPHONE ENCOUNTER
Please instruct patient to get a home blood pressure cuff and measure her BP every other day for 2 weeks and keep a log  Take BP before bed  She should drop off the log in 2 weeks and we will give instruction

## 2022-01-05 ENCOUNTER — CLINICAL SUPPORT (OUTPATIENT)
Dept: FAMILY MEDICINE CLINIC | Facility: CLINIC | Age: 67
End: 2022-01-05
Payer: MEDICARE

## 2022-01-05 VITALS
WEIGHT: 139 LBS | BODY MASS INDEX: 25.58 KG/M2 | TEMPERATURE: 98.2 F | SYSTOLIC BLOOD PRESSURE: 132 MMHG | HEIGHT: 62 IN | HEART RATE: 72 BPM | DIASTOLIC BLOOD PRESSURE: 80 MMHG

## 2022-01-05 DIAGNOSIS — I10 ESSENTIAL HYPERTENSION: Primary | ICD-10-CM

## 2022-01-05 PROCEDURE — 99211 OFF/OP EST MAY X REQ PHY/QHP: CPT | Performed by: FAMILY MEDICINE

## 2022-01-05 NOTE — PROGRESS NOTES
Patient presents in office today for a blood pressure check  Elevated reading was taken at the rheum office     First reading taken right arm sitting 132/80  Second reading taken right arm sitting 130/80    As per Dr Deep Olm[place], these readings are ok and she will see her at her next appt at the end of the month

## 2022-01-22 ENCOUNTER — APPOINTMENT (OUTPATIENT)
Dept: LAB | Age: 67
End: 2022-01-22
Payer: MEDICARE

## 2022-01-22 DIAGNOSIS — E83.52 HYPERCALCEMIA: ICD-10-CM

## 2022-01-22 LAB
CA-I BLD-SCNC: 1.28 MMOL/L (ref 1.12–1.32)
CALCIUM SERPL-MCNC: 9.5 MG/DL (ref 8.3–10.1)
PTH-INTACT SERPL-MCNC: 12.7 PG/ML (ref 18.4–80.1)

## 2022-01-22 PROCEDURE — 36415 COLL VENOUS BLD VENIPUNCTURE: CPT

## 2022-01-22 PROCEDURE — 82310 ASSAY OF CALCIUM: CPT

## 2022-01-22 PROCEDURE — 83970 ASSAY OF PARATHORMONE: CPT

## 2022-01-22 PROCEDURE — 82330 ASSAY OF CALCIUM: CPT

## 2022-01-27 ENCOUNTER — OFFICE VISIT (OUTPATIENT)
Dept: FAMILY MEDICINE CLINIC | Facility: CLINIC | Age: 67
End: 2022-01-27
Payer: MEDICARE

## 2022-01-27 ENCOUNTER — TELEPHONE (OUTPATIENT)
Dept: FAMILY MEDICINE CLINIC | Facility: CLINIC | Age: 67
End: 2022-01-27

## 2022-01-27 VITALS
TEMPERATURE: 98.5 F | SYSTOLIC BLOOD PRESSURE: 160 MMHG | DIASTOLIC BLOOD PRESSURE: 82 MMHG | HEART RATE: 91 BPM | HEIGHT: 62 IN | OXYGEN SATURATION: 97 % | WEIGHT: 141.8 LBS | BODY MASS INDEX: 26.09 KG/M2

## 2022-01-27 DIAGNOSIS — I10 ESSENTIAL HYPERTENSION: ICD-10-CM

## 2022-01-27 DIAGNOSIS — E78.00 HYPERCHOLESTEROLEMIA: ICD-10-CM

## 2022-01-27 DIAGNOSIS — Z00.00 MEDICARE ANNUAL WELLNESS VISIT, SUBSEQUENT: Primary | ICD-10-CM

## 2022-01-27 PROCEDURE — 99213 OFFICE O/P EST LOW 20 MIN: CPT | Performed by: FAMILY MEDICINE

## 2022-01-27 PROCEDURE — G0438 PPPS, INITIAL VISIT: HCPCS | Performed by: FAMILY MEDICINE

## 2022-01-27 NOTE — PATIENT INSTRUCTIONS
Medicare Preventive Visit Patient Instructions  Thank you for completing your Welcome to Medicare Visit or Medicare Annual Wellness Visit today  Your next wellness visit will be due in one year (1/28/2023)  The screening/preventive services that you may require over the next 5-10 years are detailed below  Some tests may not apply to you based off risk factors and/or age  Screening tests ordered at today's visit but not completed yet may show as past due  Also, please note that scanned in results may not display below  Preventive Screenings:  Service Recommendations Previous Testing/Comments   Colorectal Cancer Screening  * Colonoscopy    * Fecal Occult Blood Test (FOBT)/Fecal Immunochemical Test (FIT)  * Fecal DNA/Cologuard Test  * Flexible Sigmoidoscopy Age: 54-65 years old   Colonoscopy: every 10 years (may be performed more frequently if at higher risk)  OR  FOBT/FIT: every 1 year  OR  Cologuard: every 3 years  OR  Sigmoidoscopy: every 5 years  Screening may be recommended earlier than age 48 if at higher risk for colorectal cancer  Also, an individualized decision between you and your healthcare provider will decide whether screening between the ages of 74-80 would be appropriate  Colonoscopy: 07/12/2019  FOBT/FIT: Not on file  Cologuard: Not on file  Sigmoidoscopy: Not on file    Screening Current     Breast Cancer Screening Age: 36 years old  Frequency: every 1-2 years  Not required if history of left and right mastectomy Mammogram: 09/20/2021    Screening Current   Cervical Cancer Screening Between the ages of 21-29, pap smear recommended once every 3 years  Between the ages of 33-67, can perform pap smear with HPV co-testing every 5 years     Recommendations may differ for women with a history of total hysterectomy, cervical cancer, or abnormal pap smears in past  Pap Smear: 09/30/2020    Screening Not Indicated   Hepatitis C Screening Once for adults born between 1945 and 1965  More frequently in patients at high risk for Hepatitis C Hep C Antibody: Not on file    Screening Current   Diabetes Screening 1-2 times per year if you're at risk for diabetes or have pre-diabetes Fasting glucose: 88 mg/dL   A1C: No results in last 5 years    Screening Current   Cholesterol Screening Once every 5 years if you don't have a lipid disorder  May order more often based on risk factors  Lipid panel: 12/30/2020    Screening Not Indicated  History Lipid Disorder     Other Preventive Screenings Covered by Medicare:  1  Abdominal Aortic Aneurysm (AAA) Screening: covered once if your at risk  You're considered to be at risk if you have a family history of AAA  2  Lung Cancer Screening: covers low dose CT scan once per year if you meet all of the following conditions: (1) Age 50-69; (2) No signs or symptoms of lung cancer; (3) Current smoker or have quit smoking within the last 15 years; (4) You have a tobacco smoking history of at least 30 pack years (packs per day multiplied by number of years you smoked); (5) You get a written order from a healthcare provider  3  Glaucoma Screening: covered annually if you're considered high risk: (1) You have diabetes OR (2) Family history of glaucoma OR (3)  aged 48 and older OR (3)  American aged 72 and older  3  Osteoporosis Screening: covered every 2 years if you meet one of the following conditions: (1) You're estrogen deficient and at risk for osteoporosis based off medical history and other findings; (2) Have a vertebral abnormality; (3) On glucocorticoid therapy for more than 3 months; (4) Have primary hyperparathyroidism; (5) On osteoporosis medications and need to assess response to drug therapy  · Last bone density test (DXA Scan): 12/16/2021   5  HIV Screening: covered annually if you're between the age of 15-65  Also covered annually if you are younger than 13 and older than 72 with risk factors for HIV infection   For pregnant patients, it is covered up to 3 times per pregnancy  Immunizations:  Immunization Recommendations   Influenza Vaccine Annual influenza vaccination during flu season is recommended for all persons aged >= 6 months who do not have contraindications   Pneumococcal Vaccine (Prevnar and Pneumovax)  * Prevnar = PCV13  * Pneumovax = PPSV23   Adults 25-60 years old: 1-3 doses may be recommended based on certain risk factors  Adults 72 years old: Prevnar (PCV13) vaccine recommended followed by Pneumovax (PPSV23) vaccine  If already received PPSV23 since turning 65, then PCV13 recommended at least one year after PPSV23 dose  Hepatitis B Vaccine 3 dose series if at intermediate or high risk (ex: diabetes, end stage renal disease, liver disease)   Tetanus (Td) Vaccine - COST NOT COVERED BY MEDICARE PART B Following completion of primary series, a booster dose should be given every 10 years to maintain immunity against tetanus  Td may also be given as tetanus wound prophylaxis  Tdap Vaccine - COST NOT COVERED BY MEDICARE PART B Recommended at least once for all adults  For pregnant patients, recommended with each pregnancy  Shingles Vaccine (Shingrix) - COST NOT COVERED BY MEDICARE PART B  2 shot series recommended in those aged 48 and above     Health Maintenance Due:      Topic Date Due    Cervical Cancer Screening  08/10/2021    Hepatitis C Screening  11/02/2022 (Originally 1955)    Breast Cancer Screening: Mammogram  09/20/2022    Colorectal Cancer Screening  07/12/2024    DXA SCAN  12/16/2026     Immunizations Due:  There are no preventive care reminders to display for this patient  Advance Directives   What are advance directives? Advance directives are legal documents that state your wishes and plans for medical care  These plans are made ahead of time in case you lose your ability to make decisions for yourself   Advance directives can apply to any medical decision, such as the treatments you want, and if you want to donate organs  What are the types of advance directives? There are many types of advance directives, and each state has rules about how to use them  You may choose a combination of any of the following:  · Living will: This is a written record of the treatment you want  You can also choose which treatments you do not want, which to limit, and which to stop at a certain time  This includes surgery, medicine, IV fluid, and tube feedings  · Durable power of  for healthcare [facility]): This is a written record that states who you want to make healthcare choices for you when you are unable to make them for yourself  This person, called a proxy, is usually a family member or a friend  You may choose more than 1 proxy  · Do not resuscitate (DNR) order:  A DNR order is used in case your heart stops beating or you stop breathing  It is a request not to have certain forms of treatment, such as CPR  A DNR order may be included in other types of advance directives  · Medical directive: This covers the care that you want if you are in a coma, near death, or unable to make decisions for yourself  You can list the treatments you want for each condition  Treatment may include pain medicine, surgery, blood transfusions, dialysis, IV or tube feedings, and a ventilator (breathing machine)  · Values history: This document has questions about your views, beliefs, and how you feel and think about life  This information can help others choose the care that you would choose  Why are advance directives important? An advance directive helps you control your care  Although spoken wishes may be used, it is better to have your wishes written down  Spoken wishes can be misunderstood, or not followed  Treatments may be given even if you do not want them  An advance directive may make it easier for your family to make difficult choices about your care     Weight Management   Why it is important to manage your weight:  Being overweight increases your risk of health conditions such as heart disease, high blood pressure, type 2 diabetes, and certain types of cancer  It can also increase your risk for osteoarthritis, sleep apnea, and other respiratory problems  Aim for a slow, steady weight loss  Even a small amount of weight loss can lower your risk of health problems  How to lose weight safely:  A safe and healthy way to lose weight is to eat fewer calories and get regular exercise  You can lose up about 1 pound a week by decreasing the number of calories you eat by 500 calories each day  Healthy meal plan for weight management:  A healthy meal plan includes a variety of foods, contains fewer calories, and helps you stay healthy  A healthy meal plan includes the following:  · Eat whole-grain foods more often  A healthy meal plan should contain fiber  Fiber is the part of grains, fruits, and vegetables that is not broken down by your body  Whole-grain foods are healthy and provide extra fiber in your diet  Some examples of whole-grain foods are whole-wheat breads and pastas, oatmeal, brown rice, and bulgur  · Eat a variety of vegetables every day  Include dark, leafy greens such as spinach, kale, eriberto greens, and mustard greens  Eat yellow and orange vegetables such as carrots, sweet potatoes, and winter squash  · Eat a variety of fruits every day  Choose fresh or canned fruit (canned in its own juice or light syrup) instead of juice  Fruit juice has very little or no fiber  · Eat low-fat dairy foods  Drink fat-free (skim) milk or 1% milk  Eat fat-free yogurt and low-fat cottage cheese  Try low-fat cheeses such as mozzarella and other reduced-fat cheeses  · Choose meat and other protein foods that are low in fat  Choose beans or other legumes such as split peas or lentils  Choose fish, skinless poultry (chicken or turkey), or lean cuts of red meat (beef or pork)  Before you cook meat or poultry, cut off any visible fat     · Use less fat and oil  Try baking foods instead of frying them  Add less fat, such as margarine, sour cream, regular salad dressing and mayonnaise to foods  Eat fewer high-fat foods  Some examples of high-fat foods include french fries, doughnuts, ice cream, and cakes  · Eat fewer sweets  Limit foods and drinks that are high in sugar  This includes candy, cookies, regular soda, and sweetened drinks  Exercise:  Exercise at least 30 minutes per day on most days of the week  Some examples of exercise include walking, biking, dancing, and swimming  You can also fit in more physical activity by taking the stairs instead of the elevator or parking farther away from stores  Ask your healthcare provider about the best exercise plan for you  © Copyright [employer] 2018 Information is for End User's use only and may not be sold, redistributed or otherwise used for commercial purposes   All illustrations and images included in CareNotes® are the copyrighted property of A D A M , Inc  or [address] [place] [address]

## 2022-01-27 NOTE — PROGRESS NOTES
Assessment and Plan:     Problem List Items Addressed This Visit     None      BMI Counseling: Body mass index is 25 94 kg/m²  The BMI is above normal  Nutrition recommendations include reducing portion sizes, decreasing overall calorie intake and moderation in carbohydrate intake  Preventive health issues were discussed with patient, and age appropriate screening tests were ordered as noted in patient's After Visit Summary  Personalized health advice and appropriate referrals for health education or preventive services given if needed, as noted in patient's After Visit Summary  History of Present Illness:     Patient presents for Medicare Annual Wellness visit  She is up to date with preventative care        Patient Care Team:  Jania Ureña DO as PCP - MD German Stanley MD     Problem List:     Patient Active Problem List   Diagnosis    Aseptic necrosis bone ([address] )    Depression    Greater trochanteric bursitis of right hip    Hyperlipidemia    It band syndrome, right    Senile osteoporosis    Patellofemoral syndrome, right    Essential hypertension    Atrophic vaginitis    COVID-19    Quadriceps tendonitis    Hypercalcemia    Primary generalized (osteo)arthritis    Vertigo    Migraine with aura and without status migrainosus, not intractable    Anxiety    Labile hypertension      Past Medical and Surgical History:     Past Medical History:   Diagnosis Date    Chronic leukopenia     Hypercalcemia     Hyperlipidemia     Hypertension     labile    Hypertension     Migraines     Osteoarthritis     Osteoporosis     Vertigo      Past Surgical History:   Procedure Laterality Date    EYE SURGERY      TOOTH EXTRACTION      TUBAL LIGATION        Family History:     Family History   Problem Relation Age of Onset    Colon cancer Mother 46    Breast cancer Mother 48    Cancer Mother     Heart attack Father     Prostate cancer Father 79    Hypertension Father     Heart attack Sister     No Known Problems Daughter     No Known Problems Maternal Grandmother     No Known Problems Maternal Grandfather     No Known Problems Paternal Grandmother     No Known Problems Paternal Grandfather     No Known Problems Sister     No Known Problems Daughter     No Known Problems Maternal Aunt     No Known Problems Maternal Aunt     No Known Problems Maternal Aunt     No Known Problems Son     Stroke Brother       Social History:     Social History     Socioeconomic History    Marital status: /Civil Union     Spouse name: None    Number of children: None    Years of education: None    Highest education level: None   Occupational History    None   Tobacco Use    Smoking status: Never Smoker    Smokeless tobacco: Never Used   Vaping Use    Vaping Use: Never used   Substance and Sexual Activity    Alcohol use: No    Drug use: No    Sexual activity: Yes     Partners: Male   Other Topics Concern    None   Social History Narrative    None     Social Determinants of Health     Financial Resource Strain: Not on file   Food Insecurity: Not on file   Transportation Needs: Not on file   Physical Activity: Not on file   Stress: Not on file   Social Connections: Not on file   Intimate Partner Violence: Not on file   Housing Stability: Not on file      Medications and Allergies:     Current Outpatient Medications   Medication Sig Dispense Refill    Calcium Carb-Cholecalciferol 600-800 MG-UNIT TABS Take 2 tablets by mouth daily      Cholecalciferol ([employer] VITAMIN D3) 1000 units capsule Take 1 capsule by mouth daily      Cranberry-Vitamin C-Probiotic (AZO CRANBERRY PO) Take by mouth 2 (two) times a day      fenofibrate (TRICOR) 145 mg tablet TAKE 1 TABLET BY MOUTH DAILY 90 tablet 3    hydrochlorothiazide (HYDRODIURIL) 25 mg tablet TAKE 1 TABLET DAILY 90 tablet 0    meclizine (ANTIVERT) 25 mg tablet Take 2 tablets (50 mg total) by mouth every 8 (eight) hours as needed for dizziness 30 tablet 2    Multiple Vitamin (MULTIVITAMIN) capsule Take 1 capsule by mouth daily      nortriptyline (PAMELOR) 10 mg capsule TAKE 1 CAPSULE 3 TIMES A    capsule 1    Probiotic Product (PROBIOTIC-10 PO) Take 1 capsule by mouth daily      TURMERIC CURCUMIN PO Take by mouth      ALPRAZolam (XANAX) 0 25 mg tablet Take 1 tablet (0 25 mg total) by mouth 3 (three) times a day as needed for anxiety for up to 10 days 50 tablet 0     No current facility-administered medications for this visit  Allergies   Allergen Reactions    Iodine - Food Allergy Rash      Immunizations:     Immunization History   Administered Date(s) Administered    COVID-19 PFIZER VACCINE 0 3 ML IM 03/25/2021, 04/15/2021, 12/04/2021    INFLUENZA 09/26/2013    Influenza Quadrivalent, 6-35 Months IM 10/14/2014, 11/19/2015, 11/16/2016, 10/09/2017    Influenza, high dose seasonal 0 7 mL 10/24/2019, 10/21/2020    Influenza, injectable, quadrivalent, preservative free 0 5 mL 10/11/2018    Influenza, recombinant, quadrivalent,injectable, preservative free 11/17/2021    Influenza, seasonal, injectable 12/14/2012    Pneumococcal Conjugate 13-Valent 08/07/2019    Pneumococcal Polysaccharide PPV23 12/09/2020    Tdap 07/18/2012    Tuberculin Skin Test-PPD Intradermal 11/15/2000    Zoster 11/22/2013      Health Maintenance:         Topic Date Due    Cervical Cancer Screening  08/10/2021    Hepatitis C Screening  11/02/2022 (Originally 1955)    Breast Cancer Screening: Mammogram  09/20/2022    Colorectal Cancer Screening  07/12/2024    DXA SCAN  12/16/2026     There are no preventive care reminders to display for this patient  Medicare Health Risk Assessment:     /82   Pulse 91   Temp 98 5 °F (36 9 °C)   Ht 5' 2" (1 575 m)   Wt 64 3 kg (141 lb 12 8 oz)   LMP  (LMP Unknown)   SpO2 97%   BMI 25 94 kg/m²      Marija Carver is here for her Subsequent Wellness visit  Last Medicare Wellness visit information reviewed, patient interviewed, no change since last AWV  Health Risk Assessment:   Patient rates overall health as good  Patient feels that their physical health rating is same  Patient is very satisfied with their life  Eyesight was rated as same  Hearing was rated as same  Patient feels that their emotional and mental health rating is slightly better  Patients states they are never, rarely angry  Patient states they are never, rarely unusually tired/fatigued  Pain experienced in the last 7 days has been none  Patient states that she has experienced no weight loss or gain in last 6 months  Depression Screening:   PHQ-9 Score: 0      Fall Risk Screening: In the past year, patient has experienced: no history of falling in past year      Urinary Incontinence Screening:   Patient has not leaked urine accidently in the last six months  Home Safety:  Patient does not have trouble with stairs inside or outside of their home  Patient has working smoke alarms and has working carbon monoxide detector  Home safety hazards include: none  Nutrition:   Current diet is Regular  Medications:   Patient is currently taking over-the-counter supplements  OTC medications include: see medication list  Patient is able to manage medications  Activities of Daily Living (ADLs)/Instrumental Activities of Daily Living (IADLs):   Walk and transfer into and out of bed and chair?: Yes  Dress and groom yourself?: Yes    Bathe or shower yourself?: Yes    Feed yourself? Yes  Do your laundry/housekeeping?: Yes  Manage your money, pay your bills and track your expenses?: Yes  Make your own meals?: Yes    Do your own shopping?: Yes    Previous Hospitalizations:   Any hospitalizations or ED visits within the last 12 months?: No      Advance Care Planning:   Living will: No    Durable POA for healthcare:  Yes    Advanced directive: Yes    Advanced directive counseling given: Yes    Five wishes given: Yes    Patient declined ACP directive: No    End of Life Decisions reviewed with patient: Yes    Provider agrees with end of life decisions: Yes      Cognitive Screening:   Provider or family/friend/caregiver concerned regarding cognition?: No    PREVENTIVE SCREENINGS      Cardiovascular Screening:    General: Screening Not Indicated and History Lipid Disorder      Diabetes Screening:     General: Screening Current      Colorectal Cancer Screening:     General: Screening Current      Breast Cancer Screening:     General: Screening Current      Cervical Cancer Screening:    General: Screening Not Indicated      Osteoporosis Screening:    General: Screening Not Indicated and History Osteoporosis      Abdominal Aortic Aneurysm (AAA) Screening:        General: Screening Current and Screening Not Indicated      Lung Cancer Screening:     General: Screening Not Indicated      Hepatitis C Screening:    General: Screening Current      Neetu Bird DO

## 2022-01-31 NOTE — PROGRESS NOTES
Patient ID: Nichol Muñiz is a 78 y o  female  HPI: 78 y  o female presents for follow up hypertension and hypercholesterolemia  Pt denies any dizziness,  chest pain, palpitations, or dypsnea with exertion        SUBJECTIVE    Family History   Problem Relation Age of Onset    Colon cancer Mother 46    Breast cancer Mother 48    Cancer Mother     Heart attack Father     Prostate cancer Father 79    Hypertension Father     Heart attack Sister     No Known Problems Daughter     No Known Problems Maternal Grandmother     No Known Problems Maternal Grandfather     No Known Problems Paternal Grandmother     No Known Problems Paternal Grandfather     No Known Problems Sister     No Known Problems Daughter     No Known Problems Maternal Aunt     No Known Problems Maternal Aunt     No Known Problems Maternal Aunt     No Known Problems Son     Stroke Brother      Social History     Socioeconomic History    Marital status: /Civil Union     Spouse name: Not on file    Number of children: Not on file    Years of education: Not on file    Highest education level: Not on file   Occupational History    Not on file   Tobacco Use    Smoking status: Never Smoker    Smokeless tobacco: Never Used   Vaping Use    Vaping Use: Never used   Substance and Sexual Activity    Alcohol use: No    Drug use: No    Sexual activity: Yes     Partners: Male   Other Topics Concern    Not on file   Social History Narrative    Not on file     Social Determinants of Health     Financial Resource Strain: Not on file   Food Insecurity: Not on file   Transportation Needs: Not on file   Physical Activity: Not on file   Stress: Not on file   Social Connections: Not on file   Intimate Partner Violence: Not on file   Housing Stability: Not on file     Past Medical History:   Diagnosis Date    Chronic leukopenia     Hypercalcemia     Hyperlipidemia     Hypertension     labile    Hypertension     Migraines     Osteoarthritis     Osteoporosis     Vertigo      Past Surgical History:   Procedure Laterality Date    EYE SURGERY      TOOTH EXTRACTION      TUBAL LIGATION       Allergies   Allergen Reactions    Iodine - Food Allergy Rash       Current Outpatient Medications:     Calcium Carb-Cholecalciferol 600-800 MG-UNIT TABS, Take 2 tablets by mouth daily, Disp: , Rfl:     Cholecalciferol ([employer] VITAMIN D3) 1000 units capsule, Take 1 capsule by mouth daily, Disp: , Rfl:     Cranberry-Vitamin C-Probiotic (AZO CRANBERRY PO), Take by mouth 2 (two) times a day, Disp: , Rfl:     fenofibrate (TRICOR) 145 mg tablet, TAKE 1 TABLET BY MOUTH DAILY, Disp: 90 tablet, Rfl: 3    hydrochlorothiazide (HYDRODIURIL) 25 mg tablet, TAKE 1 TABLET DAILY, Disp: 90 tablet, Rfl: 0    meclizine (ANTIVERT) 25 mg tablet, Take 2 tablets (50 mg total) by mouth every 8 (eight) hours as needed for dizziness, Disp: 30 tablet, Rfl: 2    Multiple Vitamin (MULTIVITAMIN) capsule, Take 1 capsule by mouth daily, Disp: , Rfl:     nortriptyline (PAMELOR) 10 mg capsule, TAKE 1 CAPSULE 3 TIMES A   DAY, Disp: 270 capsule, Rfl: 1    Probiotic Product (PROBIOTIC-10 PO), Take 1 capsule by mouth daily, Disp: , Rfl:     TURMERIC CURCUMIN PO, Take by mouth, Disp: , Rfl:     ALPRAZolam (XANAX) 0 25 mg tablet, Take 1 tablet (0 25 mg total) by mouth 3 (three) times a day as needed for anxiety for up to 10 days, Disp: 50 tablet, Rfl: 0    Review of Systems  Constitutional:     Denies fever, chills ,fatigue ,weakness ,weight loss, weight gain     ENT: Denies earache ,loss of hearing ,nosebleed, nasal discharge,nasal congestion ,sore throat ,hoarseness  Pulmonary: Denies shortness of breath ,cough  ,dyspnea on exertion, orthopnea  ,PND   Cardiovascular:  Denies bradycardia , tachycardia  ,palpations, lower extremity edema leg, claudication  Breast:  Denies new or changing breast lumps ,nipple discharge ,nipple changes  Abdomen:  Denies abdominal pain , anorexia , indigestion, nausea, vomiting, constipation, diarrhea  Musculoskeletal: Denies myalgias, arthralgias, joint swelling, joint stiffness , limb pain, limb swelling  Gu: denies dysuria, polyuria  Skin: Denies skin rash, skin lesion, skin wound, itching, dry skin  Neuro: Denies headache, numbness, tingling, confusion, loss of consciousness, dizziness, vertigo  Psychiatric: Denies feelings of depression, suicidal ideation, anxiety, sleep disturbances    OBJECTIVE  /82   Pulse 91   Temp 98 5 °F (36 9 °C)   Ht 5' 2" (1 575 m)   Wt 64 3 kg (141 lb 12 8 oz)   LMP  (LMP Unknown)   SpO2 97%   BMI 25 94 kg/m²   Constitutional:   NAD, well appearing and well nourished      ENT:   Conjunctiva and lids: no injection, edema, or discharge     Pupils and iris: LOS bilaterally    External inspection of ears and nose: normal without deformities or discharge  Otoscopic exam: Canals patent without erythema  Nasal mucosa, septum and turbinates: Normal or edema or discharge         Oropharynx:  Moist mucosa, normal tongue and tonsils without lesions  No erythema        Pulmonary:Respiratory effort normal rate and rhythm, no increased work of breathing   Auscultation of lungs:  Clear bilaterally with no adventitious breath sounds       Cardiovascular: regular rate and rhythm, S1 and S2, no murmur, no edema and/or varicosities of LE      Abdomen: Soft and non-distended     Positive bowel sounds      No heptomegaly or splenomegaly      Gu: no suprapubic tenderness or CVA tenderness, no urethral discharge  Lymphatic:  No anterior or posterior cervical lymphadenopathy         Musculoskeletal:  Gait and station: Normal gait      Digits and nails normal without clubbing or cyanosis       Inspection/palpation of joints, bones, and muscles:  No joint tenderness, swelling, full active and passive range of motion       Skin: Normal skin turgor and no rashes      Neuro:      Normal reflexes     Psych:   alert and oriented to person, place and time     normal mood and affect       Assessment/Plan:Diagnoses and all orders for this visit:    Medicare annual wellness visit, subsequent    Essential hypertension  Comments:  Aleksandr current therpay     Hypercholesterolemia  Comments:  cotnineu tricor therpay   Orders:  -     Lipid Panel with Direct LDL reflex; Future      I will see patient back in  6  mos or sooner prn

## 2022-02-02 ENCOUNTER — APPOINTMENT (OUTPATIENT)
Dept: LAB | Age: 67
End: 2022-02-02
Payer: MEDICARE

## 2022-02-02 DIAGNOSIS — E78.00 HYPERCHOLESTEROLEMIA: ICD-10-CM

## 2022-02-02 LAB
CHOLEST SERPL-MCNC: 171 MG/DL
HDLC SERPL-MCNC: 34 MG/DL
LDLC SERPL CALC-MCNC: 120 MG/DL (ref 0–100)
TRIGL SERPL-MCNC: 84 MG/DL

## 2022-02-02 PROCEDURE — 36415 COLL VENOUS BLD VENIPUNCTURE: CPT

## 2022-02-02 PROCEDURE — 80061 LIPID PANEL: CPT

## 2022-02-10 DIAGNOSIS — I10 ESSENTIAL HYPERTENSION: ICD-10-CM

## 2022-02-10 RX ORDER — HYDROCHLOROTHIAZIDE 25 MG/1
TABLET ORAL
Qty: 90 TABLET | Refills: 0 | Status: SHIPPED | OUTPATIENT
Start: 2022-02-10 | End: 2022-03-02

## 2022-03-01 ENCOUNTER — TELEPHONE (OUTPATIENT)
Dept: FAMILY MEDICINE CLINIC | Facility: CLINIC | Age: 67
End: 2022-03-01

## 2022-03-01 NOTE — TELEPHONE ENCOUNTER
Pt called stating she saw Dr Nato Mehta last week who stated he was going to contact you re: taking pt off the hydrochlorothiazide and putting her on a new bp med  Pt called today because she hadn't heard from anyone    Pl adv

## 2022-03-02 DIAGNOSIS — I10 ESSENTIAL HYPERTENSION: Primary | ICD-10-CM

## 2022-03-02 RX ORDER — LOSARTAN POTASSIUM 50 MG/1
50 TABLET ORAL DAILY
Qty: 30 TABLET | Refills: 5 | Status: SHIPPED | OUTPATIENT
Start: 2022-03-02 | End: 2022-03-16 | Stop reason: SDUPTHER

## 2022-03-02 NOTE — TELEPHONE ENCOUNTER
Please explain I never heard from Dr Huma Delgado; now that I know, have her stop the hctz and I will call in generic cozaar and she will need a nurse bp check in 2 weeks

## 2022-03-16 ENCOUNTER — CLINICAL SUPPORT (OUTPATIENT)
Dept: FAMILY MEDICINE CLINIC | Facility: CLINIC | Age: 67
End: 2022-03-16
Payer: MEDICARE

## 2022-03-16 VITALS
HEIGHT: 62 IN | OXYGEN SATURATION: 91 % | WEIGHT: 144.5 LBS | DIASTOLIC BLOOD PRESSURE: 82 MMHG | SYSTOLIC BLOOD PRESSURE: 158 MMHG | BODY MASS INDEX: 26.59 KG/M2 | TEMPERATURE: 97.9 F | HEART RATE: 84 BPM

## 2022-03-16 DIAGNOSIS — I10 ESSENTIAL HYPERTENSION: Primary | ICD-10-CM

## 2022-03-16 DIAGNOSIS — I10 ESSENTIAL HYPERTENSION: ICD-10-CM

## 2022-03-16 PROCEDURE — 99211 OFF/OP EST MAY X REQ PHY/QHP: CPT

## 2022-03-16 RX ORDER — LOSARTAN POTASSIUM 100 MG/1
100 TABLET ORAL DAILY
Qty: 30 TABLET | Refills: 1 | Status: SHIPPED | OUTPATIENT
Start: 2022-03-16 | End: 2022-05-17

## 2022-03-16 NOTE — PROGRESS NOTES
Patient presents for BP check after her medication was changed from hctz to Cozaar 50 mg once daily  Patient's BP was 158/82  On her home machine she's been 150/90  Per Dr Tiffanie Collins, patient is to increase her Cozaar to 100 mg once daily and return in 2 weeks for another BP check

## 2022-03-30 ENCOUNTER — TELEPHONE (OUTPATIENT)
Dept: FAMILY MEDICINE CLINIC | Facility: CLINIC | Age: 67
End: 2022-03-30

## 2022-03-30 ENCOUNTER — CLINICAL SUPPORT (OUTPATIENT)
Dept: FAMILY MEDICINE CLINIC | Facility: CLINIC | Age: 67
End: 2022-03-30
Payer: MEDICARE

## 2022-03-30 VITALS
TEMPERATURE: 96.9 F | WEIGHT: 144.4 LBS | BODY MASS INDEX: 26.57 KG/M2 | DIASTOLIC BLOOD PRESSURE: 86 MMHG | HEART RATE: 87 BPM | OXYGEN SATURATION: 94 % | HEIGHT: 62 IN | SYSTOLIC BLOOD PRESSURE: 197 MMHG

## 2022-03-30 DIAGNOSIS — I10 ESSENTIAL HYPERTENSION: Primary | ICD-10-CM

## 2022-03-30 PROCEDURE — 99211 OFF/OP EST MAY X REQ PHY/QHP: CPT | Performed by: FAMILY MEDICINE

## 2022-03-30 NOTE — PROGRESS NOTES
Patient present to office today for blood pressure check  Patient check her blood pressure by person machine this morning was 185/88; she is currently taking Lorsartan 100 mg  Blood pressure taken today in office on left arm sitting was 178/88; after waiting 15 minutes blood pressure was 197/88  Blood pressure reading reviewed by Dr Patel  As per Dr Beth Gomez patient will start Amlodipine 5 mg in addition to Losartan 100 mg and return in 2 weeks for a office visit with Dr Jabari Taylor

## 2022-03-30 NOTE — TELEPHONE ENCOUNTER
Patient present to office today for blood pressure check  Patient check her blood pressure by person machine this morning was 185/88; she is currently taking Lorsartan 100 mg  Blood pressure taken today in office on left arm sitting was 178/88; after waiting 15 minutes blood pressure was 197/88  Blood pressure reading reviewed by Dr Patel  As per Dr Shell Kemp patient will start Amlodipine 5 mg in addition to Losartan 100 mg and return in 2 weeks for a office visit with Dr Brianna Garcia

## 2022-04-14 ENCOUNTER — APPOINTMENT (OUTPATIENT)
Dept: LAB | Age: 67
End: 2022-04-14
Payer: MEDICARE

## 2022-04-14 ENCOUNTER — OFFICE VISIT (OUTPATIENT)
Dept: FAMILY MEDICINE CLINIC | Facility: CLINIC | Age: 67
End: 2022-04-14
Payer: MEDICARE

## 2022-04-14 VITALS
SYSTOLIC BLOOD PRESSURE: 138 MMHG | HEART RATE: 92 BPM | OXYGEN SATURATION: 96 % | HEIGHT: 62 IN | DIASTOLIC BLOOD PRESSURE: 76 MMHG | WEIGHT: 145 LBS | TEMPERATURE: 98.8 F | BODY MASS INDEX: 26.68 KG/M2

## 2022-04-14 DIAGNOSIS — I10 ESSENTIAL HYPERTENSION: Primary | ICD-10-CM

## 2022-04-14 DIAGNOSIS — E83.52 HYPERCALCEMIA: ICD-10-CM

## 2022-04-14 LAB
25(OH)D3 SERPL-MCNC: 64 NG/ML (ref 30–100)
ALBUMIN SERPL BCP-MCNC: 3.7 G/DL (ref 3.5–5)
CA-I BLD-SCNC: 1.29 MMOL/L (ref 1.12–1.32)
CALCIUM SERPL-MCNC: 9.9 MG/DL (ref 8.3–10.1)
PTH-INTACT SERPL-MCNC: 12.8 PG/ML (ref 18.4–80.1)
TSH SERPL DL<=0.05 MIU/L-ACNC: 1.25 UIU/ML (ref 0.45–4.5)

## 2022-04-14 PROCEDURE — 82306 VITAMIN D 25 HYDROXY: CPT

## 2022-04-14 PROCEDURE — 82040 ASSAY OF SERUM ALBUMIN: CPT

## 2022-04-14 PROCEDURE — 82330 ASSAY OF CALCIUM: CPT

## 2022-04-14 PROCEDURE — 82310 ASSAY OF CALCIUM: CPT

## 2022-04-14 PROCEDURE — 36415 COLL VENOUS BLD VENIPUNCTURE: CPT

## 2022-04-14 PROCEDURE — 83970 ASSAY OF PARATHORMONE: CPT

## 2022-04-14 PROCEDURE — 84443 ASSAY THYROID STIM HORMONE: CPT

## 2022-04-14 PROCEDURE — 99213 OFFICE O/P EST LOW 20 MIN: CPT | Performed by: FAMILY MEDICINE

## 2022-04-22 PROBLEM — R09.89 LABILE HYPERTENSION: Status: RESOLVED | Noted: 2021-12-22 | Resolved: 2022-04-22

## 2022-04-22 NOTE — PROGRESS NOTES
Patient ID: Dai Arora is a 75 y o  female  HPI: 75 y  o female presents for follow up hypertension   She denies any headaches or blurred vision  Pt also denies any dizziness,  chest pain, palpitations, or dypsnea with exertion        SUBJECTIVE    Family History   Problem Relation Age of Onset    Colon cancer Mother 46    Breast cancer Mother 48    Cancer Mother     Heart attack Father     Prostate cancer Father 79    Hypertension Father     Heart attack Sister     No Known Problems Daughter     No Known Problems Maternal Grandmother     No Known Problems Maternal Grandfather     No Known Problems Paternal Grandmother     No Known Problems Paternal Grandfather     No Known Problems Sister     No Known Problems Daughter     No Known Problems Maternal Aunt     No Known Problems Maternal Aunt     No Known Problems Maternal Aunt     No Known Problems Son     Stroke Brother      Social History     Socioeconomic History    Marital status: /Civil Union     Spouse name: Not on file    Number of children: Not on file    Years of education: Not on file    Highest education level: Not on file   Occupational History    Not on file   Tobacco Use    Smoking status: Never Smoker    Smokeless tobacco: Never Used   Vaping Use    Vaping Use: Never used   Substance and Sexual Activity    Alcohol use: No    Drug use: No    Sexual activity: Yes     Partners: Male   Other Topics Concern    Not on file   Social History Narrative    Not on file     Social Determinants of Health     Financial Resource Strain: Not on file   Food Insecurity: Not on file   Transportation Needs: Not on file   Physical Activity: Not on file   Stress: Not on file   Social Connections: Not on file   Intimate Partner Violence: Not on file   Housing Stability: Not on file     Past Medical History:   Diagnosis Date    Chronic leukopenia     Hypercalcemia     Hyperlipidemia     Hypertension     labile    Hypertension     Migraines     Osteoarthritis     Osteoporosis     Vertigo      Past Surgical History:   Procedure Laterality Date    EYE SURGERY      TOOTH EXTRACTION      TUBAL LIGATION       Allergies   Allergen Reactions    Iodine - Food Allergy Rash       Current Outpatient Medications:     amLODIPine (NORVASC) 5 mg tablet, Take 1 tablet (5 mg total) by mouth daily, Disp: 30 tablet, Rfl: 5    Calcium Carb-Cholecalciferol 600-800 MG-UNIT TABS, Take 2 tablets by mouth daily, Disp: , Rfl:     Calcium Carbonate-Vitamin D 600-400 MG-UNIT per tablet, Take 2 tablets by mouth daily, Disp: , Rfl:     Cholecalciferol (CVS VITAMIN D3) 1000 units capsule, Take 1 capsule by mouth daily, Disp: , Rfl:     Cranberry-Vitamin C-Probiotic (AZO CRANBERRY PO), Take by mouth 2 (two) times a day, Disp: , Rfl:     fenofibrate (TRICOR) 145 mg tablet, TAKE 1 TABLET BY MOUTH DAILY, Disp: 90 tablet, Rfl: 3    losartan (Cozaar) 100 MG tablet, Take 1 tablet (100 mg total) by mouth daily, Disp: 30 tablet, Rfl: 1    meclizine (ANTIVERT) 25 mg tablet, Take 2 tablets (50 mg total) by mouth every 8 (eight) hours as needed for dizziness, Disp: 30 tablet, Rfl: 2    Multiple Vitamins-Minerals (Womens 50+ Multi Vitamin/Min) TABS, Take by mouth daily, Disp: , Rfl:     nortriptyline (PAMELOR) 10 mg capsule, TAKE 1 CAPSULE 3 TIMES A   DAY, Disp: 270 capsule, Rfl: 1    Probiotic Product (PROBIOTIC-10 PO), Take 1 capsule by mouth daily, Disp: , Rfl:     TURMERIC CURCUMIN PO, Take by mouth, Disp: , Rfl:     ALPRAZolam (XANAX) 0 25 mg tablet, Take 1 tablet (0 25 mg total) by mouth 3 (three) times a day as needed for anxiety for up to 10 days, Disp: 50 tablet, Rfl: 0    Review of Systems  Constitutional:     Denies fever, chills ,fatigue ,weakness ,weight loss, weight gain     ENT: Denies earache ,loss of hearing ,nosebleed, nasal discharge,nasal congestion ,sore throat ,hoarseness  Pulmonary: Denies shortness of breath ,cough  ,dyspnea on exertion, orthopnea  ,PND   Cardiovascular:  Denies bradycardia , tachycardia  ,palpations, lower extremity edema leg, claudication  Breast:  Denies new or changing breast lumps ,nipple discharge ,nipple changes  Abdomen:  Denies abdominal pain , anorexia , indigestion, nausea, vomiting, constipation, diarrhea  Musculoskeletal: Denies myalgias, arthralgias, joint swelling, joint stiffness , limb pain, limb swelling  Gu: denies dysuria, polyuria  Skin: Denies skin rash, skin lesion, skin wound, itching, dry skin  Neuro: Denies headache, numbness, tingling, confusion, loss of consciousness, dizziness, vertigo  Psychiatric: Denies feelings of depression, suicidal ideation, anxiety, sleep disturbances    OBJECTIVE  /76   Pulse 92   Temp 98 8 °F (37 1 °C)   Ht 5' 2" (1 575 m)   Wt 65 8 kg (145 lb)   LMP  (LMP Unknown)   SpO2 96%   BMI 26 52 kg/m²   Constitutional:   NAD, well appearing and well nourished      ENT:   Conjunctiva and lids: no injection, edema, or discharge     Pupils and iris: LOS bilaterally    External inspection of ears and nose: normal without deformities or discharge  Otoscopic exam: Canals patent without erythema  Nasal mucosa, septum and turbinates: Normal or edema or discharge         Oropharynx:  Moist mucosa, normal tongue and tonsils without lesions  No erythema        Pulmonary:Respiratory effort normal rate and rhythm, no increased work of breathing   Auscultation of lungs:  Clear bilaterally with no adventitious breath sounds       Cardiovascular: regular rate and rhythm, S1 and S2, no murmur, no edema and/or varicosities of LE      Abdomen: Soft and non-distended     Positive bowel sounds      No heptomegaly or splenomegaly      Gu: no suprapubic tenderness or CVA tenderness, no urethral discharge  Lymphatic:  No anterior or posterior cervical lymphadenopathy         Musculoskeletal:  Gait and station: Normal gait      Digits and nails normal without clubbing or cyanosis       Inspection/palpation of joints, bones, and muscles:  No joint tenderness, swelling, full active and passive range of motion       Skin: Normal skin turgor and no rashes      Neuro:    Normal reflexes      Psych:   alert and oriented to person, place and time     normal mood and affect       Assessment/Plan:Diagnoses and all orders for this visit:    Essential hypertension  Comments:  Continue ARB and calcium channel blocker tx  I will see patient back in 4 mos or sooner prn

## 2022-05-07 ENCOUNTER — APPOINTMENT (OUTPATIENT)
Dept: LAB | Age: 67
End: 2022-05-07
Payer: MEDICARE

## 2022-05-07 DIAGNOSIS — E83.52 HYPERCALCEMIA SYNDROME: ICD-10-CM

## 2022-05-07 LAB
25(OH)D3 SERPL-MCNC: 65.7 NG/ML (ref 30–100)
CA-I BLD-SCNC: 1.29 MMOL/L (ref 1.12–1.32)
PTH-INTACT SERPL-MCNC: 13.8 PG/ML (ref 18.4–80.1)

## 2022-05-07 PROCEDURE — 84165 PROTEIN E-PHORESIS SERUM: CPT

## 2022-05-07 PROCEDURE — 36415 COLL VENOUS BLD VENIPUNCTURE: CPT

## 2022-05-07 PROCEDURE — 86334 IMMUNOFIX E-PHORESIS SERUM: CPT | Performed by: PATHOLOGY

## 2022-05-07 PROCEDURE — 82330 ASSAY OF CALCIUM: CPT

## 2022-05-07 PROCEDURE — 83970 ASSAY OF PARATHORMONE: CPT

## 2022-05-07 PROCEDURE — 82397 CHEMILUMINESCENT ASSAY: CPT

## 2022-05-07 PROCEDURE — 84165 PROTEIN E-PHORESIS SERUM: CPT | Performed by: PATHOLOGY

## 2022-05-09 DIAGNOSIS — F32.A DEPRESSION, UNSPECIFIED DEPRESSION TYPE: ICD-10-CM

## 2022-05-09 RX ORDER — NORTRIPTYLINE HYDROCHLORIDE 10 MG/1
CAPSULE ORAL
Qty: 270 CAPSULE | Refills: 1 | Status: SHIPPED | OUTPATIENT
Start: 2022-05-09

## 2022-05-10 LAB
ALBUMIN SERPL ELPH-MCNC: 4.66 G/DL (ref 3.5–5)
ALBUMIN SERPL ELPH-MCNC: 62.1 % (ref 52–65)
ALPHA1 GLOB SERPL ELPH-MCNC: 0.29 G/DL (ref 0.1–0.4)
ALPHA1 GLOB SERPL ELPH-MCNC: 3.8 % (ref 2.5–5)
ALPHA2 GLOB SERPL ELPH-MCNC: 0.74 G/DL (ref 0.4–1.2)
ALPHA2 GLOB SERPL ELPH-MCNC: 9.8 % (ref 7–13)
BETA GLOB ABNORMAL SERPL ELPH-MCNC: 0.53 G/DL (ref 0.4–0.8)
BETA1 GLOB SERPL ELPH-MCNC: 7.1 % (ref 5–13)
BETA2 GLOB SERPL ELPH-MCNC: 5.5 % (ref 2–8)
BETA2+GAMMA GLOB SERPL ELPH-MCNC: 0.41 G/DL (ref 0.2–0.5)
GAMMA GLOB ABNORMAL SERPL ELPH-MCNC: 0.88 G/DL (ref 0.5–1.6)
GAMMA GLOB SERPL ELPH-MCNC: 11.7 % (ref 12–22)
IGG/ALB SER: 1.64 {RATIO} (ref 1.1–1.8)
PROT PATTERN SERPL ELPH-IMP: ABNORMAL
PROT SERPL-MCNC: 7.5 G/DL (ref 6.4–8.2)

## 2022-05-14 LAB — PTH RELATED PROT SERPL-SCNC: <2 PMOL/L

## 2022-05-17 DIAGNOSIS — I10 ESSENTIAL HYPERTENSION: ICD-10-CM

## 2022-05-17 RX ORDER — LOSARTAN POTASSIUM 100 MG/1
TABLET ORAL
Qty: 30 TABLET | Refills: 1 | Status: SHIPPED | OUTPATIENT
Start: 2022-05-17 | End: 2022-06-15

## 2022-05-18 ENCOUNTER — TELEPHONE (OUTPATIENT)
Dept: FAMILY MEDICINE CLINIC | Facility: CLINIC | Age: 67
End: 2022-05-18

## 2022-05-18 DIAGNOSIS — N39.0 URINARY TRACT INFECTION WITHOUT HEMATURIA, SITE UNSPECIFIED: Primary | ICD-10-CM

## 2022-05-18 RX ORDER — CIPROFLOXACIN 250 MG/1
250 TABLET, FILM COATED ORAL EVERY 12 HOURS SCHEDULED
Qty: 20 TABLET | Refills: 0 | Status: SHIPPED | OUTPATIENT
Start: 2022-05-18 | End: 2022-05-28

## 2022-05-18 RX ORDER — PHENAZOPYRIDINE HYDROCHLORIDE 200 MG/1
200 TABLET, FILM COATED ORAL
Qty: 9 TABLET | Refills: 0 | Status: SHIPPED | OUTPATIENT
Start: 2022-05-18 | End: 2022-07-21

## 2022-05-18 NOTE — TELEPHONE ENCOUNTER
Patient called stating she believes she has a UTI  Symptoms: very uncomfortable burning pain in vagina, extreme pain with urination, frequent pain with urination      Patient asking if she needs to be seen, or if you can send medication to [address]

## 2022-06-15 DIAGNOSIS — I10 ESSENTIAL HYPERTENSION: ICD-10-CM

## 2022-06-15 RX ORDER — LOSARTAN POTASSIUM 100 MG/1
TABLET ORAL
Qty: 30 TABLET | Refills: 1 | Status: SHIPPED | OUTPATIENT
Start: 2022-06-15 | End: 2022-07-08

## 2022-07-06 DIAGNOSIS — E78.00 HYPERCHOLESTEROLEMIA: ICD-10-CM

## 2022-07-06 RX ORDER — FENOFIBRATE 145 MG/1
TABLET, COATED ORAL
Qty: 90 TABLET | Refills: 3 | Status: SHIPPED | OUTPATIENT
Start: 2022-07-06

## 2022-07-08 DIAGNOSIS — I10 ESSENTIAL HYPERTENSION: ICD-10-CM

## 2022-07-08 RX ORDER — LOSARTAN POTASSIUM 100 MG/1
TABLET ORAL
Qty: 30 TABLET | Refills: 1 | Status: SHIPPED | OUTPATIENT
Start: 2022-07-08 | End: 2022-08-08

## 2022-07-20 ENCOUNTER — OFFICE VISIT (OUTPATIENT)
Dept: FAMILY MEDICINE CLINIC | Facility: CLINIC | Age: 67
End: 2022-07-20
Payer: MEDICARE

## 2022-07-20 VITALS
SYSTOLIC BLOOD PRESSURE: 130 MMHG | HEIGHT: 62 IN | HEART RATE: 79 BPM | TEMPERATURE: 98.1 F | DIASTOLIC BLOOD PRESSURE: 80 MMHG | OXYGEN SATURATION: 98 % | WEIGHT: 143.4 LBS | BODY MASS INDEX: 26.39 KG/M2

## 2022-07-20 DIAGNOSIS — I10 ESSENTIAL HYPERTENSION: Primary | ICD-10-CM

## 2022-07-20 DIAGNOSIS — E78.00 HYPERCHOLESTEROLEMIA: ICD-10-CM

## 2022-07-20 PROCEDURE — 99214 OFFICE O/P EST MOD 30 MIN: CPT | Performed by: FAMILY MEDICINE

## 2022-07-21 NOTE — PROGRESS NOTES
Patient ID: Mone Marie is a 75 y o  female  HPI: 75 y  o female presents for follow up hypertension and hypercholesterolemia  Pt denies any dizziness,  chest pain, palpitations, or dypsnea with exertion        SUBJECTIVE    Family History   Problem Relation Age of Onset    Colon cancer Mother 46    Breast cancer Mother 48    Cancer Mother     Heart attack Father     Prostate cancer Father 79    Hypertension Father     Heart attack Sister     No Known Problems Daughter     No Known Problems Maternal Grandmother     No Known Problems Maternal Grandfather     No Known Problems Paternal Grandmother     No Known Problems Paternal Grandfather     No Known Problems Sister     No Known Problems Daughter     No Known Problems Maternal Aunt     No Known Problems Maternal Aunt     No Known Problems Maternal Aunt     No Known Problems Son     Stroke Brother      Social History     Socioeconomic History    Marital status: /Civil Union     Spouse name: Not on file    Number of children: Not on file    Years of education: Not on file    Highest education level: Not on file   Occupational History    Not on file   Tobacco Use    Smoking status: Never Smoker    Smokeless tobacco: Never Used   Vaping Use    Vaping Use: Never used   Substance and Sexual Activity    Alcohol use: No    Drug use: No    Sexual activity: Yes     Partners: Male   Other Topics Concern    Not on file   Social History Narrative    Not on file     Social Determinants of Health     Financial Resource Strain: Not on file   Food Insecurity: Not on file   Transportation Needs: Not on file   Physical Activity: Not on file   Stress: Not on file   Social Connections: Not on file   Intimate Partner Violence: Not on file   Housing Stability: Not on file     Past Medical History:   Diagnosis Date    Chronic leukopenia     Hypercalcemia     Hyperlipidemia     Hypertension     labile    Hypertension     Migraines     Reviewed the following issues with the patient:    1. Healthy diet - eat at least 5-9 servings of fruits/vegetables per day and a goal of 25-30 grams of fiber per day  2. Exercise 20-30 minutes most days of the week at a moderate intensity  3. Appropriate calcium and vitamin D intake daily - calcium 600-800 mg twice daily and vitamin D 2000 IU daily  4. Limit alcohol intake to less than 7 drinks (3.5 oz alcohol) per week  5. Wear your seatbelt at all times you are in a vehicle  6. Skin self examinations for moles - watch for changing colors, size, bleeding, or any other concerns  7. Use sunscreen of SPF 15-30 or higher on sun exposed areas year round    Pelvic ultrasound: [phone] - call to schedule    Pulmonary - snoring - they will call you for an appointment    Podiatry - for feet pain    Work on healthy diet and exercise daily   Osteoarthritis     Osteoporosis     Vertigo      Past Surgical History:   Procedure Laterality Date    EYE SURGERY      TOOTH EXTRACTION      TUBAL LIGATION       Allergies   Allergen Reactions    Iodine - Food Allergy Rash       Current Outpatient Medications:     amLODIPine (NORVASC) 5 mg tablet, Take 1 tablet (5 mg total) by mouth daily, Disp: 30 tablet, Rfl: 5    fenofibrate (TRICOR) 145 mg tablet, TAKE 1 TABLET BY MOUTH DAILY, Disp: 90 tablet, Rfl: 3    losartan (COZAAR) 100 MG tablet, TAKE 1 TABLET BY MOUTH DAILY, Disp: 30 tablet, Rfl: 1    nortriptyline (PAMELOR) 10 mg capsule, TAKE 1 CAPSULE 3 TIMES A   DAY, Disp: 270 capsule, Rfl: 1    TURMERIC CURCUMIN PO, Take by mouth, Disp: , Rfl:     Review of Systems  Constitutional:     Denies fever, chills ,fatigue ,weakness ,weight loss, weight gain     ENT: Denies earache ,loss of hearing ,nosebleed, nasal discharge,nasal congestion ,sore throat ,hoarseness  Pulmonary: Denies shortness of breath ,cough  ,dyspnea on exertion, orthopnea  ,PND   Cardiovascular:  Denies bradycardia , tachycardia  ,palpations, lower extremity edema leg, claudication  Breast:  Denies new or changing breast lumps ,nipple discharge ,nipple changes  Abdomen:  Denies abdominal pain , anorexia , indigestion, nausea, vomiting, constipation, diarrhea  Musculoskeletal: Denies myalgias, arthralgias, joint swelling, joint stiffness , limb pain, limb swelling  Gu: denies dysuria, polyuria  Skin: Denies skin rash, skin lesion, skin wound, itching, dry skin  Neuro: Denies headache, numbness, tingling, confusion, loss of consciousness, dizziness, vertigo  Psychiatric: Denies feelings of depression, suicidal ideation, anxiety, sleep disturbances    OBJECTIVE  /80   Pulse 79   Temp 98 1 °F (36 7 °C)   Ht 5' 2" (1 575 m)   Wt 65 kg (143 lb 6 4 oz)   LMP  (LMP Unknown)   SpO2 98%   BMI 26 23 kg/m²   Constitutional:   NAD, well appearing and well nourished      ENT:   Conjunctiva and lids: no injection, edema, or discharge     Pupils and iris: LOS bilaterally    External inspection of ears and nose: normal without deformities or discharge  Otoscopic exam: Canals patent without erythema  Nasal mucosa, septum and turbinates: Normal or edema or discharge         Oropharynx:  Moist mucosa, normal tongue and tonsils without lesions  No erythema        Pulmonary:Respiratory effort normal rate and rhythm, no increased work of breathing  Auscultation of lungs:  Clear bilaterally with no adventitious breath sounds       Cardiovascular: regular rate and rhythm, S1 and S2, no murmur, no edema and/or varicosities of LE      Abdomen: Soft and non-distended     Positive bowel sounds      No heptomegaly or splenomegaly      Gu: no suprapubic tenderness or CVA tenderness, no urethral discharge  Lymphatic:  No anterior or posterior cervical lymphadenopathy         Musculoskeletal:  Gait and station: Normal gait      Digits and nails normal without clubbing or cyanosis       Inspection/palpation of joints, bones, and muscles:  No joint tenderness, swelling, full active and passive range of motion       Skin: Normal skin turgor and no rashes      Neuro:     Normal reflexes     Psych:   alert and oriented to person, place and time     normal mood and affect       Assessment/Plan:Diagnoses and all orders for this visit:    Essential hypertension  Comments:  Stable on current meds  Orders:  -     Comprehensive metabolic panel; Future    Hypercholesterolemia  Comments:  Continue tricor tx  Orders:  -     Lipid Panel with Direct LDL reflex;  Future        Will see patient back in 6 mos or sooner prn

## 2022-07-22 ENCOUNTER — APPOINTMENT (OUTPATIENT)
Dept: LAB | Age: 67
End: 2022-07-22
Payer: MEDICARE

## 2022-07-22 DIAGNOSIS — I10 ESSENTIAL HYPERTENSION: ICD-10-CM

## 2022-07-22 DIAGNOSIS — E78.00 HYPERCHOLESTEROLEMIA: ICD-10-CM

## 2022-07-22 DIAGNOSIS — E83.52 HYPERCALCEMIA SYNDROME: ICD-10-CM

## 2022-07-22 LAB
ALBUMIN SERPL BCP-MCNC: 3.7 G/DL (ref 3.5–5)
ALP SERPL-CCNC: 37 U/L (ref 46–116)
ALT SERPL W P-5'-P-CCNC: 24 U/L (ref 12–78)
ANION GAP SERPL CALCULATED.3IONS-SCNC: 5 MMOL/L (ref 4–13)
AST SERPL W P-5'-P-CCNC: 28 U/L (ref 5–45)
BILIRUB SERPL-MCNC: 0.32 MG/DL (ref 0.2–1)
BUN SERPL-MCNC: 24 MG/DL (ref 5–25)
CALCIUM SERPL-MCNC: 9.5 MG/DL (ref 8.3–10.1)
CHLORIDE SERPL-SCNC: 110 MMOL/L (ref 96–108)
CHOLEST SERPL-MCNC: 158 MG/DL
CO2 SERPL-SCNC: 27 MMOL/L (ref 21–32)
CREAT SERPL-MCNC: 1.06 MG/DL (ref 0.6–1.3)
GFR SERPL CREATININE-BSD FRML MDRD: 54 ML/MIN/1.73SQ M
GLUCOSE P FAST SERPL-MCNC: 92 MG/DL (ref 65–99)
HDLC SERPL-MCNC: 36 MG/DL
LDLC SERPL CALC-MCNC: 106 MG/DL (ref 0–100)
POTASSIUM SERPL-SCNC: 3.9 MMOL/L (ref 3.5–5.3)
PROT SERPL-MCNC: 7.2 G/DL (ref 6.4–8.4)
SODIUM SERPL-SCNC: 142 MMOL/L (ref 135–147)
TRIGL SERPL-MCNC: 82 MG/DL

## 2022-07-22 PROCEDURE — 80053 COMPREHEN METABOLIC PANEL: CPT

## 2022-07-22 PROCEDURE — 82652 VIT D 1 25-DIHYDROXY: CPT

## 2022-07-22 PROCEDURE — 36415 COLL VENOUS BLD VENIPUNCTURE: CPT

## 2022-07-22 PROCEDURE — 80061 LIPID PANEL: CPT

## 2022-07-24 LAB — 1,25(OH)2D3 SERPL-MCNC: 39.3 PG/ML (ref 24.8–81.5)

## 2022-08-08 DIAGNOSIS — I10 ESSENTIAL HYPERTENSION: ICD-10-CM

## 2022-08-08 RX ORDER — LOSARTAN POTASSIUM 100 MG/1
TABLET ORAL
Qty: 30 TABLET | Refills: 1 | Status: SHIPPED | OUTPATIENT
Start: 2022-08-08 | End: 2022-09-29 | Stop reason: SDUPTHER

## 2022-08-08 RX ORDER — AMLODIPINE BESYLATE 5 MG/1
TABLET ORAL
Qty: 30 TABLET | Refills: 5 | Status: SHIPPED | OUTPATIENT
Start: 2022-08-08

## 2022-09-21 ENCOUNTER — HOSPITAL ENCOUNTER (OUTPATIENT)
Dept: RADIOLOGY | Age: 67
Discharge: HOME | End: 2022-09-21
Payer: MEDICARE

## 2022-09-21 VITALS — HEIGHT: 62 IN | WEIGHT: 143 LBS | BODY MASS INDEX: 26.31 KG/M2

## 2022-09-21 DIAGNOSIS — Z12.31 ENCOUNTER FOR SCREENING MAMMOGRAM FOR MALIGNANT NEOPLASM OF BREAST: ICD-10-CM

## 2022-09-21 PROCEDURE — 77063 BREAST TOMOSYNTHESIS BI: CPT

## 2022-09-21 PROCEDURE — 77067 SCR MAMMO BI INCL CAD: CPT

## 2022-09-29 DIAGNOSIS — I10 ESSENTIAL HYPERTENSION: ICD-10-CM

## 2022-09-30 RX ORDER — LOSARTAN POTASSIUM 100 MG/1
100 TABLET ORAL DAILY
Qty: 30 TABLET | Refills: 1 | Status: SHIPPED | OUTPATIENT
Start: 2022-09-30

## 2022-10-05 ENCOUNTER — OFFICE VISIT (OUTPATIENT)
Dept: URGENT CARE | Age: 67
End: 2022-10-05
Payer: MEDICARE

## 2022-10-05 VITALS
OXYGEN SATURATION: 99 % | RESPIRATION RATE: 16 BRPM | TEMPERATURE: 97.7 F | SYSTOLIC BLOOD PRESSURE: 165 MMHG | HEART RATE: 92 BPM | DIASTOLIC BLOOD PRESSURE: 70 MMHG

## 2022-10-05 DIAGNOSIS — H10.32 ACUTE CONJUNCTIVITIS OF LEFT EYE, UNSPECIFIED ACUTE CONJUNCTIVITIS TYPE: Primary | ICD-10-CM

## 2022-10-05 PROCEDURE — G0463 HOSPITAL OUTPT CLINIC VISIT: HCPCS

## 2022-10-05 PROCEDURE — 99213 OFFICE O/P EST LOW 20 MIN: CPT

## 2022-10-05 RX ORDER — OFLOXACIN 3 MG/ML
1 SOLUTION/ DROPS OPHTHALMIC 4 TIMES DAILY
Qty: 5 ML | Refills: 0 | Status: SHIPPED | OUTPATIENT
Start: 2022-10-05 | End: 2022-10-12 | Stop reason: ALTCHOICE

## 2022-10-05 NOTE — PROGRESS NOTES
[facility]        NAME: Seamus Wilhelm is a 75 y o  female  : 1955    MRN: [id number]  DATE: 2022  TIME: 2:47 PM    Assessment and Plan   Acute conjunctivitis of left eye, unspecified acute conjunctivitis type [H10 32]  1  Acute conjunctivitis of left eye, unspecified acute conjunctivitis type  ofloxacin (OCUFLOX) 0 3 % ophthalmic solution         Patient Instructions     Antibiotic eyedrops as ordered  Follow up with PCP in 3-5 days  Proceed to  ER if symptoms worsen  Chief Complaint     Chief Complaint   Patient presents with    Conjunctivitis     Left eye red, painful, since yesterday         History of Present Illness       Patient presenting for evaluation of left eye irritation that began 1 day ago  Patient states that she believes she had a stye on her lower eyelid  She states when she woke up this morning, she felt that this diaper stand has had left eye irritation since  Patient states that she has no measurable vision in her left eye, and for this reason denies any visual changes  She denies any drainage from her left eye  She denies any fevers or chills at this time  Review of Systems   Review of Systems   Constitutional: Negative for chills and fever  HENT: Negative for ear pain and sore throat  Eyes: Positive for redness  Negative for pain and visual disturbance  Respiratory: Negative for cough and shortness of breath  Cardiovascular: Negative for chest pain and palpitations  Gastrointestinal: Negative for abdominal pain and vomiting  Genitourinary: Negative for dysuria and hematuria  Musculoskeletal: Negative for arthralgias and back pain  Skin: Negative for color change and rash  Neurological: Negative for seizures and syncope  All other systems reviewed and are negative          Current Medications       Current Outpatient Medications:     amLODIPine (NORVASC) 5 mg tablet, TAKE 1 TABLET BY MOUTH DAILY, Disp: 30 tablet, Rfl: 5   fenofibrate (TRICOR) 145 mg tablet, TAKE 1 TABLET BY MOUTH DAILY, Disp: 90 tablet, Rfl: 3    losartan (COZAAR) 100 MG tablet, Take 1 tablet (100 mg total) by mouth daily, Disp: 30 tablet, Rfl: 1    nortriptyline (PAMELOR) 10 mg capsule, TAKE 1 CAPSULE 3 TIMES A   DAY, Disp: 270 capsule, Rfl: 1    ofloxacin (OCUFLOX) 0 3 % ophthalmic solution, Administer 1 drop into the left eye 4 (four) times a day, Disp: 5 mL, Rfl: 0    TURMERIC CURCUMIN PO, Take by mouth, Disp: , Rfl:     Current Allergies     Allergies as of 10/05/2022 - Reviewed 10/05/2022   Allergen Reaction Noted    Iodine - food allergy Rash 04/09/2013            The following portions of the patient's history were reviewed and updated as appropriate: allergies, current medications, past family history, past medical history, past social history, past surgical history and problem list      Past Medical History:   Diagnosis Date    Chronic leukopenia     Hypercalcemia     Hyperlipidemia     Hypertension     labile    Hypertension     Migraines     Osteoarthritis     Osteoporosis     Vertigo        Past Surgical History:   Procedure Laterality Date    EYE SURGERY      TOOTH EXTRACTION      TUBAL LIGATION         Family History   Problem Relation Age of Onset    Colon cancer Mother 46    Breast cancer Mother 48    Cancer Mother     Heart attack Father     Prostate cancer Father 79    Hypertension Father     Heart attack Sister     No Known Problems Sister     No Known Problems Daughter     No Known Problems Daughter     No Known Problems Maternal Grandmother     No Known Problems Maternal Grandfather     No Known Problems Paternal Grandmother     No Known Problems Paternal Grandfather     Stroke Brother     No Known Problems Son     No Known Problems Maternal Aunt     No Known Problems Maternal Aunt     No Known Problems Maternal Aunt          Medications have been verified          Objective   /70   Pulse 92   Temp 97 7 °F (36 5 °C)   Resp 16   LMP  (LMP Unknown)   SpO2 99%        Physical Exam     Physical Exam  Vitals and nursing note reviewed  Constitutional:       General: She is not in acute distress  Appearance: Normal appearance  She is normal weight  She is not ill-appearing, toxic-appearing or diaphoretic  HENT:      Head: Normocephalic and atraumatic  Right Ear: Tympanic membrane normal       Left Ear: Tympanic membrane normal       Nose: Nose normal  No congestion or rhinorrhea  Mouth/Throat:      Mouth: Mucous membranes are moist       Pharynx: Oropharynx is clear  No oropharyngeal exudate or posterior oropharyngeal erythema  Eyes:      General: Lids are normal  Lids are everted, no foreign bodies appreciated  Vision grossly intact  Right eye: No discharge  Left eye: No foreign body, discharge or hordeolum  Extraocular Movements: Extraocular movements intact  Conjunctiva/sclera:      Left eye: Left conjunctiva is injected  No chemosis, exudate or hemorrhage  Pupils: Pupils are equal, round, and reactive to light  Cardiovascular:      Rate and Rhythm: Normal rate and regular rhythm  Pulses: Normal pulses  Heart sounds: Normal heart sounds  No murmur heard  No friction rub  No gallop  Pulmonary:      Effort: Pulmonary effort is normal  No respiratory distress  Breath sounds: Normal breath sounds  No stridor  No wheezing, rhonchi or rales  Chest:      Chest wall: No tenderness  Abdominal:      General: Abdomen is flat  Bowel sounds are normal       Palpations: Abdomen is soft  Tenderness: There is no abdominal tenderness  There is no guarding or rebound  Musculoskeletal:         General: No tenderness  Normal range of motion  Cervical back: Normal range of motion and neck supple  No tenderness  Skin:     General: Skin is warm and dry  Capillary Refill: Capillary refill takes less than 2 seconds     Neurological:      General: No focal deficit present  Mental Status: She is alert and oriented to person, place, and time     Psychiatric:         Mood and Affect: Mood normal          Behavior: Behavior normal

## 2022-10-12 ENCOUNTER — OFFICE VISIT (OUTPATIENT)
Dept: FAMILY MEDICINE CLINIC | Facility: CLINIC | Age: 67
End: 2022-10-12
Payer: MEDICARE

## 2022-10-12 VITALS
HEART RATE: 90 BPM | TEMPERATURE: 98 F | SYSTOLIC BLOOD PRESSURE: 152 MMHG | BODY MASS INDEX: 26.39 KG/M2 | DIASTOLIC BLOOD PRESSURE: 86 MMHG | WEIGHT: 143.4 LBS | HEIGHT: 62 IN | OXYGEN SATURATION: 96 %

## 2022-10-12 DIAGNOSIS — H00.025 HORDEOLUM INTERNUM OF LEFT LOWER EYELID: Primary | ICD-10-CM

## 2022-10-12 PROCEDURE — 99214 OFFICE O/P EST MOD 30 MIN: CPT | Performed by: NURSE PRACTITIONER

## 2022-10-12 RX ORDER — DIPHENOXYLATE HYDROCHLORIDE AND ATROPINE SULFATE 2.5; .025 MG/1; MG/1
1 TABLET ORAL DAILY
COMMUNITY

## 2022-10-12 NOTE — PROGRESS NOTES
Name: Eliza Spence      : 1955      MRN: [id number]  Encounter Provider: BLANCA Choi  Encounter Date: 10/12/2022   Encounter department: [address] [place] [address] Road     1  Hordeolum internum of left lower eyelid     Instructed patient to use warm soaks to affected eye for 20 minutes 3-4 times per day  May do eye scrubs with warm soapy water using a mild cleanser such as Dove or baby soap at same times as warm soaks  Patient can also use artificial tears to help lubricate eye if dryness occurs  Patient should see improvement within the next 3 days  Notify office is symptoms worsen  Subjective      Patient is a 75year old female who presents today with a complaint of eye pain and redness  She went to urgent care on 10/5/22 with a complaint of burning, pain, and lower lid swelling of her left eye after noticing a stye that appeared to rupture and drain into her eye  She diagnosed with conjunctivitis of the left eye and prescribed antibiotic eye drops  She reports that she has been using the drops but with minimal improvement  Today her left eye has less burning but still notes swelling, redness, and discomfort of her lower lid  Patient reports having 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 and has a baseline of decreased/unmeasureable vision in her left eye  Denies fever, chills, generalized muscle aches, nausea, vomiting, nasal congestion, ear pain, sore throat, or rashes  Review of Systems   Constitutional: Negative  Negative for chills, fatigue and fever  HENT: Negative for congestion, ear pain, facial swelling, rhinorrhea, sinus pressure, sinus pain and sore throat  Eyes: Positive for pain, discharge and redness  Negative for itching and visual disturbance (consistent at baseline)  Respiratory: Negative  Negative for cough  Cardiovascular: Negative  Negative for chest pain  Gastrointestinal: Negative  Endocrine: Negative  Genitourinary: Negative  Musculoskeletal: Negative  Negative for myalgias  Skin: Negative  Negative for rash  Allergic/Immunologic: Negative  Neurological: Negative  Negative for dizziness and headaches  Hematological: Negative  Psychiatric/Behavioral: Negative  Current Outpatient Medications on File Prior to Visit   Medication Sig   • amLODIPine (NORVASC) 5 mg tablet TAKE 1 TABLET BY MOUTH DAILY   • fenofibrate (TRICOR) 145 mg tablet TAKE 1 TABLET BY MOUTH DAILY   • losartan (COZAAR) 100 MG tablet Take 1 tablet (100 mg total) by mouth daily   • multivitamin (THERAGRAN) TABS Take 1 tablet by mouth daily   • nortriptyline (PAMELOR) 10 mg capsule TAKE 1 CAPSULE 3 TIMES A   DAY   • TURMERIC CURCUMIN PO Take by mouth   • [DISCONTINUED] ofloxacin (OCUFLOX) 0 3 % ophthalmic solution Administer 1 drop into the left eye 4 (four) times a day       Objective     /86   Pulse 90   Temp 98 °F (36 7 °C)   Ht 5' 2" (1 575 m)   Wt 65 kg (143 lb 6 4 oz)   LMP  (LMP Unknown)   SpO2 96%   BMI 26 23 kg/m²     Physical Exam  Vitals and nursing note reviewed  Constitutional:       Appearance: Normal appearance  She is not ill-appearing  HENT:      Head: Normocephalic and atraumatic  Nose: Nose normal       Mouth/Throat:      Mouth: Mucous membranes are moist       Pharynx: Oropharynx is clear  Eyes:      General: No scleral icterus  Right eye: No discharge  Left eye: Discharge (clear thin) and hordeolum (internal lower lid) present  No foreign body  Extraocular Movements: Extraocular movements intact  Conjunctiva/sclera: Conjunctivae normal       Right eye: Right conjunctiva is not injected  Left eye: Left conjunctiva is not injected  No chemosis or hemorrhage  Pupils: Pupils are equal, round, and reactive to light  Pulmonary:      Effort: Pulmonary effort is normal    Musculoskeletal:      Cervical back: Normal range of motion     Skin:     General: Skin is warm and dry  Neurological:      General: No focal deficit present  Mental Status: She is alert and oriented to person, place, and time  Psychiatric:         Mood and Affect: Mood normal          Behavior: Behavior normal          Thought Content:  Thought content normal          Judgment: Judgment normal        BLANCA Hoang

## 2022-10-24 ENCOUNTER — OFFICE VISIT (OUTPATIENT)
Dept: FAMILY MEDICINE CLINIC | Facility: CLINIC | Age: 67
End: 2022-10-24
Payer: MEDICARE

## 2022-10-24 VITALS
WEIGHT: 142 LBS | TEMPERATURE: 96.7 F | HEIGHT: 62 IN | SYSTOLIC BLOOD PRESSURE: 150 MMHG | HEART RATE: 80 BPM | BODY MASS INDEX: 26.13 KG/M2 | DIASTOLIC BLOOD PRESSURE: 88 MMHG

## 2022-10-24 DIAGNOSIS — H69.83 EUSTACHIAN TUBE DYSFUNCTION, BILATERAL: Primary | ICD-10-CM

## 2022-10-24 PROCEDURE — 99213 OFFICE O/P EST LOW 20 MIN: CPT | Performed by: FAMILY MEDICINE

## 2022-10-24 RX ORDER — MECLIZINE HYDROCHLORIDE 25 MG/1
25 TABLET ORAL 3 TIMES DAILY PRN
Qty: 30 TABLET | Refills: 0 | Status: SHIPPED | OUTPATIENT
Start: 2022-10-24 | End: 2022-11-03

## 2022-10-24 RX ORDER — PREDNISONE 10 MG/1
TABLET ORAL
Qty: 26 TABLET | Refills: 0 | Status: SHIPPED | OUTPATIENT
Start: 2022-10-24

## 2022-10-25 NOTE — PROGRESS NOTES
Patient ID: Maki Gamble is a 75 y o  female  HPI: 75 y  o female presenting with symptoms of ear pressure, crackling of ears and dizziness associated with positional changes    Symptoms for past 48 hrs and she has had a negative covid test       SUBJECTIVE    Family History   Problem Relation Age of Onset   • Colon cancer Mother 46   • Breast cancer Mother 48   • Cancer Mother    • Heart attack Father    • Prostate cancer Father 79   • Hypertension Father    • Heart attack Sister    • No Known Problems Sister    • No Known Problems Daughter    • No Known Problems Daughter    • No Known Problems Maternal Grandmother    • No Known Problems Maternal Grandfather    • No Known Problems Paternal Grandmother    • No Known Problems Paternal Grandfather    • Stroke Brother    • No Known Problems Son    • No Known Problems Maternal Aunt    • No Known Problems Maternal Aunt    • No Known Problems Maternal Aunt      Social History     Socioeconomic History   • Marital status: /Civil Union     Spouse name: Not on file   • Number of children: Not on file   • Years of education: Not on file   • Highest education level: Not on file   Occupational History   • Not on file   Tobacco Use   • Smoking status: Never Smoker   • Smokeless tobacco: Never Used   Vaping Use   • Vaping Use: Never used   Substance and Sexual Activity   • Alcohol use: No   • Drug use: No   • Sexual activity: Yes     Partners: Male   Other Topics Concern   • Not on file   Social History Narrative   • Not on file     Social Determinants of Health     Financial Resource Strain: Not on file   Food Insecurity: Not on file   Transportation Needs: Not on file   Physical Activity: Not on file   Stress: Not on file   Social Connections: Not on file   Intimate Partner Violence: Not on file   Housing Stability: Not on file     Past Medical History:   Diagnosis Date   • Chronic leukopenia    • Hypercalcemia    • Hyperlipidemia    • Hypertension     labile   • Hypertension    • Migraines    • Osteoarthritis    • Osteoporosis    • Vertigo      Past Surgical History:   Procedure Laterality Date   • EYE SURGERY     • TOOTH EXTRACTION     • TUBAL LIGATION       Allergies   Allergen Reactions   • Iodine - Food Allergy Rash       Current Outpatient Medications:   •  amLODIPine (NORVASC) 5 mg tablet, TAKE 1 TABLET BY MOUTH DAILY, Disp: 30 tablet, Rfl: 5  •  fenofibrate (TRICOR) 145 mg tablet, TAKE 1 TABLET BY MOUTH DAILY, Disp: 90 tablet, Rfl: 3  •  losartan (COZAAR) 100 MG tablet, Take 1 tablet (100 mg total) by mouth daily, Disp: 30 tablet, Rfl: 1  •  meclizine (ANTIVERT) 25 mg tablet, Take 1 tablet (25 mg total) by mouth 3 (three) times a day as needed for dizziness for up to 10 days, Disp: 30 tablet, Rfl: 0  •  multivitamin (THERAGRAN) TABS, Take 1 tablet by mouth daily, Disp: , Rfl:   •  nortriptyline (PAMELOR) 10 mg capsule, TAKE 1 CAPSULE 3 TIMES A   DAY, Disp: 270 capsule, Rfl: 1  •  predniSONE 10 mg tablet, 3 tabs po bid x2 days, then 2 tabs po bid x2 days, then 1 tab bid x2 days, then 1 daily until done , Disp: 26 tablet, Rfl: 0  •  TURMERIC CURCUMIN PO, Take by mouth, Disp: , Rfl:     Review of Systems  Constitutional:     Denies fever, chills, fatigue, weakness ,weight loss, weight gain       ENT: Denies earache, loss of hearing, nosebleed, nasal discharge,nasal congestion, sore throat,hoarseness, but complains of ear pressure,and crackling    Pulmonary: Denies shortness of breath ,cough , dyspnea on exertionon, orthopnea , PND   Cardiovascular:  Denies bradycardia , tachycardia ,palpations, lower extremity, edema leg, claudication  Breast:  Denies new or changing breast lumps,  nipple discharge, nipple changes,  Abdomen:  Denies abdominal pain , anorexia ,indigestion, nausea ,vomiting, constipation , diarrhea  Musculoskeletal: Denies myalgias, arthralgias, joint swelling, joint stiffness ,limb pain, limb swelling  Lymph:denies swollen glands  Gu: no dysuria or urinary frequency  Skin: Denies skin rash, skin lesion, skin wound, itching,dry skin  Neuro: Denies headache, numbness, tingling, confusion, loss of consciousness, but complains of postitional vertigo  Psychiatric: Denies feelings of depression, suicidal ideation, anxiety, sleep disturbances    OBJECTIVE  /88 (BP Location: Left arm, Patient Position: Sitting, Cuff Size: Adult)   Pulse 80   Temp (!) 96 7 °F (35 9 °C)   Ht 5' 2" (1 575 m)   Wt 64 4 kg (142 lb)   LMP  (LMP Unknown)   BMI 25 97 kg/m²   Constitutional:   NAD, well appearing and well nourished      ENT:   Conjunctiva and lids: no injection, edema, or discharge     Pupils and iris: LOS bilaterally    External inspection of ears and nose: normal without deformities or discharge  Otoscopic exam: Canals patent with tm dull, no erythema,but large effusions noted bilaterally  ENasal mucosa, septum and turbinates: Turbinae injection with discharge   Oropharynx:  Moist mucosa, normal tongue and tonsils without lesions  Erythema and injection  of post pharynx with pnd      Pulmonary:Respiratory effort normal rate and rhythm, no increased work of breathing   Auscultation of lungs:  Clear bilaterally with no adventitious breath sounds       Cardiovascular: regular rate and rhythm, S1 and S2, no murmur, no edema and/or varicosities of LE      Abdomen: Soft and non-distended     Positive bowel sounds      No heptomegaly or splenomegaly      Lymphatic: Anterior and posterior cervical lymphadenopathy         Muscskeletal:  Gait and station: Normal gait      Digits and nails normal without clubbing or cyanosis       Inspection/palpation of joints, bones, and muscles:  No joint tenderness, swelling, full active and passive range of motion       Gu: no suprabubic tenderness, CVA tenderness or urethral discharge  Skin: Normal skin turgor and no rashes      Neuro:     Normal reflexes       Psych:   alert and oriented to person, place and time     normal mood and affect       Assessment/Plan:Diagnoses and all orders for this visit:    Eustachian tube dysfunction, bilateral  -     predniSONE 10 mg tablet; 3 tabs po bid x2 days, then 2 tabs po bid x2 days, then 1 tab bid x2 days, then 1 daily until done  -     meclizine (ANTIVERT) 25 mg tablet;  Take 1 tablet (25 mg total) by mouth 3 (three) times a day as needed for dizziness for up to 10 days        Reviewed with patient plan to treat with above plan    Patient instructed to call in 72 hours if not feeling better or if symptoms worsen

## 2022-11-03 DIAGNOSIS — I10 ESSENTIAL HYPERTENSION: ICD-10-CM

## 2022-11-03 DIAGNOSIS — F32.A DEPRESSION, UNSPECIFIED DEPRESSION TYPE: ICD-10-CM

## 2022-11-03 RX ORDER — NORTRIPTYLINE HYDROCHLORIDE 10 MG/1
CAPSULE ORAL
Qty: 270 CAPSULE | Refills: 1 | Status: SHIPPED | OUTPATIENT
Start: 2022-11-03

## 2022-11-04 RX ORDER — LOSARTAN POTASSIUM 100 MG/1
TABLET ORAL
Qty: 30 TABLET | Refills: 1 | Status: SHIPPED | OUTPATIENT
Start: 2022-11-04 | End: 2022-11-08

## 2022-11-06 DIAGNOSIS — I10 ESSENTIAL HYPERTENSION: ICD-10-CM

## 2022-11-08 RX ORDER — LOSARTAN POTASSIUM 100 MG/1
TABLET ORAL
Qty: 30 TABLET | Refills: 1 | Status: SHIPPED | OUTPATIENT
Start: 2022-11-08

## 2022-11-10 ENCOUNTER — IMMUNIZATIONS (OUTPATIENT)
Dept: FAMILY MEDICINE CLINIC | Facility: CLINIC | Age: 67
End: 2022-11-10

## 2022-11-10 DIAGNOSIS — Z23 NEED FOR VACCINATION: Primary | ICD-10-CM

## 2022-12-09 DIAGNOSIS — I10 ESSENTIAL HYPERTENSION: ICD-10-CM

## 2022-12-12 RX ORDER — LOSARTAN POTASSIUM 100 MG/1
TABLET ORAL
Qty: 30 TABLET | Refills: 1 | Status: SHIPPED | OUTPATIENT
Start: 2022-12-12

## 2022-12-13 PROBLEM — Z87.81 HISTORY OF RIB FRACTURE: Status: ACTIVE | Noted: 2022-12-13

## 2023-02-04 DIAGNOSIS — I10 ESSENTIAL HYPERTENSION: ICD-10-CM

## 2023-02-06 RX ORDER — LOSARTAN POTASSIUM 100 MG/1
TABLET ORAL
Qty: 30 TABLET | Refills: 1 | Status: SHIPPED | OUTPATIENT
Start: 2023-02-06

## 2023-02-21 DIAGNOSIS — F32.A DEPRESSION, UNSPECIFIED DEPRESSION TYPE: ICD-10-CM

## 2023-02-21 RX ORDER — NORTRIPTYLINE HYDROCHLORIDE 10 MG/1
10 CAPSULE ORAL 3 TIMES DAILY
Qty: 270 CAPSULE | Refills: 1 | Status: SHIPPED | OUTPATIENT
Start: 2023-02-21

## 2023-02-27 ENCOUNTER — TELEPHONE (OUTPATIENT)
Dept: FAMILY MEDICINE CLINIC | Facility: CLINIC | Age: 68
End: 2023-02-27

## 2023-02-27 DIAGNOSIS — U07.1 COVID-19: Primary | ICD-10-CM

## 2023-02-27 RX ORDER — DEXTROMETHORPHAN HYDROBROMIDE AND PROMETHAZINE HYDROCHLORIDE 15; 6.25 MG/5ML; MG/5ML
5 SOLUTION ORAL 4 TIMES DAILY PRN
Qty: 180 ML | Refills: 0 | Status: SHIPPED | OUTPATIENT
Start: 2023-02-27

## 2023-02-27 RX ORDER — AZITHROMYCIN 250 MG/1
TABLET, FILM COATED ORAL
Qty: 6 TABLET | Refills: 0 | Status: SHIPPED | OUTPATIENT
Start: 2023-02-27 | End: 2023-03-03

## 2023-02-27 NOTE — TELEPHONE ENCOUNTER
Pt is agreeable to Zitromax and cough med  Please send to [facility]  She said she does not need a return call  The pharmacy lets her know when she has a script ready for her

## 2023-02-27 NOTE — TELEPHONE ENCOUNTER
Pt  called - Covid Positive - 2 days ago      Symptoms:     Throat is "on fire"  Dry cough  Sneezing    Patient denies all other symptoms    [facility]     Pt # [phone]    Patient is requesting medication

## 2023-03-03 DIAGNOSIS — I10 ESSENTIAL HYPERTENSION: ICD-10-CM

## 2023-03-03 RX ORDER — AMLODIPINE BESYLATE 5 MG/1
TABLET ORAL
Qty: 30 TABLET | Refills: 5 | Status: SHIPPED | OUTPATIENT
Start: 2023-03-03 | End: 2023-03-06

## 2023-03-04 DIAGNOSIS — I10 ESSENTIAL HYPERTENSION: ICD-10-CM

## 2023-03-06 RX ORDER — AMLODIPINE BESYLATE 5 MG/1
TABLET ORAL
Qty: 30 TABLET | Refills: 5 | Status: SHIPPED | OUTPATIENT
Start: 2023-03-06

## 2023-03-14 ENCOUNTER — APPOINTMENT (OUTPATIENT)
Dept: RADIOLOGY | Facility: OTHER | Age: 68
End: 2023-03-14

## 2023-03-14 ENCOUNTER — OFFICE VISIT (OUTPATIENT)
Dept: OBGYN CLINIC | Facility: OTHER | Age: 68
End: 2023-03-14

## 2023-03-14 VITALS — HEIGHT: 62 IN | BODY MASS INDEX: 27.09 KG/M2 | WEIGHT: 147.2 LBS

## 2023-03-14 DIAGNOSIS — M25.511 ACUTE PAIN OF RIGHT SHOULDER: ICD-10-CM

## 2023-03-14 DIAGNOSIS — M75.41 IMPINGEMENT SYNDROME OF RIGHT SHOULDER: ICD-10-CM

## 2023-03-14 DIAGNOSIS — M75.42 IMPINGEMENT SYNDROME OF LEFT SHOULDER: Primary | ICD-10-CM

## 2023-03-14 NOTE — PROGRESS NOTES
Assessment  Diagnoses and all orders for this visit:    Impingement syndrome of left shoulder    Impingement syndrome of right shoulder        Discussion and Plan:  Giovanna Enrique is a 70 y o  female who presents with bilateral shoulder impingement   · XR images reviewed with the patient today   · Discussed that conservative non-operative treatments include activity modification, formal physical therapy, OTC analgesics, and injection therapy  · At this time I recommend she complete formal physical therapy to improve her range of motion, strength, posture and the use of modalities   · Discussed if her symptoms are not improving over the course of approximately 6-8 weeks with the current treatment course, she should follow up with my PA-C for repeat evaluation and possible CSI vs  Further advanced imaging  Subjective: bilateral shoulder pain   Patient ID: Giovanna Enrique is a 70 y o  female      HPI  Patient presents for initial evaluation of both shoulders  Patient is RHD  She reports her symptoms started approximately 3 weeks ago  No injury or trauma  She notes diffuse anterior shoulder pain that is increased with overhead reaching  Right is worse than left  She takes OTC analgesics, which has been helping alleviate some of her discomfort  The following portions of the patient's history were reviewed and updated as appropriate: allergies, current medications, past family history, past medical history, past social history, past surgical history and problem list     Review of Systems   Constitutional: Positive for activity change  Negative for chills, fever and unexpected weight change  HENT: Negative for hearing loss, nosebleeds and sore throat  Eyes: Negative for pain, redness and visual disturbance  Respiratory: Negative for cough, shortness of breath and wheezing  Cardiovascular: Negative for chest pain, palpitations and leg swelling     Gastrointestinal: Negative for abdominal pain, nausea and vomiting  Endocrine: Negative for polydipsia and polyuria  Genitourinary: Negative for dysuria and hematuria  Musculoskeletal: Positive for arthralgias and myalgias  See HPI   Skin: Negative for rash and wound  Neurological: Negative for dizziness, numbness and headaches  Psychiatric/Behavioral: Negative for decreased concentration and suicidal ideas  The patient is not nervous/anxious  Objective:  Ht 5' 2" (1 575 m)   Wt 66 8 kg (147 lb 3 2 oz)   LMP  (LMP Unknown)   BMI 26 92 kg/m²       Right Shoulder Exam     Tenderness   The patient is experiencing no tenderness  Range of Motion   Active abduction: 150   External rotation: 30   Forward flexion: 120   Internal rotation 0 degrees: Lumbar     Muscle Strength   Internal rotation: 4/5     Tests   Richardson test: positive  Impingement: positive    Other   Erythema: absent  Scars: absent  Sensation: normal  Pulse: present    Comments:  Empty can: positive      Left Shoulder Exam     Tenderness   The patient is experiencing no tenderness  Range of Motion   Active abduction: 150   External rotation: 30   Forward flexion: 120   Internal rotation 0 degrees: Lumbar     Muscle Strength   Internal rotation: 4/5     Tests   Richardson test: positive  Impingement: positive    Other   Erythema: absent  Scars: absent  Sensation: normal  Pulse: present     Comments:  Empty can: positive            Physical Exam  Vitals and nursing note reviewed  Constitutional:       Appearance: Normal appearance  She is well-developed  HENT:      Head: Normocephalic and atraumatic  Right Ear: External ear normal       Left Ear: External ear normal    Eyes:      General: No scleral icterus  Extraocular Movements: Extraocular movements intact  Conjunctiva/sclera: Conjunctivae normal    Cardiovascular:      Rate and Rhythm: Normal rate  Pulmonary:      Effort: Pulmonary effort is normal  No respiratory distress     Musculoskeletal:      Cervical back: Normal range of motion and neck supple  Comments: See Ortho exam   Skin:     General: Skin is warm and dry  Neurological:      Mental Status: She is alert and oriented to person, place, and time  Psychiatric:         Behavior: Behavior normal            I have personally reviewed pertinent films in PACS and my interpretation is as follows  XR of both shoulders taken on 3/14/23 demonstrates no fracture or dislocation  No lytic or blastic lesions  Glenohumeral joint space narrowing  There is calcification presented on the left shoulder glenoid labrum, only seen on 1 view         Scribe Attestation    I,:  Robert Ham am acting as a scribe while in the presence of the attending physician :       I,:  Shaheen Sinha MD personally performed the services described in this documentation    as scribed in my presence :

## 2023-03-29 ENCOUNTER — EVALUATION (OUTPATIENT)
Dept: PHYSICAL THERAPY | Age: 68
End: 2023-03-29

## 2023-03-29 DIAGNOSIS — M75.42 IMPINGEMENT SYNDROME OF LEFT SHOULDER: Primary | ICD-10-CM

## 2023-03-29 DIAGNOSIS — M75.41 IMPINGEMENT SYNDROME OF RIGHT SHOULDER: ICD-10-CM

## 2023-03-29 NOTE — PROGRESS NOTES
PT Evaluation     Today's date: 3/29/2023  Patient name: Alexandria Bourne  : 1955  MRN: [id number]  Referring provider: Rivka Chapman*  Dx:   Encounter Diagnosis     ICD-10-CM    1  Impingement syndrome of left shoulder  M75 42 Ambulatory Referral to Physical Therapy      2  Impingement syndrome of right shoulder  M75 41 Ambulatory Referral to Physical Therapy                     Assessment  Assessment details: Pt presents to therapy with bilateral shoulder pain consistent with diagnosis of B impingement syndrome  Pt demonstrates ROM and strength deficits, R>L, limiting pt's ability to perform household activities such as lifting overhead to retrieve items in the cabinet and carrying heavy items  Pt would benefit from scapular and UE strengthening to support B shoulder musculature and alleviate symptoms and improve pt's ability to perform ADLs  PT discussed HEP with pt and recommended therapy once a week due to high co-pay  Impairments: abnormal or restricted ROM, impaired physical strength, lacks appropriate home exercise program and pain with function  Functional limitations: reaching overhead into cabinets  Symptom irritability: lowUnderstanding of Dx/Px/POC: good   Prognosis: good    Goals  STG (4 weeks):  1  Improve B shoulder flexion AROM by 5 degrees in order to improve pt's ability to reach for items above shoulder height  2  Decrease pt's overall shoulder pain from 8/10 to 6/10 in order to improve pt's ability to carry heavy loads of laundry  3  Become complaint with HEP  LTG (6 weeks):  1  Improve B shoulder abduction AROM by 10 degrees in order to improve pt's QOL  2  Decrease pt's overall shoulder pain from 8/10 to 4/10 in order to improve pt's tolerance to household chores         Plan  Patient would benefit from: skilled physical therapy  Referral necessary: No  Planned modality interventions: cryotherapy, TENS and thermotherapy: hydrocollator packs  Planned therapy interventions: activity modification, body mechanics training, functional ROM exercises, home exercise program, neuromuscular re-education, manual therapy, joint mobilization, patient education, strengthening, stretching and therapeutic exercise  Frequency: 1x week  Duration in weeks: 6  Treatment plan discussed with: patient         Subjective Evaluation    History of Present Illness  Date of onset: 2023  Mechanism of injury: Pt reports ongoing bilateral shoulder pain R>L, with increase in symptoms after using an upright vacuum  and falling forward with outstretched arms  Pain is elicited with all shoulder movements with some days better than others  Pt reports occasional radiating pain in B forearm  PT recommended to avoid overhead reaching, placing household items at shoulder height level and decreasing laundry load             Recurrent probem    Quality of life: good    Pain  Current pain ratin  At best pain ratin  At worst pain ratin  Location: B upper arm, R>L  Quality: sharp  Relieving factors: rest and relaxation  Aggravating factors: overhead activity and lifting  Progression: no change    Social Support  Lives with: spouse    Hand dominance: right    Treatments  Previous treatment: physical therapy  Patient Goals  Patient goals for therapy: increased strength, decreased pain, increased motion and independence with ADLs/IADLs          Objective     Active Range of Motion   Left Shoulder   Flexion: 128 degrees with pain  Abduction: 98 degrees with pain    Right Shoulder   Flexion: 102 degrees with pain  Abduction: 95 degrees with pain    Strength/Myotome Testing     Left Shoulder     Planes of Motion   Flexion: 4- (pain)   Abduction: 4- (pain)   External rotation at 0°: 4   Internal rotation at 0°: 4     Right Shoulder     Planes of Motion   Flexion: 4- (pain)   Abduction: 4- (pain)   External rotation at 0°: 4   Internal rotation at 0°: 4              Precautions: Fall risk      Manuals                                                                 Neuro Re-Ed                                                                                                        Ther Ex                                       Prone flex, abd, ext             Prone rows             Supine serratus punches             TB rows and extension, add for home             TB B ER- light                           Scap retractions HEP            Pendulum  HEP                         Pulleys                                                                              Standing flex,scap progress to            Wall slides progress to                                                                                                                    Ther Activity                                       Gait Training                                       Modalities             MH vs CP

## 2023-04-02 DIAGNOSIS — I10 ESSENTIAL HYPERTENSION: ICD-10-CM

## 2023-04-03 RX ORDER — LOSARTAN POTASSIUM 100 MG/1
TABLET ORAL
Qty: 30 TABLET | Refills: 1 | Status: SHIPPED | OUTPATIENT
Start: 2023-04-03

## 2023-04-06 ENCOUNTER — OFFICE VISIT (OUTPATIENT)
Dept: PHYSICAL THERAPY | Age: 68
End: 2023-04-06

## 2023-04-06 DIAGNOSIS — M75.41 IMPINGEMENT SYNDROME OF RIGHT SHOULDER: ICD-10-CM

## 2023-04-06 DIAGNOSIS — M75.42 IMPINGEMENT SYNDROME OF LEFT SHOULDER: Primary | ICD-10-CM

## 2023-04-06 NOTE — PROGRESS NOTES
Daily Note     Today's date: 2023  Patient name: Ofelia Reyes   : 1955  MRN: [id number]  Referring provider: Yohana Damico*  Dx:   Encounter Diagnosis     ICD-10-CM    1  Impingement syndrome of left shoulder  M75 42       2  Impingement syndrome of right shoulder  M75 41                      Subjective: Pt reports no change in symptoms since first visit  She has been complaint with her HEP with no issues or concerns  Pt agreed to be treated by Rachel MAYER under the direct supervision of Jeny King DPT  Objective: See treatment diary below      Assessment: Tolerated treatment well  Patient exhibited good technique with therapeutic exercises and no reports of shoulder pain during exercise  Pt would benefit from continued B scapular stabilization and strengthening in order to tolerate functional activities at home  Plan: Continue per plan of care  Progress treatment as tolerated         Precautions: Fall risk      Manuals                                                                 Neuro Re-Ed                                                                                                        Ther Ex                                       Prone flex, abd, ext  x10ea B           Prone rows  2x10 B           Supine serratus punches  x20 B           TB rows and extension, add for home             TB B ER- light                           Scap retractions HEP            Pendulum  HEP                         Pulleys  x10'                                                                            Standing flex,scap progress to            Wall slides progress to                                                                                                                    Ther Activity                                       Gait Training                                       Modalities             MH vs CP

## 2023-04-24 ENCOUNTER — OFFICE VISIT (OUTPATIENT)
Dept: OBGYN CLINIC | Facility: OTHER | Age: 68
End: 2023-04-24

## 2023-04-24 VITALS
SYSTOLIC BLOOD PRESSURE: 161 MMHG | DIASTOLIC BLOOD PRESSURE: 81 MMHG | BODY MASS INDEX: 27.6 KG/M2 | HEIGHT: 62 IN | WEIGHT: 150 LBS | HEART RATE: 93 BPM

## 2023-04-24 DIAGNOSIS — M75.42 IMPINGEMENT SYNDROME OF LEFT SHOULDER: Primary | ICD-10-CM

## 2023-04-24 DIAGNOSIS — M75.41 IMPINGEMENT SYNDROME OF RIGHT SHOULDER: ICD-10-CM

## 2023-04-24 RX ORDER — BUPIVACAINE HYDROCHLORIDE 2.5 MG/ML
2 INJECTION, SOLUTION INFILTRATION; PERINEURAL
Status: COMPLETED | OUTPATIENT
Start: 2023-04-24 | End: 2023-04-24

## 2023-04-24 RX ORDER — BETAMETHASONE SODIUM PHOSPHATE AND BETAMETHASONE ACETATE 3; 3 MG/ML; MG/ML
6 INJECTION, SUSPENSION INTRA-ARTICULAR; INTRALESIONAL; INTRAMUSCULAR; SOFT TISSUE
Status: COMPLETED | OUTPATIENT
Start: 2023-04-24 | End: 2023-04-24

## 2023-04-24 RX ADMIN — BUPIVACAINE HYDROCHLORIDE 2 ML: 2.5 INJECTION, SOLUTION INFILTRATION; PERINEURAL at 10:58

## 2023-04-24 RX ADMIN — BETAMETHASONE SODIUM PHOSPHATE AND BETAMETHASONE ACETATE 6 MG: 3; 3 INJECTION, SUSPENSION INTRA-ARTICULAR; INTRALESIONAL; INTRAMUSCULAR; SOFT TISSUE at 10:58

## 2023-04-24 NOTE — PATIENT INSTRUCTIONS

## 2023-04-24 NOTE — PROGRESS NOTES
Assessment  Diagnoses and all orders for this visit:    Impingement syndrome of left shoulder    Impingement syndrome of right shoulder      Discussion and Plan:    Sloan Hameed remains symptomatic on the left  Steroid injection was offered for pain relief  She accepted the injection today  She will take it easy for the remainder of the day and resume activities to tolerance tomorrow  Ice or Tylenol if needed for soreness from injection site  She may continue with PT and transition to independent HEP when deemed appropriate by her therapist   Sloan Hameed will continue with her HEP as instructed by [facility]  We will see her back in the office as needed for the left shoulder  If right shoulder pain returns, she will let us know  All questions answered  Subjective:   Patient ID: Usman Gross is a 70 y o  female    Sloan Hameed presents to the office for her bilateral shoulder impingement syndrome  She is attending therapy once a week and reports significant improvement in pain on the right and mild improvement on left  She has been compliant with her HEP  She has pain with overhead motion and reaching  She hasn't being doing much lifting  Denies pain that interferes with sleep  Denies new injury or trauma  The following portions of the patient's history were reviewed and updated as appropriate: allergies, current medications, past family history, past medical history, past social history, past surgical history and problem list     Review of Systems   Constitutional: Negative for chills and fever  HENT: Negative for hearing loss  Eyes: Negative for visual disturbance  Respiratory: Negative for shortness of breath  Cardiovascular: Negative for chest pain  Gastrointestinal: Negative for abdominal pain  Musculoskeletal:        As reviewed in the HPI   Skin: Negative for rash  Neurological:        As reviewed in the HPI   Psychiatric/Behavioral: Negative for agitation         Objective:  /81 (BP "Location: Left arm, Patient Position: Sitting, Cuff Size: Adult)   Pulse 93   Ht 5' 2\" (1 575 m)   Wt 68 kg (150 lb)   LMP  (LMP Unknown)   BMI 27 44 kg/m²       Right Shoulder Exam     Tenderness   The patient is experiencing no tenderness  Range of Motion   The patient has normal right shoulder ROM  Muscle Strength   The patient has normal right shoulder strength  Tests   Richardson test: negative  Impingement: negative    Other   Erythema: absent  Sensation: normal  Pulse: present      Left Shoulder Exam     Tenderness   The patient is experiencing no tenderness  Range of Motion   The patient has normal left shoulder ROM  Muscle Strength   The patient has normal left shoulder strength  Tests   Richardson test: positive  Impingement: positive    Other   Erythema: absent  Sensation: normal  Pulse: present     Comments:  Neg speeds              Physical Exam  Constitutional:       Appearance: She is well-developed  HENT:      Head: Normocephalic  Pulmonary:      Breath sounds: Normal breath sounds  No wheezing  Musculoskeletal:      Cervical back: Normal range of motion  Skin:     General: Skin is warm and dry  Neurological:      Mental Status: She is alert and oriented to person, place, and time  Psychiatric:         Behavior: Behavior normal          Thought Content: Thought content normal          Judgment: Judgment normal          Large joint arthrocentesis: L subacromial bursa  Universal Protocol:  Consent: Verbal consent obtained    Consent given by: patient    Supporting Documentation  Indications: pain   Procedure Details  Location: shoulder - L subacromial bursa  Preparation: Patient was prepped and draped in the usual sterile fashion  Needle size: 22 G  Approach: lateral  Medications administered: 6 mg betamethasone acetate-betamethasone sodium phosphate 6 (3-3) mg/mL; 2 mL bupivacaine 0 25 %    Patient tolerance: patient tolerated the procedure well with no immediate " complications  Dressing:  Sterile dressing applied

## 2023-04-27 ENCOUNTER — OFFICE VISIT (OUTPATIENT)
Dept: PHYSICAL THERAPY | Age: 68
End: 2023-04-27

## 2023-04-27 DIAGNOSIS — M75.42 IMPINGEMENT SYNDROME OF LEFT SHOULDER: Primary | ICD-10-CM

## 2023-04-27 DIAGNOSIS — M75.41 IMPINGEMENT SYNDROME OF RIGHT SHOULDER: ICD-10-CM

## 2023-04-27 NOTE — PROGRESS NOTES
"Daily Note     Today's date: 2023  Patient name: Quoc Bates  : 1955  MRN: [id number]  Referring provider: Adalid Mcbride  Dx:   Encounter Diagnosis     ICD-10-CM    1  Impingement syndrome of left shoulder  M75 42       2  Impingement syndrome of right shoulder  M75 41                      Subjective: Patient states that she is not having any pain today, She had an injection Monday this week  Objective: See treatment diary below      Assessment: Patient tolerated session fairly well without any increased symptoms  HEP reviewed and given  Plan: Continue with plan        Precautions: Fall risk      Manuals                                                              Neuro Re-Ed                                                                                                        Ther Ex                                       Prone flex, abd, ext  x10ea B x20 ea B x20 ea B X 20 each        Prone rows  2x10 B 2x10 B 2x10 B 2 x 10 B        Supine serratus punches  x20 B x20 B x20 B 2 x 10 B        TB rows and extension, add for home   x20 ea RTB 2x10 ea Red 2 x 10         TB B ER- light                           Scap retractions HEP            Pendulum  HEP            Bicep curls   2# x20  2# 2 x 10         Pulleys  x10' x5' 10' X 10 mkin                                                                         Standing flex,scap progress to  x10 ea x10 X 10        Wall slides progress to   5\"x15 X 15 each                                                                                                                Ther Activity                                       Gait Training                                       Modalities             MH vs CP                               "

## 2023-05-04 ENCOUNTER — OFFICE VISIT (OUTPATIENT)
Dept: PHYSICAL THERAPY | Age: 68
End: 2023-05-04

## 2023-05-04 DIAGNOSIS — M75.42 IMPINGEMENT SYNDROME OF LEFT SHOULDER: Primary | ICD-10-CM

## 2023-05-04 DIAGNOSIS — M75.41 IMPINGEMENT SYNDROME OF RIGHT SHOULDER: ICD-10-CM

## 2023-05-04 NOTE — PROGRESS NOTES
"Daily Note     Today's date: 2023  Patient name: Tana Humphreys  : 1955  MRN: [id number]  Referring provider: Minh Ramey*  Dx:   Encounter Diagnosis     ICD-10-CM    1  Impingement syndrome of left shoulder  M75 42       2  Impingement syndrome of right shoulder  M75 41                      Subjective: Patient reports no pain at rest but with movements L>R 8/10  Any active movements bother her L shoulder  Pt able to perform her HEP more confidently with an updated HEP  Objective: See treatment diary below      Assessment: Patient tolerated session fairly well without any increased symptoms  Muscular fatigue was present with prone exercises but able to complete with rest breaks to tolerance  Plan: Continue with plan        Precautions: Fall risk      Manuals                                                             Neuro Re-Ed                                                                                                        Ther Ex                                       Prone flex, abd, ext  x10ea B x20 ea B x20 ea B X 20 each x20 ea       Prone rows  2x10 B 2x10 B 2x10 B 2 x 10 B 2x10 B       Supine serratus punches  x20 B x20 B x20 B 2 x 10 B 2x10 B 2#       TB rows and extension, add for home   x20 ea RTB 2x10 ea Red 2 x 10  Red 2x10 + add       TB B ER- light                           Scap retractions HEP            Pendulum  HEP            Bicep curls   2# x20  2# 2 x 10  2# 2x10       Pulleys  x10' x5' 10' X 10 mkin 10'                                                                        Standing flex,scap progress to  x10 ea x10 X 10 X 10       Wall slides progress to   5\"x15 X 15 each X 15 ea                                                                                                               Ther Activity                                       Gait Training                                       Modalities             MH vs CP           "

## 2023-05-11 ENCOUNTER — OFFICE VISIT (OUTPATIENT)
Dept: PHYSICAL THERAPY | Age: 68
End: 2023-05-11

## 2023-05-11 DIAGNOSIS — M75.42 IMPINGEMENT SYNDROME OF LEFT SHOULDER: Primary | ICD-10-CM

## 2023-05-11 DIAGNOSIS — M75.41 IMPINGEMENT SYNDROME OF RIGHT SHOULDER: ICD-10-CM

## 2023-05-11 NOTE — PROGRESS NOTES
"Daily Note     Today's date: 2023  Patient name: Artist Estephanie  : 1955  MRN: [id number]  Referring provider: Pawan Capone*  Dx:   Encounter Diagnosis     ICD-10-CM    1  Impingement syndrome of left shoulder  M75 42       2  Impingement syndrome of right shoulder  M75 41                      Subjective: Patient reports that she was very sore after last session, didn't exercise this morning yet  Patient feeling no specific increase in shoulder pain today  Used heat for her shoulder pain after last session- at home  Objective: See treatment diary below      Assessment: Tolerated treatment well  Patient demonstrated fatigue post treatment, exhibited good technique with therapeutic exercises and would benefit from continued PT      Plan: Continue per plan of care        Precautions: Fall risk      Manuals                                        Neuro Re-Ed                                                                        Ther Ex                  UBE FW/BW 3/3 120 RPM        Prone flex, abd, ext 2x10 ea x10ea B x20 ea B x20 ea B X 20 each x20 ea   Prone rows 2x10 ea 2x10 B 2x10 B 2x10 B 2 x 10 B 2x10 B   Supine serratus punches 2# B 2x10 x20 B x20 B x20 B 2 x 10 B 2x10 B 2#   TB rows and extension, add for home Red 2x10  x20 ea RTB 2x10 ea Red 2 x 10  Red 2x10 + add   TB B ER- light                   Bicep curls 1# 2x10  2# x20  2# 2 x 10  2# 2x10   Pulleys x5' x10' x5' 10' X 10 mkin 10'                                                Standing flex,scap 10x, 5x ea  x10 ea x10 X 10 X 10   Wall slides 15x:15   5\"x15 X 15 each X 15 ea                                                                           Ther Activity                           Gait Training                           Modalities         MH vs CP                       "

## 2023-05-18 ENCOUNTER — OFFICE VISIT (OUTPATIENT)
Dept: PHYSICAL THERAPY | Age: 68
End: 2023-05-18

## 2023-05-18 DIAGNOSIS — M75.42 IMPINGEMENT SYNDROME OF LEFT SHOULDER: ICD-10-CM

## 2023-05-18 DIAGNOSIS — M75.41 IMPINGEMENT SYNDROME OF RIGHT SHOULDER: Primary | ICD-10-CM

## 2023-05-18 NOTE — PROGRESS NOTES
"Daily Note     Today's date: 2023  Patient name: Lindy Echevarria  : 1955  MRN: [id number]  Referring provider: Yusuf Marcos*  Dx:   Encounter Diagnosis     ICD-10-CM    1  Impingement syndrome of right shoulder  M75 41       2  Impingement syndrome of left shoulder  M75 42                      Subjective: Patient reports that her L shoulder is still painful  Objective: See treatment diary below      Assessment: Tolerated treatment well  Patient demonstrated fatigue post treatment, exhibited good technique with therapeutic exercises and would benefit from continued PT Progressed program to include weight today with TE as able  No increase in pain, finished with   Recommended patient to use heat at home as needed for her pain  Plan: Continue per plan of care        Precautions: Fall risk      Manuals                                        Neuro Re-Ed                                                                        Ther Ex                  UBE FW/BW 3/3 120 RPM  120 RPM       Prone flex, abd, ext 2x10 ea 2x10ea 1# x20 ea B x20 ea B X 20 each x20 ea   Prone rows 2x10 ea 2x10 B 2x10 B 2x10 B 2 x 10 B 2x10 B   Supine serratus punches 2# B 2x10 x20 B x20 B x20 B 2 x 10 B 2x10 B 2#   TB rows and extension, add for home Red 2x10 Red 20x ea x20 ea RTB 2x10 ea Red 2 x 10  Red 2x10 + add   TB B ER- light                   Bicep curls 1# 2x10 2# 20x 2# x20  2# 2 x 10  2# 2x10   Pulleys x5' x5'  x5' 10' X 10 mkin 10'            Standing flex,scap 10x, 5x ea 2x10 x10 ea x10 X 10 X 10   Wall slides 15x:15 15x:05 ea  5\"x15 X 15 each X 15 ea                                                                           Ther Activity                           Gait Training                           Modalities          vs CP                       "

## 2023-05-25 ENCOUNTER — OFFICE VISIT (OUTPATIENT)
Dept: PHYSICAL THERAPY | Age: 68
End: 2023-05-25

## 2023-05-25 DIAGNOSIS — M75.42 IMPINGEMENT SYNDROME OF LEFT SHOULDER: ICD-10-CM

## 2023-05-25 DIAGNOSIS — M75.41 IMPINGEMENT SYNDROME OF RIGHT SHOULDER: Primary | ICD-10-CM

## 2023-05-25 NOTE — PROGRESS NOTES
"Daily Note     Today's date: 2023  Patient name: Patience Rivera  : 1955  MRN: [id number]  Referring provider: David Newby*  Dx:   Encounter Diagnosis     ICD-10-CM    1  Impingement syndrome of right shoulder  M75 41       2  Impingement syndrome of left shoulder  M75 42                      Subjective: Patient reports no new complaints  States she had some muscle soreness after last PT session  Objective: See treatment diary below      Assessment: Tolerated treatment well  Patient requires minimal verbal cueing for technique throughout session  VCs for scap engagement with prone I / Y / T  Able to add 1# weight for serratus punches this visit without adverse effects  Adequately challenged with all strengthening exercises this visit  Patient exhibited good technique with therapeutic exercises and would benefit from continued PT      Plan: Continue per plan of care  Progress treatment as tolerated         Precautions: Fall risk      Manuals                                        Neuro Re-Ed                                                                        Ther Ex                  UBE FW/BW 3/3 120 RPM  120 RPM  120 RPM      Prone flex, abd, ext 2x10 ea 2x10ea 1# 2x10 ea 1# x20 ea B X 20 each x20 ea   Prone rows 2x10 ea 2x10 B 2x10 B 2x10 B 2 x 10 B 2x10 B   Supine serratus punches 2# B 2x10 x20 B 1# 2x10 B x20 B 2 x 10 B 2x10 B 2#   TB rows and extension, add for home Red 2x10 Red 20x ea Red 20x ea RTB 2x10 ea Red 2 x 10  Red 2x10 + add   TB B ER- light                   Bicep curls 1# 2x10 2# 20x 2# 2x10  2# 2 x 10  2# 2x10   Pulleys x5' x5'  5 min 10' X 10 mkin 10'            Standing flex,scap 10x, 5x ea 2x10 2x10 ea x10 X 10 X 10   Wall slides 15x:15 15x:05 ea 5s x 15 ea 5\"x15 X 15 each X 15 ea                                                                           Ther Activity                           Gait Training                         " Modalities         MH vs CP

## 2023-05-31 ENCOUNTER — OFFICE VISIT (OUTPATIENT)
Dept: PHYSICAL THERAPY | Age: 68
End: 2023-05-31

## 2023-05-31 DIAGNOSIS — M75.41 IMPINGEMENT SYNDROME OF RIGHT SHOULDER: Primary | ICD-10-CM

## 2023-05-31 DIAGNOSIS — M75.42 IMPINGEMENT SYNDROME OF LEFT SHOULDER: ICD-10-CM

## 2023-05-31 NOTE — PROGRESS NOTES
Daily Note / Discharge Summary    Today's date: 2023  Patient name: Ovidio Mcleod  : 1955  MRN: [id number]  Referring provider: Kirstie Nayak  Dx:   Encounter Diagnosis     ICD-10-CM    1  Impingement syndrome of right shoulder  M75 41       2  Impingement syndrome of left shoulder  M75 42                      Subjective: Patient reports that she hasn't had shoulder pain in a few days  Patient does feel the exercises have helped significantly  Objective: See treatment diary below    Pain: 0/10 current and with activity    ROM: Full shoulder ROM, B, all directions    MMT: 5/5 grossly throughout B UEs    Goals  STG (4 weeks):  1  Improve B shoulder flexion AROM by 5 degrees in order to improve pt's ability to reach for items above shoulder height  MET  2  Decrease pt's overall shoulder pain from 8/10 to 6/10 in order to improve pt's ability to carry heavy loads of laundry  MET  3  Become complaint with HEP  MET    LTG (6 weeks):  1  Improve B shoulder abduction AROM by 10 degrees in order to improve pt's QOL  MET  2  Decrease pt's overall shoulder pain from 8/10 to 4/10 in order to improve pt's tolerance to household chores  MET        Assessment: Tolerated treatment well  PT progressed program to challenge scapular strength, progressed weights for strengthening the shoulder today, no increase in pain with program today  Plan: Discharge to HEP     Precautions: Fall risk    Access Code: [email or web address]  URL: [email or web address]  Date: 2023  Prepared by: Jordan Tinsley    Exercises  - Seated Scapular Retraction  - 1 x daily - 2 sets - 10 reps  - Seated Cervical Retraction  - 1 x daily - 2 sets - 10 reps  - Corner Pec Major Stretch  - 1 x daily - 5 sets - 20 reps  - Standing Shoulder Row with Anchored Resistance  - 1 x daily - 2 sets - 10 reps  - Shoulder extension with resistance - Neutral  - 1 x daily - 2 sets - 10 reps  - Standing Shoulder Flexion to 90 Degrees "with Dumbbells  - 1 x daily - 2 sets - 10 reps - 1 pounds  - Scaption with Dumbbells  - 1 x daily - 2 sets - 10 reps - 1 pounds  - Standing Bicep Curls Supinated with Dumbbells  - 1 x daily - 2 sets - 10 reps - 2 pounds  - Sidelying Shoulder Abduction Palm Forward  - 1 x daily - 2 sets - 10 reps - 1 pounds  - Sidelying Shoulder ER with Towel and Dumbbell  - 1 x daily - 2 sets - 10 reps - 1 pounds  - Prone Shoulder Extension - Single Arm  - 1 x daily - 2 sets - 10 reps - 1 pounds  - Prone Single Arm Shoulder Horizontal Abduction with Dumbbell - Palm Down  - 1 x daily - 2 sets - 10 reps - 1 pounds  - Prone Shoulder Flexion  - 1 x daily - 2 sets - 10 reps - 1 pounds      Manuals 5/11 5/18 5/25 5/31 4/27 5/4                                       Neuro Re-Ed                                                                        Ther Ex                  UBE FW/BW 3/3 120 RPM 5/5 120 RPM 5/5 120 RPM 5/5 120 RPM     Prone flex, abd, ext 2x10 ea 2x10ea 1# 2x10 ea 1# x20 ea B X 20 each x20 ea   Prone rows 2x10 ea 2x10 B 2x10 B 2x10 B 2 x 10 B 2x10 B   Supine serratus punches 2# B 2x10 x20 B 1# 2x10 B x20 B 2 x 10 B 2x10 B 2#   TB rows and extension, add for home Red 2x10 Red 20x ea Red 20x ea Green 2x10 ea Red 2 x 10  Red 2x10 + add            Bicep curls 1# 2x10 2# 20x 2# 2x10 2# 2x10 2# 2 x 10  2# 2x10   Pulleys x5' x5'  5 min x5'  X 10 mkin 10'            Standing flex,scap 10x, 5x ea 2x10 2x10 ea 1# 2 x10 X 10 X 10   Wall slides 15x:15 15x:05 ea 5s x 15 ea 5\"x15 X 15 each X 15 ea                S/l shoulder abd 1# 20x , ea         S/l shoulder ER 1# 20x                                                  Ther Activity                           Gait Training                           Modalities         MH vs CP                       "

## 2023-06-04 DIAGNOSIS — I10 ESSENTIAL HYPERTENSION: ICD-10-CM

## 2023-06-05 RX ORDER — LOSARTAN POTASSIUM 100 MG/1
TABLET ORAL
Qty: 30 TABLET | Refills: 1 | Status: SHIPPED | OUTPATIENT
Start: 2023-06-05

## 2023-07-26 ENCOUNTER — OFFICE VISIT (OUTPATIENT)
Dept: FAMILY MEDICINE CLINIC | Facility: CLINIC | Age: 68
End: 2023-07-26
Payer: COMMERCIAL

## 2023-07-26 VITALS
OXYGEN SATURATION: 97 % | BODY MASS INDEX: 27.46 KG/M2 | HEART RATE: 85 BPM | TEMPERATURE: 98.7 F | SYSTOLIC BLOOD PRESSURE: 140 MMHG | WEIGHT: 149.2 LBS | DIASTOLIC BLOOD PRESSURE: 82 MMHG | HEIGHT: 62 IN

## 2023-07-26 DIAGNOSIS — Z00.00 MEDICARE ANNUAL WELLNESS VISIT, SUBSEQUENT: Primary | ICD-10-CM

## 2023-07-26 DIAGNOSIS — Z23 NEED FOR VACCINATION: ICD-10-CM

## 2023-07-26 DIAGNOSIS — M81.8 OTHER OSTEOPOROSIS WITHOUT CURRENT PATHOLOGICAL FRACTURE: ICD-10-CM

## 2023-07-26 DIAGNOSIS — I10 ESSENTIAL HYPERTENSION: ICD-10-CM

## 2023-07-26 DIAGNOSIS — E78.2 MIXED HYPERLIPIDEMIA: ICD-10-CM

## 2023-07-26 PROCEDURE — G0009 ADMIN PNEUMOCOCCAL VACCINE: HCPCS

## 2023-07-26 PROCEDURE — 90677 PCV20 VACCINE IM: CPT

## 2023-07-26 PROCEDURE — 99214 OFFICE O/P EST MOD 30 MIN: CPT | Performed by: FAMILY MEDICINE

## 2023-07-26 PROCEDURE — G0439 PPPS, SUBSEQ VISIT: HCPCS | Performed by: FAMILY MEDICINE

## 2023-07-26 NOTE — PROGRESS NOTES
Assessment and Plan:     Problem List Items Addressed This Visit    None       Preventive health issues were discussed with patient, and age appropriate screening tests were ordered as noted in patient's After Visit Summary. Personalized health advice and appropriate referrals for health education or preventive services given if needed, as noted in patient's After Visit Summary. History of Present Illness:     Patient presents for a Medicare Wellness Visit hypertension, hyperlipidemia, and osteoporosis are under good control at the present time. HPI   Patient Care Team:  Loree Ruiz DO as PCP - MD Francisco Savage MD     Review of Systems:     Review of Systems   Constitutional: Negative for appetite change, chills and fever. HENT: Negative for ear pain, facial swelling, rhinorrhea, sinus pain, sore throat and trouble swallowing. Eyes: Negative for discharge and redness. Respiratory: Negative for chest tightness, shortness of breath and wheezing. Cardiovascular: Negative for chest pain and palpitations. Gastrointestinal: Negative for abdominal pain, diarrhea, nausea and vomiting. Endocrine: Negative for polyuria. Genitourinary: Negative for dysuria and urgency. Musculoskeletal: Negative for arthralgias and back pain. Skin: Negative for rash. Neurological: Negative for dizziness, weakness and headaches. Hematological: Negative for adenopathy. Psychiatric/Behavioral: Negative for behavioral problems, confusion and sleep disturbance. All other systems reviewed and are negative.        Problem List:     Patient Active Problem List   Diagnosis   • Aseptic necrosis bone (HCC)   • Depression   • Greater trochanteric bursitis of right hip   • Hyperlipidemia   • It band syndrome, right   • Senile osteoporosis   • Patellofemoral syndrome, right   • Essential hypertension   • Atrophic vaginitis   • COVID-19   • Quadriceps tendonitis   • Hypercalcemia   • Primary generalized (osteo)arthritis   • Vertigo   • Migraine with aura and without status migrainosus, not intractable   • Anxiety   • Labile hypertension   • History of rib fracture   • Impingement syndrome of left shoulder   • Impingement syndrome of right shoulder      Past Medical and Surgical History:     Past Medical History:   Diagnosis Date   • Chronic leukopenia    • Hypercalcemia    • Hyperlipidemia    • Hypertension     labile   • Hypertension    • Migraines    • Osteoarthritis    • Osteoporosis    • Vertigo      Past Surgical History:   Procedure Laterality Date   • EYE SURGERY     • TOOTH EXTRACTION     • TUBAL LIGATION        Family History:     Family History   Problem Relation Age of Onset   • Colon cancer Mother 46   • Breast cancer Mother 48   • Cancer Mother    • Heart attack Father    • Prostate cancer Father 79   • Hypertension Father    • Heart attack Sister    • No Known Problems Sister    • No Known Problems Daughter    • No Known Problems Daughter    • No Known Problems Maternal Grandmother    • No Known Problems Maternal Grandfather    • No Known Problems Paternal Grandmother    • No Known Problems Paternal Grandfather    • Stroke Brother    • No Known Problems Son    • No Known Problems Maternal Aunt    • No Known Problems Maternal Aunt    • No Known Problems Maternal Aunt       Social History:     Social History     Socioeconomic History   • Marital status: /Civil Union     Spouse name: Not on file   • Number of children: Not on file   • Years of education: Not on file   • Highest education level: Not on file   Occupational History   • Not on file   Tobacco Use   • Smoking status: Never   • Smokeless tobacco: Never   Vaping Use   • Vaping Use: Never used   Substance and Sexual Activity   • Alcohol use: No   • Drug use: No   • Sexual activity: Yes     Partners: Male   Other Topics Concern   • Not on file   Social History Narrative   • Not on file Social Determinants of Health     Financial Resource Strain: Low Risk  (4/17/2023)    Overall Financial Resource Strain (CARDIA)    • Difficulty of Paying Living Expenses: Not hard at all   Food Insecurity: Not on file   Transportation Needs: No Transportation Needs (4/17/2023)    PRAPARE - Transportation    • Lack of Transportation (Medical): No    • Lack of Transportation (Non-Medical):  No   Physical Activity: Not on file   Stress: Not on file   Social Connections: Not on file   Intimate Partner Violence: Not on file   Housing Stability: Not on file      Medications and Allergies:     Current Outpatient Medications   Medication Sig Dispense Refill   • amLODIPine (NORVASC) 5 mg tablet TAKE ONE TABLET BY MOUTH EVERY DAY 30 tablet 5   • fenofibrate (TRICOR) 145 mg tablet TAKE 1 TABLET BY MOUTH DAILY 90 tablet 3   • losartan (COZAAR) 100 MG tablet TAKE ONE TABLET BY MOUTH EVERY DAY 30 tablet 1   • meclizine (ANTIVERT) 25 mg tablet Take 1 tablet (25 mg total) by mouth 3 (three) times a day as needed for dizziness for up to 10 days 30 tablet 0   • multivitamin (THERAGRAN) TABS Take 1 tablet by mouth daily     • nortriptyline (PAMELOR) 10 mg capsule Take 1 capsule (10 mg total) by mouth 3 (three) times a day 270 capsule 1   • TURMERIC CURCUMIN PO Take by mouth       Current Facility-Administered Medications   Medication Dose Route Frequency Provider Last Rate Last Admin   • denosumab (PROLIA) subcutaneous injection 60 mg  60 mg Subcutaneous Q6 Months [facility], PA-C   60 mg at 03/16/23 1214     Allergies   Allergen Reactions   • Iodine - Food Allergy Rash      Immunizations:     Immunization History   Administered Date(s) Administered   • COVID-19 PFIZER VACCINE 0.3 ML IM 03/25/2021, 04/15/2021, 12/04/2021   • INFLUENZA 09/26/2013   • Influenza Quadrivalent, 6-35 Months IM 10/14/2014, 11/19/2015, 11/16/2016, 10/09/2017   • Influenza, high dose seasonal 0.7 mL 10/24/2019, 10/21/2020, 11/10/2022   • Influenza, injectable, quadrivalent, preservative free 0.5 mL 10/11/2018   • Influenza, recombinant, quadrivalent,injectable, preservative free 11/17/2021   • Influenza, seasonal, injectable 12/14/2012   • Pneumococcal Conjugate 13-Valent 08/07/2019   • Pneumococcal Polysaccharide PPV23 12/09/2020   • Tdap 07/18/2012   • Tuberculin Skin Test-PPD Intradermal 11/15/2000   • Zoster 11/22/2013      Health Maintenance:         Topic Date Due   • Hepatitis C Screening  Never done   • Breast Cancer Screening: Mammogram  09/21/2023   • Colorectal Cancer Screening  07/12/2024   • DXA SCAN  12/16/2026         Topic Date Due   • COVID-19 Vaccine (4 - Pfizer series) 01/29/2022   • Influenza Vaccine (1) 09/01/2023      Medicare Screening Tests and Risk Assessments:     Danilo Earl is here for her Subsequent Wellness visit. Last Medicare Wellness visit information reviewed, patient interviewed, no change since last AWV. Health Risk Assessment:   Patient rates overall health as very good. Patient feels that their physical health rating is same. Patient is very satisfied with their life. Eyesight was rated as same. Hearing was rated as same. Patient feels that their emotional and mental health rating is slightly better. Patients states they are never, rarely angry. Patient states they are never, rarely unusually tired/fatigued. Pain experienced in the last 7 days has been some. Patient's pain rating has been 3/10. Patient states that she has experienced no weight loss or gain in last 6 months. Fall Risk Screening: In the past year, patient has experienced: history of falling in past year      Urinary Incontinence Screening:   Patient has not leaked urine accidently in the last six months. Home Safety:  Patient does not have trouble with stairs inside or outside of their home. Patient has working smoke alarms and has working carbon monoxide detector. Home safety hazards include: none.      Nutrition:   Current diet is Regular and Limited junk food. Medications:   Patient is currently taking over-the-counter supplements. OTC medications include: one a day vitamins, AZO,Prevagen, Curcumin, Probiotic. Patient is able to manage medications. Activities of Daily Living (ADLs)/Instrumental Activities of Daily Living (IADLs):   Walk and transfer into and out of bed and chair?: Yes  Dress and groom yourself?: Yes    Bathe or shower yourself?: Yes    Feed yourself?  Yes  Do your laundry/housekeeping?: Yes  Manage your money, pay your bills and track your expenses?: Yes  Make your own meals?: Yes    Do your own shopping?: Yes    Previous Hospitalizations:   Any hospitalizations or ED visits within the last 12 months?: No      Hospitalization Comments: I did see on [facility] because I fell while cleaning the carpet in my closet injuring my shoulders    Advance Care Planning:   Living will: No    Durable POA for healthcare: No    Advanced directive: No    Advanced directive counseling given: Yes    Five wishes given: Yes    Patient declined ACP directive: No    End of Life Decisions reviewed with patient: Yes    Provider agrees with end of life decisions: Yes      Cognitive Screening:   Provider or family/friend/caregiver concerned regarding cognition?: No    PREVENTIVE SCREENINGS      Cardiovascular Screening:    General: Screening Not Indicated and History Lipid Disorder      Diabetes Screening:     General: Screening Current      Colorectal Cancer Screening:     General: Screening Current      Breast Cancer Screening:     General: Screening Current      Cervical Cancer Screening:    General: Screening Not Indicated      Osteoporosis Screening:    General: Screening Not Indicated and History Osteoporosis      Abdominal Aortic Aneurysm (AAA) Screening:        General: Screening Current and Screening Not Indicated      Lung Cancer Screening:     General: Screening Not Indicated      Hepatitis C Screening:    General: Screening Current    Screening, Brief Intervention, and Referral to Treatment (SBIRT)    Screening  Typical number of drinks in a day: 0  Typical number of drinks in a week: 0  Interpretation: Low risk drinking behavior. AUDIT-C Screenin) How often did you have a drink containing alcohol in the past year? never  2) How many drinks did you have on a typical day when you were drinking in the past year? 0  3) How often did you have 6 or more drinks on one occasion in the past year? never    AUDIT-C Score: 0  Interpretation: Score 0-2 (female): Negative screen for alcohol misuse    Single Item Drug Screening:  How often have you used an illegal drug (including marijuana) or a prescription medication for non-medical reasons in the past year? never    Single Item Drug Screen Score: 0  Interpretation: Negative screen for possible drug use disorder    No results found. Physical Exam:     Wt 67.7 kg (149 lb 3.2 oz)   LMP  (LMP Unknown)   BMI 27.29 kg/m²     Physical Exam  Vitals and nursing note reviewed. Constitutional:       General: She is not in acute distress. Appearance: Normal appearance. She is not ill-appearing or diaphoretic. HENT:      Head: Normocephalic and atraumatic. Right Ear: Tympanic membrane, ear canal and external ear normal.      Left Ear: Tympanic membrane, ear canal and external ear normal.      Nose: Nose normal. No congestion or rhinorrhea. Mouth/Throat:      Mouth: Mucous membranes are moist.      Pharynx: Oropharynx is clear. No posterior oropharyngeal erythema. Eyes:      General:         Right eye: No discharge. Left eye: No discharge. Extraocular Movements: Extraocular movements intact. Conjunctiva/sclera: Conjunctivae normal.      Pupils: Pupils are equal, round, and reactive to light. Neck:      Vascular: No carotid bruit. Cardiovascular:      Rate and Rhythm: Normal rate and regular rhythm. Pulses: Normal pulses.       Heart sounds: Normal heart sounds. No murmur heard. Pulmonary:      Effort: Pulmonary effort is normal. No respiratory distress. Breath sounds: Normal breath sounds. No wheezing or rhonchi. Abdominal:      General: Abdomen is flat. Bowel sounds are normal. There is no distension. Palpations: There is no mass. Tenderness: There is no abdominal tenderness. Musculoskeletal:         General: No swelling or deformity. Normal range of motion. Cervical back: Normal range of motion and neck supple. No rigidity. Right lower leg: No edema. Left lower leg: No edema. Lymphadenopathy:      Cervical: No cervical adenopathy. Skin:     General: Skin is warm and dry. Capillary Refill: Capillary refill takes less than 2 seconds. Coloration: Skin is not jaundiced. Findings: No bruising, erythema or rash. Neurological:      General: No focal deficit present. Mental Status: She is alert and oriented to person, place, and time. Cranial Nerves: No cranial nerve deficit. Sensory: No sensory deficit. Gait: Gait normal.      Deep Tendon Reflexes: Reflexes normal.   Psychiatric:         Mood and Affect: Mood normal.         Behavior: Behavior normal.         Thought Content:  Thought content normal.         Judgment: Judgment normal.          Cierra Lindsay,

## 2023-07-26 NOTE — PATIENT INSTRUCTIONS
Medicare Preventive Visit Patient Instructions  Thank you for completing your Welcome to Medicare Visit or Medicare Annual Wellness Visit today. Your next wellness visit will be due in one year (7/26/2024). The screening/preventive services that you may require over the next 5-10 years are detailed below. Some tests may not apply to you based off risk factors and/or age. Screening tests ordered at today's visit but not completed yet may show as past due. Also, please note that scanned in results may not display below. Preventive Screenings:  Service Recommendations Previous Testing/Comments   Colorectal Cancer Screening  * Colonoscopy    * Fecal Occult Blood Test (FOBT)/Fecal Immunochemical Test (FIT)  * Fecal DNA/Cologuard Test  * Flexible Sigmoidoscopy Age: 43-73 years old   Colonoscopy: every 10 years (may be performed more frequently if at higher risk)  OR  FOBT/FIT: every 1 year  OR  Cologuard: every 3 years  OR  Sigmoidoscopy: every 5 years  Screening may be recommended earlier than age 39 if at higher risk for colorectal cancer. Also, an individualized decision between you and your healthcare provider will decide whether screening between the ages of 77-80 would be appropriate. Colonoscopy: 07/12/2019  FOBT/FIT: Not on file  Cologuard: Not on file  Sigmoidoscopy: Not on file    Screening Current     Breast Cancer Screening Age: 36 years old  Frequency: every 1-2 years  Not required if history of left and right mastectomy Mammogram: 09/21/2022    Screening Current   Cervical Cancer Screening Between the ages of 21-29, pap smear recommended once every 3 years. Between the ages of 32-69, can perform pap smear with HPV co-testing every 5 years.    Recommendations may differ for women with a history of total hysterectomy, cervical cancer, or abnormal pap smears in past. Pap Smear: 09/30/2020    Screening Not Indicated   Hepatitis C Screening Once for adults born between 1945 and 1965  More frequently in patients at high risk for Hepatitis C Hep C Antibody: Not on file        Diabetes Screening 1-2 times per year if you're at risk for diabetes or have pre-diabetes Fasting glucose: 92 mg/dL (7/22/2022)  A1C: No results in last 5 years (No results in last 5 years)      Cholesterol Screening Once every 5 years if you don't have a lipid disorder. May order more often based on risk factors. Lipid panel: 07/22/2022    Screening Not Indicated  History Lipid Disorder     Other Preventive Screenings Covered by Medicare:  1. Abdominal Aortic Aneurysm (AAA) Screening: covered once if your at risk. You're considered to be at risk if you have a family history of AAA. 2. Lung Cancer Screening: covers low dose CT scan once per year if you meet all of the following conditions: (1) Age 48-67; (2) No signs or symptoms of lung cancer; (3) Current smoker or have quit smoking within the last 15 years; (4) You have a tobacco smoking history of at least 20 pack years (packs per day multiplied by number of years you smoked); (5) You get a written order from a healthcare provider. 3. Glaucoma Screening: covered annually if you're considered high risk: (1) You have diabetes OR (2) Family history of glaucoma OR (3)  aged 48 and older OR (3)  American aged 72 and older  3. Osteoporosis Screening: covered every 2 years if you meet one of the following conditions: (1) You're estrogen deficient and at risk for osteoporosis based off medical history and other findings; (2) Have a vertebral abnormality; (3) On glucocorticoid therapy for more than 3 months; (4) Have primary hyperparathyroidism; (5) On osteoporosis medications and need to assess response to drug therapy. · Last bone density test (DXA Scan): 12/16/2021.  5. HIV Screening: covered annually if you're between the age of 15-65. Also covered annually if you are younger than 13 and older than 72 with risk factors for HIV infection.  For pregnant patients, it is covered up to 3 times per pregnancy. Immunizations:  Immunization Recommendations   Influenza Vaccine Annual influenza vaccination during flu season is recommended for all persons aged >= 6 months who do not have contraindications   Pneumococcal Vaccine   * Pneumococcal conjugate vaccine = PCV13 (Prevnar 13), PCV15 (Vaxneuvance), PCV20 (Prevnar 20)  * Pneumococcal polysaccharide vaccine = PPSV23 (Pneumovax) Adults 20-63 years old: 1-3 doses may be recommended based on certain risk factors  Adults 72 years old: 1-2 doses may be recommended based off what pneumonia vaccine you previously received   Hepatitis B Vaccine 3 dose series if at intermediate or high risk (ex: diabetes, end stage renal disease, liver disease)   Tetanus (Td) Vaccine - COST NOT COVERED BY MEDICARE PART B Following completion of primary series, a booster dose should be given every 10 years to maintain immunity against tetanus. Td may also be given as tetanus wound prophylaxis. Tdap Vaccine - COST NOT COVERED BY MEDICARE PART B Recommended at least once for all adults. For pregnant patients, recommended with each pregnancy. Shingles Vaccine (Shingrix) - COST NOT COVERED BY MEDICARE PART B  2 shot series recommended in those aged 48 and above     Health Maintenance Due:      Topic Date Due   • Hepatitis C Screening  Never done   • Breast Cancer Screening: Mammogram  09/21/2023   • Colorectal Cancer Screening  07/12/2024   • DXA SCAN  12/16/2026     Immunizations Due:      Topic Date Due   • COVID-19 Vaccine (4 - Pfizer series) 01/29/2022   • Influenza Vaccine (1) 09/01/2023     Advance Directives   What are advance directives? Advance directives are legal documents that state your wishes and plans for medical care. These plans are made ahead of time in case you lose your ability to make decisions for yourself. Advance directives can apply to any medical decision, such as the treatments you want, and if you want to donate organs.    What are the types of advance directives? There are many types of advance directives, and each state has rules about how to use them. You may choose a combination of any of the following:  · Living will: This is a written record of the treatment you want. You can also choose which treatments you do not want, which to limit, and which to stop at a certain time. This includes surgery, medicine, IV fluid, and tube feedings. · Durable power of  for [facility]): This is a written record that states who you want to make healthcare choices for you when you are unable to make them for yourself. This person, called a proxy, is usually a family member or a friend. You may choose more than 1 proxy. · Do not resuscitate (DNR) order:  A DNR order is used in case your heart stops beating or you stop breathing. It is a request not to have certain forms of treatment, such as CPR. A DNR order may be included in other types of advance directives. · Medical directive: This covers the care that you want if you are in a coma, near death, or unable to make decisions for yourself. You can list the treatments you want for each condition. Treatment may include pain medicine, surgery, blood transfusions, dialysis, IV or tube feedings, and a ventilator (breathing machine). · Values history: This document has questions about your views, beliefs, and how you feel and think about life. This information can help others choose the care that you would choose. Why are advance directives important? An advance directive helps you control your care. Although spoken wishes may be used, it is better to have your wishes written down. Spoken wishes can be misunderstood, or not followed. Treatments may be given even if you do not want them. An advance directive may make it easier for your family to make difficult choices about your care. Fall Prevention    Fall prevention  includes ways to make your home and other areas safer.  It also includes ways you can move more carefully to prevent a fall. Health conditions that cause changes in your blood pressure, vision, or muscle strength and coordination may increase your risk for falls. Medicines may also increase your risk for falls if they make you dizzy, weak, or sleepy. Fall prevention tips:   · Stand or sit up slowly. · Use assistive devices as directed. · Wear shoes that fit well and have soles that . · Wear a personal alarm. · Stay active. · Manage your medical conditions. Home Safety Tips:  · Add items to prevent falls in the bathroom. · Keep paths clear. · Install bright lights in your home. · Keep items you use often on shelves within reach. · Paint or place reflective tape on the edges of your stairs. Weight Management   Why it is important to manage your weight:  Being overweight increases your risk of health conditions such as heart disease, high blood pressure, type 2 diabetes, and certain types of cancer. It can also increase your risk for osteoarthritis, sleep apnea, and other respiratory problems. Aim for a slow, steady weight loss. Even a small amount of weight loss can lower your risk of health problems. How to lose weight safely:  A safe and healthy way to lose weight is to eat fewer calories and get regular exercise. You can lose up about 1 pound a week by decreasing the number of calories you eat by 500 calories each day. Healthy meal plan for weight management:  A healthy meal plan includes a variety of foods, contains fewer calories, and helps you stay healthy. A healthy meal plan includes the following:  · Eat whole-grain foods more often. A healthy meal plan should contain fiber. Fiber is the part of grains, fruits, and vegetables that is not broken down by your body. Whole-grain foods are healthy and provide extra fiber in your diet.  Some examples of whole-grain foods are whole-wheat breads and pastas, oatmeal, brown rice, and bulgur. · Eat a variety of vegetables every day. Include dark, leafy greens such as spinach, kale, eriberto greens, and mustard greens. Eat yellow and orange vegetables such as carrots, sweet potatoes, and winter squash. · Eat a variety of fruits every day. Choose fresh or canned fruit (canned in its own juice or light syrup) instead of juice. Fruit juice has very little or no fiber. · Eat low-fat dairy foods. Drink fat-free (skim) milk or 1% milk. Eat fat-free yogurt and low-fat cottage cheese. Try low-fat cheeses such as mozzarella and other reduced-fat cheeses. · Choose meat and other protein foods that are low in fat. Choose beans or other legumes such as split peas or lentils. Choose fish, skinless poultry (chicken or turkey), or lean cuts of red meat (beef or pork). Before you cook meat or poultry, cut off any visible fat. · Use less fat and oil. Try baking foods instead of frying them. Add less fat, such as margarine, sour cream, regular salad dressing and mayonnaise to foods. Eat fewer high-fat foods. Some examples of high-fat foods include french fries, doughnuts, ice cream, and cakes. · Eat fewer sweets. Limit foods and drinks that are high in sugar. This includes candy, cookies, regular soda, and sweetened drinks. Exercise:  Exercise at least 30 minutes per day on most days of the week. Some examples of exercise include walking, biking, dancing, and swimming. You can also fit in more physical activity by taking the stairs instead of the elevator or parking farther away from stores. Ask your healthcare provider about the best exercise plan for you. © Copyright [employer] 2018 Information is for End User's use only and may not be sold, redistributed or otherwise used for commercial purposes.  All illustrations and images included in CareNotes® are the copyrighted property of [employer] Inc. or [address] [place] [address]

## 2023-07-28 PROBLEM — M81.8 OTHER OSTEOPOROSIS WITHOUT CURRENT PATHOLOGICAL FRACTURE: Status: ACTIVE | Noted: 2023-07-28

## 2023-07-31 ENCOUNTER — APPOINTMENT (OUTPATIENT)
Dept: LAB | Age: 68
End: 2023-07-31
Payer: COMMERCIAL

## 2023-07-31 DIAGNOSIS — E78.2 MIXED HYPERLIPIDEMIA: ICD-10-CM

## 2023-07-31 DIAGNOSIS — M81.0 SENILE OSTEOPOROSIS: ICD-10-CM

## 2023-07-31 LAB
ANION GAP SERPL CALCULATED.3IONS-SCNC: 1 MMOL/L
BUN SERPL-MCNC: 23 MG/DL (ref 5–25)
CALCIUM SERPL-MCNC: 9.3 MG/DL (ref 8.3–10.1)
CHLORIDE SERPL-SCNC: 112 MMOL/L (ref 96–108)
CHOLEST SERPL-MCNC: 175 MG/DL
CO2 SERPL-SCNC: 27 MMOL/L (ref 21–32)
CREAT SERPL-MCNC: 0.97 MG/DL (ref 0.6–1.3)
GFR SERPL CREATININE-BSD FRML MDRD: 59 ML/MIN/1.73SQ M
GLUCOSE P FAST SERPL-MCNC: 102 MG/DL (ref 65–99)
HDLC SERPL-MCNC: 46 MG/DL
LDLC SERPL CALC-MCNC: 115 MG/DL (ref 0–100)
NONHDLC SERPL-MCNC: 129 MG/DL
POTASSIUM SERPL-SCNC: 4.5 MMOL/L (ref 3.5–5.3)
SODIUM SERPL-SCNC: 140 MMOL/L (ref 135–147)
TRIGL SERPL-MCNC: 71 MG/DL

## 2023-07-31 PROCEDURE — 80048 BASIC METABOLIC PNL TOTAL CA: CPT

## 2023-07-31 PROCEDURE — 36415 COLL VENOUS BLD VENIPUNCTURE: CPT

## 2023-07-31 PROCEDURE — 80061 LIPID PANEL: CPT

## 2023-08-01 DIAGNOSIS — E78.00 HYPERCHOLESTEROLEMIA: ICD-10-CM

## 2023-08-01 DIAGNOSIS — I10 ESSENTIAL HYPERTENSION: ICD-10-CM

## 2023-08-01 RX ORDER — AMLODIPINE BESYLATE 5 MG/1
5 TABLET ORAL DAILY
Qty: 90 TABLET | Refills: 1 | Status: SHIPPED | OUTPATIENT
Start: 2023-08-01

## 2023-08-01 RX ORDER — LOSARTAN POTASSIUM 100 MG/1
100 TABLET ORAL DAILY
Qty: 90 TABLET | Refills: 3 | Status: SHIPPED | OUTPATIENT
Start: 2023-08-01

## 2023-08-01 RX ORDER — FENOFIBRATE 145 MG/1
145 TABLET, COATED ORAL DAILY
Qty: 90 TABLET | Refills: 3 | Status: SHIPPED | OUTPATIENT
Start: 2023-08-01

## 2023-08-16 DIAGNOSIS — F32.A DEPRESSION, UNSPECIFIED DEPRESSION TYPE: ICD-10-CM

## 2023-08-16 RX ORDER — NORTRIPTYLINE HYDROCHLORIDE 10 MG/1
10 CAPSULE ORAL 3 TIMES DAILY
Qty: 270 CAPSULE | Refills: 1 | Status: SHIPPED | OUTPATIENT
Start: 2023-08-16

## 2023-09-02 DIAGNOSIS — I10 ESSENTIAL HYPERTENSION: ICD-10-CM

## 2023-09-05 RX ORDER — AMLODIPINE BESYLATE 5 MG/1
5 TABLET ORAL DAILY
Qty: 30 TABLET | Refills: 5 | Status: SHIPPED | OUTPATIENT
Start: 2023-09-05

## 2023-09-23 ENCOUNTER — HOSPITAL ENCOUNTER (OUTPATIENT)
Dept: RADIOLOGY | Age: 68
Discharge: HOME | End: 2023-09-23
Payer: COMMERCIAL

## 2023-09-23 VITALS — WEIGHT: 150 LBS | HEIGHT: 62 IN | BODY MASS INDEX: 27.6 KG/M2

## 2023-09-23 DIAGNOSIS — Z12.31 ENCOUNTER FOR SCREENING MAMMOGRAM FOR MALIGNANT NEOPLASM OF BREAST: ICD-10-CM

## 2023-09-23 PROCEDURE — 77067 SCR MAMMO BI INCL CAD: CPT

## 2023-09-23 PROCEDURE — 77063 BREAST TOMOSYNTHESIS BI: CPT

## 2023-10-02 DIAGNOSIS — E78.00 HYPERCHOLESTEROLEMIA: ICD-10-CM

## 2023-10-02 RX ORDER — FENOFIBRATE 145 MG/1
145 TABLET, COATED ORAL DAILY
Qty: 90 TABLET | Refills: 3 | Status: SHIPPED | OUTPATIENT
Start: 2023-10-02

## 2023-10-14 ENCOUNTER — OFFICE VISIT (OUTPATIENT)
Dept: URGENT CARE | Age: 68
End: 2023-10-14
Payer: COMMERCIAL

## 2023-10-14 ENCOUNTER — APPOINTMENT (OUTPATIENT)
Dept: RADIOLOGY | Age: 68
End: 2023-10-14
Payer: COMMERCIAL

## 2023-10-14 VITALS
HEART RATE: 77 BPM | RESPIRATION RATE: 18 BRPM | SYSTOLIC BLOOD PRESSURE: 160 MMHG | OXYGEN SATURATION: 99 % | DIASTOLIC BLOOD PRESSURE: 78 MMHG | TEMPERATURE: 97.8 F

## 2023-10-14 DIAGNOSIS — M25.532 LEFT WRIST PAIN: Primary | ICD-10-CM

## 2023-10-14 DIAGNOSIS — M25.532 LEFT WRIST PAIN: ICD-10-CM

## 2023-10-14 PROCEDURE — 73110 X-RAY EXAM OF WRIST: CPT

## 2023-10-14 NOTE — PROGRESS NOTES
[facility]        NAME: Akshat Perez is a 70 y.o. female  : 1955    MRN: [id number]  DATE: 2023  TIME: 5:36 PM    Assessment and Plan   Left wrist pain [M25.532]  1. Left wrist pain  XR wrist 3+ vw left      Xray with mild degenerative changes, no acute fracture. Possible strain that she did not recognize at the time vs bruise. Will start with wrist brace, ibuprofen may alternate with tylenol. To f/u with PCP if not improving or worsening in the coming days. Patient Instructions       Follow up with PCP in 3-5 days. Proceed to  ER if symptoms worsen. Chief Complaint     Chief Complaint   Patient presents with    Wrist Pain     Not sure what caused pain. Started the morning. No injury reported. Increased pain with movement. History of Present Illness       Patient had 1 day of spontaneous left ulnar wrist pain. She does not recall any injury nor increased use. No hx of injury nor surgery to this area. This is not happened to her before. No redness, swelling. No fever, chills, otherwise in her usual state of health. Wrist Pain         Review of Systems   Review of Systems   All other systems reviewed and are negative.         Current Medications       Current Outpatient Medications:     amLODIPine (NORVASC) 5 mg tablet, TAKE ONE TABLET BY MOUTH EVERY DAY, Disp: 30 tablet, Rfl: 5    fenofibrate (TRICOR) 145 mg tablet, TAKE ONE TABLET BY MOUTH EVERY DAY, Disp: 90 tablet, Rfl: 3    losartan (COZAAR) 100 MG tablet, Take 1 tablet (100 mg total) by mouth daily, Disp: 90 tablet, Rfl: 3    multivitamin (THERAGRAN) TABS, Take 1 tablet by mouth daily, Disp: , Rfl:     nortriptyline (PAMELOR) 10 mg capsule, Take 1 capsule (10 mg total) by mouth 3 (three) times a day, Disp: 270 capsule, Rfl: 1    TURMERIC CURCUMIN PO, Take by mouth, Disp: , Rfl:     meclizine (ANTIVERT) 25 mg tablet, Take 1 tablet (25 mg total) by mouth 3 (three) times a day as needed for dizziness for up to 10 days, Disp: 30 tablet, Rfl: 0    Current Facility-Administered Medications:     denosumab (PROLIA) subcutaneous injection 60 mg, 60 mg, Subcutaneous, Once, Vera Hansen PA-C    Current Allergies     Allergies as of 10/14/2023 - Reviewed 10/14/2023   Allergen Reaction Noted    Iodine - food allergy Rash 04/09/2013            The following portions of the patient's history were reviewed and updated as appropriate: allergies, current medications, past family history, past medical history, past social history, past surgical history and problem list.     Past Medical History:   Diagnosis Date    Chronic leukopenia     Hypercalcemia     Hyperlipidemia     Hypertension     labile    Hypertension     Migraines     Osteoarthritis     Osteoporosis     Vertigo        Past Surgical History:   Procedure Laterality Date    EYE SURGERY      TOOTH EXTRACTION      TUBAL LIGATION         Family History   Problem Relation Age of Onset    Colon cancer Mother 46    Breast cancer Mother 48    Cancer Mother     Heart attack Father     Prostate cancer Father 79    Hypertension Father     Heart attack Sister     No Known Problems Sister     No Known Problems Daughter     No Known Problems Daughter     No Known Problems Maternal Grandmother     No Known Problems Maternal Grandfather     No Known Problems Paternal Grandmother     No Known Problems Paternal Grandfather     Stroke Brother     No Known Problems Son     No Known Problems Maternal Aunt     No Known Problems Maternal Aunt     No Known Problems Maternal Aunt          Medications have been verified. Objective   /78   Pulse 77   Temp 97.8 °F (36.6 °C) (Temporal)   Resp 18   LMP  (LMP Unknown)   SpO2 99%   No LMP recorded (lmp unknown). Patient is postmenopausal.       Physical Exam     Physical Exam  Vitals and nursing note reviewed. Constitutional:       General: She is not in acute distress. Appearance: Normal appearance. She is not toxic-appearing. HENT:      Head: Normocephalic and atraumatic. Nose: Nose normal.   Eyes:      Conjunctiva/sclera: Conjunctivae normal.   Musculoskeletal:         General: Tenderness present. No swelling or deformity. Comments: ROM slightly reduced in wrist extension. Pain at ends of extension and flexion. TTP TFCC region and ulnar palm   Neurological:      Mental Status: She is alert.

## 2023-11-08 ENCOUNTER — OFFICE VISIT (OUTPATIENT)
Dept: OBGYN CLINIC | Facility: CLINIC | Age: 68
End: 2023-11-08
Payer: COMMERCIAL

## 2023-11-08 VITALS
SYSTOLIC BLOOD PRESSURE: 178 MMHG | HEART RATE: 79 BPM | WEIGHT: 149.8 LBS | DIASTOLIC BLOOD PRESSURE: 91 MMHG | HEIGHT: 62 IN | BODY MASS INDEX: 27.57 KG/M2

## 2023-11-08 DIAGNOSIS — M77.8 FLEXOR CARPI ULNARIS TENDINITIS: Primary | ICD-10-CM

## 2023-11-08 PROCEDURE — 99214 OFFICE O/P EST MOD 30 MIN: CPT | Performed by: ORTHOPAEDIC SURGERY

## 2023-11-08 NOTE — PROGRESS NOTES
[address]  [address]  [facility] [id number] [phone]       [facility]  [id number]  1955    ORTHOPAEDIC SURGERY OUTPATIENT NOTE  11/8/2023      HISTORY:  70 y.o. right hand dominant female  who is here for initial evaluation of the left wrist. She was seen by Blaise 3.5 weeks ago for left wrist pain. She was provided with a wrist brace at that time;however, she found it was making her pain worse. She has been using an ACE wrap in the meantime, but was unable to find comfort in that as well. The wrist has been bothering her for 2 months. The pain was not caused by an injury. The pain is daily. The pain is constant. The pain is sharp, especially with movement. The pain is rated 6/10. The pain radiates into the left hand. She is taking Advil for her pain, which she feels helps some. She denies any paresthesias.      SANE score: 40    Past Medical History:   Diagnosis Date   • Chronic leukopenia    • Hypercalcemia    • Hyperlipidemia    • Hypertension     labile   • Hypertension    • Migraines    • Osteoarthritis    • Osteoporosis    • Vertigo        Past Surgical History:   Procedure Laterality Date   • EYE SURGERY     • TOOTH EXTRACTION     • TUBAL LIGATION         Social History     Socioeconomic History   • Marital status: /Civil Union     Spouse name: Not on file   • Number of children: Not on file   • Years of education: Not on file   • Highest education level: Not on file   Occupational History   • Not on file   Tobacco Use   • Smoking status: Never   • Smokeless tobacco: Never   Vaping Use   • Vaping Use: Never used   Substance and Sexual Activity   • Alcohol use: No   • Drug use: No   • Sexual activity: Yes     Partners: Male   Other Topics Concern   • Not on file   Social History Narrative   • Not on file     Social Determinants of Health     Financial Resource Strain: Low Risk  (4/17/2023)    Overall Financial Resource Strain (CARDIA)    • Difficulty of Paying Living Expenses: Not hard at all   Food Insecurity: Not on file   Transportation Needs: No Transportation Needs (4/17/2023)    PRAPARE - Transportation    • Lack of Transportation (Medical): No    • Lack of Transportation (Non-Medical):  No   Physical Activity: Not on file   Stress: Not on file   Social Connections: Not on file   Intimate Partner Violence: Not on file   Housing Stability: Not on file       Family History   Problem Relation Age of Onset   • Colon cancer Mother 46   • Breast cancer Mother 48   • Cancer Mother    • Heart attack Father    • Prostate cancer Father 79   • Hypertension Father    • Heart attack Sister    • No Known Problems Sister    • No Known Problems Daughter    • No Known Problems Daughter    • No Known Problems Maternal Grandmother    • No Known Problems Maternal Grandfather    • No Known Problems Paternal Grandmother    • No Known Problems Paternal Grandfather    • Stroke Brother    • No Known Problems Son    • No Known Problems Maternal Aunt    • No Known Problems Maternal Aunt    • No Known Problems Maternal Aunt         Patient's Medications   New Prescriptions    No medications on file   Previous Medications    AMLODIPINE (NORVASC) 5 MG TABLET    TAKE ONE TABLET BY MOUTH EVERY DAY    FENOFIBRATE (TRICOR) 145 MG TABLET    TAKE ONE TABLET BY MOUTH EVERY DAY    LOSARTAN (COZAAR) 100 MG TABLET    Take 1 tablet (100 mg total) by mouth daily    MECLIZINE (ANTIVERT) 25 MG TABLET    Take 1 tablet (25 mg total) by mouth 3 (three) times a day as needed for dizziness for up to 10 days    MULTIVITAMIN (THERAGRAN) TABS    Take 1 tablet by mouth daily    NORTRIPTYLINE (PAMELOR) 10 MG CAPSULE    Take 1 capsule (10 mg total) by mouth 3 (three) times a day    TURMERIC CURCUMIN PO    Take by mouth   Modified Medications    No medications on file   Discontinued Medications    No medications on file       Allergies   Allergen Reactions   • Iodine - Food Allergy Rash        BP (!) 178/91 (BP Location: Right arm, Patient Position: Sitting, Cuff Size: Standard)   Pulse 79   Ht 5' 2" (1.575 m)   Wt 67.9 kg (149 lb 12.8 oz)   LMP  (LMP Unknown)   BMI 27.40 kg/m²      REVIEW OF SYSTEMS:  Constitutional: Negative. HEENT: Negative. Respiratory: Negative. Skin: Negative. Neurological: Negative. Psychiatric/Behavioral: Negative. Musculoskeletal: Negative except for that mentioned in the HPI. PHYSICAL EXAM:  LEFT WRIST:    Appearance: Normal    Wrist ROM: WNL    Wrist flexion: 5/5    TTP: FCU tendon and pisiform    Tinel's at the wrist and elbow: Negative      IMAGING: X-rays of the left wrist obtained on 10/14/2023 demonstrate no acute osseous abnormalities. Mild diffuse degenerative changes present. ASSESSMENT AND PLAN:  70 y.o. female with flexor carpi ulnaris tendinitis. After obtaining a thorough history, orthopedic exam, and reviewing imaging I believe her symptoms are consistent with flexor carpi ulnaris tendinitis of the left wrist.  I believe she would benefit from physical therapy and occupational therapy for the left hand and wrist.  She has not experienced relief taking Advil orally, she may try Voltaren gel topically as needed. She may try using the wrist splint again as needed. I will see her back in 6 weeks for a follow-up evaluation.      Scribe Attestation    I,:  Deepak Banks am acting as a scribe while in the presence of the attending physician.:       I,:  Kristin Barnett personally performed the services described in this documentation    as scribed in my presence.:

## 2023-11-09 ENCOUNTER — EVALUATION (OUTPATIENT)
Dept: PHYSICAL THERAPY | Facility: CLINIC | Age: 68
End: 2023-11-09
Payer: COMMERCIAL

## 2023-11-09 DIAGNOSIS — M77.8 FLEXOR CARPI ULNARIS TENDINITIS: ICD-10-CM

## 2023-11-09 PROCEDURE — 97161 PT EVAL LOW COMPLEX 20 MIN: CPT

## 2023-11-09 PROCEDURE — 97140 MANUAL THERAPY 1/> REGIONS: CPT

## 2023-11-09 PROCEDURE — 97110 THERAPEUTIC EXERCISES: CPT

## 2023-11-09 NOTE — PROGRESS NOTES
PT Evaluation     Today's date: 2023  Patient name: Chente Gutierrez  : 1955  MRN: [id number]  Referring provider: Blanca Fatima DO  Dx:   Encounter Diagnosis     ICD-10-CM    1. Flexor carpi ulnaris tendinitis  M77.8 Ambulatory Referral to PT/OT Hand Therapy          Start Time: 30  Stop Time: 8723  Total time in clinic (min): 40 minutes    Assessment  Assessment details: Chente Gutierrez is a 70 y.o. female who presents with signs and symptoms consistent of wrist flexor tendonitis. Patient presents with pain and decreased strength. Due to these impairments, Patient has difficulty performing lifting, reaching , pushing/pulling, and cleaning/cooking . Patient would benefit from skilled physical therapy to address the impairments, improve their level of function, and to improve their overall quality of life. Reviewed HEP, involved anatomy, physio as well as POC with verbalized understanding and pt is in agreement with above. Impairments: impaired physical strength, lacks appropriate home exercise program, pain with function and poor body mechanics    Plan  Patient would benefit from: skilled physical therapy  Planned modality interventions: cryotherapy and thermotherapy: hydrocollator packs  Planned therapy interventions: abdominal trunk stabilization, ADL training, balance, body mechanics training, home exercise program, functional ROM exercises, flexibility, therapeutic exercise, therapeutic activities, stretching, strengthening, joint mobilization, manual therapy, neuromuscular re-education, patient education and postural training  Frequency: 2x week  Duration in visits: 16  Duration in weeks: 8  Treatment plan discussed with: patient      Subjective Evaluation    History of Present Illness  Mechanism of injury: History: Pt presents to PT stating her L wrist started hurting her a few weeks ago. Pt went to [facility] a few weeks ago had x-rays and given a splint. Pt states that made it worse. Pt went to ortho and was advised to come to PT. Pt is unsure of what caused her issue. Hurts when move wrist. Tries to avoid using it. Gripping or trying to open something makes it worse. Pt has been taking Advil that does help. No modalities have been used. Denies any pain traveling into the fingers or up to the elbow. Remains on medial aspect of the wrist at the base of the 5th carpel. Time of the day does not appear to effect it. Denies any neck or shoulder pain.     Aggravating factors: gripping, twisting, turning wrist, lifting items  Relieving factors: rest, Advil  Imaging: x-ray  Occupation: retired  Hobbies/recreation: housework, read, walk  Changing/staying the same: unchanged  Patient Goals  Patient goals for therapy: decreased pain and return to sport/leisure activities    Pain  Current pain ratin  At best pain ratin  At worst pain ratin  Quality: sharp  Relieving factors: rest and medications  Aggravating factors: keyboarding and lifting  Progression: no change    Social Support  Lives with: spouse    Hand dominance: right      Diagnostic Tests  X-ray: normal  Treatments  Previous treatment: medication  Current treatment: physical therapy      Objective     Postural Observations  Seated posture: good  Standing posture: good      Observations     Additional Observation Details  No swelling, redness    Active Range of Motion   Cervical/Thoracic Spine     Normal active range of motion    Left Wrist   Wrist flexion: 45 degrees   Wrist extension: WFL  Radial deviation: WFL  Ulnar deviation: WFL      Additional Active Range of Motion Details  Discomfort at end range wrist flexion    Passive Range of Motion     Left Wrist   Wrist flexion: WFL and with pain  Wrist extension: WFL and with pain  Radial deviation: WFL  Ulnar deviation: WFL    Strength/Myotome Testing     Left Wrist/Hand   Wrist extension: 3+  Wrist flexion: 4+  Radial deviation: 4+  Ulnar deviation: 4+    Additional Strength Details  Pain with resisted wrist flexion    Tests     Right Shoulder   Negative ULTT1, ULTT2, ULTT3 and ULTT4. Right Elbow   Negative Tinel's sign (cubital tunnel). Right Wrist/Hand   Negative Phalen's sign, Tinel's sign (medial nerve) and Tinel's sign (radial tunnel). Additional Tests Details  Positive reverse Phalen's sign L hand    General Comments:    Upper quarter screen   Shoulder: unremarkable  Elbow: unremarkable    Wrist/Hand Comments  TTP medial wrist, thenar eminence    No limitations on finger ROM             Precautions: HTN, depression, osteoporosis      Manuals 11/9            STM wrist flexors RE                                                   Neuro Re-Ed             gripper             web                                                                              Ther Ex             Wrist flex/ext S 10"x3 ea            Shoulder flex             Pro./sup.              Pinch/ext 10x ea                                                                Ther Activity                                                                              Modalities

## 2023-11-14 ENCOUNTER — IMMUNIZATIONS (OUTPATIENT)
Dept: FAMILY MEDICINE CLINIC | Facility: CLINIC | Age: 68
End: 2023-11-14
Payer: COMMERCIAL

## 2023-11-14 DIAGNOSIS — Z23 ENCOUNTER FOR IMMUNIZATION: Primary | ICD-10-CM

## 2023-11-14 PROCEDURE — G0008 ADMIN INFLUENZA VIRUS VAC: HCPCS

## 2023-11-14 PROCEDURE — 90662 IIV NO PRSV INCREASED AG IM: CPT

## 2023-11-16 ENCOUNTER — OFFICE VISIT (OUTPATIENT)
Dept: PHYSICAL THERAPY | Facility: CLINIC | Age: 68
End: 2023-11-16
Payer: COMMERCIAL

## 2023-11-16 DIAGNOSIS — M77.8 FLEXOR CARPI ULNARIS TENDINITIS: Primary | ICD-10-CM

## 2023-11-16 PROCEDURE — 97110 THERAPEUTIC EXERCISES: CPT

## 2023-11-16 PROCEDURE — 97112 NEUROMUSCULAR REEDUCATION: CPT

## 2023-11-16 PROCEDURE — 97140 MANUAL THERAPY 1/> REGIONS: CPT

## 2023-11-16 NOTE — PROGRESS NOTES
Daily Note     Today's date: 2023  Patient name: Samson Bettencourt  : 1955  MRN: [id number]  Referring provider: Nakul Carter DO  Dx:   Encounter Diagnosis     ICD-10-CM    1. Flexor carpi ulnaris tendinitis  M77.8           Start Time: 59  Stop Time: 4729  Total time in clinic (min): 36 minutes    Subjective: Pt reports she was feeling really good since IE having minimal pain however yesterday it started to bother her again. Objective: See treatment diary below      Assessment: Tolerated treatment well. Patient demonstrated fatigue post treatment, exhibited good technique with therapeutic exercises, and would benefit from continued PT. Pt noted no discomfort throughout session with exception of some minor discomfort with wrist ext. Holding 2# weight. Pt will follow up next week and with the exception of a flare up will look to d/c pt over the next two visits. Plan: Continue per plan of care. Progress treatment as tolerated.        Precautions: HTN, depression, osteoporosis      Manuals            STM wrist flexors RE RE                                                  Neuro Re-Ed             gripper  R 20x           web  R 20x           Gripper digit  R 5x           Smiles/frown  R 20x                                                  Ther Ex             Wrist flex/ext S 10"x3 ea 10"X4            Shoulder flex             Pro./sup.  2# 20x           Pinch/ext 10x ea            Bicep curl N/PRO/SUP  4# 15x ea           Triceps ext  8# 20x           Wrist flex/ext  2# 20x                        Ther Activity                                                                              Modalities

## 2023-11-27 ENCOUNTER — OFFICE VISIT (OUTPATIENT)
Dept: PHYSICAL THERAPY | Facility: CLINIC | Age: 68
End: 2023-11-27
Payer: COMMERCIAL

## 2023-11-27 DIAGNOSIS — M77.8 FLEXOR CARPI ULNARIS TENDINITIS: Primary | ICD-10-CM

## 2023-11-27 PROCEDURE — 97112 NEUROMUSCULAR REEDUCATION: CPT

## 2023-11-27 PROCEDURE — 97140 MANUAL THERAPY 1/> REGIONS: CPT

## 2023-11-27 PROCEDURE — 97110 THERAPEUTIC EXERCISES: CPT

## 2023-12-04 ENCOUNTER — OFFICE VISIT (OUTPATIENT)
Dept: PHYSICAL THERAPY | Facility: CLINIC | Age: 68
End: 2023-12-04
Payer: COMMERCIAL

## 2023-12-04 DIAGNOSIS — M77.8 FLEXOR CARPI ULNARIS TENDINITIS: Primary | ICD-10-CM

## 2023-12-04 PROCEDURE — 97110 THERAPEUTIC EXERCISES: CPT

## 2023-12-04 PROCEDURE — 97112 NEUROMUSCULAR REEDUCATION: CPT

## 2023-12-04 PROCEDURE — 97140 MANUAL THERAPY 1/> REGIONS: CPT

## 2023-12-04 NOTE — PROGRESS NOTES
Daily Note     Today's date: 2023  Patient name: Fide Ty  : 1955  MRN: [id number]  Referring provider: Joshua Christianson DO  Dx:   Encounter Diagnosis     ICD-10-CM    1. Flexor carpi ulnaris tendinitis  M77.8           Start Time: 79  Stop Time: 7820  Total time in clinic (min): 45 minutes    Subjective: Pt reports she was sore after last session and is no longer having a constant pain and only has discomfort with certain movements. Cleaning up after dinner and holding dishes causes some of her discomfort. Takes a short bit for the pain to go away when putting dishes down. Objective: See treatment diary below      Assessment: Tolerated treatment well. Patient demonstrated fatigue post treatment, exhibited good technique with therapeutic exercises, and would benefit from continued PT. Pt tolerated session very well reporting most soreness with STM near elbow and less in the hand/wrist today. Progressed to include UBE with no reported discomfort. Plan: Continue per plan of care. Progress treatment as tolerated.        Precautions: HTN, depression, osteoporosis      Manuals          STM wrist flexors RE RE RE RE                                                Neuro Re-Ed             gripper  R 20x R 20X R 20X         web  R 20x R 20X Y 20X         Gripper digit  R 5x R 5X R 20X         Smiles/frown  R 20x R 20X R 20X                                                Ther Ex             Wrist flex/ext S 10"x3 ea 10"X4  10"x4 10"x4         Shoulder flex             Pro./sup.  2# 20x 2# 20x 2# 20x         Pinch/ext 10x ea            Bicep curl N/PRO/SUP  4# 15x ea  N 4# 20x         Triceps ext  8# 20x  4# 20x         Wrist flex/ext  2# 20x 2# 20x 2# 20x         UBE fw/bw    L1 3'/3'         Ther Activity                                                                              Modalities

## 2023-12-11 ENCOUNTER — EVALUATION (OUTPATIENT)
Dept: PHYSICAL THERAPY | Facility: CLINIC | Age: 68
End: 2023-12-11
Payer: COMMERCIAL

## 2023-12-11 DIAGNOSIS — M77.8 FLEXOR CARPI ULNARIS TENDINITIS: Primary | ICD-10-CM

## 2023-12-11 PROCEDURE — 97140 MANUAL THERAPY 1/> REGIONS: CPT

## 2023-12-11 PROCEDURE — 97112 NEUROMUSCULAR REEDUCATION: CPT

## 2023-12-11 PROCEDURE — 97110 THERAPEUTIC EXERCISES: CPT

## 2023-12-11 NOTE — PROGRESS NOTES
Re-Evaluation     Today's date: 2023  Patient name: Waqas Ely  : 1955  MRN: [id number]  Referring provider: Enrrique Baldwin DO  Dx:   Encounter Diagnosis     ICD-10-CM    1. Flexor carpi ulnaris tendinitis  M77.8           Start Time:   Stop Time: 172  Total time in clinic (min): 46 minutes    Subjective: The patient presents for re-evaluation today. The patient reports improvement in symptoms since beginning skilled physical therapy. The patient reports 2/10 pain at it's worst over the past 24 hours, and reports 60% improvement in overall condition since beginning formal PT care. The patient's chief remaining concern is activity tolerance and doing heavier activities. Objective: See treatment diary below    ostural Observations  Seated posture: good  Standing posture: good    Observations     Additional Observation Details  No swelling, redness    Active Range of Motion   Cervical/Thoracic Spine     Normal active range of motion    Left Wrist   Wrist flexion: WFL pain free  Wrist extension: WFL  Radial deviation: WFL  Ulnar deviation: WFL      Additional Active Range of Motion Details  ** pain free AROM at re-evaluation compared to pain at end range for all motions during IE    Passive Range of Motion     Left Wrist   Wrist flexion: WFL   Wrist extension: WFL   Radial deviation: WFL  Ulnar deviation: WFL    Strength/Myotome Testing     Left Wrist/Hand   Wrist extension: 4-  Wrist flexion: 4+  Radial deviation: 4+  Ulnar deviation: 4+  *pain free MMT    Tests     Right Shoulder   Negative ULTT1, ULTT2, ULTT3 and ULTT4. Right Elbow   Negative Tinel's sign (cubital tunnel). Right Wrist/Hand   Negative Phalen's sign, Tinel's sign (medial nerve) and Tinel's sign (radial tunnel).      Additional Tests Details  Positive reverse Phalen's sign L hand    General Comments:    Upper quarter screen   Shoulder: unremarkable  Elbow: unremarkable    Wrist/Hand Comments  TTP medial wrist, thenar eminence    No limitations on finger ROM    Assessment: Juan Jose Steel is a pleasant 70 y.o. female who has been receiving PT intervention for wrist/hand pain. The patient has demonstrated decreased pain, increased strength, and increased activity tolerance since beginning treatment. Pt did very well with exercises today simulating baking and rolling cookies as she is looking to do so in the next few weeks secondary to the holidays. Primary remaining impairments include:    1)  activity tolerance    2)  hand pain/fatigue     Short Term Goals: to be achieved by 4 weeks  1) Patient to be independent with basic HEP- MET  2) Decrease pain to 3/10 at its worst- MET  3) Increase all limited wrist and/or elbow ROM by 5-10 degrees in all planes- MET  4) Increase all limited wrist and/or elbow strength by 1/2 MMT grade in all deficient planes- PROGRESSING  5) Reduce swelling to normal in all regions of the upper extremity- MET    Long Term Goals: to be achieved by discharge  1) FOTO equal to or greater than projected goal   2) Patient to be independent with comprehensive HEP  3) Patient will demonstrate normal tolerance for wrist and hand activities  4) Increase UE strength to normal and equal to the opposite side in all planes to improve a/iadls  5) Patient to report no sleep interruption secondary to pain     Plan: Continue per plan of care. Progress treatment as tolerated. Precautions: HTN, depression, osteoporosis      Manuals 11/9 11/16 11/27 12/4 12/11        STM wrist flexors RE RE RE RE RE                                               Neuro Re-Ed             gripper  R 20x R 20X R 20X R 20x        web  R 20x R 20X Y 20X Y 20x        Gripper digit  R 5x R 5X R 5X R 5x        Smiles/frown  R 20x R 20X R 20X R 20x        Tbar     R 20x        "Roller"     5.5# 30x                     Ther Ex             Wrist flex/ext S 10"x3 ea 10"X4  10"x4 10"x4 10"x5 ea        Shoulder flex     2# 20x        Pro./sup. 2# 20x 2# 20x 2# 20x         Pinch/ext 10x ea            Bicep curl N/PRO/SUP  4# 15x ea  N 4# 20x N 20x 5#        Triceps ext  8# 20x  4# 20x 5# 20x        Wrist flex/ext  2# 20x 2# 20x 2# 20x         UBE fw/bw    L1 3'/3' L2 3'/3'        Ther Activity                                                                              Modalities

## 2023-12-12 PROBLEM — Z01.419 ENCOUNTER FOR ANNUAL ROUTINE GYNECOLOGICAL EXAMINATION: Status: ACTIVE | Noted: 2023-12-12

## 2023-12-12 PROBLEM — Z12.31 ENCOUNTER FOR SCREENING MAMMOGRAM FOR BREAST CANCER: Status: ACTIVE | Noted: 2023-12-12

## 2023-12-12 NOTE — PROGRESS NOTES
Assessment/Plan:  NGE                                                                                                               Osteoporosis  - Dr. Efrain Hogue, scheduled for a DEXA scan 2/2/24                                                                                   RTO 1 yr. SBA monthly  3 D Mammography 9/23 Colonoscopy '24  Exercise 3/wk                  Calcium 1,200 mg/d with Vit D     Depression Screen: Neg        Diagnoses and all orders for this visit:    Encounter for annual routine gynecological examination    Encounter for screening mammogram for breast cancer  -     Mammo screening bilateral w 3d & cad; Future    Senile osteoporosis              Subjective:        Patient ID: Juan Carrion is a 70 y.o. female. Mrs. Anna Banerjee presents for an annual visit. She has no gynecological complaints. She denies any vaginal bleeding. She is sexually active and uses a lubricant. She performs self breast exams. She is seeing Dr. Efrain Hogue for osteoporosis. She began Prolia therapy 1 year ago and is due for her third dose soon. She is not compliant with calcium. She is adequate with exercise. The following portions of the patient's history were reviewed and updated as appropriate: She  has a past medical history of Chronic leukopenia, Hypercalcemia, Hyperlipidemia, Hypertension, Hypertension, Migraines, Osteoarthritis, Osteoporosis, and Vertigo.   Patient Active Problem List    Diagnosis Date Noted    Encounter for annual routine gynecological examination 12/12/2023    Encounter for screening mammogram for breast cancer 12/12/2023    Other osteoporosis without current pathological fracture 07/28/2023    Impingement syndrome of left shoulder 03/14/2023    Impingement syndrome of right shoulder 03/14/2023    History of rib fracture 12/13/2022    Hypercalcemia 12/22/2021    Primary generalized (osteo)arthritis 12/22/2021    Vertigo 12/22/2021    Migraine with aura and without status migrainosus, not intractable 12/22/2021    Anxiety 12/22/2021    Labile hypertension 12/22/2021    Quadriceps tendonitis 05/13/2021    COVID-19 01/25/2021    Atrophic vaginitis 09/11/2019    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 Her family history includes Breast cancer (age of onset: 48) in her mother; Cancer in her mother; Colon cancer (age of onset: 46) in her mother; Heart attack in her father and sister; Hypertension in her father; No Known Problems in her daughter, daughter, maternal aunt, maternal aunt, maternal aunt, maternal grandfather, maternal grandmother, paternal grandfather, paternal grandmother, sister, and son; Prostate cancer (age of onset: 79) in her father; Stroke in her brother. She  reports that she has never smoked. She has never used smokeless tobacco. She reports that she does not drink alcohol and does not use drugs.   SH:   to Tomma Case    Current Outpatient Medications   Medication Sig Dispense Refill    amLODIPine (NORVASC) 5 mg tablet TAKE ONE TABLET BY MOUTH EVERY DAY 30 tablet 5    fenofibrate (TRICOR) 145 mg tablet TAKE ONE TABLET BY MOUTH EVERY DAY 90 tablet 3    losartan (COZAAR) 100 MG tablet Take 1 tablet (100 mg total) by mouth daily 90 tablet 3    meclizine (ANTIVERT) 25 mg tablet Take 1 tablet (25 mg total) by mouth 3 (three) times a day as needed for dizziness for up to 10 days 30 tablet 0    multivitamin (THERAGRAN) TABS Take 1 tablet by mouth daily      nortriptyline (PAMELOR) 10 mg capsule Take 1 capsule (10 mg total) by mouth 3 (three) times a day 270 capsule 1    TURMERIC CURCUMIN PO Take by mouth       Current Facility-Administered Medications   Medication Dose Route Frequency Provider Last Rate Last Admin    denosumab (PROLIA) subcutaneous injection 60 mg  60 mg Subcutaneous Once [employer], PA-C         Current Outpatient Medications on File Prior to Visit   Medication Sig    amLODIPine (NORVASC) 5 mg tablet TAKE ONE TABLET BY MOUTH EVERY DAY    fenofibrate (TRICOR) 145 mg tablet TAKE ONE TABLET BY MOUTH EVERY DAY    losartan (COZAAR) 100 MG tablet Take 1 tablet (100 mg total) by mouth daily    meclizine (ANTIVERT) 25 mg tablet Take 1 tablet (25 mg total) by mouth 3 (three) times a day as needed for dizziness for up to 10 days    multivitamin (THERAGRAN) TABS Take 1 tablet by mouth daily    nortriptyline (PAMELOR) 10 mg capsule Take 1 capsule (10 mg total) by mouth 3 (three) times a day    TURMERIC CURCUMIN PO Take by mouth     Current Facility-Administered Medications on File Prior to Visit   Medication    denosumab (PROLIA) subcutaneous injection 60 mg     She is allergic to iodine - food allergy. .    Review of Systems   Constitutional:  Negative for activity change, appetite change, fatigue and unexpected weight change. Eyes:  Negative for visual disturbance. Respiratory:  Negative for cough, chest tightness, shortness of breath and wheezing. Cardiovascular:  Negative for chest pain, palpitations and leg swelling. Breast: Patient denies tenderness, nipple discharge, masses, or erythema. Gastrointestinal:  Negative for abdominal distention, abdominal pain, blood in stool, constipation, diarrhea, nausea and vomiting. Endocrine: Negative for cold intolerance and heat intolerance.    Genitourinary:  Negative for decreased urine volume, difficulty urinating, dyspareunia, dysuria, frequency, hematuria, menstrual problem, pelvic pain, urgency, vaginal bleeding, vaginal discharge and vaginal pain. Musculoskeletal:  Negative for arthralgias. Skin:  Negative for rash. Neurological:  Negative for weakness, light-headedness, numbness and headaches. Hematological:  Does not bruise/bleed easily. Psychiatric/Behavioral:  Negative for agitation, behavioral problems and sleep disturbance. The patient is not nervous/anxious. Objective:    Vitals:    12/13/23 1045   BP: 140/80   BP Location: Right arm   Patient Position: Sitting   Cuff Size: Standard   Weight: 67.3 kg (148 lb 6.4 oz)   Height: 5' 2" (1.575 m)            Physical Exam  Vitals and nursing note reviewed. Exam conducted with a chaperone present. Constitutional:       General: She is not in acute distress. Appearance: Normal appearance. She is well-developed. HENT:      Head: Normocephalic and atraumatic. Eyes:      General: No scleral icterus. Right eye: No discharge. Left eye: No discharge. Extraocular Movements: Extraocular movements intact. Conjunctiva/sclera: Conjunctivae normal.   Neck:      Thyroid: No thyromegaly. Trachea: No tracheal deviation. Cardiovascular:      Rate and Rhythm: Normal rate and regular rhythm. Heart sounds: Normal heart sounds. No murmur heard. Pulmonary:      Effort: Pulmonary effort is normal. No respiratory distress. Breath sounds: Normal breath sounds. No wheezing. Chest:   Breasts:     Breasts are symmetrical.      Right: No inverted nipple, mass, nipple discharge, skin change or tenderness. Left: No inverted nipple, mass, nipple discharge, skin change or tenderness. Abdominal:      General: Bowel sounds are normal. There is no distension. Palpations: Abdomen is soft. There is no mass. Tenderness: There is no abdominal tenderness. There is no guarding or rebound. Genitourinary:     General: Normal vulva. Labia:         Right: No rash, tenderness or lesion. Left: No rash, tenderness or lesion. Vagina: Normal.      Cervix: No cervical motion tenderness or discharge. Uterus: Not deviated, not enlarged and not tender. Adnexa:         Right: No mass, tenderness or fullness. Left: No mass, tenderness or fullness. Rectum: No external hemorrhoid. Comments: Urethral meatus within normal limits. Perineum within normal limits. Bladder well supported. Physiologic vaginal atrophy. Musculoskeletal:         General: No tenderness. Normal range of motion. Cervical back: Normal range of motion and neck supple. Lymphadenopathy:      Cervical: No cervical adenopathy. Skin:     General: Skin is warm and dry. Neurological:      Mental Status: She is alert and oriented to person, place, and time. Psychiatric:         Mood and Affect: Mood normal.         Behavior: Behavior normal.         Thought Content:  Thought content normal.         Judgment: Judgment normal.

## 2023-12-13 ENCOUNTER — ANNUAL EXAM (OUTPATIENT)
Dept: OBGYN CLINIC | Facility: CLINIC | Age: 68
End: 2023-12-13
Payer: COMMERCIAL

## 2023-12-13 VITALS
BODY MASS INDEX: 27.31 KG/M2 | HEIGHT: 62 IN | SYSTOLIC BLOOD PRESSURE: 140 MMHG | DIASTOLIC BLOOD PRESSURE: 80 MMHG | WEIGHT: 148.4 LBS

## 2023-12-13 DIAGNOSIS — Z12.31 ENCOUNTER FOR SCREENING MAMMOGRAM FOR BREAST CANCER: ICD-10-CM

## 2023-12-13 DIAGNOSIS — Z01.419 ENCOUNTER FOR ANNUAL ROUTINE GYNECOLOGICAL EXAMINATION: Primary | ICD-10-CM

## 2023-12-13 DIAGNOSIS — M81.0 SENILE OSTEOPOROSIS: ICD-10-CM

## 2023-12-13 PROCEDURE — G0101 CA SCREEN;PELVIC/BREAST EXAM: HCPCS | Performed by: OBSTETRICS & GYNECOLOGY

## 2023-12-18 ENCOUNTER — OFFICE VISIT (OUTPATIENT)
Dept: PHYSICAL THERAPY | Facility: CLINIC | Age: 68
End: 2023-12-18
Payer: COMMERCIAL

## 2023-12-18 DIAGNOSIS — M77.8 FLEXOR CARPI ULNARIS TENDINITIS: Primary | ICD-10-CM

## 2023-12-18 PROCEDURE — 97112 NEUROMUSCULAR REEDUCATION: CPT

## 2023-12-18 PROCEDURE — 97140 MANUAL THERAPY 1/> REGIONS: CPT

## 2023-12-18 PROCEDURE — 97110 THERAPEUTIC EXERCISES: CPT

## 2023-12-18 NOTE — PROGRESS NOTES
"Daily Note     Today's date: 2023  Patient name: Mayra Echevarria  : 1955  MRN: [id number]  Referring provider: Nataliya Camp DO  Dx:   Encounter Diagnosis     ICD-10-CM    1. Flexor carpi ulnaris tendinitis  M77.8           Start Time: 1214  Stop Time: 1248  Total time in clinic (min): 34 minutes    Subjective: Pt reports her hand and wrist held up really great over the weekend even with baking cookies and handling a rolling pin and cookie sheets.      Objective: See treatment diary below      Assessment: Tolerated treatment well. Patient demonstrated fatigue post treatment, exhibited good technique with therapeutic exercises, and would benefit from continued PT. Pt reported no pain or tenderness today. Will assess her at next session for potential d/c as she has been doing very well and will be going 10 days without therapy prior to next visit.      Plan: Continue per plan of care.  Potential discharge next visit.     Precautions: HTN, depression, osteoporosis      Manuals        STM wrist flexors RE RE RE RE RE RE                                              Neuro Re-Ed             gripper  R 20x R 20X R 20X R 20x GN 20x       web  R 20x R 20X Y 20X Y 20x Y 20x       Gripper digit  R 5x R 5X R 5X R 5x R 5x       Smiles/frown  R 20x R 20X R 20X R 20x R 20x       Tbar     R 20x R 20x       \"Roller\"     5.5# 30x                     Ther Ex             Wrist flex/ext S 10\"x3 ea 10\"X4  10\"x4 10\"x4 10\"x5 ea 10\"x5 ea       Shoulder flex     2# 20x        Pro./sup.  2# 20x 2# 20x 2# 20x  2# 20x       Pinch/ext 10x ea            Bicep curl N/PRO/SUP  4# 15x ea  N 4# 20x N 20x 5# 1UE 20x 5#       Triceps ext  8# 20x  4# 20x 5# 20x 1UE 5# 20x       Wrist flex/ext  2# 20x 2# 20x 2# 20x  2# 20x       UBE fw/bw    L1 3'/3' L2 3'/3' L2 3'/3'       Ther Activity                                                                              Modalities                                     " 06-Apr-2017 19:50

## 2023-12-20 ENCOUNTER — OFFICE VISIT (OUTPATIENT)
Dept: OBGYN CLINIC | Facility: CLINIC | Age: 68
End: 2023-12-20
Payer: COMMERCIAL

## 2023-12-20 VITALS
BODY MASS INDEX: 27.42 KG/M2 | WEIGHT: 149 LBS | DIASTOLIC BLOOD PRESSURE: 91 MMHG | HEIGHT: 62 IN | HEART RATE: 78 BPM | SYSTOLIC BLOOD PRESSURE: 165 MMHG

## 2023-12-20 DIAGNOSIS — M77.8 FLEXOR CARPI ULNARIS TENDINITIS: Primary | ICD-10-CM

## 2023-12-20 PROCEDURE — 99213 OFFICE O/P EST LOW 20 MIN: CPT | Performed by: ORTHOPAEDIC SURGERY

## 2023-12-20 NOTE — PROGRESS NOTES
ORTHO CARE [facility] [address] [address] [phone]       Mayra Echevarria  [id number]  1955    ORTHOPAEDIC SURGERY OUTPATIENT NOTE  12/20/2023      HISTORY:  68 y.o. female presents for follow-up evaluation of her left wrist.  She has been treated for flexor carpi ulnaris tendinitis with physical therapy.  She is happy reports that she is doing well overall notes that she does have symptomatic improvement with the assistance of physical therapy. She is experiencing no pain currently.  She reports a 95% SANE score.  Overall she is happy with her progress up to this point.      Past Medical History:   Diagnosis Date    Chronic leukopenia     Hypercalcemia     Hyperlipidemia     Hypertension     labile    Hypertension     Migraines     Osteoarthritis     Osteoporosis     Vertigo        Past Surgical History:   Procedure Laterality Date    EYE SURGERY      TOOTH EXTRACTION      TUBAL LIGATION         Social History     Socioeconomic History    Marital status: /Civil Union     Spouse name: Not on file    Number of children: Not on file    Years of education: Not on file    Highest education level: Not on file   Occupational History    Not on file   Tobacco Use    Smoking status: Never    Smokeless tobacco: Never   Vaping Use    Vaping status: Never Used   Substance and Sexual Activity    Alcohol use: No    Drug use: No    Sexual activity: Yes     Partners: Male   Other Topics Concern    Not on file   Social History Narrative    Not on file     Social Determinants of Health     Financial Resource Strain: Low Risk  (4/17/2023)    Overall Financial Resource Strain (CARDIA)     Difficulty of Paying Living Expenses: Not hard at all   Food Insecurity: Not on file   Transportation Needs: No Transportation Needs (4/17/2023)    PRAPARE - Transportation     Lack of Transportation (Medical): No     Lack of Transportation  "(Non-Medical): No   Physical Activity: Not on file   Stress: Not on file   Social Connections: Not on file   Intimate Partner Violence: Not on file   Housing Stability: Not on file       Family History   Problem Relation Age of Onset    Colon cancer Mother 51    Breast cancer Mother 53    Cancer Mother     Heart attack Father     Prostate cancer Father 70    Hypertension Father     Heart attack Sister     No Known Problems Sister     No Known Problems Daughter     No Known Problems Daughter     No Known Problems Maternal Grandmother     No Known Problems Maternal Grandfather     No Known Problems Paternal Grandmother     No Known Problems Paternal Grandfather     Stroke Brother     No Known Problems Son     No Known Problems Maternal Aunt     No Known Problems Maternal Aunt     No Known Problems Maternal Aunt         Patient's Medications   New Prescriptions    No medications on file   Previous Medications    AMLODIPINE (NORVASC) 5 MG TABLET    TAKE ONE TABLET BY MOUTH EVERY DAY    FENOFIBRATE (TRICOR) 145 MG TABLET    TAKE ONE TABLET BY MOUTH EVERY DAY    LOSARTAN (COZAAR) 100 MG TABLET    Take 1 tablet (100 mg total) by mouth daily    MECLIZINE (ANTIVERT) 25 MG TABLET    Take 1 tablet (25 mg total) by mouth 3 (three) times a day as needed for dizziness for up to 10 days    MULTIVITAMIN (THERAGRAN) TABS    Take 1 tablet by mouth daily    NORTRIPTYLINE (PAMELOR) 10 MG CAPSULE    Take 1 capsule (10 mg total) by mouth 3 (three) times a day    TURMERIC CURCUMIN PO    Take by mouth   Modified Medications    No medications on file   Discontinued Medications    No medications on file       Allergies   Allergen Reactions    Iodine - Food Allergy Rash        /91 (BP Location: Left arm, Patient Position: Sitting, Cuff Size: Standard)   Pulse 78   Ht 5' 2\" (1.575 m)   Wt 67.6 kg (149 lb)   LMP  (LMP Unknown)   BMI 27.25 kg/m²      REVIEW OF SYSTEMS:  Constitutional: Negative.    HEENT: Negative.    Respiratory: " Negative.    Skin: Negative.    Neurological: Negative.    Psychiatric/Behavioral: Negative.  Musculoskeletal: Negative except for that mentioned in the HPI.    PHYSICAL EXAM:        L wrist:  Flexion: 90 degrees  Extension: 70 degrees  Pronation: 80 degrees  Supination: 80 degrees    -No point tenderness on exam    Strength:  Wrist flexion: 5/5  Wrist Extension: 5/5  Wrist pronation: 5/5  Wrist supination: 5/5    Pain with resisted wrist extension: negative  Pain with resisted 3rd finger extension: negative  Pain with resisted wrist flexion: negative    Cubital tunnel Tinel's: negative    Radial/median/ulnar nerve intact    <2 sec cap refill    Neck:   Spurling's Maneuver: negative  FROM flexion, extension, rotation, sidebending    Reflexes:   Triceps: symmetric bilaterally  Biceps: symmetric bilaterally  Brachioradialis: symmetric bilaterally      Integumentary: -Within normal limits  Bruising: -Negative  Abrasion: -Negative  Rash -negative  Laceration: -Negative      IMAGING: No imaging reviewed today    1. Flexor carpi ulnaris tendinitis, left            ASSESSMENT AND PLAN:  68 y.o. female demonstrates symptomatic relief with physical therapy for flexor carpi ulnaris of the left wrist.  She may continue with activities of daily living without specific restrictions.  I will see her back on an as-needed basis.    Scribe Attestation      I,:  Vicente Wallace am acting as a scribe while in the presence of the attending physician.:       I,:  Nataliya Camp, DO personally performed the services described in this documentation    as scribed in my presence.:

## 2023-12-27 ENCOUNTER — OFFICE VISIT (OUTPATIENT)
Dept: PHYSICAL THERAPY | Facility: CLINIC | Age: 68
End: 2023-12-27
Payer: COMMERCIAL

## 2023-12-27 DIAGNOSIS — M77.8 FLEXOR CARPI ULNARIS TENDINITIS: Primary | ICD-10-CM

## 2023-12-27 PROCEDURE — 97530 THERAPEUTIC ACTIVITIES: CPT

## 2023-12-27 PROCEDURE — 97110 THERAPEUTIC EXERCISES: CPT

## 2023-12-27 NOTE — PROGRESS NOTES
"Daily Note & Discharge    Today's date: 2023  Patient name: Mayra Echevarria  : 1955  MRN: [id number]  Referring provider: Nataliya Camp DO  Dx:   Encounter Diagnosis     ICD-10-CM    1. Flexor carpi ulnaris tendinitis  M77.8           Start Time: 1225  Stop Time: 1253  Total time in clinic (min): 28 minutes    Subjective: Pt reports she followed up with the ortho and had no tenderness or discomfort upon assessment. Pt feels she is ready for d/c to HEP after she also was able to make food and cookies for [employer] and had no issues.      Objective: See treatment diary below      Assessment: Pt presents to PT for d/c with improvements in ROM, strength, pain levels and overall functional activity tolerance. Pt has returned to activities pain free and is able to manage symptoms on own with HEP. HEP has been provided and reviewed with pt with verbalized understanding. Pt is in agreement with above POC.       Plan:  discharge from physical therapy     Precautions: HTN, depression, osteoporosis      Manuals       STM wrist flexors RE RE RE RE RE RE                                              Neuro Re-Ed             gripper  R 20x R 20X R 20X R 20x GN 20x       web  R 20x R 20X Y 20X Y 20x Y 20x       Gripper digit  R 5x R 5X R 5X R 5x R 5x       Smiles/frown  R 20x R 20X R 20X R 20x R 20x       Tbar     R 20x R 20x       \"Roller\"     5.5# 30x                     Ther Ex             Wrist flex/ext S 10\"x3 ea 10\"X4  10\"x4 10\"x4 10\"x5 ea 10\"x5 ea 10\"x5 e      Shoulder flex     2# 20x  & T 2# 20x ea      Pro./sup.  2# 20x 2# 20x 2# 20x  2# 20x 2# 20x      Pinch/ext 10x ea            Bicep curl N/PRO/SUP  4# 15x ea  N 4# 20x N 20x 5# 1UE 20x 5# 20x 2# BUE      Triceps ext  8# 20x  4# 20x 5# 20x 1UE 5# 20x       Wrist flex/ext  2# 20x 2# 20x 2# 20x  2# 20x 2# 20x      UBE fw/bw    L1 3'/3' L2 3'/3' L2 3'/3' L2 3'/3'      Ther Activity             D/c discussion, HEP, goals, " progress       RE                                                          Modalities

## 2024-01-19 ENCOUNTER — RA CDI HCC (OUTPATIENT)
Dept: OTHER | Facility: HOSPITAL | Age: 69
End: 2024-01-19

## 2024-01-22 DIAGNOSIS — F32.A DEPRESSION, UNSPECIFIED DEPRESSION TYPE: ICD-10-CM

## 2024-01-22 RX ORDER — NORTRIPTYLINE HYDROCHLORIDE 10 MG/1
10 CAPSULE ORAL 3 TIMES DAILY
Qty: 270 CAPSULE | Refills: 1 | Status: SHIPPED | OUTPATIENT
Start: 2024-01-22

## 2024-01-29 ENCOUNTER — OFFICE VISIT (OUTPATIENT)
Dept: FAMILY MEDICINE CLINIC | Facility: CLINIC | Age: 69
End: 2024-01-29
Payer: COMMERCIAL

## 2024-01-29 VITALS
WEIGHT: 151.3 LBS | SYSTOLIC BLOOD PRESSURE: 148 MMHG | OXYGEN SATURATION: 97 % | BODY MASS INDEX: 27.84 KG/M2 | TEMPERATURE: 97.9 F | DIASTOLIC BLOOD PRESSURE: 78 MMHG | HEIGHT: 62 IN | HEART RATE: 75 BPM

## 2024-01-29 DIAGNOSIS — E55.9 VITAMIN D DEFICIENCY: ICD-10-CM

## 2024-01-29 DIAGNOSIS — I10 ESSENTIAL HYPERTENSION: Primary | ICD-10-CM

## 2024-01-29 DIAGNOSIS — E78.00 HYPERCHOLESTEROLEMIA: ICD-10-CM

## 2024-01-29 DIAGNOSIS — M81.0 SENILE OSTEOPOROSIS: ICD-10-CM

## 2024-01-29 PROCEDURE — 3078F DIAST BP <80 MM HG: CPT | Performed by: FAMILY MEDICINE

## 2024-01-29 PROCEDURE — 3077F SYST BP >= 140 MM HG: CPT | Performed by: FAMILY MEDICINE

## 2024-01-29 PROCEDURE — 1160F RVW MEDS BY RX/DR IN RCRD: CPT | Performed by: FAMILY MEDICINE

## 2024-01-29 PROCEDURE — 1159F MED LIST DOCD IN RCRD: CPT | Performed by: FAMILY MEDICINE

## 2024-01-29 PROCEDURE — 99214 OFFICE O/P EST MOD 30 MIN: CPT | Performed by: FAMILY MEDICINE

## 2024-01-31 NOTE — PROGRESS NOTES
Patient ID: Mayra Echevarria is a 68 y.o. female.    HPI: 68 y.o.female presents for follow up hypertension and hypercholesterolemia. Pt denies any dizziness,  chest pain, palpitations, or dypsnea with exertion.      SUBJECTIVE    Family History   Problem Relation Age of Onset    Colon cancer Mother 51    Breast cancer Mother 53    Cancer Mother     Heart attack Father     Prostate cancer Father 70    Hypertension Father     Heart attack Sister     No Known Problems Sister     No Known Problems Daughter     No Known Problems Daughter     No Known Problems Maternal Grandmother     No Known Problems Maternal Grandfather     No Known Problems Paternal Grandmother     No Known Problems Paternal Grandfather     Stroke Brother     No Known Problems Son     No Known Problems Maternal Aunt     No Known Problems Maternal Aunt     No Known Problems Maternal Aunt      Social History     Socioeconomic History    Marital status: /Civil Union     Spouse name: Not on file    Number of children: Not on file    Years of education: Not on file    Highest education level: Not on file   Occupational History    Not on file   Tobacco Use    Smoking status: Never    Smokeless tobacco: Never   Vaping Use    Vaping status: Never Used   Substance and Sexual Activity    Alcohol use: No    Drug use: No    Sexual activity: Yes     Partners: Male   Other Topics Concern    Not on file   Social History Narrative    Not on file     Social Determinants of Health     Financial Resource Strain: Low Risk  (4/17/2023)    Overall Financial Resource Strain (CARDIA)     Difficulty of Paying Living Expenses: Not hard at all   Food Insecurity: Not on file   Transportation Needs: No Transportation Needs (4/17/2023)    PRAPARE - Transportation     Lack of Transportation (Medical): No     Lack of Transportation (Non-Medical): No   Physical Activity: Not on file   Stress: Not on file   Social Connections: Not on file   Intimate Partner Violence: Not on  file   Housing Stability: Not on file     Past Medical History:   Diagnosis Date    Chronic leukopenia     Hypercalcemia     Hyperlipidemia     Hypertension     labile    Hypertension     Migraines     Osteoarthritis     Osteoporosis     Vertigo      Past Surgical History:   Procedure Laterality Date    EYE SURGERY      TOOTH EXTRACTION      TUBAL LIGATION       Allergies   Allergen Reactions    Iodine - Food Allergy Rash       Current Outpatient Medications:     amLODIPine (NORVASC) 5 mg tablet, TAKE ONE TABLET BY MOUTH EVERY DAY, Disp: 30 tablet, Rfl: 5    fenofibrate (TRICOR) 145 mg tablet, TAKE ONE TABLET BY MOUTH EVERY DAY, Disp: 90 tablet, Rfl: 3    losartan (COZAAR) 100 MG tablet, Take 1 tablet (100 mg total) by mouth daily, Disp: 90 tablet, Rfl: 3    multivitamin (THERAGRAN) TABS, Take 1 tablet by mouth daily, Disp: , Rfl:     nortriptyline (PAMELOR) 10 mg capsule, TAKE 1 CAPSULE THREE TIMES A DAY, Disp: 270 capsule, Rfl: 1    TURMERIC CURCUMIN PO, Take by mouth, Disp: , Rfl:     meclizine (ANTIVERT) 25 mg tablet, Take 1 tablet (25 mg total) by mouth 3 (three) times a day as needed for dizziness for up to 10 days (Patient not taking: Reported on 1/29/2024), Disp: 30 tablet, Rfl: 0    Current Facility-Administered Medications:     denosumab (PROLIA) subcutaneous injection 60 mg, 60 mg, Subcutaneous, Once, Mee Centeno PA-C    Review of Systems  Constitutional:     Denies fever, chills ,fatigue ,weakness ,weight loss, weight gain     ENT: Denies earache ,loss of hearing ,nosebleed, nasal discharge,nasal congestion ,sore throat ,hoarseness  Pulmonary: Denies shortness of breath ,cough  ,dyspnea on exertion, orthopnea  ,PND   Cardiovascular:  Denies bradycardia , tachycardia  ,palpations, lower extremity edema leg, claudication  Breast:  Denies new or changing breast lumps ,nipple discharge ,nipple changes  Abdomen:  Denies abdominal pain , anorexia , indigestion, nausea, vomiting, constipation,  "diarrhea  Musculoskeletal: Denies myalgias, arthralgias, joint swelling, joint stiffness , limb pain, limb swelling  Gu: denies dysuria, polyuria  Skin: Denies skin rash, skin lesion, skin wound, itching, dry skin  Neuro: Denies headache, numbness, tingling, confusion, loss of consciousness, dizziness, vertigo  Psychiatric: Denies feelings of depression, suicidal ideation, anxiety, sleep disturbances    OBJECTIVE  /78   Pulse 75   Temp 97.9 °F (36.6 °C)   Ht 5' 2\" (1.575 m)   Wt 68.6 kg (151 lb 4.8 oz)   LMP  (LMP Unknown)   SpO2 97%   BMI 27.67 kg/m²   Constitutional:   NAD, well appearing and well nourished      ENT:   Conjunctiva and lids: no injection, edema, or discharge     Pupils and iris: LOS bilaterally    External inspection of ears and nose: normal without deformities or discharge.      Otoscopic exam: Canals patent without erythema.       Nasal mucosa, septum and turbinates: Normal or edema or discharge         Oropharynx:  Moist mucosa, normal tongue and tonsils without lesions. No erythema        Pulmonary:Respiratory effort normal rate and rhythm, no increased work of breathing. Auscultation of lungs:  Clear bilaterally with no adventitious breath sounds       Cardiovascular: regular rate and rhythm, S1 and S2, no murmur, no edema and/or varicosities of LE      Abdomen: Soft and non-distended     Positive bowel sounds      No heptomegaly or splenomegaly      Gu: no suprapubic tenderness or CVA tenderness, no urethral discharge  Lymphatic:  No anterior or posterior cervical lymphadenopathy         Musculoskeletal:  Gait and station: Normal gait      Digits and nails normal without clubbing or cyanosis       Inspection/palpation of joints, bones, and muscles:  No joint tenderness, swelling, full active and passive range of motion       Skin: Normal skin turgor and no rashes      Neuro:      Normal reflexes     Psych:   alert and oriented to person, place and time     normal mood and " affect       Assessment/Plan:Diagnoses and all orders for this visit:    Essential hypertension  Comments:  Stable on ARB and calcium channel blocker th  Orders:  -     Comprehensive metabolic panel; Future    Hypercholesterolemia  Comments:  Continue Tricor therapy.  Orders:  -     Lipid panel; Future    Vitamin D deficiency  Comments:  Continue with vitamin D supplementation.  Orders:  -     Vitamin D 25 hydroxy; Future    Senile osteoporosis  Comments:  Continue Prolia and calcium replacement therapy.        Reviewed with patient plan to treat with above plan.    Patient instructed to call in 72 hours if not feeling better or if symptoms worse

## 2024-02-02 ENCOUNTER — HOSPITAL ENCOUNTER (OUTPATIENT)
Dept: RADIOLOGY | Age: 69
Discharge: HOME | End: 2024-02-02
Payer: COMMERCIAL

## 2024-02-02 DIAGNOSIS — M81.0 SENILE OSTEOPOROSIS: ICD-10-CM

## 2024-02-02 PROCEDURE — 77080 DXA BONE DENSITY AXIAL: CPT

## 2024-02-10 PROBLEM — Z01.419 ENCOUNTER FOR ANNUAL ROUTINE GYNECOLOGICAL EXAMINATION: Status: RESOLVED | Noted: 2023-12-12 | Resolved: 2024-02-10

## 2024-02-12 DIAGNOSIS — I10 ESSENTIAL HYPERTENSION: ICD-10-CM

## 2024-02-12 RX ORDER — AMLODIPINE BESYLATE 5 MG/1
5 TABLET ORAL DAILY
Qty: 90 TABLET | Refills: 1 | Status: SHIPPED | OUTPATIENT
Start: 2024-02-12

## 2024-03-01 ENCOUNTER — APPOINTMENT (OUTPATIENT)
Dept: LAB | Age: 69
End: 2024-03-01
Payer: COMMERCIAL

## 2024-03-01 DIAGNOSIS — I10 ESSENTIAL HYPERTENSION: ICD-10-CM

## 2024-03-01 DIAGNOSIS — M81.0 SENILE OSTEOPOROSIS: ICD-10-CM

## 2024-03-01 DIAGNOSIS — E55.9 VITAMIN D DEFICIENCY: ICD-10-CM

## 2024-03-01 DIAGNOSIS — E78.00 HYPERCHOLESTEROLEMIA: ICD-10-CM

## 2024-03-01 LAB
25(OH)D3 SERPL-MCNC: 60 NG/ML (ref 30–100)
ALBUMIN SERPL BCP-MCNC: 4.5 G/DL (ref 3.5–5)
ALP SERPL-CCNC: 24 U/L (ref 34–104)
ALT SERPL W P-5'-P-CCNC: 22 U/L (ref 7–52)
ANION GAP SERPL CALCULATED.3IONS-SCNC: 9 MMOL/L
AST SERPL W P-5'-P-CCNC: 32 U/L (ref 13–39)
BILIRUB SERPL-MCNC: 0.45 MG/DL (ref 0.2–1)
BUN SERPL-MCNC: 24 MG/DL (ref 5–25)
CALCIUM SERPL-MCNC: 9.9 MG/DL (ref 8.4–10.2)
CHLORIDE SERPL-SCNC: 105 MMOL/L (ref 96–108)
CHOLEST SERPL-MCNC: 166 MG/DL
CO2 SERPL-SCNC: 28 MMOL/L (ref 21–32)
CREAT SERPL-MCNC: 0.93 MG/DL (ref 0.6–1.3)
GFR SERPL CREATININE-BSD FRML MDRD: 62 ML/MIN/1.73SQ M
GLUCOSE P FAST SERPL-MCNC: 94 MG/DL (ref 65–99)
HDLC SERPL-MCNC: 46 MG/DL
LDLC SERPL CALC-MCNC: 104 MG/DL (ref 0–100)
NONHDLC SERPL-MCNC: 120 MG/DL
POTASSIUM SERPL-SCNC: 4 MMOL/L (ref 3.5–5.3)
PROT SERPL-MCNC: 7.5 G/DL (ref 6.4–8.4)
SODIUM SERPL-SCNC: 142 MMOL/L (ref 135–147)
TRIGL SERPL-MCNC: 82 MG/DL

## 2024-03-01 PROCEDURE — 82306 VITAMIN D 25 HYDROXY: CPT

## 2024-03-01 PROCEDURE — 80053 COMPREHEN METABOLIC PANEL: CPT

## 2024-03-01 PROCEDURE — 36415 COLL VENOUS BLD VENIPUNCTURE: CPT

## 2024-03-01 PROCEDURE — 80061 LIPID PANEL: CPT

## 2024-03-04 ENCOUNTER — OFFICE VISIT (OUTPATIENT)
Dept: FAMILY MEDICINE CLINIC | Facility: CLINIC | Age: 69
End: 2024-03-04
Payer: COMMERCIAL

## 2024-03-04 VITALS
TEMPERATURE: 97.2 F | HEART RATE: 94 BPM | HEIGHT: 62 IN | OXYGEN SATURATION: 98 % | WEIGHT: 150.4 LBS | SYSTOLIC BLOOD PRESSURE: 140 MMHG | DIASTOLIC BLOOD PRESSURE: 82 MMHG | BODY MASS INDEX: 27.68 KG/M2

## 2024-03-04 DIAGNOSIS — R41.3 MEMORY IMPAIRMENT: Primary | ICD-10-CM

## 2024-03-04 PROCEDURE — G2211 COMPLEX E/M VISIT ADD ON: HCPCS | Performed by: FAMILY MEDICINE

## 2024-03-04 PROCEDURE — 99213 OFFICE O/P EST LOW 20 MIN: CPT | Performed by: FAMILY MEDICINE

## 2024-03-04 RX ORDER — ELECTROLYTES/DEXTROSE
SOLUTION, ORAL ORAL
COMMUNITY

## 2024-03-04 RX ORDER — PHENOL 1.4 %
600 AEROSOL, SPRAY (ML) MUCOUS MEMBRANE 2 TIMES DAILY WITH MEALS
COMMUNITY

## 2024-03-05 ENCOUNTER — TELEPHONE (OUTPATIENT)
Age: 69
End: 2024-03-05

## 2024-03-05 NOTE — TELEPHONE ENCOUNTER
Patient calling back because they scheduled her for a consult no a sleep study. Please call back patient . [facility] is requesting for Dr. Melendez nurse to look into this.    Tali Mcleod

## 2024-03-05 NOTE — TELEPHONE ENCOUNTER
Pt stated the information that PCP gave was not enough to reseve an appt.for a sleep study. Please have PCP contact pt and advise what to do next.  Thank you for your help.

## 2024-03-06 ENCOUNTER — TELEPHONE (OUTPATIENT)
Age: 69
End: 2024-03-06

## 2024-03-06 NOTE — TELEPHONE ENCOUNTER
Pt called in regarding her sleep study. The earliest she could be seen was August.  She wanted Dr Melendez know she was scheduled out so far, and  if there was anything that could be done to be seen sooner.   Please advise Thank you

## 2024-03-12 NOTE — PROGRESS NOTES
Patient ID: Mayra Echevarria is a 69 y.o. female.    HPI: 69 y.o.female presents for evaluation of memory impairment.  The patient is concerned over her memory.  Mini-Mental status evaluation was given today and she scored 30 out of 30.  We talked in detail about other factors that can affect short-term recall and the patient is willing to go for sleep study.    SUBJECTIVE    Family History   Problem Relation Age of Onset    Colon cancer Mother 51    Breast cancer Mother 53    Cancer Mother     Heart attack Father     Prostate cancer Father 70    Hypertension Father     Heart attack Sister     No Known Problems Sister     No Known Problems Daughter     No Known Problems Daughter     No Known Problems Maternal Grandmother     No Known Problems Maternal Grandfather     No Known Problems Paternal Grandmother     No Known Problems Paternal Grandfather     Stroke Brother     No Known Problems Son     No Known Problems Maternal Aunt     No Known Problems Maternal Aunt     No Known Problems Maternal Aunt      Social History     Socioeconomic History    Marital status: /Civil Union     Spouse name: Not on file    Number of children: Not on file    Years of education: Not on file    Highest education level: Not on file   Occupational History    Not on file   Tobacco Use    Smoking status: Never     Passive exposure: Never    Smokeless tobacco: Never   Vaping Use    Vaping status: Never Used   Substance and Sexual Activity    Alcohol use: No    Drug use: No    Sexual activity: Yes     Partners: Male   Other Topics Concern    Not on file   Social History Narrative    Not on file     Social Determinants of Health     Financial Resource Strain: Low Risk  (4/17/2023)    Overall Financial Resource Strain (CARDIA)     Difficulty of Paying Living Expenses: Not hard at all   Food Insecurity: Not on file   Transportation Needs: No Transportation Needs (4/17/2023)    PRAPARE - Transportation     Lack of Transportation (Medical):  No     Lack of Transportation (Non-Medical): No   Physical Activity: Not on file   Stress: Not on file   Social Connections: Not on file   Intimate Partner Violence: Not on file   Housing Stability: Not on file     Past Medical History:   Diagnosis Date    Chronic leukopenia     Hypercalcemia     Hyperlipidemia     Hypertension     labile    Hypertension     Migraines     Osteoarthritis     Osteoporosis     Vertigo      Past Surgical History:   Procedure Laterality Date    EYE SURGERY      TOOTH EXTRACTION      TUBAL LIGATION       Allergies   Allergen Reactions    Iodine - Food Allergy Rash       Current Outpatient Medications:     amLODIPine (NORVASC) 5 mg tablet, TAKE 1 TABLET DAILY, Disp: 90 tablet, Rfl: 1    calcium carbonate (Calcium 600) 600 MG tablet, Take 600 mg by mouth 2 (two) times a day with meals, Disp: , Rfl:     fenofibrate (TRICOR) 145 mg tablet, TAKE ONE TABLET BY MOUTH EVERY DAY, Disp: 90 tablet, Rfl: 3    Lactobacillus (Acidophilus Probiotic) CAPS, Take by mouth, Disp: , Rfl:     losartan (COZAAR) 100 MG tablet, Take 1 tablet (100 mg total) by mouth daily, Disp: 90 tablet, Rfl: 3    multivitamin (THERAGRAN) TABS, Take 1 tablet by mouth daily, Disp: , Rfl:     nortriptyline (PAMELOR) 10 mg capsule, TAKE 1 CAPSULE THREE TIMES A DAY, Disp: 270 capsule, Rfl: 1    meclizine (ANTIVERT) 25 mg tablet, Take 1 tablet (25 mg total) by mouth 3 (three) times a day as needed for dizziness for up to 10 days (Patient not taking: Reported on 1/29/2024), Disp: 30 tablet, Rfl: 0    TURMERIC CURCUMIN PO, Take by mouth (Patient not taking: Reported on 3/4/2024), Disp: , Rfl:     Current Facility-Administered Medications:     denosumab (PROLIA) subcutaneous injection 60 mg, 60 mg, Subcutaneous, Once, Mee Centeno PA-C    Review of Systems  Constitutional:     Denies fever, chills ,fatigue ,weakness ,weight loss, weight gain     ENT: Denies earache ,loss of hearing ,nosebleed, nasal discharge,nasal congestion ,sore  "throat ,hoarseness  Pulmonary: Denies shortness of breath ,cough  ,dyspnea on exertion, orthopnea  ,PND   Cardiovascular:  Denies bradycardia , tachycardia  ,palpations, lower extremity edema leg, claudication  Breast:  Denies new or changing breast lumps ,nipple discharge ,nipple changes  Abdomen:  Denies abdominal pain , anorexia , indigestion, nausea, vomiting, constipation, diarrhea  Musculoskeletal: Denies myalgias, arthralgias, joint swelling, joint stiffness , limb pain, limb swelling  Gu: denies dysuria, polyuria  Skin: Denies skin rash, skin lesion, skin wound, itching, dry skin  Neuro: Denies headache, numbness, tingling, confusion, loss of consciousness, dizziness, vertigo positive short-term memory recall issues  Psychiatric: Denies feelings of depression, suicidal ideation, anxiety, sleep disturbances    OBJECTIVE  /82 (BP Location: Left arm, Patient Position: Sitting, Cuff Size: Large)   Pulse 94   Temp (!) 97.2 °F (36.2 °C)   Ht 5' 2\" (1.575 m)   Wt 68.2 kg (150 lb 6.4 oz)   LMP  (LMP Unknown)   SpO2 98%   BMI 27.51 kg/m²   Constitutional:   NAD, well appearing and well nourished      ENT:   Conjunctiva and lids: no injection, edema, or discharge     Pupils and iris: LOS bilaterally    External inspection of ears and nose: normal without deformities or discharge.      Otoscopic exam: Canals patent without erythema.       Nasal mucosa, septum and turbinates: Normal or edema or discharge         Oropharynx:  Moist mucosa, normal tongue and tonsils without lesions. No erythema        Pulmonary:Respiratory effort normal rate and rhythm, no increased work of breathing. Auscultation of lungs:  Clear bilaterally with no adventitious breath sounds       Cardiovascular: regular rate and rhythm, S1 and S2, no murmur, no edema and/or varicosities of LE      Abdomen: Soft and non-distended     Positive bowel sounds      No heptomegaly or splenomegaly      Gu: no suprapubic tenderness or CVA " tenderness, no urethral discharge  Lymphatic:  No anterior or posterior cervical lymphadenopathy         Musculoskeletal:  Gait and station: Normal gait      Digits and nails normal without clubbing or cyanosis       Inspection/palpation of joints, bones, and muscles:  No joint tenderness, swelling, full active and passive range of motion       Skin: Normal skin turgor and no rashes      Neuro:      Normal reflexes     Psych:   alert and oriented to person, place and time     normal mood and affect       Assessment/Plan:Diagnoses and all orders for this visit:    Memory impairment  -     Ambulatory Referral to Sleep Medicine; Future    Other orders  -     Lactobacillus (Acidophilus Probiotic) CAPS; Take by mouth  -     calcium carbonate (Calcium 600) 600 MG tablet; Take 600 mg by mouth 2 (two) times a day with meals        Reviewed with patient plan to treat with above plan.    Patient instructed to call in 72 hours if not feeling better or if symptoms worsen

## 2024-03-20 ENCOUNTER — TELEPHONE (OUTPATIENT)
Age: 69
End: 2024-03-20

## 2024-03-20 ENCOUNTER — OFFICE VISIT (OUTPATIENT)
Age: 69
End: 2024-03-20
Payer: COMMERCIAL

## 2024-03-20 VITALS
HEART RATE: 87 BPM | OXYGEN SATURATION: 97 % | DIASTOLIC BLOOD PRESSURE: 62 MMHG | HEIGHT: 62 IN | TEMPERATURE: 97.5 F | SYSTOLIC BLOOD PRESSURE: 124 MMHG | BODY MASS INDEX: 27.68 KG/M2 | WEIGHT: 150.4 LBS

## 2024-03-20 DIAGNOSIS — M81.0 SENILE OSTEOPOROSIS: Primary | ICD-10-CM

## 2024-03-20 DIAGNOSIS — M15.0 PRIMARY GENERALIZED (OSTEO)ARTHRITIS: ICD-10-CM

## 2024-03-20 PROCEDURE — 99214 OFFICE O/P EST MOD 30 MIN: CPT | Performed by: PHYSICIAN ASSISTANT

## 2024-03-20 NOTE — PROGRESS NOTES
Assessment/Plan:    Senile osteoporosis  Osteoporosis is overall stable with Prolia.  She is due for dose #3 today however we are awaiting a new insurance authorization and will administer her dose once approved.  She is up-to-date with her DEXA scan and will be due for an updated DEXA in February 2026.  Bone density every 2 years.  Labs as ordered to monitor for medication side effects and toxicity.  Follow-up in 6 months or sooner if needed.    Primary generalized (osteo)arthritis  Generalized osteoarthritis symptoms are stable.  Encouraged home exercise program.       Diagnoses and all orders for this visit:    Senile osteoporosis  -     Basic metabolic panel; Future  -     Discontinue: denosumab (PROLIA) subcutaneous injection 60 mg    Primary generalized (osteo)arthritis          Subjective:      Patient ID: Mayra Echevarria is a 69 y.o. female.    She is here for follow-up of her osteoporosis.  She has a history of a low trauma rib fracture as well.    She is on Prolia and is due for dose #3 today.  She had a DEXA scan done on 2/2/2024.  Lumbar spine T-score -0.8.  This has increased 7.7% as compared to her previous scan.  Left total hip T-score -2.0.  This has decreased 6.8% as compared to her previous scan.  Left femoral neck T-score -2.7.  Of note, when reviewing images it does appear as though she may have been lined up differently making a hip comparison difficult.    She has a history of osteoarthritis.  She is feeling well.  She has minimal stiffness in the morning.  She has no swelling in her joints.     She had labs done on 3/1/2024.  Creatinine 0.93, GFR 62.  Vitamin D 60.      The following portions of the patient's history were reviewed and updated as appropriate: allergies, current medications, past family history, past medical history, past social history, past surgical history, and problem list.    Review of Systems   Constitutional:  Negative for chills, fatigue and fever.   HENT:  Negative for  "hearing loss, sore throat and tinnitus.    Eyes:  Negative for pain and visual disturbance.   Respiratory:  Negative for cough and shortness of breath.    Cardiovascular:  Negative for chest pain and palpitations.   Gastrointestinal:  Negative for abdominal pain, nausea and vomiting.   Genitourinary:  Negative for difficulty urinating.   Musculoskeletal:  Positive for arthralgias. Negative for back pain, gait problem, joint swelling, myalgias, neck pain and neck stiffness.   Skin:  Negative for rash.   Neurological:  Negative for dizziness, seizures, weakness, numbness and headaches.   Psychiatric/Behavioral:  Negative for confusion, decreased concentration and sleep disturbance.          Objective:      /62 (BP Location: Right arm, Patient Position: Sitting, Cuff Size: Adult)   Pulse 87   Temp 97.5 °F (36.4 °C) (Temporal)   Ht 5' 2\" (1.575 m)   Wt 68.2 kg (150 lb 6.4 oz)   LMP  (LMP Unknown)   SpO2 97%   BMI 27.51 kg/m²          Physical Exam  Constitutional:       Appearance: Normal appearance.   Cardiovascular:      Rate and Rhythm: Normal rate and regular rhythm.   Pulmonary:      Breath sounds: Normal breath sounds.   Musculoskeletal:      Comments: Neck decreased lateral flexion.  Shoulders, elbows, and wrists full range of motion.  Tinel's negative bilaterally.  Hands squaring 1st carpometacarpal joints bilaterally with early Heberden's nodes.  No synovitis noted.  Straight leg raising negative.  Hips full range of motion.  Knees full range of motion with patellofemoral crepitus.  Ankles full range of motion.  Feet rearfoot hyperpronation with hallux valgus deformities and hammertoe deformities.  Varicosities lower extremities with spider veins.  No edema appreciated.    Skin:     General: Skin is warm and dry.   Neurological:      General: No focal deficit present.      Mental Status: She is alert.         Dragon Dictation software was used to dictate this note. It may contain errors with " dictating incorrect words/spelling. Please contact provider directly for any questions.

## 2024-03-20 NOTE — ASSESSMENT & PLAN NOTE
Osteoporosis is overall stable with Prolia.  She is due for dose #3 today however we are awaiting a new insurance authorization and will administer her dose once approved.  She is up-to-date with her DEXA scan and will be due for an updated DEXA in February 2026.  Bone density every 2 years.  Labs as ordered to monitor for medication side effects and toxicity.  Follow-up in 6 months or sooner if needed.

## 2024-03-20 NOTE — TELEPHONE ENCOUNTER
Patient called asking if there had been any update regarding her Prolia Prior Authorization and getting scheduled. I called and spoke with Mallory and was advised the authorization is still in progress and we should hopefully have a response by tomorrow. She will wait to hear back from our office for an update and scheduling. Thank you

## 2024-03-24 DIAGNOSIS — H69.93 EUSTACHIAN TUBE DYSFUNCTION, BILATERAL: ICD-10-CM

## 2024-03-24 RX ORDER — MECLIZINE HYDROCHLORIDE 25 MG/1
25 TABLET ORAL 3 TIMES DAILY PRN
Qty: 30 TABLET | Refills: 0 | Status: SHIPPED | OUTPATIENT
Start: 2024-03-24 | End: 2024-04-03

## 2024-03-25 ENCOUNTER — NURSE TRIAGE (OUTPATIENT)
Age: 69
End: 2024-03-25

## 2024-03-25 NOTE — TELEPHONE ENCOUNTER
"Incoming call:    Re: Pt of Dr. Shelton     Pt calling in with dizziness. She is able to walk, it becomes worse with sudden head movements. Meclizine typically helps the pt. She did call in for a refill. She did call PCP and left message for them to call back.     Dispo:  Pt has an appt for a Prolia injection today, however, she will need to reschedule. Advised pt to call PCP now to be seen in their office, or to to [facility] if unable to be seen today.     Please advise pt when to reschedule prolia:  [phone]    Reason for Disposition   MILD dizziness (e.g., walking normally) AND has NOT been evaluated by physician for this (Exception: dizziness caused by heat exposure, sudden standing, or poor fluid intake)    Answer Assessment - Initial Assessment Questions  1. DESCRIPTION: \"Describe your dizziness.\"      Since Saturday morning. Has happened before, and took meclizine.   2. LIGHTHEADED: \"Do you feel lightheaded?\" (e.g., somewhat faint, woozy, weak upon standing)      Dizzy with any movement (head spins)  3. VERTIGO: \"Do you feel like either you or the room is spinning or tilting?\" (i.e. vertigo)      No  4. SEVERITY: \"How bad is it?\"  \"Do you feel like you are going to faint?\" \"Can you stand and walk?\"    - MILD: Feels slightly dizzy, but walking normally.    - MODERATE: Feels very unsteady when walking, but not falling; interferes with normal activities (e.g., school, work) .    - SEVERE: Unable to walk without falling, or requires assistance to walk without falling; feels like passing out now.       Mild, was able to walk down steps.   5. ONSET:  \"When did the dizziness begin?\"      Saturday   6. AGGRAVATING FACTORS: \"Does anything make it worse?\" (e.g., standing, change in head position)      Sudden head movements   7. HEART RATE: \"Can you tell me your heart rate?\" \"How many beats in 15 seconds?\"  (Note: not all patients can do this)        Feels normal per pt   8. CAUSE: \"What do you think is causing the " "dizziness?\"      Pt has some ear issues - hasn't had an episode in a couple years   9. RECURRENT SYMPTOM: \"Have you had dizziness before?\" If Yes, ask: \"When was the last time?\" \"What happened that time?\"      Yes, 2 years ago  10. OTHER SYMPTOMS: \"Do you have any other symptoms?\" (e.g., fever, chest pain, vomiting, diarrhea, bleeding)        No other symptoms    Protocols used: Dizziness-ADULT-[place]    "

## 2024-03-25 NOTE — TELEPHONE ENCOUNTER
Patient wanted to make sure that they were going to receive a call back to reschedule their Prolia shot

## 2024-03-25 NOTE — TELEPHONE ENCOUNTER
Patient's Prolia injection scheduled for 3-27-24.  She will see Dr. Melendez on 3-26-24 to discuss dizziness, etc.

## 2024-03-26 ENCOUNTER — OFFICE VISIT (OUTPATIENT)
Dept: FAMILY MEDICINE CLINIC | Facility: CLINIC | Age: 69
End: 2024-03-26
Payer: COMMERCIAL

## 2024-03-26 ENCOUNTER — TELEPHONE (OUTPATIENT)
Dept: FAMILY MEDICINE CLINIC | Facility: CLINIC | Age: 69
End: 2024-03-26

## 2024-03-26 VITALS
HEIGHT: 62 IN | BODY MASS INDEX: 27.57 KG/M2 | SYSTOLIC BLOOD PRESSURE: 130 MMHG | DIASTOLIC BLOOD PRESSURE: 80 MMHG | HEART RATE: 82 BPM | WEIGHT: 149.8 LBS | TEMPERATURE: 97.2 F | OXYGEN SATURATION: 98 %

## 2024-03-26 DIAGNOSIS — H69.93 EUSTACHIAN TUBE DYSFUNCTION, BILATERAL: Primary | ICD-10-CM

## 2024-03-26 PROCEDURE — G2211 COMPLEX E/M VISIT ADD ON: HCPCS | Performed by: FAMILY MEDICINE

## 2024-03-26 PROCEDURE — 99213 OFFICE O/P EST LOW 20 MIN: CPT | Performed by: FAMILY MEDICINE

## 2024-03-26 RX ORDER — PREDNISONE 10 MG/1
TABLET ORAL
Qty: 26 TABLET | Refills: 0 | Status: SHIPPED | OUTPATIENT
Start: 2024-03-26

## 2024-03-26 NOTE — TELEPHONE ENCOUNTER
Pt called regarding Prolia shot.   She is inquiring about when she should reschedule it for.   Please advise

## 2024-03-26 NOTE — TELEPHONE ENCOUNTER
Patient called in stating that she needs to r/s her Prolia shot appt because her PCP thinks she should wait one week to have this done due to the ear infection and dizziness. Please advise.

## 2024-03-26 NOTE — TELEPHONE ENCOUNTER
Pt was calling to confirm when she can have her prolia shot rescheduled, advised per Dr. Melendez's message in a week.  She also wanted to see if the medication was called in.  LOUISA

## 2024-04-03 ENCOUNTER — TELEPHONE (OUTPATIENT)
Age: 69
End: 2024-04-03

## 2024-04-03 ENCOUNTER — CLINICAL SUPPORT (OUTPATIENT)
Age: 69
End: 2024-04-03
Payer: COMMERCIAL

## 2024-04-03 VITALS
BODY MASS INDEX: 27.07 KG/M2 | TEMPERATURE: 97.2 F | OXYGEN SATURATION: 97 % | DIASTOLIC BLOOD PRESSURE: 78 MMHG | HEART RATE: 80 BPM | SYSTOLIC BLOOD PRESSURE: 144 MMHG | WEIGHT: 148 LBS

## 2024-04-03 DIAGNOSIS — M81.0 SENILE OSTEOPOROSIS: Primary | ICD-10-CM

## 2024-04-03 PROCEDURE — 96372 THER/PROPH/DIAG INJ SC/IM: CPT

## 2024-04-03 NOTE — PROGRESS NOTES
Assessment/Plan:    Mayra Echevarria came into the [facility]umat[facility] Office today 04/03/24 to receive Prolia injection.      Verbal consent obtained.  Consent given by: patient    patient states patient has been medically healthy with no underlining concerns/complications.      Mayra Echevarria presents with no symptoms today.       All insturctions were reviewed with the patient.    If the patient should have any questions/concerns, advised patient to contacted [facility].       Subjective:     History provided by: patient    Patient ID: Mayra Echevarria is a 69 y.o. female      Objective:    Vitals:    04/03/24 0910   BP: 144/78   BP Location: Right arm   Patient Position: Sitting   Cuff Size: Adult   Pulse: 80   Temp: (!) 97.2 °F (36.2 °C)   TempSrc: Temporal   SpO2: 97%   Weight: 67.1 kg (148 lb)       Patient tolerated the injection well without any complications.  Injection site/s upper left arm .  Medication was provided by office.    Patient signed consent form yes   Patient signed ABN form no (If no patient is not a medicare patient).   Patient waited 15 minutes after injection no (This only applies to patient's receiving first time injection).       Last Visit: 3/20/2024  Next visit:Visit date not found

## 2024-04-08 NOTE — PROGRESS NOTES
Patient ID: Mayra Echevarria is a 69 y.o. female.    HPI: 69 y.o.female presenting with symptoms of ear pressure, crackling of ears and dizziness associated with positional changes.      SUBJECTIVE    Family History   Problem Relation Age of Onset    Colon cancer Mother 51    Breast cancer Mother 53    Cancer Mother         two primary sites  breast and colon    Heart attack Father     Prostate cancer Father 70    Hypertension Father     Heart attack Sister     No Known Problems Sister     No Known Problems Daughter     No Known Problems Daughter     No Known Problems Maternal Grandmother     No Known Problems Maternal Grandfather     No Known Problems Paternal Grandmother     No Known Problems Paternal Grandfather     Stroke Brother     No Known Problems Son     No Known Problems Maternal Aunt     No Known Problems Maternal Aunt     No Known Problems Maternal Aunt      Social History     Socioeconomic History    Marital status: /Civil Union     Spouse name: Not on file    Number of children: Not on file    Years of education: Not on file    Highest education level: Not on file   Occupational History    Not on file   Tobacco Use    Smoking status: Never     Passive exposure: Never    Smokeless tobacco: Never   Vaping Use    Vaping status: Never Used   Substance and Sexual Activity    Alcohol use: No    Drug use: No    Sexual activity: Yes     Partners: Male     Birth control/protection: Female Sterilization   Other Topics Concern    Not on file   Social History Narrative    Not on file     Social Determinants of Health     Financial Resource Strain: Low Risk  (4/17/2023)    Overall Financial Resource Strain (CARDIA)     Difficulty of Paying Living Expenses: Not hard at all   Food Insecurity: Not on file   Transportation Needs: No Transportation Needs (4/17/2023)    PRAPARE - Transportation     Lack of Transportation (Medical): No     Lack of Transportation (Non-Medical): No   Physical Activity: Not on file    Stress: Not on file   Social Connections: Not on file   Intimate Partner Violence: Not on file   Housing Stability: Not on file     Past Medical History:   Diagnosis Date    Allergic years ago    Iodine    Chronic leukopenia     Hypercalcemia     Hyperlipidemia     Hypertension     labile    Hypertension     Migraines     Osteoarthritis     Osteoporosis     Vertigo      Past Surgical History:   Procedure Laterality Date    EYE SURGERY      TOOTH EXTRACTION      TUBAL LIGATION       Allergies   Allergen Reactions    Iodine - Food Allergy Rash       Current Outpatient Medications:     amLODIPine (NORVASC) 5 mg tablet, TAKE 1 TABLET DAILY, Disp: 90 tablet, Rfl: 1    calcium carbonate (Calcium 600) 600 MG tablet, Take 600 mg by mouth 2 (two) times a day with meals, Disp: , Rfl:     fenofibrate (TRICOR) 145 mg tablet, TAKE ONE TABLET BY MOUTH EVERY DAY, Disp: 90 tablet, Rfl: 3    Lactobacillus (Acidophilus Probiotic) CAPS, Take by mouth, Disp: , Rfl:     losartan (COZAAR) 100 MG tablet, Take 1 tablet (100 mg total) by mouth daily, Disp: 90 tablet, Rfl: 3    meclizine (ANTIVERT) 25 mg tablet, Take 1 tablet (25 mg total) by mouth 3 (three) times a day as needed for dizziness for up to 10 days, Disp: 30 tablet, Rfl: 0    multivitamin (THERAGRAN) TABS, Take 1 tablet by mouth daily, Disp: , Rfl:     nortriptyline (PAMELOR) 10 mg capsule, TAKE 1 CAPSULE THREE TIMES A DAY, Disp: 270 capsule, Rfl: 1    predniSONE 10 mg tablet, 3 tabs po bid x2 days, then 2 tabs po bid x2 days, then 1 tab bid x2 days, then 1 daily until done., Disp: 26 tablet, Rfl: 0    TURMERIC CURCUMIN PO, Take by mouth (Patient not taking: Reported on 3/4/2024), Disp: , Rfl:     Current Facility-Administered Medications:     denosumab (PROLIA) subcutaneous injection 60 mg, 60 mg, Subcutaneous, Once, Mee Centeno PA-C    denosumab (PROLIA) subcutaneous injection 60 mg, 60 mg, Subcutaneous, Q6 Months, , 60 mg at 04/03/24 0930    Review of  "Systems  Constitutional:     Denies fever, chills, fatigue, weakness ,weight loss, weight gain       ENT: Denies earache, loss of hearing, nosebleed, nasal discharge,nasal congestion, sore throat,hoarseness, but complains of ear pressure,and crackling    Pulmonary: Denies shortness of breath ,cough , dyspnea on exertionon, orthopnea , PND   Cardiovascular:  Denies bradycardia , tachycardia ,palpations, lower extremity, edema leg, claudication  Breast:  Denies new or changing breast lumps,  nipple discharge, nipple changes,  Abdomen:  Denies abdominal pain , anorexia ,indigestion, nausea ,vomiting, constipation , diarrhea  Musculoskeletal: Denies myalgias, arthralgias, joint swelling, joint stiffness ,limb pain, limb swelling  Lymph:denies swollen glands  Gu: no dysuria or urinary frequency  Skin: Denies skin rash, skin lesion, skin wound, itching,dry skin  Neuro: Denies headache, numbness, tingling, confusion, loss of consciousness, but complains of postitional vertigo  Psychiatric: Denies feelings of depression, suicidal ideation, anxiety, sleep disturbances    OBJECTIVE  /80 (BP Location: Left arm, Patient Position: Sitting, Cuff Size: Large)   Pulse 82   Temp (!) 97.2 °F (36.2 °C)   Ht 5' 2\" (1.575 m)   Wt 67.9 kg (149 lb 12.8 oz)   LMP  (LMP Unknown)   SpO2 98%   BMI 27.40 kg/m²   Constitutional:   NAD, well appearing and well nourished      ENT:   Conjunctiva and lids: no injection, edema, or discharge     Pupils and iris: LOS bilaterally    External inspection of ears and nose: normal without deformities or discharge.      Otoscopic exam: Canals patent with tm dull, no erythema,but large effusions noted bilaterally  ENasal mucosa, septum and turbinates: Turbinae injection with discharge   Oropharynx:  Moist mucosa, normal tongue and tonsils without lesions.Erythema and injection  of post pharynx with pnd      Pulmonary:Respiratory effort normal rate and rhythm, no increased work of breathing. " Auscultation of lungs:  Clear bilaterally with no adventitious breath sounds       Cardiovascular: regular rate and rhythm, S1 and S2, no murmur, no edema and/or varicosities of LE      Abdomen: Soft and non-distended     Positive bowel sounds      No heptomegaly or splenomegaly      Lymphatic: Anterior and posterior cervical lymphadenopathy         Muscskeletal:  Gait and station: Normal gait      Digits and nails normal without clubbing or cyanosis       Inspection/palpation of joints, bones, and muscles:  No joint tenderness, swelling, full active and passive range of motion       Gu: no suprabubic tenderness, CVA tenderness or urethral discharge  Skin: Normal skin turgor and no rashes      Neuro:     Normal reflexes    Psych:   alert and oriented to person, place and time     normal mood and affect       Assessment/Plan:Diagnoses and all orders for this visit:    Eustachian tube dysfunction, bilateral  -     predniSONE 10 mg tablet; 3 tabs po bid x2 days, then 2 tabs po bid x2 days, then 1 tab bid x2 days, then 1 daily until done.        Reviewed with patient plan to treat with above plan.    Patient instructed to call in 72 hours if not feeling better or if symptoms worsen

## 2024-04-29 ENCOUNTER — TELEPHONE (OUTPATIENT)
Dept: RHEUMATOLOGY | Facility: CLINIC | Age: 69
End: 2024-04-29

## 2024-06-20 ENCOUNTER — VBI (OUTPATIENT)
Dept: ADMINISTRATIVE | Facility: OTHER | Age: 69
End: 2024-06-20

## 2024-06-20 NOTE — TELEPHONE ENCOUNTER
06/20/24 9:33 AM     Chart reviewed for CRC: Colonoscopy was/were not submitted to the patient's insurance.     Suzanne Stanley   [facility]

## 2024-07-15 DIAGNOSIS — I10 ESSENTIAL HYPERTENSION: ICD-10-CM

## 2024-07-15 RX ORDER — LOSARTAN POTASSIUM 100 MG/1
100 TABLET ORAL DAILY
Qty: 100 TABLET | Refills: 1 | Status: SHIPPED | OUTPATIENT
Start: 2024-07-15

## 2024-07-22 DIAGNOSIS — F32.A DEPRESSION, UNSPECIFIED DEPRESSION TYPE: ICD-10-CM

## 2024-07-22 RX ORDER — NORTRIPTYLINE HYDROCHLORIDE 10 MG/1
10 CAPSULE ORAL 3 TIMES DAILY
Qty: 270 CAPSULE | Refills: 1 | Status: SHIPPED | OUTPATIENT
Start: 2024-07-22

## 2024-07-23 ENCOUNTER — RA CDI HCC (OUTPATIENT)
Dept: OTHER | Facility: HOSPITAL | Age: 69
End: 2024-07-23

## 2024-08-02 ENCOUNTER — OFFICE VISIT (OUTPATIENT)
Dept: FAMILY MEDICINE CLINIC | Facility: CLINIC | Age: 69
End: 2024-08-02
Payer: COMMERCIAL

## 2024-08-02 VITALS
DIASTOLIC BLOOD PRESSURE: 72 MMHG | OXYGEN SATURATION: 99 % | SYSTOLIC BLOOD PRESSURE: 138 MMHG | TEMPERATURE: 97.2 F | HEIGHT: 62 IN | HEART RATE: 87 BPM | BODY MASS INDEX: 27.2 KG/M2 | WEIGHT: 147.8 LBS

## 2024-08-02 DIAGNOSIS — I10 ESSENTIAL HYPERTENSION: ICD-10-CM

## 2024-08-02 DIAGNOSIS — E78.00 HYPERCHOLESTEROLEMIA: ICD-10-CM

## 2024-08-02 DIAGNOSIS — Z00.00 MEDICARE ANNUAL WELLNESS VISIT, SUBSEQUENT: Primary | ICD-10-CM

## 2024-08-02 PROCEDURE — 99213 OFFICE O/P EST LOW 20 MIN: CPT | Performed by: FAMILY MEDICINE

## 2024-08-02 PROCEDURE — G0439 PPPS, SUBSEQ VISIT: HCPCS | Performed by: FAMILY MEDICINE

## 2024-08-02 NOTE — PATIENT INSTRUCTIONS
Medicare Preventive Visit Patient Instructions  Thank you for completing your Welcome to Medicare Visit or Medicare Annual Wellness Visit today. Your next wellness visit will be due in one year (8/3/2025).  The screening/preventive services that you may require over the next 5-10 years are detailed below. Some tests may not apply to you based off risk factors and/or age. Screening tests ordered at today's visit but not completed yet may show as past due. Also, please note that scanned in results may not display below.  Preventive Screenings:  Service Recommendations Previous Testing/Comments   Colorectal Cancer Screening  * Colonoscopy    * Fecal Occult Blood Test (FOBT)/Fecal Immunochemical Test (FIT)  * Fecal DNA/Cologuard Test  * Flexible Sigmoidoscopy Age: 45-75 years old   Colonoscopy: every 10 years (may be performed more frequently if at higher risk)  OR  FOBT/FIT: every 1 year  OR  Cologuard: every 3 years  OR  Sigmoidoscopy: every 5 years  Screening may be recommended earlier than age 45 if at higher risk for colorectal cancer. Also, an individualized decision between you and your healthcare provider will decide whether screening between the ages of 76-85 would be appropriate. Colonoscopy: 07/12/2019  FOBT/FIT: Not on file  Cologuard: Not on file  Sigmoidoscopy: Not on file          Breast Cancer Screening Age: 40+ years old  Frequency: every 1-2 years  Not required if history of left and right mastectomy Mammogram: 09/23/2023        Cervical Cancer Screening Between the ages of 21-29, pap smear recommended once every 3 years.   Between the ages of 30-65, can perform pap smear with HPV co-testing every 5 years.   Recommendations may differ for women with a history of total hysterectomy, cervical cancer, or abnormal pap smears in past. Pap Smear: 12/13/2023        Hepatitis C Screening Once for adults born between 1945 and 1965  More frequently in patients at high risk for Hepatitis C Hep C Antibody: Not on  expressive aphasia file        Diabetes Screening 1-2 times per year if you're at risk for diabetes or have pre-diabetes Fasting glucose: 94 mg/dL (3/1/2024)  A1C: No results in last 5 years (No results in last 5 years)      Cholesterol Screening Once every 5 years if you don't have a lipid disorder. May order more often based on risk factors. Lipid panel: 03/01/2024          Other Preventive Screenings Covered by Medicare:  Abdominal Aortic Aneurysm (AAA) Screening: covered once if your at risk. You're considered to be at risk if you have a family history of AAA.  Lung Cancer Screening: covers low dose CT scan once per year if you meet all of the following conditions: (1) Age 55-77; (2) No signs or symptoms of lung cancer; (3) Current smoker or have quit smoking within the last 15 years; (4) You have a tobacco smoking history of at least 20 pack years (packs per day multiplied by number of years you smoked); (5) You get a written order from a healthcare provider.  Glaucoma Screening: covered annually if you're considered high risk: (1) You have diabetes OR (2) Family history of glaucoma OR (3)  aged 50 and older OR (4)  American aged 65 and older  Osteoporosis Screening: covered every 2 years if you meet one of the following conditions: (1) You're estrogen deficient and at risk for osteoporosis based off medical history and other findings; (2) Have a vertebral abnormality; (3) On glucocorticoid therapy for more than 3 months; (4) Have primary hyperparathyroidism; (5) On osteoporosis medications and need to assess response to drug therapy.   Last bone density test (DXA Scan): 02/02/2024.  HIV Screening: covered annually if you're between the age of 15-65. Also covered annually if you are younger than 15 and older than 65 with risk factors for HIV infection. For pregnant patients, it is covered up to 3 times per pregnancy.    Immunizations:  Immunization Recommendations   Influenza Vaccine Annual influenza  vaccination during flu season is recommended for all persons aged >= 6 months who do not have contraindications   Pneumococcal Vaccine   * Pneumococcal conjugate vaccine = PCV13 (Prevnar 13), PCV15 (Vaxneuvance), PCV20 (Prevnar 20)  * Pneumococcal polysaccharide vaccine = PPSV23 (Pneumovax) Adults 19-63 yo with certain risk factors or if 65+ yo  If never received any pneumonia vaccine: recommend Prevnar 20 (PCV20)  Give PCV20 if previously received 1 dose of PCV13 or PPSV23   Hepatitis B Vaccine 3 dose series if at intermediate or high risk (ex: diabetes, end stage renal disease, liver disease)   Respiratory syncytial virus (RSV) Vaccine - COVERED BY MEDICARE PART D  * RSVPreF3 (Arexvy) CDC recommends that adults 60 years of age and older may receive a single dose of RSV vaccine using shared clinical decision-making (SCDM)   Tetanus (Td) Vaccine - COST NOT COVERED BY MEDICARE PART B Following completion of primary series, a booster dose should be given every 10 years to maintain immunity against tetanus. Td may also be given as tetanus wound prophylaxis.   Tdap Vaccine - COST NOT COVERED BY MEDICARE PART B Recommended at least once for all adults. For pregnant patients, recommended with each pregnancy.   Shingles Vaccine (Shingrix) - COST NOT COVERED BY MEDICARE PART B  2 shot series recommended in those 19 years and older who have or will have weakened immune systems or those 50 years and older     Health Maintenance Due:      Topic Date Due   • Hepatitis C Screening  Never done   • Colorectal Cancer Screening  07/12/2024   • Breast Cancer Screening: Mammogram  09/23/2024   • DXA SCAN  02/02/2029     Immunizations Due:      Topic Date Due   • COVID-19 Vaccine (4 - 2023-24 season) 09/01/2023   • Influenza Vaccine (1) 09/01/2024     Advance Directives   What are advance directives?  Advance directives are legal documents that state your wishes and plans for medical care. These plans are made ahead of time in case you  lose your ability to make decisions for yourself. Advance directives can apply to any medical decision, such as the treatments you want, and if you want to donate organs.   What are the types of advance directives?  There are many types of advance directives, and each state has rules about how to use them. You may choose a combination of any of the following:  Living will:  This is a written record of the treatment you want. You can also choose which treatments you do not want, which to limit, and which to stop at a certain time. This includes surgery, medicine, IV fluid, and tube feedings.   Durable power of  for healthcare (DPAHC):  This is a written record that states who you want to make healthcare choices for you when you are unable to make them for yourself. This person, called a proxy, is usually a family member or a friend. You may choose more than 1 proxy.  Do not resuscitate (DNR) order:  A DNR order is used in case your heart stops beating or you stop breathing. It is a request not to have certain forms of treatment, such as CPR. A DNR order may be included in other types of advance directives.  Medical directive:  This covers the care that you want if you are in a coma, near death, or unable to make decisions for yourself. You can list the treatments you want for each condition. Treatment may include pain medicine, surgery, blood transfusions, dialysis, IV or tube feedings, and a ventilator (breathing machine).  Values history:  This document has questions about your views, beliefs, and how you feel and think about life. This information can help others choose the care that you would choose.  Why are advance directives important?  An advance directive helps you control your care. Although spoken wishes may be used, it is better to have your wishes written down. Spoken wishes can be misunderstood, or not followed. Treatments may be given even if you do not want them. An advance directive may make  it easier for your family to make difficult choices about your care.   Weight Management   Why it is important to manage your weight:  Being overweight increases your risk of health conditions such as heart disease, high blood pressure, type 2 diabetes, and certain types of cancer. It can also increase your risk for osteoarthritis, sleep apnea, and other respiratory problems. Aim for a slow, steady weight loss. Even a small amount of weight loss can lower your risk of health problems.  How to lose weight safely:  A safe and healthy way to lose weight is to eat fewer calories and get regular exercise. You can lose up about 1 pound a week by decreasing the number of calories you eat by 500 calories each day.   Healthy meal plan for weight management:  A healthy meal plan includes a variety of foods, contains fewer calories, and helps you stay healthy. A healthy meal plan includes the following:  Eat whole-grain foods more often.  A healthy meal plan should contain fiber. Fiber is the part of grains, fruits, and vegetables that is not broken down by your body. Whole-grain foods are healthy and provide extra fiber in your diet. Some examples of whole-grain foods are whole-wheat breads and pastas, oatmeal, brown rice, and bulgur.  Eat a variety of vegetables every day.  Include dark, leafy greens such as spinach, kale, eriberto greens, and mustard greens. Eat yellow and orange vegetables such as carrots, sweet potatoes, and winter squash.   Eat a variety of fruits every day.  Choose fresh or canned fruit (canned in its own juice or light syrup) instead of juice. Fruit juice has very little or no fiber.  Eat low-fat dairy foods.  Drink fat-free (skim) milk or 1% milk. Eat fat-free yogurt and low-fat cottage cheese. Try low-fat cheeses such as mozzarella and other reduced-fat cheeses.  Choose meat and other protein foods that are low in fat.  Choose beans or other legumes such as split peas or lentils. Choose fish, skinless  poultry (chicken or turkey), or lean cuts of red meat (beef or pork). Before you cook meat or poultry, cut off any visible fat.   Use less fat and oil.  Try baking foods instead of frying them. Add less fat, such as margarine, sour cream, regular salad dressing and mayonnaise to foods. Eat fewer high-fat foods. Some examples of high-fat foods include french fries, doughnuts, ice cream, and cakes.  Eat fewer sweets.  Limit foods and drinks that are high in sugar. This includes candy, cookies, regular soda, and sweetened drinks.  Exercise:  Exercise at least 30 minutes per day on most days of the week. Some examples of exercise include walking, biking, dancing, and swimming. You can also fit in more physical activity by taking the stairs instead of the elevator or parking farther away from stores. Ask your healthcare provider about the best exercise plan for you.      © Copyright [employer] 2018 Information is for End User's use only and may not be sold, redistributed or otherwise used for commercial purposes. All illustrations and images included in CareNotes® are the copyrighted property of [employer] or [employer]

## 2024-08-02 NOTE — PROGRESS NOTES
Ambulatory Visit  Name: Mayra Echevarria      : 1955      MRN: [id number]  Encounter Provider: Namita Melendez DO  Encounter Date: 2024   Encounter department: [facility]    Assessment & Plan        Preventive health issues were discussed with patient, and age appropriate screening tests were ordered as noted in patient's After Visit Summary. Personalized health advice and appropriate referrals for health education or preventive services given if needed, as noted in patient's After Visit Summary.    History of Present Illness     HPI   Patient Care Team:  Namita Melendez DO as PCP - General  MD Namita Charles DO Chand Rohatgi, MD    Review of Systems   Constitutional:  Negative for appetite change, chills and fever.   HENT:  Negative for ear pain, facial swelling, rhinorrhea, sinus pain, sore throat and trouble swallowing.    Eyes:  Negative for discharge and redness.   Respiratory:  Negative for chest tightness, shortness of breath and wheezing.    Cardiovascular:  Negative for chest pain and palpitations.   Gastrointestinal:  Negative for abdominal pain, diarrhea, nausea and vomiting.   Endocrine: Negative for polyuria.   Genitourinary:  Negative for dysuria and urgency.   Musculoskeletal:  Negative for arthralgias and back pain.   Skin:  Negative for rash.   Neurological:  Negative for dizziness, weakness and headaches.   Hematological:  Negative for adenopathy.   Psychiatric/Behavioral:  Negative for behavioral problems, confusion and sleep disturbance.    All other systems reviewed and are negative.    Medical History Reviewed by provider this encounter:       Annual Wellness Visit Questionnaire   Mayra is here for her Subsequent Wellness visit. Last Medicare Wellness visit information reviewed, patient interviewed, no change since last AWV.     Health Risk Assessment:   Patient rates overall health as excellent. Patient feels  that their physical health rating is same. Patient is very satisfied with their life. Eyesight was rated as same. Hearing was rated as same. Patient feels that their emotional and mental health rating is same. Patients states they are never, rarely angry. Patient states they are never, rarely unusually tired/fatigued. Pain experienced in the last 7 days has been some. Patient's pain rating has been 3/10. Patient states that she has experienced no weight loss or gain in last 6 months.     Depression Screening:   PHQ-9 Score: 0      Fall Risk Screening:   In the past year, patient has experienced: history of falling in past year    Number of falls: 1  Injured during fall?: No    Feels unsteady when standing or walking?: No    Worried about falling?: No      Urinary Incontinence Screening:   Patient has not leaked urine accidently in the last six months.     Home Safety:  Patient does not have trouble with stairs inside or outside of their home. Patient has working smoke alarms and has working carbon monoxide detector. Home safety hazards include: none.     Nutrition:   Current diet is No Added Salt.     Medications:   Patient is currently taking over-the-counter supplements. OTC medications include: see medication list. Patient is able to manage medications.     Activities of Daily Living (ADLs)/Instrumental Activities of Daily Living (IADLs):   Walk and transfer into and out of bed and chair?: Yes  Dress and groom yourself?: Yes    Bathe or shower yourself?: Yes    Feed yourself? Yes  Do your laundry/housekeeping?: Yes  Manage your money, pay your bills and track your expenses?: Yes  Make your own meals?: Yes    Do your own shopping?: Yes    Durable Medical Equipment Suppliers  None    Previous Hospitalizations:   Any hospitalizations or ED visits within the last 12 months?: No      Advance Care Planning:   Living will: No    Durable POA for healthcare: No    Advanced directive: No    Advanced directive counseling  given: Yes    ACP document given: Yes    Patient declined ACP directive: No    End of Life Decisions reviewed with patient: Yes    Provider agrees with end of life decisions: Yes      Cognitive Screening:   Provider or family/friend/caregiver concerned regarding cognition?: No    PREVENTIVE SCREENINGS      Cardiovascular Screening:    General: Screening Not Indicated and History Lipid Disorder      Diabetes Screening:     General: Screening Current      Colorectal Cancer Screening:     General: Screening Current      Prostate Cancer Screening:    General: Screening Current      Breast Cancer Screening:     General: Screening Current      Cervical Cancer Screening:    General: Screening Not Indicated      Osteoporosis Screening:    General: Screening Not Indicated and History Osteoporosis      Abdominal Aortic Aneurysm (AAA) Screening:        General: Screening Current and Screening Not Indicated      Lung Cancer Screening:     General: Screening Not Indicated      Hepatitis C Screening:    General: Screening Current    Screening, Brief Intervention, and Referral to Treatment (SBIRT)    Screening  Typical number of drinks in a day: 0  Typical number of drinks in a week: 0  Interpretation: Low risk drinking behavior.    AUDIT-C Screenin) How often did you have a drink containing alcohol in the past year? never  2) How many drinks did you have on a typical day when you were drinking in the past year? 0  3) How often did you have 6 or more drinks on one occasion in the past year? never    AUDIT-C Score: 0  Interpretation: Score 0-2 (female): Negative screen for alcohol misuse    Single Item Drug Screening:  How often have you used an illegal drug (including marijuana) or a prescription medication for non-medical reasons in the past year? never    Single Item Drug Screen Score: 0  Interpretation: Negative screen for possible drug use disorder    Social Determinants of Health     Financial Resource Strain: Low Risk   (4/17/2023)    Overall Financial Resource Strain (CARDIA)     Difficulty of Paying Living Expenses: Not hard at all   Food Insecurity: No Food Insecurity (8/1/2024)    Hunger Vital Sign     Worried About Running Out of Food in the Last Year: Never true     Ran Out of Food in the Last Year: Never true   Transportation Needs: No Transportation Needs (8/1/2024)    PRAPARE - Transportation     Lack of Transportation (Medical): No     Lack of Transportation (Non-Medical): No   Housing Stability: Low Risk  (8/1/2024)    Housing Stability Vital Sign     Unable to Pay for Housing in the Last Year: No     Number of Times Moved in the Last Year: 0     Homeless in the Last Year: No   Utilities: Not At Risk (8/1/2024)    [facility] Utilities     Threatened with loss of utilities: No     No results found.    Objective     LMP  (LMP Unknown)     Physical Exam  Vitals and nursing note reviewed.   Constitutional:       General: She is not in acute distress.     Appearance: Normal appearance. She is well-developed. She is not ill-appearing or diaphoretic.   HENT:      Head: Normocephalic and atraumatic.      Right Ear: Tympanic membrane, ear canal and external ear normal.      Left Ear: Tympanic membrane, ear canal and external ear normal.      Nose: Nose normal. No congestion or rhinorrhea.      Mouth/Throat:      Mouth: Mucous membranes are moist.      Pharynx: Oropharynx is clear. No oropharyngeal exudate or posterior oropharyngeal erythema.   Eyes:      General: No scleral icterus.        Right eye: No discharge.         Left eye: No discharge.      Extraocular Movements: Extraocular movements intact.      Conjunctiva/sclera: Conjunctivae normal.      Pupils: Pupils are equal, round, and reactive to light.   Neck:      Thyroid: No thyromegaly.      Vascular: No carotid bruit or JVD.      Trachea: No tracheal deviation.   Cardiovascular:      Rate and Rhythm: Normal rate and regular rhythm.      Pulses: Normal pulses.      Heart sounds:  Normal heart sounds. No murmur heard.  Pulmonary:      Effort: Pulmonary effort is normal. No respiratory distress.      Breath sounds: Normal breath sounds. No stridor. No wheezing, rhonchi or rales.   Abdominal:      General: Abdomen is flat. Bowel sounds are normal. There is no distension.      Palpations: Abdomen is soft. There is no mass.      Tenderness: There is no abdominal tenderness. There is no guarding or rebound.   Musculoskeletal:         General: No swelling, tenderness or deformity. Normal range of motion.      Cervical back: Normal range of motion and neck supple. No rigidity.      Right lower leg: No edema.      Left lower leg: No edema.   Lymphadenopathy:      Cervical: No cervical adenopathy.   Skin:     General: Skin is warm and dry.      Capillary Refill: Capillary refill takes less than 2 seconds.      Coloration: Skin is not jaundiced.      Findings: No bruising, erythema or rash.   Neurological:      General: No focal deficit present.      Mental Status: She is alert and oriented to person, place, and time.      Cranial Nerves: No cranial nerve deficit.      Sensory: No sensory deficit.      Motor: No abnormal muscle tone.      Coordination: Coordination normal.      Gait: Gait normal.      Deep Tendon Reflexes: Reflexes are normal and symmetric. Reflexes normal.   Psychiatric:         Mood and Affect: Mood normal.         Behavior: Behavior normal.         Thought Content: Thought content normal.         Judgment: Judgment normal.       Administrative Statements   I have spent a total time of 20 minutes in caring for this patient on the day of the visit/encounter including Diagnostic results, Prognosis, Risks and benefits of tx options, Instructions for management, Patient and family education, and Importance of tx compliance.

## 2024-08-05 ENCOUNTER — TELEPHONE (OUTPATIENT)
Age: 69
End: 2024-08-05

## 2024-08-05 NOTE — TELEPHONE ENCOUNTER
Caller: patient     Doctor: Cristian    Reason for call: Patient calling to check status on scheduling an appointment for a USGI right hip    Call back#: [phone]

## 2024-08-05 NOTE — TELEPHONE ENCOUNTER
Caller: Patient     Doctor: Cristian    Reason for call:  Patient requesting USGI right hip. Last done 5/27/21  Please call patient to schedule     Call back#: [phone]

## 2024-08-09 NOTE — TELEPHONE ENCOUNTER
Called and spoke to patient  Patient was already scheduled  Thursday 8/15/24 @ 1:00 pm at [facility]

## 2024-08-12 DIAGNOSIS — I10 ESSENTIAL HYPERTENSION: ICD-10-CM

## 2024-08-12 RX ORDER — AMLODIPINE BESYLATE 5 MG/1
5 TABLET ORAL DAILY
Qty: 90 TABLET | Refills: 1 | Status: SHIPPED | OUTPATIENT
Start: 2024-08-12

## 2024-08-13 ENCOUNTER — OFFICE VISIT (OUTPATIENT)
Dept: SLEEP CENTER | Facility: CLINIC | Age: 69
End: 2024-08-13
Payer: COMMERCIAL

## 2024-08-13 VITALS
BODY MASS INDEX: 27.05 KG/M2 | HEART RATE: 93 BPM | WEIGHT: 147 LBS | OXYGEN SATURATION: 94 % | SYSTOLIC BLOOD PRESSURE: 160 MMHG | DIASTOLIC BLOOD PRESSURE: 90 MMHG | HEIGHT: 62 IN

## 2024-08-13 DIAGNOSIS — G47.00 INSOMNIA, UNSPECIFIED TYPE: ICD-10-CM

## 2024-08-13 DIAGNOSIS — G47.39 SLEEP APNEA-LIKE BEHAVIOR: Primary | ICD-10-CM

## 2024-08-13 DIAGNOSIS — R09.89 LABILE HYPERTENSION: ICD-10-CM

## 2024-08-13 DIAGNOSIS — R41.3 MEMORY IMPAIRMENT: ICD-10-CM

## 2024-08-13 PROCEDURE — 99204 OFFICE O/P NEW MOD 45 MIN: CPT | Performed by: NURSE PRACTITIONER

## 2024-08-13 NOTE — PROGRESS NOTES
Consultation - [facility]  Sleep Disorders Center  Mayra Echevarria, 1955, MRN: [id number]    8/13/2024        Reason for Consult / Principal Problem:  Memory Impairment  Hypertension  Evaluation of possible Sleep Apnea    CONSULTING PROVIDER:  Namita Melendez DO  [id number]  [address]  [place] [id number]    ASSESSMENT/PLAN:    1. Sleep apnea-like behavior  -     Home Study; Future  2. Insomnia, unspecified type  -     Home Study; Future  3. Memory impairment  -     Ambulatory Referral to Sleep Medicine  -     Home Study; Future  4. Labile hypertension  -     Home Study; Future       Thank you for the opportunity of participating in the evaluation and care of this patient in the Sleep Clinic at [facility].  If there are any questions regarding this evaluation, please feel free to reach out.   ________________________________________________________________________________________________    HPI: Mayra Ehcevarria is a 69 y.o. female with PMHx of HTN, presents for evaluation of possible obstructive sleep apnea.  She reports that her daughter felt she was having a decline in memory.  She discussed this with her PCP.  A mini mental status exam was noted to have been normal.  She is treated for HTN with cozaar and norvasc.  She has a history of migraines and reports that she develops them approximately 2 times per year.  She denies waking up with headaches.      Sleep-Related History: She reports that she has difficulty initiating sleep and has been taking nortriptyline 30mg at bedtime for approximately 7 years.  She currently sleeps alone, due to her 's loud snoring.  She does not recall waking up frequently during the night, but admits to night time awakenings a few times per week for urination.        Prior Sleep Studies:  No prior sleep study    [place] Sleepiness Scale: Total score: 6/24  Greater or equal to 10 is positive for  excessive daytime sleepiness    Pertinent Meds: nortriptyline 30mg at bedtime and initiates sleep within 10-15 minutes      Sleep-Wake Schedule:  She is self-described as having no morning/night preference.  Bedtime: 10:00pm, initiates sleep within 10-15 minutes with use of nortriptyline 30mg  Wake Time: 8:30-9:00pm in summer, wakes up at 6:00am when she needs to take her grandchildren to school  Difficulty Falling Asleep: Yes, she had difficulty falling asleep when she was working.  Has been taking medication since prior to penitentiary.  Avg Number of Awakenings: occasionally once per night, denies tossing and turning or waking  Cause of Awakenings: for urination  Weekend Sleep Schedule: wakes slightly later on Saturdays, wakes early for Yarsani on Sundays  Naps: 2-3 times per week, in the late morning for one hour    If She does take a nap, naps are refreshing in quality    Avg TST per 24 hours: 8-10 hours    Sleep-Related Details:  Preferred Sleep Position: prone, currently sleeps alone, due to 's snoring  SDB Symptoms:   Bruxism: No  Nocturnal GERD: No  Nocturnal Palpitations: No  Nocturnal Anxiety or Rumination: No  Sleep-Related Hallucinations: No   Sleep Paralysis: No   Cataplexy: No       She denies any parasomnia activity.    Wake-Related Details  Daytime Sleepiness: Yes, some daytime sleepiness if she is not busy.    Drowsy Driving: No  Employment:  currently retired.  She worked daytime hours.  Caffeine:  none    Tobacco:   reports that she has never smoked. She has never been exposed to tobacco smoke. She has never used smokeless tobacco.  E-cig/Vaping:    E-Cigarette/Vaping    E-Cigarette Use Never User       E-Cigarette/Vaping Substances    Nicotine No     THC No     CBD No     Flavoring No     Other No     Unknown No       Alcohol:   reports no history of alcohol use.    Drugs:   reports no history of drug use.      Weight Change:   weight has been relatively stable    She does have difficulty  "with memory.      Other Relevant Labs and Studies:  Labs: I have personally reviewed pertinent lab results.  Lab Results   Component Value Date    WBC 3.79 (L) 01/27/2018    HGB 11.4 (L) 01/27/2018    HCT 35.8 01/27/2018    MCV 83 01/27/2018     (H) 01/27/2018      Lab Results   Component Value Date    CALCIUM 9.9 03/01/2024    K 4.0 03/01/2024    CO2 28 03/01/2024     03/01/2024    BUN 24 03/01/2024    CREATININE 0.93 03/01/2024     No results found for: \"IRON\", \"TIBC\", \"FERRITIN\"  No results found for: \"WMGEQHFC22\"  No results found for: \"FOLATE\"      Past Medical History:   Diagnosis Date    Allergic years ago    Iodine    Chronic leukopenia     Depression 04/22/2014    Hypercalcemia     Hyperlipidemia     Hypertension     labile    Hypertension     Migraines     Osteoarthritis     Osteoporosis     Vertigo      Past Surgical History:   Procedure Laterality Date    EYE SURGERY      TOOTH EXTRACTION      TUBAL LIGATION       Patient Active Problem List   Diagnosis    Aseptic necrosis bone (HCC)    Greater trochanteric bursitis of right hip    Hyperlipidemia    It band syndrome, right    Senile osteoporosis    Patellofemoral syndrome, right    Essential hypertension    Atrophic vaginitis    COVID-19    Quadriceps tendonitis    Hypercalcemia    Primary generalized (osteo)arthritis    Vertigo    Migraine with aura and without status migrainosus, not intractable    Anxiety    Labile hypertension    History of rib fracture    Impingement syndrome of left shoulder    Impingement syndrome of right shoulder    Other osteoporosis without current pathological fracture    Encounter for screening mammogram for breast cancer    Sleep apnea-like behavior    Insomnia    Memory impairment     Allergies as of 08/13/2024 - Reviewed 08/13/2024   Allergen Reaction Noted    Iodine - food allergy Rash 04/09/2013     REVIEW OF SYSTEMS:  Review of Systems   Musculoskeletal:  Positive for arthralgias.     10-point system " "review completed, all of which are negative except as mentioned above.       CURRENT MEDICATIONS:  Current Outpatient Medications   Medication Instructions    amLODIPine (NORVASC) 5 mg, Oral, Daily    calcium carbonate (CALCIUM 600) 600 mg, Oral, 2 times daily with meals    fenofibrate (TRICOR) 145 mg, Oral, Daily    Lactobacillus (Acidophilus Probiotic) CAPS Oral    losartan (COZAAR) 100 mg, Oral, Daily    meclizine (ANTIVERT) 25 mg, Oral, 3 times daily PRN    multivitamin (THERAGRAN) TABS 1 tablet, Oral, Daily    nortriptyline (PAMELOR) 10 mg, Oral, 3 times daily    TURMERIC CURCUMIN PO Oral     SOCIAL HISTORY:  Social History     Tobacco Use    Smoking status: Never     Passive exposure: Never    Smokeless tobacco: Never   Vaping Use    Vaping status: Never Used   Substance Use Topics    Alcohol use: No    Drug use: No     FAMILY HISTORY:  Family History   Problem Relation Age of Onset    Colon cancer Mother 51    Breast cancer Mother 53    Cancer Mother         two primary sites  breast and colon    Heart attack Father     Prostate cancer Father 70    Hypertension Father     Heart attack Sister     No Known Problems Sister     No Known Problems Daughter     No Known Problems Daughter     No Known Problems Maternal Grandmother     No Known Problems Maternal Grandfather     No Known Problems Paternal Grandmother     No Known Problems Paternal Grandfather     Stroke Brother     No Known Problems Son     No Known Problems Maternal Aunt     No Known Problems Maternal Aunt     No Known Problems Maternal Aunt      Family History of Sleep Disorders: unknown    Objective:  Vital Signs:  /90   Pulse 93   Ht 5' 2\" (1.575 m)   Wt 66.7 kg (147 lb)   LMP  (LMP Unknown)   SpO2 94%   BMI 26.89 kg/m²   Body mass index is 26.89 kg/m².  Neck Circumference: Neck Circumference: 13 inches      PHYSICAL EXAMINATION:    Constitutional: Alert, cooperative, no distress, appears stated age  Skin: Warm, dry, no rashes " noted  Eyes: Conjunctiva/corneas clear  ENT: Nasal congestion absent, nares asymmetric, septum deviated, mucosa normal  Posterior Airspace:   Kumar Tongue Position: 3  Retrognathia: absent  Overbite: absent  High Arched Palate: absent  Tongue Scalloping/Ridging: absent  Tonsils: 2+  Uvula: normal  Lungs: Clear to auscultation bilaterally, respirations unlabored  Heart: Normal rate and regular rhythm, S1/2 normal, no murmur noted, no rub or gallop  Extremities: Normal, no digital clubbing, no pedal edema  Neuro: No focal deficits noted, oriented to person, place and time.        The review of systems and following portions of the patient's history were reviewed and updated as appropriate: allergies, current medications, past family history, past medical history, past social history, past surgical history and problem list.    ________________________________________________________________________________________________      I have spent a total time of 55 minutes on 08/13/24 in caring for this patient including Risks and benefits of tx options, Instructions for management, Patient and family education, Importance of tx compliance, Risk factor reductions, Impressions, Counseling / Coordination of care, Documenting in the medical record, Reviewing / ordering tests, medicine, procedures  , and Obtaining or reviewing history  .    Today we discussed the anatomy and physiology of the upper airway.  I pointed out how changes in this region can result in both snoring and abnormal breathing events including apneas and hypopneas.  I explained the most common co-morbidities of untreated sleep apnea.  After this we talked about some forms of treatment including application of positive airway pressure, mandibular advancement devices and surgery.      In order to evaluate the possibility of Obstructive Sleep Apnea as a contributing factor to the patient's symptoms, a home sleep study will be completed to identify the presence  "or absence of abnormal nocturnal breathing.   Following testing, the patient will return to the Sleep Disorders Center for review of results and plan of treatment.    We discussed considering decrease in nortriptyline dose, to re-evaluate sleep initiation insomnia, now that she is retired.  We will discuss this further after the home sleep study.    The following instructions have been given to the patient today:    Patient Instructions    Schedule home sleep study  After testing, the nurse will call with results and recommendations for plan of treatment       Nursing Support:  When:  Monday through Friday 7:00am-5:00pm, except holidays  Where:  Direct phone line is [phone], option 3  If you are having a true emergency, please call 911.  In the event that the line is busy or it is after hours, please leave a voice mail message and we will return your call.  Please speak clearly, leaving your full name, birth date, best number to reach you and the reason for your call.  Medication refills:  We will need the name of the medication, the dosage, the ordering provider, whether you get a 30 day or 90 day refill and the pharmacy name and address.  Medications will be ordered by the providers only.  Nurses cannot call in prescriptions.    To reach the Sleep Disorders Center office staff:  Call [phone], option 1, followed by option 3    BLANCA Moreno  [facility]    Portions of the record may have been created with voice recognition software. Occasional wrong word or \"sound a like\" substitutions may have occurred due to the inherent limitations of voice recognition software. Please read the chart carefully and recognize, using context, where substitutions have occurred.                                             "

## 2024-08-13 NOTE — PATIENT INSTRUCTIONS
Schedule home sleep study  After testing, the nurse will call with results and recommendations for plan of treatment       Nursing Support:  When:  Monday through Friday 7:00am-5:00pm, except holidays  Where:  Direct phone line is [phone], option 3  If you are having a true emergency, please call 911.  In the event that the line is busy or it is after hours, please leave a voice mail message and we will return your call.  Please speak clearly, leaving your full name, birth date, best number to reach you and the reason for your call.  Medication refills:  We will need the name of the medication, the dosage, the ordering provider, whether you get a 30 day or 90 day refill and the pharmacy name and address.  Medications will be ordered by the providers only.  Nurses cannot call in prescriptions.    To reach the Sleep Disorders Center office staff:  Call [phone], option 1, followed by option 3

## 2024-08-15 ENCOUNTER — APPOINTMENT (OUTPATIENT)
Dept: RADIOLOGY | Facility: OTHER | Age: 69
End: 2024-08-15
Payer: COMMERCIAL

## 2024-08-15 ENCOUNTER — OFFICE VISIT (OUTPATIENT)
Dept: OBGYN CLINIC | Facility: OTHER | Age: 69
End: 2024-08-15
Payer: COMMERCIAL

## 2024-08-15 VITALS
BODY MASS INDEX: 27.05 KG/M2 | HEIGHT: 62 IN | WEIGHT: 147 LBS | SYSTOLIC BLOOD PRESSURE: 152 MMHG | DIASTOLIC BLOOD PRESSURE: 88 MMHG

## 2024-08-15 DIAGNOSIS — M25.551 RIGHT HIP PAIN: ICD-10-CM

## 2024-08-15 DIAGNOSIS — M70.61 TROCHANTERIC BURSITIS OF RIGHT HIP: Primary | ICD-10-CM

## 2024-08-15 DIAGNOSIS — M16.11 PRIMARY LOCALIZED OSTEOARTHRITIS OF RIGHT HIP: ICD-10-CM

## 2024-08-15 PROCEDURE — 99204 OFFICE O/P NEW MOD 45 MIN: CPT | Performed by: FAMILY MEDICINE

## 2024-08-15 PROCEDURE — 73502 X-RAY EXAM HIP UNI 2-3 VIEWS: CPT

## 2024-08-15 PROCEDURE — 20610 DRAIN/INJ JOINT/BURSA W/O US: CPT | Performed by: FAMILY MEDICINE

## 2024-08-15 RX ORDER — BUPIVACAINE HYDROCHLORIDE 5 MG/ML
9 INJECTION, SOLUTION PERINEURAL
Status: COMPLETED | OUTPATIENT
Start: 2024-08-15 | End: 2024-08-15

## 2024-08-15 RX ORDER — TRIAMCINOLONE ACETONIDE 40 MG/ML
40 INJECTION, SUSPENSION INTRA-ARTICULAR; INTRAMUSCULAR
Status: COMPLETED | OUTPATIENT
Start: 2024-08-15 | End: 2024-08-15

## 2024-08-15 RX ADMIN — TRIAMCINOLONE ACETONIDE 40 MG: 40 INJECTION, SUSPENSION INTRA-ARTICULAR; INTRAMUSCULAR at 13:00

## 2024-08-15 RX ADMIN — BUPIVACAINE HYDROCHLORIDE 9 ML: 5 INJECTION, SOLUTION PERINEURAL at 13:00

## 2024-08-15 NOTE — PROGRESS NOTES
1. Trochanteric bursitis of right hip        2. Right hip pain  XR hip/pelv 2-3 vws right if performed      3. Primary localized osteoarthritis of right hip          Orders Placed This Encounter   Procedures    Large joint arthrocentesis    XR hip/pelv 2-3 vws right if performed        IMAGING STUDIES: (I personally reviewed images in PACS and report):  xray right hip 8/15/24:  Moderate right hip chronic osteoarthritis      ASSESSMENT/PLAN:  Right Hip lateral hip troch bursitis  Right Hip Moderate Osteoarthritis    Repeat X-ray next visit: None    Return if symptoms worsen or fail to improve.    Patient instructions below verbally summarized in person during encounter:  Patient Instructions   Trial right hip land boone guided corticosteroid injection      __________________________________________________________________________    HISTORY OF PRESENT ILLNESS:    Complains of lateral hip pain ongoing for 3 weeks after performing yard work and gardening.  Denies any groin pain.  Review of chart reveals that in 2021 patient did have ultrasound-guided corticosteroid injection of the right hip for osteoarthritis with associated groin pain however her pain currently appears different than her pain from 2021.  Pain is currently intermittent mild to moderate mildly improved since initial presentation of pain 3 weeks ago however persist.  Denies any back pain.        Review of Systems      Following history reviewed and update:    Past Medical History:   Diagnosis Date    Allergic years ago    Iodine    Chronic leukopenia     Depression 04/22/2014    Hypercalcemia     Hyperlipidemia     Hypertension     labile    Hypertension     Migraines     Osteoarthritis     Osteoporosis     Vertigo      Past Surgical History:   Procedure Laterality Date    EYE SURGERY      TOOTH EXTRACTION      TUBAL LIGATION       Social History   Social History     Substance and Sexual Activity   Alcohol Use No     Social History     Substance and  "Sexual Activity   Drug Use No     Social History     Tobacco Use   Smoking Status Never    Passive exposure: Never   Smokeless Tobacco Never     Family History   Problem Relation Age of Onset    Colon cancer Mother 51    Breast cancer Mother 53    Cancer Mother         two primary sites  breast and colon    Heart attack Father     Prostate cancer Father 70    Hypertension Father     Heart attack Sister     No Known Problems Sister     No Known Problems Daughter     No Known Problems Daughter     No Known Problems Maternal Grandmother     No Known Problems Maternal Grandfather     No Known Problems Paternal Grandmother     No Known Problems Paternal Grandfather     Stroke Brother     No Known Problems Son     No Known Problems Maternal Aunt     No Known Problems Maternal Aunt     No Known Problems Maternal Aunt      Allergies   Allergen Reactions    Iodine - Food Allergy Rash          Physical Exam  /88 (BP Location: Left arm, Patient Position: Sitting, Cuff Size: Standard)   Ht 5' 2\" (1.575 m)   Wt 66.7 kg (147 lb)   LMP  (LMP Unknown)   BMI 26.89 kg/m²         Ortho Exam  BACK EXAM:    DERMATOMAL SENSATION:  L1: normal   L2: normal   L3: normal   L4: normal   L5: normal   S1: normal    STRENGTH (bilateral):  Knee Extension: 5/5  Foot Dorsiflexion: 5/5  Great Toe Extension: 5/5  Foot Plantarflexion: 5/5  Hip Flexion: 5/5  Hip Abduction: 5/5    RIGHT HIP:  +lateral hip tenderness  LOG ROLL: negative  RADHA: negative  FADIR: negative    LEFT HIP:  LOG ROLL: negative  RADHA: negative  FADIR: negative    __________________________________________________________________________  Large joint arthrocentesis: R greater trochanteric bursa  Universal Protocol:  procedure performed by consultantConsent: Verbal consent obtained.  Risks and benefits: risks, benefits and alternatives were discussed  Consent given by: patient  Time out: Immediately prior to procedure a \"time out\" was called to verify the correct " patient, procedure, equipment, support staff and site/side marked as required.  Patient understanding: patient states understanding of the procedure being performed  Site marked: the operative site was marked  Patient identity confirmed: verbally with patient  Supporting Documentation  Indications: pain and diagnostic evaluation   Procedure Details  Location: hip - R greater trochanteric bursa  Preparation: Patient was prepped and draped in the usual sterile fashion  Needle size: 22 G  Ultrasound guidance: no  Approach: lateral  Medications administered: 9 mL bupivacaine 0.5 %; 40 mg triamcinolone acetonide 40 mg/mL    Patient tolerance: patient tolerated the procedure well with no immediate complications  Dressing:  Sterile dressing applied

## 2024-09-12 ENCOUNTER — HOSPITAL ENCOUNTER (OUTPATIENT)
Dept: SLEEP CENTER | Facility: CLINIC | Age: 69
Discharge: HOME | End: 2024-09-12
Payer: COMMERCIAL

## 2024-09-12 DIAGNOSIS — R09.89 LABILE HYPERTENSION: ICD-10-CM

## 2024-09-12 DIAGNOSIS — R41.3 MEMORY IMPAIRMENT: ICD-10-CM

## 2024-09-12 DIAGNOSIS — G47.00 INSOMNIA, UNSPECIFIED TYPE: ICD-10-CM

## 2024-09-12 DIAGNOSIS — G47.39 SLEEP APNEA-LIKE BEHAVIOR: ICD-10-CM

## 2024-09-12 PROCEDURE — G0399 HOME SLEEP TEST/TYPE 3 PORTA: HCPCS

## 2024-09-12 NOTE — PROGRESS NOTES
Home Sleep Study Documentation    HOME STUDY DEVICE: Noxturnal no                                           Arlene G3 yes      Pre-Sleep Home Study:    Set-up and instructions performed by: ROOSEVELT Merrill    Technician performed demonstration for Patient: yes    Return demonstration performed by Patient: yes    Written instructions provided to Patient: yes    Patient signed consent form: yes        Post-Sleep Home Study:    Additional comments by Patient: None    Home Sleep Study Failed:no:    Failure reason: N/A    Reported or Detected: N/A    Scored by: CARLOTTA Guzman

## 2024-09-16 PROBLEM — G47.8 OTHER SLEEP DISORDERS: Status: ACTIVE | Noted: 2024-09-16

## 2024-09-16 PROBLEM — G47.33 OBSTRUCTIVE SLEEP APNEA: Status: ACTIVE | Noted: 2024-09-16

## 2024-09-16 PROCEDURE — 95806 SLEEP STUDY UNATT&RESP EFFT: CPT | Performed by: PSYCHIATRY & NEUROLOGY

## 2024-09-17 DIAGNOSIS — G47.00 INSOMNIA, UNSPECIFIED TYPE: ICD-10-CM

## 2024-09-17 DIAGNOSIS — G47.39 SLEEP APNEA-LIKE BEHAVIOR: Primary | ICD-10-CM

## 2024-09-17 DIAGNOSIS — I10 ESSENTIAL HYPERTENSION: ICD-10-CM

## 2024-09-24 ENCOUNTER — HOSPITAL ENCOUNTER (OUTPATIENT)
Dept: RADIOLOGY | Age: 69
Discharge: HOME | End: 2024-09-24
Payer: COMMERCIAL

## 2024-09-24 ENCOUNTER — APPOINTMENT (OUTPATIENT)
Dept: LAB | Age: 69
End: 2024-09-24
Payer: COMMERCIAL

## 2024-09-24 VITALS — HEIGHT: 62 IN | WEIGHT: 147 LBS | BODY MASS INDEX: 27.05 KG/M2

## 2024-09-24 DIAGNOSIS — Z12.31 ENCOUNTER FOR SCREENING MAMMOGRAM FOR BREAST CANCER: ICD-10-CM

## 2024-09-24 DIAGNOSIS — M81.0 SENILE OSTEOPOROSIS: ICD-10-CM

## 2024-09-24 DIAGNOSIS — E78.00 HYPERCHOLESTEROLEMIA: ICD-10-CM

## 2024-09-24 DIAGNOSIS — I10 ESSENTIAL HYPERTENSION: ICD-10-CM

## 2024-09-24 LAB
ALBUMIN SERPL BCG-MCNC: 4.3 G/DL (ref 3.5–5)
ALP SERPL-CCNC: 23 U/L (ref 34–104)
ALT SERPL W P-5'-P-CCNC: 25 U/L (ref 7–52)
ANION GAP SERPL CALCULATED.3IONS-SCNC: 8 MMOL/L (ref 4–13)
AST SERPL W P-5'-P-CCNC: 35 U/L (ref 13–39)
BILIRUB SERPL-MCNC: 0.44 MG/DL (ref 0.2–1)
BUN SERPL-MCNC: 22 MG/DL (ref 5–25)
CALCIUM SERPL-MCNC: 9.6 MG/DL (ref 8.4–10.2)
CHLORIDE SERPL-SCNC: 104 MMOL/L (ref 96–108)
CHOLEST SERPL-MCNC: 179 MG/DL
CO2 SERPL-SCNC: 29 MMOL/L (ref 21–32)
CREAT SERPL-MCNC: 0.97 MG/DL (ref 0.6–1.3)
GFR SERPL CREATININE-BSD FRML MDRD: 59 ML/MIN/1.73SQ M
GLUCOSE P FAST SERPL-MCNC: 100 MG/DL (ref 65–99)
HDLC SERPL-MCNC: 37 MG/DL
LDLC SERPL CALC-MCNC: 126 MG/DL (ref 0–100)
NONHDLC SERPL-MCNC: 142 MG/DL
POTASSIUM SERPL-SCNC: 4.1 MMOL/L (ref 3.5–5.3)
PROT SERPL-MCNC: 7.1 G/DL (ref 6.4–8.4)
SODIUM SERPL-SCNC: 141 MMOL/L (ref 135–147)
TRIGL SERPL-MCNC: 79 MG/DL

## 2024-09-24 PROCEDURE — 77063 BREAST TOMOSYNTHESIS BI: CPT

## 2024-09-24 PROCEDURE — 80053 COMPREHEN METABOLIC PANEL: CPT

## 2024-09-24 PROCEDURE — 80061 LIPID PANEL: CPT

## 2024-09-24 PROCEDURE — 36415 COLL VENOUS BLD VENIPUNCTURE: CPT

## 2024-09-24 PROCEDURE — 77067 SCR MAMMO BI INCL CAD: CPT

## 2024-09-24 RX ORDER — FENOFIBRATE 145 MG/1
145 TABLET, COATED ORAL DAILY
Qty: 90 TABLET | Refills: 0 | Status: SHIPPED | OUTPATIENT
Start: 2024-09-24

## 2024-09-24 NOTE — TELEPHONE ENCOUNTER
Patient called to request a refill for their Fenofibrate 145 mg advised a refill was requested on 09/24/2024 and is pending approval. Patient verbalized understanding and is in agreement.

## 2024-09-27 ENCOUNTER — TELEPHONE (OUTPATIENT)
Age: 69
End: 2024-09-27

## 2024-10-01 ENCOUNTER — TELEPHONE (OUTPATIENT)
Dept: SLEEP CENTER | Facility: CLINIC | Age: 69
End: 2024-10-01

## 2024-10-01 NOTE — TELEPHONE ENCOUNTER
Home sleep study resulted, does not support a diagnosis of MATT.  Baseline hypoxemia present.    In-lab diagnostic study recommended to rule out a false negative result, as a home sleep test may underestimate disease severity.  Study to be expedited due to hypoxemia.    Call placed to patient, left call back message.    SnapSensehart message sent providing results, recommendations and instructions.

## 2024-10-08 ENCOUNTER — OFFICE VISIT (OUTPATIENT)
Age: 69
End: 2024-10-08
Payer: COMMERCIAL

## 2024-10-08 VITALS
HEART RATE: 92 BPM | SYSTOLIC BLOOD PRESSURE: 122 MMHG | OXYGEN SATURATION: 94 % | DIASTOLIC BLOOD PRESSURE: 70 MMHG | TEMPERATURE: 97.7 F | WEIGHT: 147.2 LBS | HEIGHT: 62 IN | BODY MASS INDEX: 27.09 KG/M2

## 2024-10-08 DIAGNOSIS — E55.9 VITAMIN D DEFICIENCY: ICD-10-CM

## 2024-10-08 DIAGNOSIS — M81.0 SENILE OSTEOPOROSIS: Primary | ICD-10-CM

## 2024-10-08 PROCEDURE — 96372 THER/PROPH/DIAG INJ SC/IM: CPT | Performed by: PHYSICIAN ASSISTANT

## 2024-10-08 PROCEDURE — 99214 OFFICE O/P EST MOD 30 MIN: CPT | Performed by: PHYSICIAN ASSISTANT

## 2024-10-08 NOTE — PROGRESS NOTES
Ambulatory Visit  Name: Mayra Echevarria      : 1955      MRN: [id number]  Encounter Provider: Mee Centeno PA-C  Encounter Date: 10/8/2024   Encounter department: [facility] RHEUMATOLOG[facility]    Assessment & Plan  Senile osteoporosis  Osteoporosis currently being treated with Prolia.  She is due for dose #4 today.  She is up-to-date with her DEXA scan and will be due for an updated DEXA in 2026.  We will continue to monitor her bone density every 2 years.  Labs as ordered to monitor for medication side effects and toxicity.  Follow-up in 6 months or sooner if needed.  Orders:    Basic metabolic panel; Future    Vitamin D 25 hydroxy; Future    denosumab (PROLIA) subcutaneous injection 60 mg    Vitamin D deficiency  Will check vitamin D with next labs.  Orders:    Vitamin D 25 hydroxy; Future      History of Present Illness     Mayra Echevarria is a 69 y.o. female.  She is here for follow-up of her osteoporosis.  She has a history of a low trauma rib fracture as well.    She is on Prolia and is due for dose #4 today.  She had a DEXA scan done on 2024.  Lumbar spine T-score -0.8.  This has increased 7.7% as compared to her previous scan.  Left total hip T-score -2.0.  This has decreased 6.8% as compared to her previous scan.  Left femoral neck T-score -2.7.  Of note, when reviewing images it does appear as though she may have been lined up differently making a hip comparison difficult.     She has a history of osteoarthritis.  She is feeling well.  She has minimal stiffness in the morning.  She has no swelling in her joints.     She had labs done on 2024.  Creatinine 0.97, GFR 59.  Calcium 9.6.      Review of Systems   Constitutional:  Negative for chills, fatigue and fever.   HENT:  Negative for hearing loss, sore throat and tinnitus.    Eyes:  Negative for pain and visual disturbance.   Respiratory:  Negative for cough and shortness of breath.    Cardiovascular:  " Negative for chest pain and palpitations.   Gastrointestinal:  Negative for abdominal pain, nausea and vomiting.   Genitourinary:  Negative for difficulty urinating.   Musculoskeletal:  Positive for arthralgias. Negative for back pain, gait problem, joint swelling, myalgias, neck pain and neck stiffness.   Skin:  Negative for rash.   Neurological:  Negative for dizziness, seizures, weakness, numbness and headaches.   Psychiatric/Behavioral:  Negative for confusion, decreased concentration and sleep disturbance.      Current Outpatient Medications on File Prior to Visit   Medication Sig Dispense Refill    amLODIPine (NORVASC) 5 mg tablet TAKE 1 TABLET DAILY 90 tablet 1    calcium carbonate (Calcium 600) 600 MG tablet Take 600 mg by mouth 2 (two) times a day with meals      fenofibrate (TRICOR) 145 mg tablet TAKE 1 TABLET DAILY 90 tablet 0    Lactobacillus (Acidophilus Probiotic) CAPS Take by mouth      losartan (COZAAR) 100 MG tablet TAKE 1 TABLET DAILY 100 tablet 1    multivitamin (THERAGRAN) TABS Take 1 tablet by mouth daily      nortriptyline (PAMELOR) 10 mg capsule TAKE 1 CAPSULE THREE TIMES A  capsule 1    TURMERIC CURCUMIN PO Take by mouth      meclizine (ANTIVERT) 25 mg tablet Take 1 tablet (25 mg total) by mouth 3 (three) times a day as needed for dizziness for up to 10 days 30 tablet 0     Current Facility-Administered Medications on File Prior to Visit   Medication Dose Route Frequency Provider Last Rate Last Admin    denosumab (PROLIA) subcutaneous injection 60 mg  60 mg Subcutaneous Once Mee Centeno PA-C        [COMPLETED] denosumab (PROLIA) subcutaneous injection 60 mg  60 mg Subcutaneous Q6 Months    60 mg at 10/08/24 0950          Objective     /70 (BP Location: Left arm, Patient Position: Sitting, Cuff Size: Adult)   Pulse 92   Temp 97.7 °F (36.5 °C) (Temporal)   Ht 5' 1.5\" (1.562 m)   Wt 66.8 kg (147 lb 3.2 oz)   LMP  (LMP Unknown)   SpO2 94%   BMI 27.36 kg/m²     Physical " Exam  Constitutional:       Appearance: Normal appearance.   Cardiovascular:      Rate and Rhythm: Normal rate and regular rhythm.   Pulmonary:      Breath sounds: Normal breath sounds.   Musculoskeletal:         General: No swelling or tenderness.      Comments: Neck decreased lateral flexion.  Shoulders, elbows, and wrists full range of motion.  Tinel's negative bilaterally.  Hands squaring 1st carpometacarpal joints bilaterally with early Heberden's nodes.  No synovitis noted.  Straight leg raising negative.  Hips full range of motion.  Knees full range of motion with patellofemoral crepitus.  Ankles full range of motion.  Feet rearfoot hyperpronation with hallux valgus deformities and hammertoe deformities.  Varicosities lower extremities with spider veins.  No edema appreciated.     Skin:     General: Skin is warm and dry.   Neurological:      General: No focal deficit present.      Mental Status: She is alert.           Dragon Dictation software was used to dictate this note. It may contain errors with dictating incorrect words/spelling. Please contact provider directly for any questions.

## 2024-10-08 NOTE — PROGRESS NOTES
"Assessment/Plan:    Mayra Echevarria came into the [facility]umatology Office today 10/08/24 to receive Prolia injection.      Verbal consent obtained.  Consent given by: patient    patient states patient has been medically healthy with no underlining concerns/complications.      Mayra Echevarria presents with no symptoms today.       All insturctions were reviewed with the patient.    If the patient should have any questions/concerns, advised patient to contacted [facility].       Subjective:     History provided by: patient    Patient ID: Mayra Echevarria is a 69 y.o. female      Objective:    Vitals:    10/08/24 0906   BP: 122/70   BP Location: Left arm   Patient Position: Sitting   Cuff Size: Adult   Pulse: 92   Temp: 97.7 °F (36.5 °C)   TempSrc: Temporal   SpO2: 94%   Weight: 66.8 kg (147 lb 3.2 oz)   Height: 5' 1.5\" (1.562 m)       Patient tolerated the injection well without any complications.  Injection site/s upper left arm.  Medication was provided by office.    Patient signed consent form yes   Patient signed ABN form no (If no patient is not a medicare patient).   Patient waited 15 minutes after injection no (This only applies to patient's receiving first time injection).       Last Visit: 9/27/2024  Next visit:Visit date not found  "

## 2024-10-08 NOTE — ASSESSMENT & PLAN NOTE
Osteoporosis currently being treated with Prolia.  She is due for dose #4 today.  She is up-to-date with her DEXA scan and will be due for an updated DEXA in February 2026.  We will continue to monitor her bone density every 2 years.  Labs as ordered to monitor for medication side effects and toxicity.  Follow-up in 6 months or sooner if needed.  Orders:    Basic metabolic panel; Future    Vitamin D 25 hydroxy; Future    denosumab (PROLIA) subcutaneous injection 60 mg

## 2024-10-11 ENCOUNTER — TELEPHONE (OUTPATIENT)
Age: 69
End: 2024-10-11

## 2024-10-11 DIAGNOSIS — M70.61 TROCHANTERIC BURSITIS OF RIGHT HIP: Primary | ICD-10-CM

## 2024-10-11 RX ORDER — METHYLPREDNISOLONE 4 MG
TABLET, DOSE PACK ORAL
Qty: 21 TABLET | Refills: 0 | Status: SHIPPED | OUTPATIENT
Start: 2024-10-11

## 2024-10-11 NOTE — TELEPHONE ENCOUNTER
Caller: Juanis     Doctor: Cristian     Reason for call: Patient is calling this morning to see if Dr. Foster would be able to squeeze her in for a hip injection this afternoon? Patient is going away on Sunday and doesn't feel she will be able to sit that long     Call back#: [phone]

## 2024-10-11 NOTE — TELEPHONE ENCOUNTER
Last injection was < 3 months ago on 8/15/23. I sent through oral steroid pack. She may take oral steroid as long as no diabetes history.

## 2024-12-05 ENCOUNTER — HOSPITAL ENCOUNTER (OUTPATIENT)
Dept: SLEEP CENTER | Facility: CLINIC | Age: 69
Discharge: HOME | End: 2024-12-05
Payer: COMMERCIAL

## 2024-12-05 DIAGNOSIS — G47.00 INSOMNIA, UNSPECIFIED TYPE: ICD-10-CM

## 2024-12-05 DIAGNOSIS — G47.39 SLEEP APNEA-LIKE BEHAVIOR: ICD-10-CM

## 2024-12-05 DIAGNOSIS — I10 ESSENTIAL HYPERTENSION: ICD-10-CM

## 2024-12-05 PROCEDURE — 95810 POLYSOM 6/> YRS 4/> PARAM: CPT | Performed by: PSYCHIATRY & NEUROLOGY

## 2024-12-05 PROCEDURE — 95810 POLYSOM 6/> YRS 4/> PARAM: CPT

## 2024-12-06 NOTE — PROGRESS NOTES
Sleep Study Documentation    Pre-Sleep Study       Sleep testing procedure explained to patient:YES    Patient napped prior to study:NO    Caffeine:Dayshift worker after 12PM.  Caffeine use:NO    Alcohol:Dayshift workers after 5PM: Alcohol use:NO    Typical day for patient:YES       Study Documentation    Sleep Study Indications: Loud/Habitual Snoring     Sleep Study: Diagnostic   Snore:Mild  Supplemental O2: no      Minimum SaO2 80%  Baseline SaO2 90.4%      EKG abnormalities: no     EEG abnormalities: no    Were abnormal behaviors in sleep observed:NO    Is Total Sleep Study Recording Time < 2 hours: N/A    Is Total Sleep Study Recording Time > 2 hours but study is incomplete: N/A    Is Total Sleep Study Recording Time 6 hours or more but sleep was not obtained: NO    Patient classification: retired       Post-Sleep Study    Medication used at bedtime or during sleep study:NO    Patient reports time it took to fall asleep:20 to 30 minutes    Patient reports waking up during study:1 to 2 times.  Patient reports returning to sleep without difficulty.    Patient reports sleeping 4 to 6 hours without dreaming.    Does the Patient feel this is a typical night of sleep:typical    Patient rated sleepiness: Not sleepy or tired    PAP treatment:no.

## 2024-12-12 ENCOUNTER — OFFICE VISIT (OUTPATIENT)
Dept: OBGYN CLINIC | Facility: CLINIC | Age: 69
End: 2024-12-12
Payer: COMMERCIAL

## 2024-12-12 ENCOUNTER — APPOINTMENT (OUTPATIENT)
Dept: RADIOLOGY | Age: 69
End: 2024-12-12
Payer: COMMERCIAL

## 2024-12-12 VITALS
WEIGHT: 147 LBS | DIASTOLIC BLOOD PRESSURE: 80 MMHG | HEIGHT: 61 IN | SYSTOLIC BLOOD PRESSURE: 169 MMHG | HEART RATE: 80 BPM | BODY MASS INDEX: 27.75 KG/M2

## 2024-12-12 DIAGNOSIS — M25.841 CALCIFICATION OF HAND JOINT, RIGHT: ICD-10-CM

## 2024-12-12 DIAGNOSIS — M79.641 RIGHT HAND PAIN: Primary | ICD-10-CM

## 2024-12-12 PROCEDURE — 20600 DRAIN/INJ JOINT/BURSA W/O US: CPT | Performed by: PHYSICIAN ASSISTANT

## 2024-12-12 PROCEDURE — 73110 X-RAY EXAM OF WRIST: CPT

## 2024-12-12 PROCEDURE — 99213 OFFICE O/P EST LOW 20 MIN: CPT | Performed by: PHYSICIAN ASSISTANT

## 2024-12-12 RX ORDER — LIDOCAINE HYDROCHLORIDE 10 MG/ML
1 INJECTION, SOLUTION INFILTRATION; PERINEURAL
Status: COMPLETED | OUTPATIENT
Start: 2024-12-12 | End: 2024-12-12

## 2024-12-12 RX ORDER — TRIAMCINOLONE ACETONIDE 40 MG/ML
20 INJECTION, SUSPENSION INTRA-ARTICULAR; INTRAMUSCULAR
Status: COMPLETED | OUTPATIENT
Start: 2024-12-12 | End: 2024-12-12

## 2024-12-12 RX ADMIN — LIDOCAINE HYDROCHLORIDE 1 ML: 10 INJECTION, SOLUTION INFILTRATION; PERINEURAL at 15:00

## 2024-12-12 RX ADMIN — TRIAMCINOLONE ACETONIDE 20 MG: 40 INJECTION, SUSPENSION INTRA-ARTICULAR; INTRAMUSCULAR at 15:00

## 2024-12-12 NOTE — PROGRESS NOTES
Orthopaedic Surgery - Office Note  Mayra Echevarria (69 y.o. female)   : 1955   MRN: [id number]  Encounter Date: 2024    Chief Complaint   Patient presents with    Right Hand - Pain         Assessment/Plan  Diagnoses and all orders for this visit:    Right hand pain  -     XR wrist 3+ vw right; Future    Calcification of hand joint, right    Other orders  -     Hand/upper extremity injection    The diagnosis as well as treatment options were reviewed with the patient in the office today.  I reviewed her x-rays which did show a small area of calcification in the region of her symptoms.  I discussed with the patient a diagnostic and therapeutic cortisone injection as a treatment option.  Risks and benefits were reviewed.  Shared decision making was utilized.  She elected to undergo the procedure and prior to leaving the exam room all of her symptoms had resolved and she was unable to reproduce the pain postinjection.  She may ice this area for comfort 20 minutes on 1 hour off 3 times a day.  She may discontinue use of the brace and will return in 6 weeks for repeat evaluation at which time if she has pain symptoms that persist I would recommend an MRI for further characterization of calcification seen on x-ray.  All question concerns were answered in the office today     Return for Recheck in 6 weeks with myself.        History of Present Illness  This is a new patient with right hand pain.  Patient reports the pain started several weeks ago without any known trauma.  The pain is located on the palmar aspect of the ulnar side of the right hand.  She reports it is symptomatic with gripping activities.  She has not had any treatment other than a brace which has not significantly helped.  She has not had problems like this in the past.  She denies any paresthesias.    Review of Systems  Pertinent items are noted in HPI.  All other systems were reviewed and are negative.    Physical Exam  /80 (BP  "Location: Left arm, Patient Position: Standing, Cuff Size: Standard)   Pulse 80   Ht 5' 1\" (1.549 m)   Wt 66.7 kg (147 lb)   LMP  (LMP Unknown)   BMI 27.78 kg/m²   Cons: Appears well.  No apparent distress.  Psych: Alert. Oriented x3.  Mood and affect normal.    Patient's right hand is without skin breakdown lesion or signs of infection.  She is tender to palpation on the palmar aspect of the hamate region.  She is nontender throughout palpation of the rest of the hand and wrist.  She is nontender at the TFCC, ulnar styloid, DRUJ, and distal radius.  She has no CMC joint tenderness and a negative Finklestein's test.  Distal radial and ulnar pulses are +2.  She has full active and passive range of motion of the right wrist.  She has a negative Finklestein's test.  She has a negative Tinel's at the cubital tunnel              Studies Reviewed  Independent review of the x-rays performed in the office today of the right wrist show no acute fractures or dislocations.  Mild CMC joint arthritis is seen.  Small calcification in the region of the hamate is noted.  X-rays were reviewed from orthopedic standpoint will await official radiologist interpretation    Hand/upper extremity injection  Universal Protocol:  Consent: Verbal consent obtained.  Risks and benefits: risks, benefits and alternatives were discussed  Consent given by: patient  Time out: Immediately prior to procedure a \"time out\" was called to verify the correct patient, procedure, equipment, support staff and site/side marked as required.  Patient understanding: patient states understanding of the procedure being performed  Patient consent: the patient's understanding of the procedure matches consent given  Relevant documents: relevant documents present and verified  Test results: test results available and properly labeled  Site marked: the operative site was marked  Radiology Images displayed and confirmed. If images not available, report reviewed: " imaging studies available  Patient identity confirmed: verbally with patient  Supporting Documentation  Indications: pain   Procedure Details  Condition comment: right hamate calcification   Preparation: Patient was prepped and draped in the usual sterile fashion  Medications administered: 1 mL lidocaine 1 %; 20 mg triamcinolone acetonide 40 mg/mL  Patient tolerance: patient tolerated the procedure well with no immediate complications  Dressing:  Sterile dressing applied           Medical, Surgical, Family, and Social History  The patient's medical history, family history, and social history, were reviewed and updated as appropriate.    Past Medical History:   Diagnosis Date    Allergic years ago    Iodine    Chronic leukopenia     Depression 04/22/2014    Hypercalcemia     Hyperlipidemia     Hypertension     labile    Hypertension     Migraines     Osteoarthritis     Osteoporosis     Vertigo        Past Surgical History:   Procedure Laterality Date    EYE SURGERY      TOOTH EXTRACTION      TUBAL LIGATION         Family History   Problem Relation Age of Onset    Colon cancer Mother 51    Breast cancer Mother 53    Cancer Mother         two primary sites  breast and colon    Heart attack Father     Prostate cancer Father 70    Hypertension Father     Heart attack Sister     No Known Problems Sister     No Known Problems Daughter     No Known Problems Daughter     No Known Problems Maternal Grandmother     No Known Problems Maternal Grandfather     No Known Problems Paternal Grandmother     No Known Problems Paternal Grandfather     Stroke Brother     No Known Problems Son     No Known Problems Maternal Aunt     No Known Problems Maternal Aunt     No Known Problems Maternal Aunt        Social History     Occupational History    Not on file   Tobacco Use    Smoking status: Never     Passive exposure: Never    Smokeless tobacco: Never   Vaping Use    Vaping status: Never Used   Substance and Sexual Activity    Alcohol  use: No    Drug use: No    Sexual activity: Yes     Partners: Male     Birth control/protection: Female Sterilization       Allergies   Allergen Reactions    Iodine - Food Allergy Rash         Current Outpatient Medications:     amLODIPine (NORVASC) 5 mg tablet, TAKE 1 TABLET DAILY, Disp: 90 tablet, Rfl: 1    calcium carbonate (Calcium 600) 600 MG tablet, Take 600 mg by mouth 2 (two) times a day with meals, Disp: , Rfl:     fenofibrate (TRICOR) 145 mg tablet, TAKE 1 TABLET DAILY, Disp: 90 tablet, Rfl: 0    Lactobacillus (Acidophilus Probiotic) CAPS, Take by mouth, Disp: , Rfl:     losartan (COZAAR) 100 MG tablet, TAKE 1 TABLET DAILY, Disp: 100 tablet, Rfl: 1    meclizine (ANTIVERT) 25 mg tablet, Take 1 tablet (25 mg total) by mouth 3 (three) times a day as needed for dizziness for up to 10 days, Disp: 30 tablet, Rfl: 0    methylPREDNISolone 4 MG tablet therapy pack, Use as directed on package, Disp: 21 tablet, Rfl: 0    multivitamin (THERAGRAN) TABS, Take 1 tablet by mouth daily, Disp: , Rfl:     nortriptyline (PAMELOR) 10 mg capsule, TAKE 1 CAPSULE THREE TIMES A DAY, Disp: 270 capsule, Rfl: 1    TURMERIC CURCUMIN PO, Take by mouth, Disp: , Rfl:     Current Facility-Administered Medications:     denosumab (PROLIA) subcutaneous injection 60 mg, 60 mg, Subcutaneous, Once, Mee Centeno PA-C    denosumab (PROLIA) subcutaneous injection 60 mg, 60 mg, Subcutaneous, Once,       Josse Tyler PA-C

## 2024-12-16 ENCOUNTER — RESULTS FOLLOW-UP (OUTPATIENT)
Dept: SLEEP CENTER | Facility: CLINIC | Age: 69
End: 2024-12-16

## 2024-12-20 NOTE — TELEPHONE ENCOUNTER
Diagnostic study resulted, does not confirm MATT.    Patient to follow-up with BLANCA Moreno.    Call placed to patient, left call back message.    [employer] message sent providing results, recommendations and instructions.

## 2024-12-23 DIAGNOSIS — E78.00 HYPERCHOLESTEROLEMIA: ICD-10-CM

## 2024-12-24 RX ORDER — FENOFIBRATE 145 MG/1
145 TABLET, COATED ORAL DAILY
Qty: 90 TABLET | Refills: 1 | Status: ON HOLD | OUTPATIENT
Start: 2024-12-24

## 2024-12-26 ENCOUNTER — APPOINTMENT (EMERGENCY)
Dept: RADIOLOGY | Facility: HOSPITAL | Age: 69
DRG: 064 | End: 2024-12-26
Payer: COMMERCIAL

## 2024-12-26 ENCOUNTER — HOSPITAL ENCOUNTER (INPATIENT)
Facility: HOSPITAL | Age: 69
LOS: 4 days | Discharge: HOME | DRG: 064 | End: 2024-12-30
Attending: EMERGENCY MEDICINE | Admitting: STUDENT IN AN ORGANIZED HEALTH CARE EDUCATION/TRAINING PROGRAM
Payer: COMMERCIAL

## 2024-12-26 ENCOUNTER — NURSE TRIAGE (OUTPATIENT)
Age: 69
End: 2024-12-26

## 2024-12-26 DIAGNOSIS — I63.9 ACUTE CVA (CEREBROVASCULAR ACCIDENT) (HCC): ICD-10-CM

## 2024-12-26 DIAGNOSIS — F32.A DEPRESSION, UNSPECIFIED DEPRESSION TYPE: ICD-10-CM

## 2024-12-26 DIAGNOSIS — R42 DIZZINESS: Primary | ICD-10-CM

## 2024-12-26 DIAGNOSIS — I63.9 CVA (CEREBRAL VASCULAR ACCIDENT) (HCC): ICD-10-CM

## 2024-12-26 DIAGNOSIS — R41.0 CONFUSION: ICD-10-CM

## 2024-12-26 DIAGNOSIS — R26.2 AMBULATORY DYSFUNCTION: ICD-10-CM

## 2024-12-26 PROBLEM — R41.82 AMS (ALTERED MENTAL STATUS): Status: ACTIVE | Noted: 2024-12-26

## 2024-12-26 LAB
2HR DELTA HS TROPONIN: 3 NG/L
4HR DELTA HS TROPONIN: 3 NG/L
ALBUMIN SERPL BCG-MCNC: 4.3 G/DL (ref 3.5–5)
ALP SERPL-CCNC: 23 U/L (ref 34–104)
ALT SERPL W P-5'-P-CCNC: 22 U/L (ref 7–52)
ANION GAP SERPL CALCULATED.3IONS-SCNC: 9 MMOL/L (ref 4–13)
AST SERPL W P-5'-P-CCNC: 32 U/L (ref 13–39)
BACTERIA UR QL AUTO: NORMAL /HPF
BASOPHILS # BLD AUTO: 0.05 THOUSANDS/ÂΜL (ref 0–0.1)
BASOPHILS NFR BLD AUTO: 1 % (ref 0–1)
BILIRUB SERPL-MCNC: 0.47 MG/DL (ref 0.2–1)
BILIRUB UR QL STRIP: NEGATIVE
BUN SERPL-MCNC: 25 MG/DL (ref 5–25)
CALCIUM SERPL-MCNC: 9.7 MG/DL (ref 8.4–10.2)
CARDIAC TROPONIN I PNL SERPL HS: 12 NG/L (ref ?–50)
CARDIAC TROPONIN I PNL SERPL HS: 15 NG/L (ref ?–50)
CARDIAC TROPONIN I PNL SERPL HS: 15 NG/L (ref ?–50)
CHLORIDE SERPL-SCNC: 106 MMOL/L (ref 96–108)
CLARITY UR: CLEAR
CO2 SERPL-SCNC: 24 MMOL/L (ref 21–32)
COLOR UR: ABNORMAL
CREAT SERPL-MCNC: 0.89 MG/DL (ref 0.6–1.3)
EOSINOPHIL # BLD AUTO: 0.25 THOUSAND/ÂΜL (ref 0–0.61)
EOSINOPHIL NFR BLD AUTO: 4 % (ref 0–6)
ERYTHROCYTE [DISTWIDTH] IN BLOOD BY AUTOMATED COUNT: 14.6 % (ref 11.6–15.1)
GFR SERPL CREATININE-BSD FRML MDRD: 65 ML/MIN/1.73SQ M
GLUCOSE SERPL-MCNC: 118 MG/DL (ref 65–140)
GLUCOSE UR STRIP-MCNC: NEGATIVE MG/DL
HCT VFR BLD AUTO: 41.8 % (ref 34.8–46.1)
HGB BLD-MCNC: 13.3 G/DL (ref 11.5–15.4)
HGB UR QL STRIP.AUTO: NEGATIVE
IMM GRANULOCYTES # BLD AUTO: 0.04 THOUSAND/UL (ref 0–0.2)
IMM GRANULOCYTES NFR BLD AUTO: 1 % (ref 0–2)
KETONES UR STRIP-MCNC: ABNORMAL MG/DL
LEUKOCYTE ESTERASE UR QL STRIP: NEGATIVE
LYMPHOCYTES # BLD AUTO: 0.92 THOUSANDS/ÂΜL (ref 0.6–4.47)
LYMPHOCYTES NFR BLD AUTO: 13 % (ref 14–44)
MCH RBC QN AUTO: 26.9 PG (ref 26.8–34.3)
MCHC RBC AUTO-ENTMCNC: 31.8 G/DL (ref 31.4–37.4)
MCV RBC AUTO: 85 FL (ref 82–98)
MONOCYTES # BLD AUTO: 0.48 THOUSAND/ÂΜL (ref 0.17–1.22)
MONOCYTES NFR BLD AUTO: 7 % (ref 4–12)
NEUTROPHILS # BLD AUTO: 5.3 THOUSANDS/ÂΜL (ref 1.85–7.62)
NEUTS SEG NFR BLD AUTO: 74 % (ref 43–75)
NITRITE UR QL STRIP: NEGATIVE
NON-SQ EPI CELLS URNS QL MICRO: NORMAL /HPF
NRBC BLD AUTO-RTO: 0 /100 WBCS
PH UR STRIP.AUTO: 7.5 [PH]
PLATELET # BLD AUTO: 424 THOUSANDS/UL (ref 149–390)
PMV BLD AUTO: 10.2 FL (ref 8.9–12.7)
POTASSIUM SERPL-SCNC: 3.8 MMOL/L (ref 3.5–5.3)
PROT SERPL-MCNC: 7.1 G/DL (ref 6.4–8.4)
PROT UR STRIP-MCNC: ABNORMAL MG/DL
RBC # BLD AUTO: 4.94 MILLION/UL (ref 3.81–5.12)
RBC #/AREA URNS AUTO: NORMAL /HPF
SODIUM SERPL-SCNC: 139 MMOL/L (ref 135–147)
SP GR UR STRIP.AUTO: >=1.05 (ref 1–1.03)
TSH SERPL DL<=0.05 MIU/L-ACNC: 0.6 UIU/ML (ref 0.45–4.5)
UROBILINOGEN UR STRIP-ACNC: <2 MG/DL
VIT B12 SERPL-MCNC: 666 PG/ML (ref 180–914)
WBC # BLD AUTO: 7.04 THOUSAND/UL (ref 4.31–10.16)
WBC #/AREA URNS AUTO: NORMAL /HPF

## 2024-12-26 PROCEDURE — 93005 ELECTROCARDIOGRAM TRACING: CPT

## 2024-12-26 PROCEDURE — 36415 COLL VENOUS BLD VENIPUNCTURE: CPT

## 2024-12-26 PROCEDURE — 99223 1ST HOSP IP/OBS HIGH 75: CPT | Performed by: STUDENT IN AN ORGANIZED HEALTH CARE EDUCATION/TRAINING PROGRAM

## 2024-12-26 PROCEDURE — 99285 EMERGENCY DEPT VISIT HI MDM: CPT | Performed by: EMERGENCY MEDICINE

## 2024-12-26 PROCEDURE — 99285 EMERGENCY DEPT VISIT HI MDM: CPT

## 2024-12-26 PROCEDURE — 85025 COMPLETE CBC W/AUTO DIFF WBC: CPT

## 2024-12-26 PROCEDURE — 70496 CT ANGIOGRAPHY HEAD: CPT

## 2024-12-26 PROCEDURE — 70498 CT ANGIOGRAPHY NECK: CPT

## 2024-12-26 PROCEDURE — 71046 X-RAY EXAM CHEST 2 VIEWS: CPT

## 2024-12-26 PROCEDURE — 82607 VITAMIN B-12: CPT | Performed by: STUDENT IN AN ORGANIZED HEALTH CARE EDUCATION/TRAINING PROGRAM

## 2024-12-26 PROCEDURE — 81001 URINALYSIS AUTO W/SCOPE: CPT

## 2024-12-26 PROCEDURE — 80053 COMPREHEN METABOLIC PANEL: CPT

## 2024-12-26 PROCEDURE — 84443 ASSAY THYROID STIM HORMONE: CPT | Performed by: STUDENT IN AN ORGANIZED HEALTH CARE EDUCATION/TRAINING PROGRAM

## 2024-12-26 PROCEDURE — 84484 ASSAY OF TROPONIN QUANT: CPT

## 2024-12-26 RX ORDER — FENOFIBRATE 145 MG/1
145 TABLET, COATED ORAL DAILY
Status: DISCONTINUED | OUTPATIENT
Start: 2024-12-27 | End: 2024-12-30 | Stop reason: HOSPADM

## 2024-12-26 RX ORDER — LOSARTAN POTASSIUM 50 MG/1
100 TABLET ORAL ONCE
Status: DISCONTINUED | OUTPATIENT
Start: 2024-12-26 | End: 2024-12-26

## 2024-12-26 RX ORDER — LOSARTAN POTASSIUM 50 MG/1
100 TABLET ORAL DAILY
Status: DISCONTINUED | OUTPATIENT
Start: 2024-12-27 | End: 2024-12-30 | Stop reason: HOSPADM

## 2024-12-26 RX ORDER — SODIUM CHLORIDE, SODIUM GLUCONATE, SODIUM ACETATE, POTASSIUM CHLORIDE, MAGNESIUM CHLORIDE, SODIUM PHOSPHATE, DIBASIC, AND POTASSIUM PHOSPHATE .53; .5; .37; .037; .03; .012; .00082 G/100ML; G/100ML; G/100ML; G/100ML; G/100ML; G/100ML; G/100ML
100 INJECTION, SOLUTION INTRAVENOUS CONTINUOUS
Status: DISPENSED | OUTPATIENT
Start: 2024-12-26 | End: 2024-12-27

## 2024-12-26 RX ORDER — MECLIZINE HYDROCHLORIDE 25 MG/1
25 TABLET ORAL EVERY 8 HOURS PRN
Status: DISCONTINUED | OUTPATIENT
Start: 2024-12-26 | End: 2024-12-30 | Stop reason: HOSPADM

## 2024-12-26 RX ORDER — NORTRIPTYLINE HYDROCHLORIDE 10 MG/1
30 CAPSULE ORAL
Status: DISCONTINUED | OUTPATIENT
Start: 2024-12-26 | End: 2024-12-30 | Stop reason: HOSPADM

## 2024-12-26 RX ORDER — AMLODIPINE BESYLATE 5 MG/1
5 TABLET ORAL ONCE
Status: COMPLETED | OUTPATIENT
Start: 2024-12-26 | End: 2024-12-26

## 2024-12-26 RX ORDER — AMLODIPINE BESYLATE 5 MG/1
5 TABLET ORAL DAILY
Status: DISCONTINUED | OUTPATIENT
Start: 2024-12-27 | End: 2024-12-30 | Stop reason: HOSPADM

## 2024-12-26 RX ORDER — ENOXAPARIN SODIUM 100 MG/ML
40 INJECTION SUBCUTANEOUS DAILY
Status: DISCONTINUED | OUTPATIENT
Start: 2024-12-27 | End: 2024-12-30 | Stop reason: HOSPADM

## 2024-12-26 RX ADMIN — NORTRIPTYLINE HYDROCHLORIDE 30 MG: 10 CAPSULE ORAL at 22:29

## 2024-12-26 RX ADMIN — AMLODIPINE BESYLATE 5 MG: 5 TABLET ORAL at 15:37

## 2024-12-26 RX ADMIN — SODIUM CHLORIDE, SODIUM GLUCONATE, SODIUM ACETATE, POTASSIUM CHLORIDE, MAGNESIUM CHLORIDE, SODIUM PHOSPHATE, DIBASIC, AND POTASSIUM PHOSPHATE 100 ML/HR: .53; .5; .37; .037; .03; .012; .00082 INJECTION, SOLUTION INTRAVENOUS at 21:45

## 2024-12-26 RX ADMIN — IOHEXOL 135 ML: 350 INJECTION, SOLUTION INTRAVENOUS at 15:08

## 2024-12-26 NOTE — ED ATTENDING ATTESTATION
12/26/2024  I, Josse Caban DO, saw and evaluated the patient. I have discussed the patient with the resident/non-physician practitioner and agree with the resident's/non-physician practitioner's findings, Plan of Care, and MDM as documented in the resident's/non-physician practitioner's note, except where noted. All available labs and Radiology studies were reviewed.  I was present for key portions of any procedure(s) performed by the resident/non-physician practitioner and I was immediately available to provide assistance.       At this point I agree with the current assessment done in the Emergency Department.  I have conducted an independent evaluation of this patient a history and physical is as follows:    69 yof with vertigo. Confusion. Hx vertigo but seems worse  This AM,  reports patient is more confused. Is being worked up for dementia?   No recent fevers, n/v/d. Does c/o generalized weakness. Ambulatory dysfunction.   Questionable ataxia on exam with finger to nose, unsteady on feet but able to use walker Other exam grossly normal    A/P: vertigo  Diff dx cva, tia, worsening dementia, at risk for uti  Plan labs, cta, possibly admit    ED Course         Critical Care Time  Procedures

## 2024-12-26 NOTE — TELEPHONE ENCOUNTER
Regarding: confusion, dizziness unsteady on feet  ----- Message from Irena FARR sent at 12/26/2024  9:13 AM EST -----   called, pt having confusion, dizziness, disoriented and difficulty walking since yesterday.  Attempted to warm transfer to nurse line, not available.

## 2024-12-26 NOTE — ED PROVIDER NOTES
Time reflects when diagnosis was documented in both MDM as applicable and the Disposition within this note       Time User Action Codes Description Comment    12/26/2024  4:42 PM Segundo Curran Add [R42] Dizziness     12/26/2024  4:42 PM Segundo Curran Add [R41.0] Confusion     12/27/2024  4:39 PM Marika Osorio Add [I63.9] CVA (cerebral vascular accident) (HCC)           ED Disposition       ED Disposition   Admit    Condition   Stable    Date/Time   Thu Dec 26, 2024  5:00 PM    Comment   --             Assessment & Plan       Medical Decision Making  69-year-old female history of hypertension, hyperlipidemia, and vertigo presenting today for evaluation of dizziness and altered mental status.  Patient's  at baseline states that patient yesterday morning around 6 AM was complaining of severe vertigo with imaging it difficult for her to walk.  Was given medication that was previously prescribed for vertigo but they cannot over the name of.  Today, patient with increasing confusion, repetitive questioning.  Per patient's , she has been worked up for dementia outpatient.  She is never been evaluated for prior episodes of vertigo, per chart review patient is prescribed meclizine.    Differential: Stroke, TIA, UTI, electrolyte abnormality, polypharmacy, dementia.    Plan: CBC, CMP, troponin, UA, EKG, CTA head and neck, chest x-ray.  Will not stroke alert since last known well was more than 24 hours ago.  Will likely need to be admitted for ambulatory dysfunction and further evaluation of vertigo.    On review of patient's labs, there are no significant abnormalities.  Slightly elevated troponin but normal delta.  EKG is nonischemic with no signs of arrhythmia or interval abnormalities.  Chest x-ray on my tradition shows no acute cardiopulmonary maladies.  CTA head and neck is overall unrevealing for any etiology for patient's symptoms.  Given patient is still having persistent confusion and new onset  amatory dysfunction without a clear etiology, will plan to admit for further evaluation.  I discussed the case with the hospitalist who agrees to admit the patient.    Amount and/or Complexity of Data Reviewed  Labs: ordered. Decision-making details documented in ED Course.  Radiology: ordered and independent interpretation performed.    Risk  Prescription drug management.  Decision regarding hospitalization.        ED Course as of 12/28/24 2005   Thu Dec 26, 2024   1148 hs TnI 0hr: 12       Medications   amLODIPine (NORVASC) tablet 5 mg (has no administration in time range)   fenofibrate (TRICOR) tablet 145 mg (has no administration in time range)   losartan (COZAAR) tablet 100 mg (has no administration in time range)   meclizine (ANTIVERT) tablet 25 mg (has no administration in time range)   nortriptyline (PAMELOR) capsule 30 mg (has no administration in time range)   enoxaparin (LOVENOX) subcutaneous injection 40 mg (has no administration in time range)   multi-electrolyte (PLASMALYTE-A/ISOLYTE-S PH 7.4) IV solution (has no administration in time range)   iohexol (OMNIPAQUE) 350 MG/ML injection (SINGLE-DOSE) 135 mL (135 mL Intravenous Given 12/26/24 1508)   amLODIPine (NORVASC) tablet 5 mg (5 mg Oral Given 12/26/24 1537)       ED Risk Strat Scores                                              History of Present Illness       Chief Complaint   Patient presents with    Dizziness     Per , patient has been dizzy since yesterday. H/o veritgo, but per  this is worse. Patient reports she doesn't remember waking up yesterday. Off balance and weak today. Last known well was two days ago. Per , patient is being tested for recent memory loss.        Past Medical History:   Diagnosis Date    Allergic years ago    Iodine    Chronic leukopenia     Depression 04/22/2014    Hypercalcemia     Hyperlipidemia     Hypertension     labile    Hypertension     Migraines     Osteoarthritis     Osteoporosis      "Vertigo       Past Surgical History:   Procedure Laterality Date    EYE SURGERY      TOOTH EXTRACTION      TUBAL LIGATION        Family History   Problem Relation Age of Onset    Colon cancer Mother 51    Breast cancer Mother 53    Cancer Mother         two primary sites  breast and colon    Heart attack Father     Prostate cancer Father 70    Hypertension Father     Heart attack Sister     No Known Problems Sister     No Known Problems Daughter     No Known Problems Daughter     No Known Problems Maternal Grandmother     No Known Problems Maternal Grandfather     No Known Problems Paternal Grandmother     No Known Problems Paternal Grandfather     Stroke Brother     No Known Problems Son     No Known Problems Maternal Aunt     No Known Problems Maternal Aunt     No Known Problems Maternal Aunt       Social History     Tobacco Use    Smoking status: Never     Passive exposure: Never    Smokeless tobacco: Never   Vaping Use    Vaping status: Never Used   Substance Use Topics    Alcohol use: No    Drug use: No      E-Cigarette/Vaping    E-Cigarette Use Never User       E-Cigarette/Vaping Substances    Nicotine No     THC No     CBD No     Flavoring No     Other No     Unknown No       I have reviewed and agree with the history as documented.     Patient is a 69-year-old female with history of hypertension, hyperlipidemia, and vertigo presents today for evaluation of dizziness.  Patient is brought in by her  who aids in history.  He states that yesterday patient woke him up around 6 AM complaining of sudden onset vertigo.  He states that she does get episodic vertigo which is \"very bad\" but has never been worked up for it in the past.  He states that he gave her a dose of her vertigo medicine which did not really seem to help with the symptoms.  She has been having persistent difficulties walking since the onset of the her vertigo.  She does have today she is not having vertigo but does feel like she has " generalized weakness.  Patient's  is also concerned because this morning she had increasing confusion.  States that she was repetitively questioning him about things that she should know about like if her children were still home.  Denies any headaches, vision changes, chest pain, palpitations, shortness breath.  Patient is being worked up outpatient for dementia per .  Patient on my evaluation cannot member events from yesterday, is alert to self and place but not to time, but does understand situation.        Review of Systems   Constitutional:  Negative for chills and fever.   HENT:  Negative for congestion, rhinorrhea and sore throat.    Eyes:  Negative for pain and visual disturbance.   Respiratory:  Negative for cough and shortness of breath.    Cardiovascular:  Negative for chest pain and palpitations.   Gastrointestinal:  Negative for abdominal pain, constipation, diarrhea, nausea and vomiting.   Genitourinary:  Negative for dysuria and hematuria.   Musculoskeletal:  Negative for back pain and neck pain.   Skin:  Negative for color change and rash.   Neurological:  Positive for dizziness. Negative for weakness and numbness.   Psychiatric/Behavioral:  Positive for confusion.    All other systems reviewed and are negative.          Objective       ED Triage Vitals   Temperature Pulse Blood Pressure Respirations SpO2 Patient Position - Orthostatic VS   12/26/24 1519 12/26/24 1033 12/26/24 1033 12/26/24 1033 12/26/24 1033 12/26/24 1033   98.4 °F (36.9 °C) 71 (!) 174/78 20 99 % Sitting      Temp Source Heart Rate Source BP Location FiO2 (%) Pain Score    12/26/24 1519 12/26/24 1033 12/26/24 1033 -- 12/26/24 1033    Oral Monitor Left arm  No Pain      Vitals      Date and Time Temp Pulse SpO2 Resp BP Pain Score FACES Pain Rating User   12/28/24 1547 -- -- -- -- 158/78 -- -- MC   12/28/24 1537 -- -- -- 18 -- -- -- IN   12/28/24 1537 97.8 °F (36.6 °C) 85 93 % -- 183/85 -- -- DII   12/28/24 1523 -- 99  94 % -- 182/86 -- -- DII   12/28/24 1322 -- 82 93 % -- 168/77 -- -- DII   12/28/24 1106 -- -- -- -- -- 2 -- AR   12/28/24 1106 -- -- -- 16 -- -- -- WA   12/28/24 1106 98 °F (36.7 °C) 77 97 % -- 157/72 -- -- DII   12/28/24 0753 97.9 °F (36.6 °C) 75 91 % 16 150/65 -- -- DII   12/28/24 0715 -- 75 90 % -- 152/62 -- -- AV   12/28/24 0511 -- 70 98 % -- 158/67 -- -- DII   12/28/24 0314 -- 71 97 % -- 149/67 -- -- GR   12/28/24 0114 -- 67 96 % -- 158/70 -- -- GR   12/27/24 2315 -- 71 93 % -- 148/71 -- -- GR   12/27/24 2215 -- 71 93 % -- 148/72 -- -- GR   12/27/24 2200 -- -- 92 % -- -- -- -- GR   12/27/24 2145 98.6 °F (37 °C) 73 94 % -- 149/74 -- -- DII   12/27/24 2115 -- 82 96 % -- 148/73 -- -- GR   12/27/24 2015 -- 76 96 % -- 154/80 No Pain -- GR   12/27/24 1641 -- 80 96 % -- 176/83 -- -- JS   12/27/24 1633 98 °F (36.7 °C) 75 96 % -- 176/83 -- -- JS   12/27/24 1458 97.9 °F (36.6 °C) 76 95 % -- 170/77 -- -- DII   12/27/24 1100 -- 84 95 % -- -- -- -- WA   12/27/24 0845 -- -- -- -- -- No Pain -- KA   12/27/24 0844 -- -- -- -- -- No Pain --    12/27/24 0814 -- 77 97 % -- 150/68 -- -- DII   12/27/24 0725 98.4 °F (36.9 °C) 70 98 % -- 149/68 -- -- DII   12/27/24 0204 98.4 °F (36.9 °C) 71 95 % -- 165/63 -- -- DII   12/27/24 0059 -- 71 95 % -- 170/68 -- -- DII   12/26/24 2151 98.3 °F (36.8 °C) 75 98 % -- 174/79 -- -- DII   12/26/24 2034 98.1 °F (36.7 °C) 76 98 % -- 147/78 -- -- DII   12/26/24 2034 98.1 °F (36.7 °C) 75 -- 20 147/78 No Pain -- AK   12/26/24 1908 97.7 °F (36.5 °C) 79 99 % -- 144/80 -- -- DII   12/26/24 1834 97.7 °F (36.5 °C) 77 97 % 14 170/76 -- -- DII   12/26/24 1537 -- -- -- -- 197/81 -- -- J   12/26/24 1519 98.4 °F (36.9 °C) -- -- -- -- No Pain -- JK   12/26/24 1515 -- 70 96 % 14 215/87 -- -- JK   12/26/24 1330 -- 72 97 % 16 183/79 -- --    12/26/24 1130 -- -- -- -- -- No Pain -- JK   12/26/24 1033 -- 71 99 % 20 174/78 No Pain -- ST            Physical Exam  Vitals and nursing note reviewed.   Constitutional:        General: She is not in acute distress.     Appearance: Normal appearance. She is well-developed and normal weight. She is not ill-appearing, toxic-appearing or diaphoretic.   HENT:      Head: Normocephalic and atraumatic.      Right Ear: External ear normal.      Left Ear: External ear normal.      Nose: Nose normal. No congestion or rhinorrhea.      Mouth/Throat:      Mouth: Mucous membranes are moist.      Pharynx: Oropharynx is clear.   Eyes:      General: No scleral icterus.        Right eye: No discharge.         Left eye: No discharge.      Extraocular Movements: Extraocular movements intact.      Conjunctiva/sclera: Conjunctivae normal.      Pupils: Pupils are equal, round, and reactive to light.   Cardiovascular:      Rate and Rhythm: Normal rate and regular rhythm.      Pulses: Normal pulses.      Heart sounds: Normal heart sounds. No murmur heard.     No friction rub. No gallop.   Pulmonary:      Effort: Pulmonary effort is normal. No respiratory distress.      Breath sounds: Normal breath sounds. No stridor. No wheezing, rhonchi or rales.   Abdominal:      General: Abdomen is flat. Bowel sounds are normal. There is no distension.      Palpations: Abdomen is soft.      Tenderness: There is no abdominal tenderness. There is no guarding.   Musculoskeletal:         General: No tenderness. Normal range of motion.      Cervical back: Normal range of motion and neck supple.      Right lower leg: No edema.      Left lower leg: No edema.   Skin:     General: Skin is warm and dry.      Capillary Refill: Capillary refill takes less than 2 seconds.      Coloration: Skin is not jaundiced or pale.   Neurological:      Mental Status: She is alert. She is disoriented.      Cranial Nerves: No cranial nerve deficit.      Sensory: No sensory deficit.      Motor: No weakness.      Coordination: Coordination abnormal (Ataxia with finger-to-nose testing).      Gait: Gait normal.   Psychiatric:         Mood and Affect:  Mood normal.         Behavior: Behavior normal.         Results Reviewed       Procedure Component Value Units Date/Time    Basic metabolic panel [[id number]]  (Abnormal) Collected: 12/27/24 0652    Lab Status: Final result Specimen: Blood from Arm, Left Updated: 12/27/24 0723     Sodium 139 mmol/L      Potassium 3.4 mmol/L      Chloride 110 mmol/L      CO2 21 mmol/L      ANION GAP 8 mmol/L      BUN 20 mg/dL      Creatinine 0.68 mg/dL      Glucose 93 mg/dL      Calcium 8.7 mg/dL      eGFR 88 ml/min/1.73sq m     Narrative:      National Kid[employer] Disease Foundation guidelines for Chronic Kidney Disease (CKD):     Stage 1 with normal or high GFR (GFR > 90 mL/min/1.73 square meters)    Stage 2 Mild CKD (GFR = 60-89 mL/min/1.73 square meters)    Stage 3A Moderate CKD (GFR = 45-59 mL/min/1.73 square meters)    Stage 3B Moderate CKD (GFR = 30-44 mL/min/1.73 square meters)    Stage 4 Severe CKD (GFR = 15-29 mL/min/1.73 square meters)    Stage 5 End Stage CKD (GFR <15 mL/min/1.73 square meters)  Note: GFR calculation is accurate only with a steady state creatinine    CBC and differential [[id number]] Collected: 12/27/24 0652    Lab Status: Final result Specimen: Blood from Arm, Left Updated: 12/27/24 0713     WBC 6.91 Thousand/uL      RBC 4.28 Million/uL      Hemoglobin 11.6 g/dL      Hematocrit 36.5 %      MCV 85 fL      MCH 27.1 pg      MCHC 31.8 g/dL      RDW 14.6 %      MPV 10.0 fL      Platelets 333 Thousands/uL      nRBC 0 /100 WBCs      Segmented % 75 %      Immature Grans % 0 %      Lymphocytes % 14 %      Monocytes % 8 %      Eosinophils Relative 3 %      Basophils Relative 0 %      Absolute Neutrophils 5.15 Thousands/µL      Absolute Immature Grans 0.02 Thousand/uL      Absolute Lymphocytes 0.96 Thousands/µL      Absolute Monocytes 0.54 Thousand/µL      Eosinophils Absolute 0.21 Thousand/µL      Basophils Absolute 0.03 Thousands/µL     TSH, 3rd generation [[id number]]  (Normal) Collected: 12/26/24 1105    Lab Status:  Final result Specimen: Blood from Arm, Right Updated: 12/26/24 2313     TSH 3RD GENERATON 0.601 uIU/mL     Vitamin B12 [[id number]]  (Normal) Collected: 12/26/24 1105    Lab Status: Final result Specimen: Blood from Arm, Right Updated: 12/26/24 2313     Vitamin B-12 666 pg/mL     Urine Microscopic [[id number]]  (Normal) Collected: 12/26/24 1630    Lab Status: Final result Specimen: Urine, Clean Catch Updated: 12/26/24 1648     RBC, UA None Seen /hpf      WBC, UA None Seen /hpf      Epithelial Cells Occasional /hpf      Bacteria, UA None Seen /hpf     UA w Reflex to Microscopic w Reflex to Culture [[id number]]  (Abnormal) Collected: 12/26/24 1630    Lab Status: Final result Specimen: Urine, Clean Catch Updated: 12/26/24 1643     Color, UA Light Yellow     Clarity, UA Clear     Specific Gravity, UA >=1.050     pH, UA 7.5     Leukocytes, UA Negative     Nitrite, UA Negative     Protein, UA 50 (1+) mg/dl      Glucose, UA Negative mg/dl      Ketones, UA Trace mg/dl      Urobilinogen, UA <2.0 mg/dl      Bilirubin, UA Negative     Occult Blood, UA Negative    HS Troponin I 4hr [[id number]]  (Normal) Collected: 12/26/24 1542    Lab Status: Final result Specimen: Blood from Line, Venous Updated: 12/26/24 1613     hs TnI 4hr 15 ng/L      Delta 4hr hsTnI 3 ng/L     HS Troponin I 2hr [[id number]]  (Normal) Collected: 12/26/24 1333    Lab Status: Final result Specimen: Blood from Arm, Left Updated: 12/26/24 1406     hs TnI 2hr 15 ng/L      Delta 2hr hsTnI 3 ng/L     HS Troponin 0hr (reflex protocol) [[id number]]  (Normal) Collected: 12/26/24 1105    Lab Status: Final result Specimen: Blood from Arm, Right Updated: 12/26/24 1139     hs TnI 0hr 12 ng/L     Comprehensive metabolic panel [[id number]]  (Abnormal) Collected: 12/26/24 1105    Lab Status: Final result Specimen: Blood from Arm, Right Updated: 12/26/24 1132     Sodium 139 mmol/L      Potassium 3.8 mmol/L      Chloride 106 mmol/L      CO2 24 mmol/L      ANION GAP 9 mmol/L       BUN 25 mg/dL      Creatinine 0.89 mg/dL      Glucose 118 mg/dL      Calcium 9.7 mg/dL      AST 32 U/L      ALT 22 U/L      Alkaline Phosphatase 23 U/L      Total Protein 7.1 g/dL      Albumin 4.3 g/dL      Total Bilirubin 0.47 mg/dL      eGFR 65 ml/min/1.73sq m     Narrative:      National Kidney Disease Foundation guidelines for Chronic Kidney Disease (CKD):     Stage 1 with normal or high GFR (GFR > 90 mL/min/1.73 square meters)    Stage 2 Mild CKD (GFR = 60-89 mL/min/1.73 square meters)    Stage 3A Moderate CKD (GFR = 45-59 mL/min/1.73 square meters)    Stage 3B Moderate CKD (GFR = 30-44 mL/min/1.73 square meters)    Stage 4 Severe CKD (GFR = 15-29 mL/min/1.73 square meters)    Stage 5 End Stage CKD (GFR <15 mL/min/1.73 square meters)  Note: GFR calculation is accurate only with a steady state creatinine    CBC and differential [[id number]]  (Abnormal) Collected: 12/26/24 1105    Lab Status: Final result Specimen: Blood from Arm, Right Updated: 12/26/24 1115     WBC 7.04 Thousand/uL      RBC 4.94 Million/uL      Hemoglobin 13.3 g/dL      Hematocrit 41.8 %      MCV 85 fL      MCH 26.9 pg      MCHC 31.8 g/dL      RDW 14.6 %      MPV 10.2 fL      Platelets 424 Thousands/uL      nRBC 0 /100 WBCs      Segmented % 74 %      Immature Grans % 1 %      Lymphocytes % 13 %      Monocytes % 7 %      Eosinophils Relative 4 %      Basophils Relative 1 %      Absolute Neutrophils 5.30 Thousands/µL      Absolute Immature Grans 0.04 Thousand/uL      Absolute Lymphocytes 0.92 Thousands/µL      Absolute Monocytes 0.48 Thousand/µL      Eosinophils Absolute 0.25 Thousand/µL      Basophils Absolute 0.05 Thousands/µL             MRI brain wo contrast   Final Interpretation by Juve Villegas MD (12/27 1943)      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      Mild chronic white  matter microangiopathic changes involving both cerebral hemispheres.         I personally discussed this study with Dr. Akers on 12/27/2024 7:40 PM.            Workstation performed: [id number]         CT stroke alert brain   Final Interpretation by Juve Villegas MD (12/27 1712)      No acute intracranial abnormality.      Findings were directly discussed with James Wilkinson at approximately 5:00 p.m. on 12/27/2024.      Workstation performed: [id number]         CTA stroke alert (head/neck)   Final Interpretation by Juve Villegas MD (12/27 6702)      High-grade stenosis of a left middle cerebral artery inferior division M3 branch extending posteriorly in the left sylvian fissure as described above. No large vessel occlusion or intracranial aneurysm noted.      No hemodynamically significant stenosis, occlusion or dissection identified on CT angiogram of the neck.         Findings were directly discussed with James Wilkinson at 5:40 p.m. on 12/27/2024..      Workstation performed: [id number]         CTA head and neck with and without contrast   Final Interpretation by Melba Miguel MD (12/26 8997)      CT Brain:  No acute intracranial CT abnormality.      CT Angiography:      1.  Limited evaluation of the neck vasculature due to technique and artifacts related to motion and prior dental work, especially suboptimal evaluation of the vertebral arteries. Patent and grossly unremarkable vertebral arteries within the limits of    suboptimal image quality. No significant stenosis in the cervical carotid arteries.   2.  Patent major vessels of the [place] without high-grade stenosis.                        Workstation performed: [id number]         XR chest 2 views   ED Interpretation by Segundo Curran MD (12/26 1143)   No acute cardiopulmonary abnormalities      Final Interpretation by Ector Tovar MD (12/26 1345)      No acute cardiopulmonary disease.            Workstation performed: [id number]              Procedures    ED Medication and Procedure Management   Prior to Admission Medications   Prescriptions Last Dose Informant Patient Reported? Taking?   Lactobacillus (Acidophilus Probiotic) CAPS Past Week  Yes Yes   Sig: Take by mouth   TURMERIC CURCUMIN PO Unknown Self Yes No   Sig: Take by mouth   amLODIPine (NORVASC) 5 mg tablet 12/26/2024 Noon  No Yes   Sig: TAKE 1 TABLET DAILY   calcium carbonate (Calcium 600) 600 MG tablet Unknown  Yes No   Sig: Take 600 mg by mouth 2 (two) times a day with meals   fenofibrate (TRICOR) 145 mg tablet Past Week  No Yes   Sig: TAKE 1 TABLET DAILY   losartan (COZAAR) 100 MG tablet Past Week  No Yes   Sig: TAKE 1 TABLET DAILY   meclizine (ANTIVERT) 25 mg tablet 12/25/2024  No Yes   Sig: Take 1 tablet (25 mg total) by mouth 3 (three) times a day as needed for dizziness for up to 10 days   methylPREDNISolone 4 MG tablet therapy pack   No No   Sig: Use as directed on package   multivitamin (THERAGRAN) TABS Unknown Self Yes No   Sig: Take 1 tablet by mouth daily   nortriptyline (PAMELOR) 10 mg capsule 12/25/2024  No Yes   Sig: TAKE 1 CAPSULE THREE TIMES A DAY   Patient taking differently: Take 30 mg by mouth daily at bedtime      Facility-Administered Medications Last Administration Doses Remaining   denosumab (PROLIA) subcutaneous injection 60 mg None recorded 1   denosumab (PROLIA) subcutaneous injection 60 mg None recorded 1        Current Discharge Medication List        CONTINUE these medications which have NOT CHANGED    Details   amLODIPine (NORVASC) 5 mg tablet TAKE 1 TABLET DAILY  Qty: 90 tablet, Refills: 1    Associated Diagnoses: Essential hypertension      fenofibrate (TRICOR) 145 mg tablet TAKE 1 TABLET DAILY  Qty: 90 tablet, Refills: 1    Associated Diagnoses: Hypercholesterolemia      Lactobacillus (Acidophilus Probiotic) CAPS Take by mouth      losartan (COZAAR) 100 MG tablet TAKE 1 TABLET DAILY  Qty: 100 tablet, Refills: 1    Associated Diagnoses: Essential  hypertension      meclizine (ANTIVERT) 25 mg tablet Take 1 tablet (25 mg total) by mouth 3 (three) times a day as needed for dizziness for up to 10 days  Qty: 30 tablet, Refills: 0    Associated Diagnoses: Eustachian tube dysfunction, bilateral      nortriptyline (PAMELOR) 10 mg capsule TAKE 1 CAPSULE THREE TIMES A DAY  Qty: 270 capsule, Refills: 1    Associated Diagnoses: Depression, unspecified depression type      calcium carbonate (Calcium 600) 600 MG tablet Take 600 mg by mouth 2 (two) times a day with meals      methylPREDNISolone 4 MG tablet therapy pack Use as directed on package  Qty: 21 tablet, Refills: 0    Associated Diagnoses: Trochanteric bursitis of right hip      multivitamin (THERAGRAN) TABS Take 1 tablet by mouth daily      TURMERIC CURCUMIN PO Take by mouth             ED SEPSIS DOCUMENTATION   Time reflects when diagnosis was documented in both MDM as applicable and the Disposition within this note       Time User Action Codes Description Comment    12/26/2024  4:42 PM Segundo Curran Add [R42] Dizziness     12/26/2024  4:42 PM Seugndo Curran Add [R41.0] Confusion     12/27/2024  4:39 PM Marika Osorio [I63.9] CVA (cerebral vascular accident) ([facility])                  Segundo Curran MD  12/28/24 2005

## 2024-12-26 NOTE — DISCHARGE INSTRUCTIONS
Please take *** as prescribed.  Please schedule a follow-up appointment with your family doctor to be reevaluated soon as possible for your symptoms.  Please follow-up with physical therapy, a referral is in place.    Please return if you develop any new or concerning symptoms including increasing confusion, difficulty walking, or high fevers.

## 2024-12-26 NOTE — TELEPHONE ENCOUNTER
" Bill called in with concerns that since 12/25 patient has been dizzy/lightheaded with increased confusion and weakness. Patient does have hx of vertigo as well as confusion at baseline but Bill reports both are worse and it \"seems different\". Bill having to assist patient with getting up and ambulating as well as dressing. Poor PO intake,. Bill denies any other symptoms for patient (facial droop, one sided weakness, vomiting, diarrhea, urinary frequency, fever, etc.). RN advised  that patient needs to go to ED now. Bill verbalized understanding and will take her. RN did offer to call 911 but  declined.     Reason for Disposition   SEVERE dizziness (e.g., unable to stand, requires support to walk, feels like passing out now)    Answer Assessment - Initial Assessment Questions  1. DESCRIPTION: \"Describe your dizziness.\"      Severe, has hx of vertigo,  states this is worse than normal,   2. LIGHTHEADED: \"Do you feel lightheaded?\" (e.g., somewhat faint, woozy, weak upon standing)      Yes, according to  patient could barely get out of bed and is very weak  3. VERTIGO: \"Do you feel like either you or the room is spinning or tilting?\" (i.e., vertigo)      Yes, has vertigo takes meclizine for that,  states this seems worse than any episode  4. SEVERITY: \"How bad is it?\"  \"Do you feel like you are going to faint?\" \"Can you stand and walk?\"      Severe dizziness, head spinning 5. ONSET:  \"When did the dizziness begin?\"      12/25/24   6. AGGRAVATING FACTORS: \"Does anything make it worse?\" (e.g., standing, change in head position)      Dizziness is constant   7. HEART RATE: \"Can you tell me your heart rate?\" \"How many beats in 15 seconds?\"  (Note: Not all patients can do this.)        unable  8. CAUSE: \"What do you think is causing the dizziness?\" (e.g., decreased fluids or food, diarrhea, emotional distress, heat exposure, new medicine, sudden standing, vomiting; unknown)      " "Unsure   9. RECURRENT SYMPTOM: \"Have you had dizziness before?\" If Yes, ask: \"When was the last time?\" \"What happened that time?\"      Has hx of vertigo but  reports this seems diff than that  10. OTHER SYMPTOMS: \"Do you have any other symptoms?\" (e.g., fever, chest pain, vomiting, diarrhea, bleeding)         reports at baseline patient is confused but confusion is worse as is the dizziness, patient requiring assistance to walk and dress  11. PREGNANCY: \"Is there any chance you are pregnant?\" \"When was your last menstrual period?\"        na    Protocols used: Dizziness-Adult-[place]    "

## 2024-12-27 ENCOUNTER — VBI (OUTPATIENT)
Dept: ADMINISTRATIVE | Facility: OTHER | Age: 69
End: 2024-12-27

## 2024-12-27 ENCOUNTER — APPOINTMENT (INPATIENT)
Dept: RADIOLOGY | Facility: HOSPITAL | Age: 69
DRG: 064 | End: 2024-12-27
Payer: COMMERCIAL

## 2024-12-27 LAB
ANION GAP SERPL CALCULATED.3IONS-SCNC: 8 MMOL/L (ref 4–13)
ANION GAP SERPL CALCULATED.3IONS-SCNC: 8 MMOL/L (ref 4–13)
ATRIAL RATE: 66 BPM
BASOPHILS # BLD AUTO: 0.03 THOUSANDS/ÂΜL (ref 0–0.1)
BASOPHILS NFR BLD AUTO: 0 % (ref 0–1)
BUN SERPL-MCNC: 16 MG/DL (ref 5–25)
BUN SERPL-MCNC: 20 MG/DL (ref 5–25)
CALCIUM SERPL-MCNC: 8.7 MG/DL (ref 8.4–10.2)
CALCIUM SERPL-MCNC: 9.5 MG/DL (ref 8.4–10.2)
CARDIAC TROPONIN I PNL SERPL HS: 16 NG/L (ref ?–50)
CHLORIDE SERPL-SCNC: 107 MMOL/L (ref 96–108)
CHLORIDE SERPL-SCNC: 110 MMOL/L (ref 96–108)
CO2 SERPL-SCNC: 21 MMOL/L (ref 21–32)
CO2 SERPL-SCNC: 23 MMOL/L (ref 21–32)
CREAT SERPL-MCNC: 0.68 MG/DL (ref 0.6–1.3)
CREAT SERPL-MCNC: 0.82 MG/DL (ref 0.6–1.3)
EOSINOPHIL # BLD AUTO: 0.21 THOUSAND/ÂΜL (ref 0–0.61)
EOSINOPHIL NFR BLD AUTO: 3 % (ref 0–6)
ERYTHROCYTE [DISTWIDTH] IN BLOOD BY AUTOMATED COUNT: 14.6 % (ref 11.6–15.1)
EST. AVERAGE GLUCOSE BLD GHB EST-MCNC: 128 MG/DL
GFR SERPL CREATININE-BSD FRML MDRD: 72 ML/MIN/1.73SQ M
GFR SERPL CREATININE-BSD FRML MDRD: 88 ML/MIN/1.73SQ M
GLUCOSE SERPL-MCNC: 112 MG/DL (ref 65–140)
GLUCOSE SERPL-MCNC: 93 MG/DL (ref 65–140)
GLUCOSE SERPL-MCNC: 97 MG/DL (ref 65–140)
HBA1C MFR BLD: 6.1 %
HCT VFR BLD AUTO: 36.5 % (ref 34.8–46.1)
HGB BLD-MCNC: 11.6 G/DL (ref 11.5–15.4)
IMM GRANULOCYTES # BLD AUTO: 0.02 THOUSAND/UL (ref 0–0.2)
IMM GRANULOCYTES NFR BLD AUTO: 0 % (ref 0–2)
LYMPHOCYTES # BLD AUTO: 0.96 THOUSANDS/ÂΜL (ref 0.6–4.47)
LYMPHOCYTES NFR BLD AUTO: 14 % (ref 14–44)
MCH RBC QN AUTO: 27.1 PG (ref 26.8–34.3)
MCHC RBC AUTO-ENTMCNC: 31.8 G/DL (ref 31.4–37.4)
MCV RBC AUTO: 85 FL (ref 82–98)
MONOCYTES # BLD AUTO: 0.54 THOUSAND/ÂΜL (ref 0.17–1.22)
MONOCYTES NFR BLD AUTO: 8 % (ref 4–12)
NEUTROPHILS # BLD AUTO: 5.15 THOUSANDS/ÂΜL (ref 1.85–7.62)
NEUTS SEG NFR BLD AUTO: 75 % (ref 43–75)
NRBC BLD AUTO-RTO: 0 /100 WBCS
P AXIS: 8 DEGREES
PLATELET # BLD AUTO: 333 THOUSANDS/UL (ref 149–390)
PMV BLD AUTO: 10 FL (ref 8.9–12.7)
POTASSIUM SERPL-SCNC: 3.4 MMOL/L (ref 3.5–5.3)
POTASSIUM SERPL-SCNC: 4.1 MMOL/L (ref 3.5–5.3)
PR INTERVAL: 154 MS
QRS AXIS: 18 DEGREES
QRSD INTERVAL: 86 MS
QT INTERVAL: 420 MS
QTC INTERVAL: 440 MS
RBC # BLD AUTO: 4.28 MILLION/UL (ref 3.81–5.12)
SODIUM SERPL-SCNC: 138 MMOL/L (ref 135–147)
SODIUM SERPL-SCNC: 139 MMOL/L (ref 135–147)
T WAVE AXIS: 32 DEGREES
TSH SERPL DL<=0.05 MIU/L-ACNC: 1.49 UIU/ML (ref 0.45–4.5)
VENTRICULAR RATE: 66 BPM
WBC # BLD AUTO: 6.91 THOUSAND/UL (ref 4.31–10.16)

## 2024-12-27 PROCEDURE — 70498 CT ANGIOGRAPHY NECK: CPT

## 2024-12-27 PROCEDURE — 80048 BASIC METABOLIC PNL TOTAL CA: CPT | Performed by: STUDENT IN AN ORGANIZED HEALTH CARE EDUCATION/TRAINING PROGRAM

## 2024-12-27 PROCEDURE — 84443 ASSAY THYROID STIM HORMONE: CPT

## 2024-12-27 PROCEDURE — 93005 ELECTROCARDIOGRAM TRACING: CPT

## 2024-12-27 PROCEDURE — 97167 OT EVAL HIGH COMPLEX 60 MIN: CPT

## 2024-12-27 PROCEDURE — 85025 COMPLETE CBC W/AUTO DIFF WBC: CPT | Performed by: STUDENT IN AN ORGANIZED HEALTH CARE EDUCATION/TRAINING PROGRAM

## 2024-12-27 PROCEDURE — 70496 CT ANGIOGRAPHY HEAD: CPT

## 2024-12-27 PROCEDURE — 93010 ELECTROCARDIOGRAM REPORT: CPT | Performed by: INTERNAL MEDICINE

## 2024-12-27 PROCEDURE — 97163 PT EVAL HIGH COMPLEX 45 MIN: CPT

## 2024-12-27 PROCEDURE — 84484 ASSAY OF TROPONIN QUANT: CPT | Performed by: STUDENT IN AN ORGANIZED HEALTH CARE EDUCATION/TRAINING PROGRAM

## 2024-12-27 PROCEDURE — 99233 SBSQ HOSP IP/OBS HIGH 50: CPT | Performed by: STUDENT IN AN ORGANIZED HEALTH CARE EDUCATION/TRAINING PROGRAM

## 2024-12-27 PROCEDURE — NC001 PR NO CHARGE: Performed by: EMERGENCY MEDICINE

## 2024-12-27 PROCEDURE — 70551 MRI BRAIN STEM W/O DYE: CPT

## 2024-12-27 PROCEDURE — 83036 HEMOGLOBIN GLYCOSYLATED A1C: CPT

## 2024-12-27 PROCEDURE — 82948 REAGENT STRIP/BLOOD GLUCOSE: CPT

## 2024-12-27 RX ORDER — POTASSIUM CHLORIDE 1500 MG/1
40 TABLET, EXTENDED RELEASE ORAL ONCE
Status: COMPLETED | OUTPATIENT
Start: 2024-12-27 | End: 2024-12-27

## 2024-12-27 RX ORDER — ASPIRIN 325 MG
325 TABLET ORAL ONCE
Status: COMPLETED | OUTPATIENT
Start: 2024-12-27 | End: 2024-12-27

## 2024-12-27 RX ORDER — ATORVASTATIN CALCIUM 40 MG/1
40 TABLET, FILM COATED ORAL
Status: DISCONTINUED | OUTPATIENT
Start: 2024-12-28 | End: 2024-12-30 | Stop reason: HOSPADM

## 2024-12-27 RX ORDER — ASPIRIN 81 MG/1
81 TABLET, CHEWABLE ORAL DAILY
Status: DISCONTINUED | OUTPATIENT
Start: 2024-12-28 | End: 2024-12-30 | Stop reason: HOSPADM

## 2024-12-27 RX ADMIN — FENOFIBRATE 145 MG: 145 TABLET ORAL at 08:14

## 2024-12-27 RX ADMIN — POTASSIUM CHLORIDE 40 MEQ: 1500 TABLET, EXTENDED RELEASE ORAL at 14:35

## 2024-12-27 RX ADMIN — NORTRIPTYLINE HYDROCHLORIDE 30 MG: 10 CAPSULE ORAL at 21:13

## 2024-12-27 RX ADMIN — LOSARTAN POTASSIUM 100 MG: 50 TABLET, FILM COATED ORAL at 08:14

## 2024-12-27 RX ADMIN — ENOXAPARIN SODIUM 40 MG: 40 INJECTION SUBCUTANEOUS at 08:13

## 2024-12-27 RX ADMIN — IOHEXOL 100 ML: 350 INJECTION, SOLUTION INTRAVENOUS at 17:19

## 2024-12-27 RX ADMIN — ASPIRIN 325 MG ORAL TABLET 325 MG: 325 PILL ORAL at 19:30

## 2024-12-27 RX ADMIN — AMLODIPINE BESYLATE 5 MG: 5 TABLET ORAL at 08:14

## 2024-12-27 NOTE — OCCUPATIONAL THERAPY NOTE
"    Occupational Therapy Evaluation     Patient Name: Mayra Echevarria  Today's Date: 12/27/2024  Problem List  Principal Problem:    AMS (altered mental status)  Active Problems:    Essential hypertension    Vertigo    Memory impairment    Ambulatory dysfunction    Past Medical History  Past Medical History:   Diagnosis Date    Allergic years ago    Iodine    Chronic leukopenia     Depression 04/22/2014    Hypercalcemia     Hyperlipidemia     Hypertension     labile    Hypertension     Migraines     Osteoarthritis     Osteoporosis     Vertigo      Past Surgical History  Past Surgical History:   Procedure Laterality Date    EYE SURGERY      TOOTH EXTRACTION      TUBAL LIGATION           12/27/24 0845   OT Last Visit   OT Visit Date 12/27/24   Note Type   Note type Evaluation   Pain Assessment   Pain Assessment Tool 0-10   Pain Score No Pain   Restrictions/Precautions   Weight Bearing Precautions Per Order No   Other Precautions Cognitive;Chair Alarm;Bed Alarm;Multiple lines;Fall Risk;Visual impairment  (tends to lean to the R)   Home Living   Type of Home House   Home Layout Multi-level;Stairs to enter without rails   Bathroom Shower/Tub Tub/shower unit   Bathroom Toilet Raised   Bathroom Equipment Grab bars in shower   Prior Function   Level of [place] Independent with ADLs   Lives With Spouse   Receives Help From Family   IADLs Independent with driving   Falls in the last 6 months 0   Comments pt and spouse both reporting memory decline in last year limiting her ability to drive (only drives to family homes)   Lifestyle   Autonomy pta pt reports I in ADLs/IADLs/functional mobility   Reciprocal Relationships supportive spouse   Service to Others retired    Intrinsic Gratification reading   Subjective   Subjective \"I feel a little shaky\"   ADL   Where Assessed Edge of bed   Eating Assistance 5  Supervision/Setup   Grooming Assistance 4  Minimal Assistance   UB Bathing Assistance 4  Minimal Assistance " "  LB Bathing Assistance 3  Moderate Assistance   UB Dressing Assistance 4  Minimal Assistance   LB Dressing Assistance 3  Moderate Assistance   Toileting Assistance  3  Moderate Assistance   Bed Mobility   Supine to Sit 4  Minimal assistance   Additional items Assist x 1;Increased time required   Sit to Supine 4  Minimal assistance   Additional items Assist x 1;Increased time required   Transfers   Sit to Stand 4  Minimal assistance   Additional items Assist x 1;Increased time required;Verbal cues   Stand to Sit 4  Minimal assistance   Additional items Assist x 1;Increased time required   Functional Mobility   Functional Mobility 4  Minimal assistance   Additional items Rolling walker   Balance   Static Sitting Fair -   Dynamic Sitting Poor +   Static Standing Poor +   Dynamic Standing Poor   Ambulatory Poor   Activity Tolerance   Activity Tolerance Patient limited by fatigue   Nurse Made Aware okay to see per RN   RUE Assessment   RUE Assessment WFL   LUE Assessment   LUE Assessment WFL   Hand Function   Gross Motor Coordination Functional   Fine Motor Coordination Impaired   Vision - Complex Assessment   Additional Comments pt w/ saccadic eye movements during smooth pursuits; hx of L eye being a \"lazy eye\"; difficulty converging   Psychosocial   Psychosocial (WDL) WDL   Cognition   Overall Cognitive Status Impaired   Arousal/Participation Cooperative   Attention Attends with cues to redirect   Orientation Level Oriented to person;Oriented to place;Disoriented to time;Disoriented to situation   Memory Decreased recall of recent events   Following Commands Follows one step commands without difficulty   Comments pt emotional t/o session, spouse reporting AMS is mostly resolved and she is closer to baseline cognitive impairment   Assessment   Limitation Decreased ADL status;Decreased Safe judgement during ADL;Decreased cognition;Decreased endurance;Decreased self-care trans;Decreased high-level ADLs   Prognosis Good "   Assessment Pt is a 69 y.o. YO  female admitted to [facility] on 2024 w/ AMS  and dizziness. Pt  has a past medical history of Allergic, Chronic leukopenia, Depression, Hypercalcemia, Hyperlipidemia, Hypertension, Hypertension, Migraines, Osteoarthritis, Osteoporosis, and Vertigo. Pt with active OT orders.  . Pt resides in a house with spouse. Pt was I w/  ADLS and IADLS, (+) drove, & required no use of DME PTA. Currently pt is min a for ub adls, mod a for llb adls, and min a for transfers/functional mobility. Pt is limited at this time 2*: endurance, activity tolerance, functional mobility, functional standing tolerance, decreased I w/ ADLS/IADLS, and impaired fine motor skills.The following Occupational Performance Areas to address include: bathing/shower, toilet hygiene, dressing, functional mobility, and household maintenance. Based on the aforementioned OT evaluation, functional performance deficits, and assessments, pt has been identified as a high complexity evaluation. From OT standpoint, anticipate d/c  level I discharge resources . Pt to continue to benefit from acute immediate OT services to address the following goals 3-5x/week to  w/in 10-14 days: .   The patient's raw score on the AM-PAC Daily Activity Inpatient Short Form is 16. A raw score of less than 19 suggests the patient may benefit from discharge to post-acute rehabilitation services. Please refer to the recommendation of the Occupational Therapist for safe discharge planning.   Goals   Patient Goals to see her family   LTG Time Frame 10-14   Plan   Treatment Interventions ADL retraining;Functional transfer training;Endurance training;Patient/family training;Compensatory technique education;Continued evaluation   Goal Expiration Date 01/10/25   OT Frequency 3-5x/wk   Discharge Recommendation   Rehab Resource Intensity Level, OT I (Maximum Resource Intensity)   AM-PAC Daily Activity Inpatient   Lower Body Dressing 2   Bathing 2   Toileting  2   Upper Body Dressing 3   Grooming 3   Eating 4   Daily Activity Raw Score 16   Daily Activity Standardized Score (Calc for Raw Score >=11) 35.96   AM-PAC Applied Cognition Inpatient   Following a Speech/Presentation 3   Understanding Ordinary Conversation 4   Taking Medications 3   Remembering Where Things Are Placed or Put Away 3   Remembering List of 4-5 Errands 3   Taking Care of Complicated Tasks 3   Applied Cognition Raw Score 19   Applied Cognition Standardized Score 39.77   Barthel Index   Feeding 5   Bathing 0   Grooming Score 0   Dressing Score 5   Bladder Score 5   Bowels Score 5   Toilet Use Score 5   Transfers (Bed/Chair) Score 10   Mobility (Level Surface) Score 0   Stairs Score 0   Barthel Index Score 35   Modified Pine Scale   Modified Natali Scale 4         GOALS    1) Pt will increase activity tolerance to G for 30 min txment sessions    2) Pt will complete UB/LB dressing/self care w/ mod I using adaptive device and DME as needed    3) Pt will complete bathing w/ Mod I w/ use of AE and DME as needed    4) Pt will complete toileting w/ mod I w/ G hygiene/thoroughness using DME as needed    5) Pt will improve functional transfers to Mod I on/off all surfaces using DME as needed w/ G balance/safety     6) Pt will improve functional mobility during ADL/IADL/leisure tasks to Mod I using DME as needed w/ G balance/safety     7) Pt will participate in simulated IADL management task to increase independence to  w/ G safety and endurance    8) Pt will demonstrate G carryover of pt/caregiver education and training as appropriate.    9) Pt will demonstrate 100% carryover of energy conservation techniques t/o functional I/ADL/leisure tasks w/o cues s/p skilled education    10) Pt will independently identify and utilize 2-3 coping strategies to increase positive affect and promote overall well-being.    11) Pt will engage in ongoing cognitive assessment w/ G participation to assist w/ safe d/c  planning/recommendations    Tiffanie Brunner MS, OTR/L

## 2024-12-27 NOTE — PLAN OF CARE
Problem: PHYSICAL THERAPY ADULT  Goal: Performs mobility at highest level of function for planned discharge setting.  See evaluation for individualized goals.  Description: Treatment/Interventions: Functional transfer training, LE strengthening/ROM, Endurance training, Elevations, Therapeutic exercise, Cognitive reorientation, Patient/family training, Gait training, Bed mobility, Equipment eval/education, Spoke to nursing, Spoke to case management, OT  Equipment Recommended: Walker       See flowsheet documentation for full assessment, interventions and recommendations.  Note: Prognosis: Fair  Problem List: Decreased strength, Decreased endurance, Impaired balance, Decreased mobility, Decreased coordination, Decreased cognition, Impaired judgement, Decreased safety awareness, Impaired vision  Assessment: Pt is a 69 y.o. female admitted to [facility] on 12/26/2024 with worsening dizziness, difficulty ambulating and increased confusion from baseline. Pt received a primary medical dx of AMS. Pt has the following comorbidities which affect their treatment: active problems listed above,  has a past medical history of Allergic, Chronic leukopenia, Depression, Hypercalcemia, Hyperlipidemia, Hypertension, Hypertension, Migraines, Osteoarthritis, Osteoporosis, and Vertigo.  , as well as personal factors including stairs to access home. Pt has a high complexity clinical presentation due to Ongoing medical management for primary dx, Increased reliance on more restrictive AD compared to baseline, Decreased activity tolerance compared to baseline, Fall risk, Increased assistance needed from caregiver at current time, Ongoing telemetry monitoring, Cog status, Trending lab values, Diagnostic imaging pending, Continuous pulse oximetry monitoring  , and PMH. PT was consulted to evaluate pt's functional mobility and discharge needs. Upon evaluation, patient required minAx1 for all mobility as outlined above. Body system impairments  include impaired oculomotor screen, L>R hypofunction on HIT, impaired balance, endurance, cognition, vision. Pt's functional activity impairments include: activity tolerance and mobility. Participation restrictions include inability to safely access home/community. At conclusion of eval, pt remained supine in bed with HOB elevated and bed alarm donned, phone, call bell, and all other personal needs within reach. Pt would benefit from skilled PT to address their functional mobility limitations. The patient's AM-PAC Basic Mobility Inpatient Short Form Raw Score is 17. A Raw score of greater than 16 suggests the patient may benefit from discharge to home. Please also refer to the recommendation of the Physical Therapist for safe discharge planning.  Barriers to Discharge: Decreased caregiver support, Inaccessible home environment     Rehab Resource Intensity Level, PT: (S) I (Maximum Resource Intensity)    See flowsheet documentation for full assessment.

## 2024-12-27 NOTE — PROGRESS NOTES
"Progress Note - Hospitalist   Name: Mayra Echevarria 69 y.o. female I MRN: [id number]  Unit/Bed#: [facility] 703-01 I Date of Admission: 12/26/2024   Date of Service: 12/27/2024 I Hospital Day: 1    Assessment & Plan  AMS (altered mental status)  History of vertigo and patient is presenting with worsening dizziness and altered mental status.   UA and labs unremarkable for signs of infection  CT head and neck unremarkable  Chest x-ray unremarkable  MRI ordered for further evaluation, pending   Consider neurology evaluation if MRI brain unremarkable   TSH and B12 WNL  PT/OT  Essential hypertension  Blood pressure mildly elevated  Continue losartan and amlodipine  Vertigo  Continue meclizine as needed  Follow-up MRI brain to rule out central etiology  Memory impairment  History noted  Ambulatory dysfunction  PT/OT    VTE Pharmacologic Prophylaxis:    lovenox SC    Mobility:   Basic Mobility Inpatient Raw Score: 17  JH-HLM Goal: 5: Stand one or more mins  JH-HLM Achieved: 7: Walk 25 feet or more  JH-HLM Goal achieved. Continue to encourage appropriate mobility.    Patient Centered Rounds: I performed bedside rounds with nursing staff today.  Ellie  Discussions with Specialists or Other Care Team Provider:     Education and Discussions with Family / Patient: Updated  () at bedside.    Current Length of Stay: 1 day(s)  Current Patient Status: Inpatient   Certification Statement: The patient will continue to require additional inpatient hospital stay due to MRI pending  Discharge Plan: Anticipate discharge tomorrow to PT/OT recommending LEVEL 1 resource intensity    Code Status: Level 1 - Full Code    Subjective   Ms. Echevarria reports feeling better today, doing better per  at bedside. Has history of \"lazy eye\" and sees primarily out of the right eye per both patient and  at bedside     Objective :  Temp:  [97.7 °F (36.5 °C)-98.4 °F (36.9 °C)] 98.4 °F (36.9 °C)  HR:  [70-84] 84  BP: " (144-215)/(63-87) 150/68  Resp:  [14-20] 20  SpO2:  [95 %-99 %] 95 %  O2 Device: None (Room air)    Body mass index is 27.44 kg/m².     Input and Output Summary (last 24 hours):     Intake/Output Summary (Last 24 hours) at 12/27/2024 1421  Last data filed at 12/27/2024 1100  Gross per 24 hour   Intake 700 ml   Output 0 ml   Net 700 ml       Physical Exam  Vitals reviewed.   Constitutional:       Comments: Patient seen sitting in bed,  at bedside, NAD   Cardiovascular:      Rate and Rhythm: Normal rate and regular rhythm.   Pulmonary:      Effort: Pulmonary effort is normal. No respiratory distress.      Breath sounds: Normal breath sounds.   Abdominal:      General: Bowel sounds are normal.      Palpations: Abdomen is soft.      Tenderness: There is no abdominal tenderness.   Musculoskeletal:      Right lower leg: No edema.      Left lower leg: No edema.   Skin:     General: Skin is warm.   Neurological:      Mental Status: She is alert.      Comments: Oriented to self. States that she is in [facility] but says in [place] instead of [place]. Stated the year is 2025   Psychiatric:         Mood and Affect: Mood normal.           Lines/Drains:        Telemetry:  Telemetry Orders (From admission, onward)               24 Hour Telemetry Monitoring  Continuous x 24 Hours (Telem)        Expiring   Question:  Reason for 24 Hour Telemetry  Answer:  TIA/Suspected CVA/ Confirmed CVA                     Telemetry Reviewed: Normal Sinus Rhythm  Indication for Continued Telemetry Use: Acute CVA               Lab Results: I have reviewed the following results:   Results from last 7 days   Lab Units 12/27/24  0652   WBC Thousand/uL 6.91   HEMOGLOBIN g/dL 11.6   HEMATOCRIT % 36.5   PLATELETS Thousands/uL 333   SEGS PCT % 75   LYMPHO PCT % 14   MONO PCT % 8   EOS PCT % 3     Results from last 7 days   Lab Units 12/27/24  0652 12/26/24  1105   SODIUM mmol/L 139 139   POTASSIUM mmol/L 3.4* 3.8   CHLORIDE mmol/L 110*  106   CO2 mmol/L 21 24   BUN mg/dL 20 25   CREATININE mg/dL 0.68 0.89   ANION GAP mmol/L 8 9   CALCIUM mg/dL 8.7 9.7   ALBUMIN g/dL  --  4.3   TOTAL BILIRUBIN mg/dL  --  0.47   ALK PHOS U/L  --  23*   ALT U/L  --  22   AST U/L  --  32   GLUCOSE RANDOM mg/dL 93 118                       Recent Cultures (last 7 days):         Imaging Results Review: I reviewed radiology reports from this admission including: CT head.      Last 24 Hours Medication List:     Current Facility-Administered Medications:     amLODIPine (NORVASC) tablet 5 mg, Daily    enoxaparin (LOVENOX) subcutaneous injection 40 mg, Daily    fenofibrate (TRICOR) tablet 145 mg, Daily    losartan (COZAAR) tablet 100 mg, Daily    meclizine (ANTIVERT) tablet 25 mg, Q8H PRN    multi-electrolyte (PLASMALYTE-A/ISOLYTE-S PH 7.4) IV solution, Continuous, Last Rate: Stopped (12/27/24 1142)    nortriptyline (PAMELOR) capsule 30 mg, HS    potassium chloride (Klor-Con M20) CR tablet 40 mEq, Once    Administrative Statements   Today, Patient Was Seen By: Abhishek Eduardo PA-C      **Please Note: This note may have been constructed using a voice recognition system.**

## 2024-12-27 NOTE — TELEPHONE ENCOUNTER
12/27/24 2:29 PM     Chart reviewed for CRC: Colonoscopy was/were submitted to the patient's insurance.     Suzanne Stanley MA   [facility]

## 2024-12-27 NOTE — CONSULTS
Consultation - Neurology   Name: aMyra Echevarria 69 y.o. female I MRN: [id number]  [facility] I Date of Admission: 12/26/2024   Date of Service: 12/27/2024 I Hospital Day: 1     Physician Requesting Evaluation: Megan Mccartney MD   Reason for Evaluation / Principal Problem: stroke alert    Assessment & Plan  AMS (altered mental status)  Initial NIHSS 4  Presenting deficits were: right sided droop and leg/arm weakness  Interventions: Patient not a candidate. Unclear time of onset outside appropriate time window. and Symptoms resolved/clearly non disabling.   These new symptoms resolved and patient came in with new vertigo and AMS yesterday as well, which complicates LKW.    Workup:  Lab Results   Component Value Date    CHOLESTEROL 179 09/24/2024    LDLCALC 126 (H) 09/24/2024    TRIG 79 09/24/2024      CTH: no acute infarct  CTA: no LVO, stenosis of left inferior M3  MRI: pending  TTE/FROILAN: pending    Suspect patient's symptoms in the setting of intracranial athero given severe stenosis at M3 branch corresponding to today's symptoms    Discussed with neurology attending, Dr. Wilkinson  Pending: MRI brain, TTE, Lipid panel, A1c pending  BP: Permissive HTN <180/90 for first 24 hours, and then gradual return to normotension  AC/AP: aspirin 325 load, then 81 mg daily, likely 90 days of dapt depending on MRI  Atorvastatin 40mg QHS  Glucose <180   Neuro checks- Every 1 hour x 4 hours, then every 2 hours x 4 hours, then every 4 hours x 72 hours     Stat CT Head for change in neuro status.  Monitor on telemetry  DVT ppx per primary team, SCDs  PT/OT/ST/PM&R evaluations   Medical management as per primary team appreciated.      Recommendations for outpatient neurological follow up have yet to be determined.   Pending for discharge: Stroke work up and medical stability   CHIEF COMPLAINT     Chief Complaint   Patient presents with    Dizziness     Per , patient has been dizzy since yesterday. H/o veritgo, but  per  this is worse. Patient reports she doesn't remember waking up yesterday. Off balance and weak today. Last known well was two days ago. Per , patient is being tested for recent memory loss.       Inpatient consult to Neurology  Consult performed by: Kristy Bailey MD  Consult ordered by: Coni Putnam MD        HISTORY OF PRESENT ILLNESS   Reason for Consult: Stroke Alert  Hx and PE limited by: none.   Patient Last Known Well: 4:15 pm  Stroke alert called: 4:40 pm  Neurology time of arrival: Immediate    HPI: Mayra Echevarria is a 69 y.o. female with a pertinent PMhx of hld and htn, neurology requested at bedside for stroke alert evaluation. Patient came in altered and dizzy yesterday with ambulatory dysfunction. Today patient was in hospital bed waiting for MRI when she told her  her head feels funny and he noticed she was not moving her right side properly so he called her nurse in. They noticed a facial droop as well. During a rapid she was found to have right facial droop and arm/leg weakness with dysarthria and confusion. Upon my arrival patient was somewhat improving. Speech was much clearer and after imaging was completed, motor deficits resolved as well. Upon returning to patient's room,  confirms she is back to herself.       Stroke risk factors: hyperlipidemia and hypertension.  Prior stroke history: none.   The patient is not on chronic anticoagulation.     Upon arrival, initial BP was noted to be Blood Pressure: (!) 174/78. Glucose 97.   They had a NIHSS of 4 for incorrect month and right sided droop and RUE/RLE drift    Patient was taken to the CT scanner and NC CTH revealed no acute findings and CTA H/N showed no LVO but high grade stenosis of left inferior division M3 posteriorly into sylvian fissure.    TNK Decision: Not administered as patient not a candidate for thrombolytic medications.  TNK Consent: n/a  Contraindication(s) to TNK administration: > 4.5 hours from  time of symptom onset and Symptoms resolved/clearly non disabling    Review of previous medical records was completed.   REVIEW OF SYSTEMS   Review of Systems  PAST MEDICAL HISTORY   Past Medical History:  has a past medical history of Allergic, Chronic leukopenia, Depression, Hypercalcemia, Hyperlipidemia, Hypertension, Hypertension, Migraines, Osteoarthritis, Osteoporosis, and Vertigo.     Allergies:   Allergies   Allergen Reactions    Iodine - Food Allergy Rash       PAST SURGICAL HISTORY     Past Surgical History:   Procedure Laterality Date    EYE SURGERY      TOOTH EXTRACTION      TUBAL LIGATION       SOCIAL & FAMILY HISTORY     Social History     Substance and Sexual Activity   Alcohol Use No       Social History     Substance and Sexual Activity   Drug Use No     Social History     Tobacco Use   Smoking Status Never    Passive exposure: Never   Smokeless Tobacco Never       Marital Status: /Civil Union   Patient Pre-hospital Living Situation:   Patient Pre-hospital Level of Mobility:     Family History:  Family History   Problem Relation Age of Onset    Colon cancer Mother 51    Breast cancer Mother 53    Cancer Mother         two primary sites  breast and colon    Heart attack Father     Prostate cancer Father 70    Hypertension Father     Heart attack Sister     No Known Problems Sister     No Known Problems Daughter     No Known Problems Daughter     No Known Problems Maternal Grandmother     No Known Problems Maternal Grandfather     No Known Problems Paternal Grandmother     No Known Problems Paternal Grandfather     Stroke Brother     No Known Problems Son     No Known Problems Maternal Aunt     No Known Problems Maternal Aunt     No Known Problems Maternal Aunt        Code Status: Level 1 - Full Code  Advance Directive and Living Will:      Power of :    POLST:    OBJECTIVE     Vitals:    12/27/24 1100 12/27/24 1458 12/27/24 1633 12/27/24 1641   BP:  170/77 (!) 176/83 (!) 176/83    Pulse: 84 76 75 80   Resp:       Temp:  97.9 °F (36.6 °C) 98 °F (36.7 °C)    TempSrc:   Oral    SpO2: 95% 95% 96% 96%   Weight:       Height:            Temperature:   Temp (24hrs), Av.1 °F (36.7 °C), Min:97.7 °F (36.5 °C), Max:98.4 °F (36.9 °C)    Temperature: 98 °F (36.7 °C)    Intake & Output:  I/O          0701   0700  07 07 07 0700    P.O.   700    Total Intake(mL/kg)   700 (10.3)    Urine (mL/kg/hr)  0     Stool  0     Total Output  0     Net  0 +700                   Weights:   IBW (Ideal Body Weight): 50.1 kg    Body mass index is 27.44 kg/m².  Weight (last 2 days)       Date/Time Weight    24 68 (150)            NIHSS:  1a.Level of Consciousness: 0 = Alert   1b. LOC Questions: 1 = Answers one correctly   1c. LOC Commands: 0 = Obeys both correctly   2. Best Gaze: 0 = Normal   3. Visual: 0 = No visual field loss   4. Facial Palsy: 1=Minor paralysis (flattened nasolabial fold, asymmetric on smiling)   5a. Motor Right Arm: 1=Drift, limb holds 90 (or 45) degrees but drifts down before full 10 seconds: does not hit bed   5b. Motor Left Arm: 0=No drift, limb holds 90 (or 45) degrees for full 10 seconds   6a. Motor Right Le=Drift, limb holds 90 (or 45) degrees but drifts down before full 10 seconds: does not hit bed   6b. Motor Left Le=No drift, limb holds 90 (or 45) degrees for full 10 seconds   7. Limb Ataxia:  0=Absent   8. Sensory: 0=Normal; no sensory loss   9. Best Language:  0=No aphasia, normal   10. Dysarthria: 0=Normal articulation   11. Extinction and Inattention (formerly Neglect): 0=No abnormality   Total Score: 4     Time NIHSS was completed: 5:10 pm    Modified [place] Score:  0 (No baseline symptoms/disability)    Patient says it is January. Dysconjugate gaze with left eye upward deviated is baseline. Right facial droop and some drift of RUE and RLE all resolved. FNF and HTS difficulty on right not out of proportion to  "weakness.    LABORATORY DATA   Labs: I have personally reviewed pertinent reports.    Results from last 7 days   Lab Units 12/27/24  0652 12/26/24  1105   WBC Thousand/uL 6.91 7.04   HEMOGLOBIN g/dL 11.6 13.3   HEMATOCRIT % 36.5 41.8   PLATELETS Thousands/uL 333 424*   SEGS PCT % 75 74   MONO PCT % 8 7   EOS PCT % 3 4      Results from last 7 days   Lab Units 12/27/24  0652 12/26/24  1105   SODIUM mmol/L 139 139   POTASSIUM mmol/L 3.4* 3.8   CHLORIDE mmol/L 110* 106   CO2 mmol/L 21 24   BUN mg/dL 20 25   CREATININE mg/dL 0.68 0.89   CALCIUM mg/dL 8.7 9.7   ALK PHOS U/L  --  23*   ALT U/L  --  22   AST U/L  --  32                            Micro:  No results found for: \"BLOODCX\", \"URINECX\", \"WOUNDCULT\", \"SPUTUMCULTUR\"    IMAGING & DIAGNOSTIC TESTING   Radiology Results: I have personally reviewed pertinent reports and I have personally reviewed pertinent films in PACS    CT stroke alert brain   Final Result by Juve Villegas MD (12/27 8161)      No acute intracranial abnormality.      Findings were directly discussed with James Wilkinson at approximately 5:00 p.m. on 12/27/2024.      Workstation performed: [id number]         CTA stroke alert (head/neck)   Final Result by Juve Villegas MD (12/27 7475)      High-grade stenosis of a left middle cerebral artery inferior division M3 branch extending posteriorly in the left sylvian fissure as described above. No large vessel occlusion or intracranial aneurysm noted.      No hemodynamically significant stenosis, occlusion or dissection identified on CT angiogram of the neck.         Findings were directly discussed with James Wilkinson at 5:40 p.m. on 12/27/2024..      Workstation performed: [id number]         CTA head and neck with and without contrast   Final Result by Melba Miguel MD (12/26 9027)      CT Brain:  No acute intracranial CT abnormality.      CT Angiography:      1.  Limited evaluation of the neck vasculature due to technique and artifacts related to motion and " prior dental work, especially suboptimal evaluation of the vertebral arteries. Patent and grossly unremarkable vertebral arteries within the limits of    suboptimal image quality. No significant stenosis in the cervical carotid arteries.   2.  Patent major vessels of the [place] without high-grade stenosis.                        Workstation performed: [id number]         XR chest 2 views   ED Interpretation by Segundo Curran MD (12/26 1149)   No acute cardiopulmonary abnormalities      Final Result by Ector Tovar MD (12/26 2971)      No acute cardiopulmonary disease.            Workstation performed: [id number]         MRI brain wo contrast    (Results Pending)       ACTIVE MEDICATIONS     Current Facility-Administered Medications:     amLODIPine (NORVASC) tablet 5 mg, Daily    enoxaparin (LOVENOX) subcutaneous injection 40 mg, Daily    fenofibrate (TRICOR) tablet 145 mg, Daily    losartan (COZAAR) tablet 100 mg, Daily    meclizine (ANTIVERT) tablet 25 mg, Q8H PRN    multi-electrolyte (PLASMALYTE-A/ISOLYTE-S PH 7.4) IV solution, Continuous, Last Rate: Stopped (12/27/24 1142)    nortriptyline (PAMELOR) capsule 30 mg,     HOME MEDICATIONS     Prior to Admission medications    Medication Sig Start Date End Date Taking? Authorizing Provider   amLODIPine (NORVASC) 5 mg tablet TAKE 1 TABLET DAILY 8/12/24  Yes Namita Melendez DO   fenofibrate (TRICOR) 145 mg tablet TAKE 1 TABLET DAILY 12/24/24  Yes Namita Melendez DO   Lactobacillus (Acidophilus Probiotic) CAPS Take by mouth   Yes Historical Provider, MD   losartan (COZAAR) 100 MG tablet TAKE 1 TABLET DAILY 7/15/24  Yes Namita Melendez DO   meclizine (ANTIVERT) 25 mg tablet Take 1 tablet (25 mg total) by mouth 3 (three) times a day as needed for dizziness for up to 10 days 3/24/24 12/26/24 Yes Namita Melendez DO   nortriptyline (PAMELOR) 10 mg capsule TAKE 1 CAPSULE THREE TIMES A DAY  Patient taking differently:  Take 30 mg by mouth daily at bedtime 7/22/24  Yes Namita Melendez, DO   calcium carbonate (Calcium 600) 600 MG tablet Take 600 mg by mouth 2 (two) times a day with meals    Historical Provider, MD   methylPREDNISolone 4 MG tablet therapy pack Use as directed on package 10/11/24   Reggie Foster III, DO   multivitamin (THERAGRAN) TABS Take 1 tablet by mouth daily    Historical Provider, MD   TURMERIC CURCUMIN PO Take by mouth    Historical Provider, MD       =======================================================================  I have discussed the patient's history, physical exam findings, assessment, and plan in detail with attending, Dr. Jaja Bailey MD   [facility] Neurology Residency, PGY-2

## 2024-12-27 NOTE — UTILIZATION REVIEW
Initial Clinical Review    Admission: Date/Time/Statement:   Admission Orders (From admission, onward)       Ordered        12/26/24 1701  INPATIENT ADMISSION  Once                          Orders Placed This Encounter   Procedures    INPATIENT ADMISSION     Standing Status:   Standing     Number of Occurrences:   1     Level of Care:   Med Surg [16]     Estimated length of stay:   More than 2 Midnights     Certification:   I certify that inpatient services are medically necessary for this patient for a duration of greater than two midnights. See H&P and MD Progress Notes for additional information about the patient's course of treatment.     ED Arrival Information       Expected   -    Arrival   12/26/2024 10:25    Acuity   Emergent              Means of arrival   Walk-In    Escorted by   Spouse    Service   Hospitalist    Admission type   Emergency              Arrival complaint   Dizziness             Chief Complaint   Patient presents with    Dizziness     Per , patient has been dizzy since yesterday. H/o veritgo, but per  this is worse. Patient reports she doesn't remember waking up yesterday. Off balance and weak today. Last known well was two days ago. Per , patient is being tested for recent memory loss.        Initial Presentation: 69 y.o. female to Ed presents for Altered mental status and Ambulatory dysfunction. Last known normal yesterday morning before 6 am. Woke this am with worsening vertigo and took meclizine. This am, pt was noted to be more confused today continue to have repetitive questioning and asking about family members that were no longer alive. Vertigo appears to have resolved however continues to have generalized weakness and difficulty ambulating.   PMH for HTN, HLD, vertigo, arthritis, depression.  Admit to Inpatient Dx; Altered mental status  BP mildly elevated  Plan; MRI. Iv fluids. Check TSH and B12.   Continue losartan and amlodipine. Meclizine prn.   On exam;  Mild ataxia to finger to nose    Anticipated Length of Stay/Certification Statement: : Patient will be admitted on an inpatient basis with an anticipated length of stay of greater than 2 midnights secondary to AMS, MRI pending.     Date: 12/27   Day 2:   Progress notes; MRI Brain. Iv fluids.   Continue losartan and amlodipine.  PT/OT eval and treat.     ED Treatment-Medication Administration from 12/26/2024 1025 to 12/26/2024 1823         Date/Time Order Dose Route Action     12/26/2024 1508 iohexol (OMNIPAQUE) 350 MG/ML injection (SINGLE-DOSE) 135 mL 135 mL Intravenous Given     12/26/2024 1537 amLODIPine (NORVASC) tablet 5 mg 5 mg Oral Given            Scheduled Medications:  amLODIPine, 5 mg, Oral, Daily  enoxaparin, 40 mg, Subcutaneous, Daily  fenofibrate, 145 mg, Oral, Daily  losartan, 100 mg, Oral, Daily  nortriptyline, 30 mg, Oral, HS      Continuous IV Infusions:  multi-electrolyte, 100 mL/hr, Intravenous, Continuous      PRN Meds:  meclizine, 25 mg, Oral, Q8H PRN      ED Triage Vitals   Temperature Pulse Respirations Blood Pressure SpO2 Pain Score   12/26/24 1519 12/26/24 1033 12/26/24 1033 12/26/24 1033 12/26/24 1033 12/26/24 1033   98.4 °F (36.9 °C) 71 20 (!) 174/78 99 % No Pain     Weight (last 2 days)       Date/Time Weight    12/26/24 2034 68 (150)            Vital Signs (last 3 days)       Date/Time Temp Pulse Resp BP MAP (mmHg) SpO2 O2 Device Patient Position - Orthostatic VS Nydia Coma Scale Score Pain    12/27/24 0844 -- -- -- -- -- -- -- -- -- No Pain    12/27/24 08:14:17 -- 77 -- 150/68 95 97 % -- -- -- --    12/27/24 07:25:35 98.4 °F (36.9 °C) 70 -- 149/68 95 98 % -- -- -- --    12/27/24 02:04:08 98.4 °F (36.9 °C) 71 -- 165/63 97 95 % -- -- -- --    12/27/24 00:59:51 -- 71 -- 170/68 102 95 % -- -- -- --    12/26/24 21:51:52 98.3 °F (36.8 °C) 75 -- 174/79 111 98 % -- -- -- --    12/26/24 20:34:06 98.1 °F (36.7 °C) 76 -- 147/78 101 98 % None (Room air) -- 15 --    12/26/24 2034 98.1 °F (36.7 °C)  75 20 147/78 101 -- -- Lying -- No Pain    12/26/24 19:08:59 97.7 °F (36.5 °C) 79 -- 144/80 101 99 % -- -- -- --    12/26/24 18:34:48 97.7 °F (36.5 °C) 77 14 170/76 107 97 % -- -- -- --    12/26/24 1537 -- -- -- 197/81 -- -- -- -- -- --    12/26/24 1519 98.4 °F (36.9 °C) -- -- -- -- -- -- -- 15 No Pain    12/26/24 1515 -- 70 14 215/87 125 96 % None (Room air) -- -- --    12/26/24 1330 -- 72 16 183/79 114 97 % None (Room air) Lying -- --    12/26/24 1130 -- -- -- -- -- -- -- -- 15 No Pain    12/26/24 1033 -- 71 20 174/78 -- 99 % None (Room air) Sitting -- No Pain            Pertinent Labs/Diagnostic Test Results:   Radiology:  CTA head and neck with and without contrast   Final Interpretation by Melba Miguel MD (12/26 5017)      CT Brain:  No acute intracranial CT abnormality.      CT Angiography:      1.  Limited evaluation of the neck vasculature due to technique and artifacts related to motion and prior dental work, especially suboptimal evaluation of the vertebral arteries. Patent and grossly unremarkable vertebral arteries within the limits of    suboptimal image quality. No significant stenosis in the cervical carotid arteries.   2.  Patent major vessels of the [place] without high-grade stenosis.                        Workstation performed: [id number]         XR chest 2 views   ED Interpretation by Segundo Curran MD (12/26 9441)   No acute cardiopulmonary abnormalities      Final Interpretation by Ector Tovar MD (12/26 5784)      No acute cardiopulmonary disease.            Workstation performed: [id number]         MRI inpatient order    (Results Pending)     Cardiology:  ECG 12 lead    by Interface, Ris Results In (12/26 1268)        GI:  No orders to display           Results from last 7 days   Lab Units 12/27/24  0652 12/26/24  1105   WBC Thousand/uL 6.91 7.04   HEMOGLOBIN g/dL 11.6 13.3   HEMATOCRIT % 36.5 41.8   PLATELETS Thousands/uL 333 424*   TOTAL NEUT ABS Thousands/µL 5.15 5.30          Results from last 7 days   Lab Units 12/27/24  0652 12/26/24  1105   SODIUM mmol/L 139 139   POTASSIUM mmol/L 3.4* 3.8   CHLORIDE mmol/L 110* 106   CO2 mmol/L 21 24   ANION GAP mmol/L 8 9   BUN mg/dL 20 25   CREATININE mg/dL 0.68 0.89   EGFR ml/min/1.73sq m 88 65   CALCIUM mg/dL 8.7 9.7     Results from last 7 days   Lab Units 12/26/24  1105   AST U/L 32   ALT U/L 22   ALK PHOS U/L 23*   TOTAL PROTEIN g/dL 7.1   ALBUMIN g/dL 4.3   TOTAL BILIRUBIN mg/dL 0.47         Results from last 7 days   Lab Units 12/27/24  0652 12/26/24  1105   GLUCOSE RANDOM mg/dL 93 118       Results from last 7 days   Lab Units 12/26/24  1542 12/26/24  1333 12/26/24  1105   HS TNI 0HR ng/L  --   --  12   HS TNI 2HR ng/L  --  15  --    HSTNI D2 ng/L  --  3  --    HS TNI 4HR ng/L 15  --   --    HSTNI D4 ng/L 3  --   --              Results from last 7 days   Lab Units 12/26/24  1105   TSH 3RD GENERATON uIU/mL 0.601       Results from last 7 days   Lab Units 12/26/24  1630   CLARITY UA  Clear   COLOR UA  Light Yellow   SPEC GRAV UA  >=1.050*   PH UA  7.5   GLUCOSE UA mg/dl Negative   KETONES UA mg/dl Trace*   BLOOD UA  Negative   PROTEIN UA mg/dl 50 (1+)*   NITRITE UA  Negative   BILIRUBIN UA  Negative   UROBILINOGEN UA (BE) mg/dl <2.0   LEUKOCYTES UA  Negative   WBC UA /hpf None Seen   RBC UA /hpf None Seen   BACTERIA UA /hpf None Seen   EPITHELIAL CELLS WET PREP /hpf Occasional         Past Medical History:   Diagnosis Date    Allergic years ago    Iodine    Chronic leukopenia     Depression 04/22/2014    Hypercalcemia     Hyperlipidemia     Hypertension     labile    Hypertension     Migraines     Osteoarthritis     Osteoporosis     Vertigo      Present on Admission:   Memory impairment   Essential hypertension   Vertigo      Admitting Diagnosis: Dizziness [R42]  Confusion [R41.0]  Age/Sex: 69 y.o. female    Network Utilization Review Department  ATTENTION: Please call with any questions or concerns to [phone] and carefully  listen to the prompts so that you are directed to the right person. All voicemails are confidential.   For Discharge needs, contact Care Management DC Support Team at [phone] opt. 2  Send all requests for admission clinical reviews, approved or denied determinations and any other requests to dedicated fax number below belonging to the campus where the patient is receiving treatment. List of dedicated fax numbers for the Facilities:  FACILITY NAME UR FAX NUMBER   ADMISSION DENIALS (Administrative/Medical Necessity) [phone]   DISCHARGE SUPPORT TEAM (NETWORK) [phone]   PARENT CHILD HEALTH (Maternity/NICU/Pediatrics) [phone]   [facility] [phone]   [facility] [phone]   [facility] [phone]   [facility] [phone]   [facility] [phone]   [facility] [phone]   [facility] [phone]   [facility] [phone]   [facility] [phone]   [facility] [phone]   [facility] [phone]   [facility] [phone]

## 2024-12-27 NOTE — H&P
H&P - Hospitalist   Name: Mayra Echevarria 69 y.o. female I MRN: [id number]  [facility] I Date of Admission: 12/26/2024   Date of Service: 12/26/2024 I Hospital Day: 0     Assessment & Plan  AMS (altered mental status)  History of vertigo and patient is presenting with worsening dizziness and altered mental status.  Patient was at baseline yesterday morning around 6 AM after which she complained of severe vertigo symptoms with difficulty ambulating.  Today, family noted that patient is increasingly confused and repeating questions and patient asked about her mother-in-law's cooking the night before when she is no longer alive.  Patient has been evaluated for dementia outpatient.  Not a stroke alert given last known well more than 24 hours ago.  Patient is alert oriented to self, place, situation, time.  UA and labs unremarkable for signs of infection  CT head and neck unremarkable  Chest x-ray unremarkable  MRI ordered for further evaluation  Consider neurology evaluation if MRI brain unremarkable   Check TSH and B12  IVF  PT/OT  Essential hypertension  Blood pressure mildly elevated  Continue losartan and amlodipine  Vertigo  Continue meclizine as needed  Follow-up MRI brain to rule out central etiology  Memory impairment  History noted  Ambulatory dysfunction  PT/OT      VTE Pharmacologic Prophylaxis:   Moderate Risk (Score 3-4) - Pharmacological DVT Prophylaxis Ordered: enoxaparin (Lovenox).  Code Status: Level 1 - Full Code   Discussion with family: Updated  () at bedside.    Anticipated Length of Stay: Patient will be admitted on an inpatient basis with an anticipated length of stay of greater than 2 midnights secondary to AMS, MRI pending.    History of Present Illness   Chief Complaint: confusion    Mayra Echevarria is a 69 y.o. female with a PMH of hypertension, hyperlipidemia, vertigo, arthritis, depression who presents with ambulatory dysfunction and altered mental status.   Patient was last known normal yesterday morning before 6:00.  She woke up with worsening vertigo for which she took meclizine.  This morning, patient was noted to be more confused today continue to have repetitive questioning and asking about family members that were no longer alive.  Vertigo appears to have resolved however continues to have generalized weakness and difficulty ambulating.  Labs are stable in the ED.  # UA unremarkable in the ED.  CT head and neck unremarkable.  Patient was not a stroke alert.  Patient was OOW.  ED requests admission for MRI and PT/OT evaluation.    Family at bedside reports patient has tardive dyskinesia from one of her prior medications previously.    Review of Systems   Constitutional:  Negative for appetite change, chills and fever.   HENT:  Negative for congestion and voice change.    Eyes:  Negative for pain and visual disturbance.   Respiratory:  Negative for cough and shortness of breath.    Cardiovascular:  Negative for chest pain and leg swelling.   Gastrointestinal:  Negative for abdominal pain, constipation, diarrhea, nausea and vomiting.   Genitourinary:  Negative for difficulty urinating and dysuria.   Musculoskeletal:  Positive for gait problem. Negative for back pain.   Skin:  Negative for rash and wound.   Neurological:  Positive for dizziness. Negative for light-headedness and headaches.   Psychiatric/Behavioral:  Positive for confusion. Negative for agitation.        Historical Information   Past Medical History:   Diagnosis Date    Allergic years ago    Iodine    Chronic leukopenia     Depression 04/22/2014    Hypercalcemia     Hyperlipidemia     Hypertension     labile    Hypertension     Migraines     Osteoarthritis     Osteoporosis     Vertigo      Past Surgical History:   Procedure Laterality Date    EYE SURGERY      TOOTH EXTRACTION      TUBAL LIGATION       Social History     Tobacco Use    Smoking status: Never     Passive exposure: Never    Smokeless  tobacco: Never   Vaping Use    Vaping status: Never Used   Substance and Sexual Activity    Alcohol use: No    Drug use: No    Sexual activity: Yes     Partners: Male     Birth control/protection: Female Sterilization     E-Cigarette/Vaping    E-Cigarette Use Never User      E-Cigarette/Vaping Substances    Nicotine No     THC No     CBD No     Flavoring No     Other No     Unknown No      Family history non-contributory  Social History:  Marital Status: /Civil Union       Meds/Allergies   I have reviewed home medications with patient personally.  Prior to Admission medications    Medication Sig Start Date End Date Taking? Authorizing Provider   amLODIPine (NORVASC) 5 mg tablet TAKE 1 TABLET DAILY 8/12/24  Yes Namita Melendez DO   fenofibrate (TRICOR) 145 mg tablet TAKE 1 TABLET DAILY 12/24/24  Yes Namita Melendez DO   Lactobacillus (Acidophilus Probiotic) CAPS Take by mouth   Yes Historical Provider, MD   losartan (COZAAR) 100 MG tablet TAKE 1 TABLET DAILY 7/15/24  Yes Namita Melendez DO   meclizine (ANTIVERT) 25 mg tablet Take 1 tablet (25 mg total) by mouth 3 (three) times a day as needed for dizziness for up to 10 days 3/24/24 12/26/24 Yes Namita Melendez DO   nortriptyline (PAMELOR) 10 mg capsule TAKE 1 CAPSULE THREE TIMES A DAY  Patient taking differently: Take 30 mg by mouth daily at bedtime 7/22/24  Yes Namita Melendez DO   calcium carbonate (Calcium 600) 600 MG tablet Take 600 mg by mouth 2 (two) times a day with meals    Historical Provider, MD   methylPREDNISolone 4 MG tablet therapy pack Use as directed on package 10/11/24   Reggie Foster III, DO   multivitamin (THERAGRAN) TABS Take 1 tablet by mouth daily    Historical Provider, MD   TURMERIC CURCUMIN PO Take by mouth    Historical Provider, MD     Allergies   Allergen Reactions    Iodine - Food Allergy Rash       Objective :  Temp:  [97.7 °F (36.5 °C)-98.4 °F (36.9 °C)] 98.1 °F (36.7 °C)  HR:   "[70-79] 76  BP: (144-215)/(76-87) 147/78  Resp:  [14-20] 20  SpO2:  [96 %-99 %] 98 %  O2 Device: None (Room air)    Physical Exam  Vitals reviewed.   Constitutional:       General: She is not in acute distress.     Appearance: Normal appearance. She is not ill-appearing.   HENT:      Head: Normocephalic and atraumatic.   Cardiovascular:      Rate and Rhythm: Normal rate and regular rhythm.      Heart sounds: Normal heart sounds.   Pulmonary:      Effort: Pulmonary effort is normal. No respiratory distress.   Abdominal:      General: Bowel sounds are normal.      Tenderness: There is no abdominal tenderness.   Musculoskeletal:      Right lower leg: No edema.      Left lower leg: No edema.   Neurological:      Mental Status: She is alert and oriented to person, place, and time.      Motor: No weakness.      Comments: Mild ataxia to finger to nose          Lines/Drains:            Lab Results: I have reviewed the following results:  Results from last 7 days   Lab Units 12/26/24  1105   WBC Thousand/uL 7.04   HEMOGLOBIN g/dL 13.3   HEMATOCRIT % 41.8   PLATELETS Thousands/uL 424*   SEGS PCT % 74   LYMPHO PCT % 13*   MONO PCT % 7   EOS PCT % 4     Results from last 7 days   Lab Units 12/26/24  1105   SODIUM mmol/L 139   POTASSIUM mmol/L 3.8   CHLORIDE mmol/L 106   CO2 mmol/L 24   BUN mg/dL 25   CREATININE mg/dL 0.89   ANION GAP mmol/L 9   CALCIUM mg/dL 9.7   ALBUMIN g/dL 4.3   TOTAL BILIRUBIN mg/dL 0.47   ALK PHOS U/L 23*   ALT U/L 22   AST U/L 32   GLUCOSE RANDOM mg/dL 118             No results found for: \"HGBA1C\"        Imaging Results Review: I reviewed radiology reports from this admission including: CT H/N.  Other Study Results Review: No additional pertinent studies reviewed.    Administrative Statements   I have spent a total time of 55 minutes in caring for this patient on the day of the visit/encounter including Impressions, Counseling / Coordination of care, Documenting in the medical record, Reviewing / " ordering tests, medicine, procedures  , Obtaining or reviewing history  , and Communicating with other healthcare professionals .    ** Please Note: This note has been constructed using a voice recognition system. **

## 2024-12-27 NOTE — CASE MANAGEMENT
Case Management Assessment & Discharge Planning Note    Patient name Mayra Echevarria  Location [facility]-01 MRN [id number]  : 1955 Date 2024       Current Admission Date: 2024  Current Admission Diagnosis:AMS (altered mental status)   Patient Active Problem List    Diagnosis Date Noted Date Diagnosed    AMS (altered mental status) 2024     Ambulatory dysfunction 2024     MATT (obstructive sleep apnea) 2024     Other sleep disorders 2024     Sleep apnea-like behavior 2024     Insomnia 2024     Memory impairment 2024     Encounter for screening mammogram for breast cancer 2023     Other osteoporosis without current pathological fracture 2023     Impingement syndrome of left shoulder 2023     Impingement syndrome of right shoulder 2023     History of rib fracture 2022     Hypercalcemia 2021     Primary generalized (osteo)arthritis 2021     Vertigo 2021     Migraine with aura and without status migrainosus, not intractable 2021     Anxiety 2021     Labile hypertension 2021     Quadriceps tendonitis 2021     COVID-19 2021     Atrophic vaginitis 2019     Aseptic necrosis bone (HCC) 2019     Greater trochanteric bursitis of right hip 2017     It band syndrome, right 2017     Patellofemoral syndrome, right 2017     Senile osteoporosis 2015     Hyperlipidemia 2012     Essential hypertension 2009       LOS (days): 1  Geometric Mean LOS (GMLOS) (days): 2.6  Days to GMLOS:1.7     OBJECTIVE:    Risk of Unplanned Readmission Score: 7.78         Current admission status: Inpatient       Preferred Pharmacy:   [facility] ALVINO Naidu [address]  [place] [address]  Phone: [phone] Fax: [phone]    [facility] - [place], [place] - [address] [place] [address]  [facility] [address]  Phone: [phone] Fax: [phone]    Primary Care Provider: Namita Melendez,     Primary Insurance: [employer] REP  Secondary Insurance:     ASSESSMENT:  Active Health Care Proxies       Reggie Echevarria Health Care Representative - Spouse   Primary Phone: [phone] (Mobile)  Home Phone: [phone]                 Advance Directives  Does patient have a Health Care POA?: No  Was patient offered paperwork?: No  Does patient currently have a Health Care decision maker?: Yes, please see Health Care Proxy section  Does patient have Advance Directives?: No  Was patient offered paperwork?: No  Primary Contact: Reggie Echevarria ()         Readmission Root Cause  30 Day Readmission: No    Patient Information  Mental Status: Confused  During Assessment patient was accompanied by: Spouse  Assessment information provided by:: Spouse  Primary Caregiver: Self  Support Systems: Spouse/significant other, Self  County of Residence: [place]  What city do you live in?: [place]  Home entry access options. Select all that apply.: Stairs  Number of steps to enter home.: 2  Do the steps have railings?: No  Type of Current Residence: Bi-level  Upon entering residence, is there a bedroom on the main floor (no further steps)?: No  A bedroom is located on the following floor levels of residence (select all that apply):: 2nd Floor  Upon entering residence, is there a bathroom on the main floor (no further steps)?: No  Indicate which floors of current residence have a bathroom (select all the apply):: 2nd Floor  Number of steps to 2nd floor from main floor: One Flight  Living Arrangements: Lives w/ Spouse/significant other  Is patient a ?: No    Activities of Daily Living Prior to Admission  Completes ADLs independently?: Yes  Ambulates independently?: Yes  Does patient use assisted devices?: No  Does patient currently own DME?: Yes  What DME does the patient currently own?:  [employer]  Does patient have a history of Outpatient Therapy (PT/OT)?: Yes ([facility] PT)  Does the patient have a history of Short-Term Rehab?: No  Does patient have a history of HHC?: No  Does patient currently have HHC?: No         Patient Information Continued  Income Source: Pension/half-way  Does patient have prescription coverage?: Yes  Does patient receive dialysis treatments?: No  Does patient have a history of substance abuse?: No         Means of Transportation  Means of Transport to Appts:: Drives Self      Social Determinants of Health (SDOH)      Flowsheet Row Most Recent Value   Housing Stability    In the last 12 months, was there a time when you were not able to pay the mortgage or rent on time? N   In the past 12 months, how many times have you moved where you were living? 0   At any time in the past 12 months, were you homeless or living in a shelter (including now)? N   Transportation Needs    In the past 12 months, has lack of transportation kept you from medical appointments or from getting medications? no   In the past 12 months, has lack of transportation kept you from meetings, work, or from getting things needed for daily living? No   Food Insecurity    Within the past 12 months, you worried that your food would run out before you got the money to buy more. Never true   Within the past 12 months, the food you bought just didn't last and you didn't have money to get more. Never true   Utilities    In the past 12 months has the electric, gas, oil, or water company threatened to shut off services in your home? No            DISCHARGE DETAILS:    Discharge planning discussed with:: Patient,   Freedom of Choice: Yes  Comments - Freedom of Choice: Discussed FOC                Contacts  Patient Contacts: Reggie Echevarria ()  Contact Method: Phone  Phone Number: [phone]  Reason/Outcome: Continuity of Care, Emergency Contact, Discharge Planning    This CM introduced self and role to  patient and her  at bedside. Patient lives with  in a split level home- is independent with ADL's and normally drives self. Patient was confused and tearful during assessment,  states that is abnormal for her.  CM will follow for needs.

## 2024-12-27 NOTE — PLAN OF CARE
Problem: PAIN - ADULT  Goal: Verbalizes/displays adequate comfort level or baseline comfort level  Description: Interventions:  - Encourage patient to monitor pain and request assistance  - Assess pain using appropriate pain scale  - Administer analgesics based on type and severity of pain and evaluate response  - Implement non-pharmacological measures as appropriate and evaluate response  - Consider cultural and social influences on pain and pain management  - Notify physician/advanced practitioner if interventions unsuccessful or patient reports new pain  Outcome: Progressing     Problem: INFECTION - ADULT  Goal: Absence or prevention of progression during hospitalization  Description: INTERVENTIONS:  - Assess and monitor for signs and symptoms of infection  - Monitor lab/diagnostic results  - Monitor all insertion sites, i.e. indwelling lines, tubes, and drains  - Monitor endotracheal if appropriate and nasal secretions for changes in amount and color  - [place] appropriate cooling/warming therapies per order  - Administer medications as ordered  - Instruct and encourage patient and family to use good hand hygiene technique  - Identify and instruct in appropriate isolation precautions for identified infection/condition  Outcome: Progressing  Goal: Absence of fever/infection during neutropenic period  Description: INTERVENTIONS:  - Monitor WBC    Outcome: Progressing     Problem: SAFETY ADULT  Goal: Patient will remain free of falls  Description: INTERVENTIONS:  - Educate patient/family on patient safety including physical limitations  - Instruct patient to call for assistance with activity   - Consult OT/PT to assist with strengthening/mobility   - Keep Call bell within reach  - Keep bed low and locked with side rails adjusted as appropriate  - Keep care items and personal belongings within reach  - Initiate and maintain comfort rounds  - Make Fall Risk Sign visible to staff  - Offer Toileting every 2 Hours,  in advance of need  - Initiate/Maintain bed and chair alarm  - Obtain necessary fall risk management equipment: non skid socks   - Apply yellow socks and bracelet for high fall risk patients  - Consider moving patient to room near nurses station  Outcome: Progressing  Goal: Maintain or return to baseline ADL function  Description: INTERVENTIONS:  -  Assess patient's ability to carry out ADLs; assess patient's baseline for ADL function and identify physical deficits which impact ability to perform ADLs (bathing, care of mouth/teeth, toileting, grooming, dressing, etc.)  - Assess/evaluate cause of self-care deficits   - Assess range of motion  - Assess patient's mobility; develop plan if impaired  - Assess patient's need for assistive devices and provide as appropriate  - Encourage maximum independence but intervene and supervise when necessary  - Involve family in performance of ADLs  - Assess for home care needs following discharge   - Consider OT consult to assist with ADL evaluation and planning for discharge  - Provide patient education as appropriate  Outcome: Progressing  Goal: Maintains/Returns to pre admission functional level  Description: INTERVENTIONS:  - Perform AM-PAC 6 Click Basic Mobility/ Daily Activity assessment daily.  - Set and communicate daily mobility goal to care team and patient/family/caregiver.   - Collaborate with rehabilitation services on mobility goals if consulted  - Perform Range of Motion 3 times a day.  - Reposition patient every 2 hours.  - Dangle patient 3 times a day  - Stand patient 3 times a day  - Ambulate patient 3 times a day  - Out of bed to chair 3 times a day   - Out of bed for meals 3 times a day  - Out of bed for toileting  - Record patient progress and toleration of activity level   Outcome: Progressing     Problem: DISCHARGE PLANNING  Goal: Discharge to home or other facility with appropriate resources  Description: INTERVENTIONS:  - Identify barriers to discharge  w/patient and caregiver  - Arrange for needed discharge resources and transportation as appropriate  - Identify discharge learning needs (meds, wound care, etc.)  - Arrange for interpretive services to assist at discharge as needed  - Refer to Case Management Department for coordinating discharge planning if the patient needs post-hospital services based on physician/advanced practitioner order or complex needs related to functional status, cognitive ability, or social support system  Outcome: Progressing     Problem: Knowledge Deficit  Goal: Patient/family/caregiver demonstrates understanding of disease process, treatment plan, medications, and discharge instructions  Description: Complete learning assessment and assess knowledge base.  Interventions:  - Provide teaching at level of understanding  - Provide teaching via preferred learning methods  Outcome: Progressing

## 2024-12-27 NOTE — ASSESSMENT & PLAN NOTE
History of vertigo and patient is presenting with worsening dizziness and altered mental status.  Patient was at baseline yesterday morning around 6 AM after which she complained of severe vertigo symptoms with difficulty ambulating.  Today, family noted that patient is increasingly confused and repeating questions and patient asked about her mother-in-law's cooking the night before when she is no longer alive.  Patient has been evaluated for dementia outpatient.  Not a stroke alert given last known well more than 24 hours ago.  Patient is alert oriented to self, place, situation, time.  UA and labs unremarkable for signs of infection  CT head and neck unremarkable  Chest x-ray unremarkable  MRI ordered for further evaluation  Consider neurology evaluation if MRI brain unremarkable   Check TSH and B12  IVF  PT/OT

## 2024-12-27 NOTE — PHYSICAL THERAPY NOTE
Physical Therapy Evaluation    Patient Name: Mayra Echevarria    Today's Date: 12/27/2024     Problem List  Principal Problem:    AMS (altered mental status)  Active Problems:    Essential hypertension    Vertigo    Memory impairment    Ambulatory dysfunction       Past Medical History  Past Medical History:   Diagnosis Date    Allergic years ago    Iodine    Chronic leukopenia     Depression 04/22/2014    Hypercalcemia     Hyperlipidemia     Hypertension     labile    Hypertension     Migraines     Osteoarthritis     Osteoporosis     Vertigo         Past Surgical History  Past Surgical History:   Procedure Laterality Date    EYE SURGERY      TOOTH EXTRACTION      TUBAL LIGATION           12/27/24 0844   PT Last Visit   PT Visit Date 12/27/24   Note Type   Note type Evaluation   Pain Assessment   Pain Assessment Tool 0-10   Pain Score No Pain   Restrictions/Precautions   Weight Bearing Precautions Per Order No   Other Precautions Cognitive;Chair Alarm;Bed Alarm;Telemetry;Multiple lines;Fall Risk;Visual impairment  (falls to R with ambulation, L eye hypertropia)   Home Living   Type of Home House   Home Layout Multi-level;Stairs to enter without rails;Bed/bath upstairs  (1 CARMITA, split level, full bath on main floor (doesnt use), 2 half flights to bed/bath)   Bathroom Shower/Tub Tub/shower unit   Bathroom Toilet Raised   Bathroom Equipment Grab bars in shower   Additional Comments no AD use PTA   Prior Function   Level of [place] Independent with ADLs;Independent with functional mobility;Independent with IADLS   Lives With Spouse   Receives Help From Family   IADLs Independent with driving;Independent with meal prep;Independent with medication management   Falls in the last 6 months 0   Comments pt has been having memory decline in the last year which has limited her ability to watch her grandchildren and has caused her to only drive to [facility]    Family/Caregiver Present Yes   Cognition   Overall Cognitive Status Impaired   Arousal/Participation Cooperative   Orientation Level Oriented to person;Oriented to place;Disoriented to time;Disoriented to situation   Memory Decreased recall of recent events   Following Commands Follows one step commands without difficulty   Comments emotional at times throughout session regarding her  not being there at first and loving her grandchildren   RLE Assessment   RLE Assessment WFL   LLE Assessment   LLE Assessment WFL   Vestibular   Spontaneous Nystagmus (-) no evidence of nystagmus at rest in room light   Gaze Holding Nystagmus (-) no evidence of nystagmus   Head Impulse (-) re-fixation saccades with head thrust right;(-) re-fixation saccades with head thrust left   Vestibular Comments (S)  impaired smooth pursuit and horizontal saccade (especially to L), impaired convergence and VOR testing. reports being recently sick. falls to R with ambulation. recent respiratory illness   Light Touch   RLE Light Touch Grossly intact   LLE Light Touch Grossly intact   Bed Mobility   Supine to Sit 4  Minimal assistance   Additional items Assist x 1;Increased time required;Verbal cues;LE management;HOB elevated   Sit to Supine 4  Minimal assistance   Additional items Assist x 1;Increased time required;Verbal cues;LE management   Transfers   Sit to Stand 4  Minimal assistance   Additional items Assist x 1;Increased time required;Verbal cues   Stand to Sit 4  Minimal assistance   Additional items Assist x 1;Increased time required;Verbal cues   Additional Comments HHA   Ambulation/Elevation   Gait pattern Improper Weight shift;Decreased foot clearance;Short stride;Excessively slow  (falls towards R)   Gait Assistance 4  Minimal assist   Additional items Assist x 1;Verbal cues   Assistive Device   (HHA)   Distance 30'x2   Ambulation/Elevation Additional Comments (S)  falls to R   Balance   Static Sitting Fair -   Dynamic Sitting  Poor +   Static Standing Poor +   Dynamic Standing Poor   Ambulatory Poor   Endurance Deficit   Endurance Deficit Yes   Activity Tolerance   Activity Tolerance Patient limited by fatigue   Medical Staff Made Aware OT   Nurse Made Aware yes-cleared   Assessment   Prognosis Fair   Problem List Decreased strength;Decreased endurance;Impaired balance;Decreased mobility;Decreased coordination;Decreased cognition;Impaired judgement;Decreased safety awareness;Impaired vision   Assessment Pt is a 69 y.o. female admitted to [facility] on 12/26/2024 with worsening dizziness, difficulty ambulating and increased confusion from baseline. Pt received a primary medical dx of AMS. Pt has the following comorbidities which affect their treatment: active problems listed above,  has a past medical history of Allergic, Chronic leukopenia, Depression, Hypercalcemia, Hyperlipidemia, Hypertension, Hypertension, Migraines, Osteoarthritis, Osteoporosis, and Vertigo.  , as well as personal factors including stairs to access home. Pt has a high complexity clinical presentation due to Ongoing medical management for primary dx, Increased reliance on more restrictive AD compared to baseline, Decreased activity tolerance compared to baseline, Fall risk, Increased assistance needed from caregiver at current time, Ongoing telemetry monitoring, Cog status, Trending lab values, Diagnostic imaging pending, Continuous pulse oximetry monitoring  , and PMH. PT was consulted to evaluate pt's functional mobility and discharge needs. Upon evaluation, patient required minAx1 for all mobility as outlined above. Body system impairments include impaired oculomotor screen, L>R hypofunction on HIT, impaired balance, endurance, cognition, vision. Pt's functional activity impairments include: activity tolerance and mobility. Participation restrictions include inability to safely access home/community. At conclusion of eval, pt remained supine in bed with HOB elevated and  bed alarm donned, phone, call bell, and all other personal needs within reach. Pt would benefit from skilled PT to address their functional mobility limitations. The patient's AM-PAC Basic Mobility Inpatient Short Form Raw Score is 17. A Raw score of greater than 16 suggests the patient may benefit from discharge to home. Please also refer to the recommendation of the Physical Therapist for safe discharge planning.   Barriers to Discharge Decreased caregiver support;Inaccessible home environment   Goals   Patient Goals to see her family   STG Expiration Date 01/10/25   Short Term Goal #1 In 14 days, pt will: 1) perform bed mobility with mod I to promote functional independence 2) perform transfers to<>from all surfaces with mod I to promote safety and decrease caregiver burden. 3) ambulate 300' with mod I and least restrictive device to promote safe return to community 4) negotiate 6 stairs with mod I to promote safe return to home 5) improve balance grades by 1/2 to promote safety with functional mobility. 6) perform vestibular therex independently to improve oculomotor and vestibular teaming and subsequently functional balance.   PT Treatment Day 0   Plan   Treatment/Interventions Functional transfer training;LE strengthening/ROM;Endurance training;Elevations;Therapeutic exercise;Cognitive reorientation;Patient/family training;Gait training;Bed mobility;Equipment eval/education;Spoke to nursing;Spoke to case management;OT   PT Frequency 3-5x/wk   Discharge Recommendation   Rehab Resource Intensity Level, PT (S)  I (Maximum Resource Intensity)   Equipment Recommended Walker   Walker Package Recommended Wheeled walker   AM-PAC Basic Mobility Inpatient   Turning in Flat Bed Without Bedrails 3   Lying on Back to Sitting on Edge of Flat Bed Without Bedrails 3   Moving Bed to Chair 3   Standing Up From Chair Using Arms 3   Walk in Room 3   Climb 3-5 Stairs With Railing 2   Basic Mobility Inpatient Raw Score 17   Basic  Mobility Standardized Score 39.67   [facility] Highest Level Of Mobility   -[facility] Goal 5: Stand one or more mins   -[facility] Achieved 7: Walk 25 feet or more   Modified York Scale   Modified York Scale 4   Barthel Index   Feeding 5   Bathing 0   Grooming Score 0   Dressing Score 5   Bladder Score 5   Bowels Score 5   Toilet Use Score 5   Transfers (Bed/Chair) Score 10   Mobility (Level Surface) Score 0   Stairs Score 0   Barthel Index Score 35   Elliot Stout, PT, DPT, NCS

## 2024-12-27 NOTE — RAPID RESPONSE
Rapid Response Note  Mayra Echevarria 69 y.o. female MRN: [id number]  Unit/Bed#: [facility] 703-01 Encounter: [id number]    Rapid Response Notification(s):   Response called date/time:  12/27/2024 4:40 PM  Response team arrival date/time:  12/27/2024 4:43 PM  Response end date/time:  12/27/2024 5:03 PM  [facility]  Rapid response location:  [facility] unit  Primary reason for rapid response call:  Acute change in neuro status    Rapid Response Intervention(s):   Airway:  None  Breathing:  None  Circulation:  Electrocardiogram  Fluids administered:  None       Assessment:   Acute Encephalopathy, ? CVA    Plan:   Stroke Code Called.   Labs per Stroke protocol ordered.   Stat EKG  Neurology team present at bedside.  Patient pending MRI     Rapid Response Outcome:   Transfer:  Other (comment)  Primary service notified of transfer: Yes    Code Status: Level 1 (Full Code)      Family notified:  present at bedside.      Background/Situation:   Mayra Echevarria is a 69 y.o. female   PMH of hypertension, hyperlipidemia, vertigo, arthritis, depression admitted for workup of AMS, vertigo. Last known normal 6:00 am 12/26. CTH, CTA upon admission negative for acute intracranial abnormality or high grade stenosis.     RRT called for onset of stroke like symptoms. Per nursing at bedside, alerted by  at 4:15 PM due to noticeable new facial droop.  Upon arrival to bedside, patient vitally stable, BGL 97,   NIHSS assessment approximately 10 (limited by dysarthria).   Stroke code called.    Review of Systems   Unable to perform ROS: Mental status change       Objective:   Vitals:    12/27/24 1100 12/27/24 1458 12/27/24 1633 12/27/24 1641   BP:  170/77 (!) 176/83 (!) 176/83   Pulse: 84 76 75 80   Resp:       Temp:  97.9 °F (36.6 °C) 98 °F (36.7 °C)    TempSrc:   Oral    SpO2: 95% 95% 96% 96%   Weight:       Height:         Physical Exam  Eyes:      Extraocular Movements: Extraocular movements intact.      Pupils: Pupils  are equal, round, and reactive to light.   Cardiovascular:      Rate and Rhythm: Normal rate.      Heart sounds: No murmur heard.  Pulmonary:      Effort: No respiratory distress.   Abdominal:      General: There is no distension.      Tenderness: There is no abdominal tenderness.   Skin:     Coloration: Skin is not pale.   Neurological:      Mental Status: She is alert.      Cranial Nerves: Cranial nerve deficit present.      Motor: Weakness present.

## 2024-12-27 NOTE — PROGRESS NOTES
Pastoral Care Progress Note          Chaplaincy Interventions Utilized:   Empowerment: Clarified, confirmed, or reviewed information from treatment team     Exploration: Explored emotional needs & resources    Collaboration: Advocated for patient/family     Relationship Building: Listened empathically    Chaplaincy Outcomes Achieved:  Tearfully processed emotions and Verbally processed emotions   12/27/24 1600   Clinical Encounter Type   Visited With Family;Patient not available   Crisis Visit   (Rapid Response/Stroke. Pt taken for tests.)   Patient Spiritual Encounters   Spiritual Encounter Notes  present.  supported him and remains available.

## 2024-12-27 NOTE — ASSESSMENT & PLAN NOTE
Initial NIHSS 4  Presenting deficits were: right sided droop and leg/arm weakness  Interventions: Patient not a candidate. Unclear time of onset outside appropriate time window. and Symptoms resolved/clearly non disabling.   These new symptoms resolved and patient came in with new vertigo and AMS yesterday as well, which complicates LKW.    Workup:  Lab Results   Component Value Date    CHOLESTEROL 179 09/24/2024    LDLCALC 126 (H) 09/24/2024    TRIG 79 09/24/2024      CTH: no acute infarct  CTA: no LVO, stenosis of left inferior M3  MRI: pending  TTE/FROILAN: pending    Suspect patient's symptoms in the setting of intracranial athero given severe stenosis at M3 branch corresponding to today's symptoms    Discussed with neurology attending, Dr. Wilkinson  Pending: MRI brain, TTE, Lipid panel, A1c pending  BP: Permissive HTN <180/90 for first 24 hours, and then gradual return to normotension  AC/AP: aspirin 325 load, then 81 mg daily, likely 90 days of dapt depending on MRI  Atorvastatin 40mg QHS  Glucose <180   Neuro checks- Every 1 hour x 4 hours, then every 2 hours x 4 hours, then every 4 hours x 72 hours     Stat CT Head for change in neuro status.  Monitor on telemetry  DVT ppx per primary team, SCDs  PT/OT/ST/PM&R evaluations   Medical management as per primary team appreciated.

## 2024-12-27 NOTE — ASSESSMENT & PLAN NOTE
History of vertigo and patient is presenting with worsening dizziness and altered mental status.   UA and labs unremarkable for signs of infection  CT head and neck unremarkable  Chest x-ray unremarkable  MRI ordered for further evaluation, pending   Consider neurology evaluation if MRI brain unremarkable   TSH and B12 WNL  PT/OT

## 2024-12-27 NOTE — PLAN OF CARE
Problem: OCCUPATIONAL THERAPY ADULT  Goal: Performs self-care activities at highest level of function for planned discharge setting.  See evaluation for individualized goals.  Description: Treatment Interventions: ADL retraining, Functional transfer training, Endurance training, Patient/family training, Compensatory technique education, Continued evaluation          See flowsheet documentation for full assessment, interventions and recommendations.   2024 1214 by Tiffanie Brunner OT  Note: Limitation: Decreased ADL status, Decreased Safe judgement during ADL, Decreased cognition, Decreased endurance, Decreased self-care trans, Decreased high-level ADLs  Prognosis: Good  Assessment: Pt is a 69 y.o. YO  female admitted to [facility] on 2024 w/ AMS  and dizziness. Pt  has a past medical history of Allergic, Chronic leukopenia, Depression, Hypercalcemia, Hyperlipidemia, Hypertension, Hypertension, Migraines, Osteoarthritis, Osteoporosis, and Vertigo. Pt with active OT orders.  . Pt resides in a house with spouse. Pt was I w/  ADLS and IADLS, (+) drove, & required no use of DME PTA. Currently pt is min a for ub adls, mod a for llb adls, and min a for transfers/functional mobility. Pt is limited at this time 2*: endurance, activity tolerance, functional mobility, functional standing tolerance, decreased I w/ ADLS/IADLS, and impaired fine motor skills.The following Occupational Performance Areas to address include: bathing/shower, toilet hygiene, dressing, functional mobility, and household maintenance. Based on the aforementioned OT evaluation, functional performance deficits, and assessments, pt has been identified as a high complexity evaluation. From OT standpoint, anticipate d/c  level I discharge resources . Pt to continue to benefit from acute immediate OT services to address the following goals 3-5x/week to  w/in 10-14 days: .   The patient's raw score on the AM-PAC Daily Activity Inpatient Short Form  is 16. A raw score of less than 19 suggests the patient may benefit from discharge to post-acute rehabilitation services. Please refer to the recommendation of the Occupational Therapist for safe discharge planning.     Rehab Resource Intensity Level, OT: I (Maximum Resource Intensity)       2024 1214 by Tiffanie Brunner OT  Note: Limitation: Decreased ADL status, Decreased Safe judgement during ADL, Decreased cognition, Decreased endurance, Decreased self-care trans, Decreased high-level ADLs  Prognosis: Good  Assessment: Pt is a 69 y.o. YO  female admitted to [facility] on 2024 w/ AMS  and dizziness. Pt  has a past medical history of Allergic, Chronic leukopenia, Depression, Hypercalcemia, Hyperlipidemia, Hypertension, Hypertension, Migraines, Osteoarthritis, Osteoporosis, and Vertigo. Pt with active OT orders.  . Pt resides in a house with spouse. Pt was I w/  ADLS and IADLS, (+) drove, & required no use of DME PTA. Currently pt is min a for ub adls, mod a for llb adls, and min a for transfers/functional mobility. Pt is limited at this time 2*: endurance, activity tolerance, functional mobility, functional standing tolerance, decreased I w/ ADLS/IADLS, and impaired fine motor skills.The following Occupational Performance Areas to address include: bathing/shower, toilet hygiene, dressing, functional mobility, and household maintenance. Based on the aforementioned OT evaluation, functional performance deficits, and assessments, pt has been identified as a high complexity evaluation. From OT standpoint, anticipate d/c  level I discharge resources . Pt to continue to benefit from acute immediate OT services to address the following goals 3-5x/week to  w/in 10-14 days: .   The patient's raw score on the AM-PAC Daily Activity Inpatient Short Form is 16. A raw score of less than 19 suggests the patient may benefit from discharge to post-acute rehabilitation services. Please refer to the recommendation of the  Occupational Therapist for safe discharge planning.     Rehab Resource Intensity Level, OT: I (Maximum Resource Intensity)

## 2024-12-28 PROBLEM — I63.9 ACUTE CVA (CEREBROVASCULAR ACCIDENT) (HCC): Status: ACTIVE | Noted: 2024-12-26

## 2024-12-28 LAB
ANION GAP SERPL CALCULATED.3IONS-SCNC: 8 MMOL/L (ref 4–13)
ATRIAL RATE: 74 BPM
BUN SERPL-MCNC: 18 MG/DL (ref 5–25)
CALCIUM SERPL-MCNC: 9 MG/DL (ref 8.4–10.2)
CHLORIDE SERPL-SCNC: 110 MMOL/L (ref 96–108)
CHOLEST SERPL-MCNC: 137 MG/DL (ref ?–200)
CO2 SERPL-SCNC: 24 MMOL/L (ref 21–32)
CREAT SERPL-MCNC: 0.79 MG/DL (ref 0.6–1.3)
ERYTHROCYTE [DISTWIDTH] IN BLOOD BY AUTOMATED COUNT: 14.6 % (ref 11.6–15.1)
GFR SERPL CREATININE-BSD FRML MDRD: 76 ML/MIN/1.73SQ M
GLUCOSE SERPL-MCNC: 104 MG/DL (ref 65–140)
HCT VFR BLD AUTO: 39.1 % (ref 34.8–46.1)
HDLC SERPL-MCNC: 42 MG/DL
HGB BLD-MCNC: 12.5 G/DL (ref 11.5–15.4)
LDLC SERPL CALC-MCNC: 79 MG/DL (ref 0–100)
MAGNESIUM SERPL-MCNC: 2 MG/DL (ref 1.9–2.7)
MCH RBC QN AUTO: 27.7 PG (ref 26.8–34.3)
MCHC RBC AUTO-ENTMCNC: 32 G/DL (ref 31.4–37.4)
MCV RBC AUTO: 87 FL (ref 82–98)
P AXIS: 16 DEGREES
PLATELET # BLD AUTO: 331 THOUSANDS/UL (ref 149–390)
PMV BLD AUTO: 9.8 FL (ref 8.9–12.7)
POTASSIUM SERPL-SCNC: 3.9 MMOL/L (ref 3.5–5.3)
PR INTERVAL: 170 MS
QRS AXIS: -7 DEGREES
QRSD INTERVAL: 88 MS
QT INTERVAL: 402 MS
QTC INTERVAL: 446 MS
RBC # BLD AUTO: 4.51 MILLION/UL (ref 3.81–5.12)
SODIUM SERPL-SCNC: 142 MMOL/L (ref 135–147)
T WAVE AXIS: 40 DEGREES
TRIGL SERPL-MCNC: 80 MG/DL (ref ?–150)
VENTRICULAR RATE: 74 BPM
WBC # BLD AUTO: 5.79 THOUSAND/UL (ref 4.31–10.16)

## 2024-12-28 PROCEDURE — 93010 ELECTROCARDIOGRAM REPORT: CPT | Performed by: INTERNAL MEDICINE

## 2024-12-28 PROCEDURE — 85027 COMPLETE CBC AUTOMATED: CPT | Performed by: PHYSICIAN ASSISTANT

## 2024-12-28 PROCEDURE — 83735 ASSAY OF MAGNESIUM: CPT | Performed by: PHYSICIAN ASSISTANT

## 2024-12-28 PROCEDURE — 80061 LIPID PANEL: CPT

## 2024-12-28 PROCEDURE — 99232 SBSQ HOSP IP/OBS MODERATE 35: CPT | Performed by: INTERNAL MEDICINE

## 2024-12-28 PROCEDURE — 92610 EVALUATE SWALLOWING FUNCTION: CPT

## 2024-12-28 PROCEDURE — 80048 BASIC METABOLIC PNL TOTAL CA: CPT | Performed by: PHYSICIAN ASSISTANT

## 2024-12-28 RX ADMIN — NORTRIPTYLINE HYDROCHLORIDE 30 MG: 10 CAPSULE ORAL at 21:30

## 2024-12-28 RX ADMIN — FENOFIBRATE 145 MG: 145 TABLET ORAL at 09:13

## 2024-12-28 RX ADMIN — ASPIRIN 81 MG CHEWABLE TABLET 81 MG: 81 TABLET CHEWABLE at 09:13

## 2024-12-28 RX ADMIN — ATORVASTATIN CALCIUM 40 MG: 40 TABLET, FILM COATED ORAL at 15:50

## 2024-12-28 RX ADMIN — ENOXAPARIN SODIUM 40 MG: 40 INJECTION SUBCUTANEOUS at 09:13

## 2024-12-28 RX ADMIN — AMLODIPINE BESYLATE 5 MG: 5 TABLET ORAL at 09:13

## 2024-12-28 RX ADMIN — LOSARTAN POTASSIUM 100 MG: 50 TABLET, FILM COATED ORAL at 09:13

## 2024-12-28 NOTE — PROGRESS NOTES
Progress Note - Neurology   Name: Mayra Echevarria 69 y.o. female I MRN: [id number]  [facility] I Date of Admission: 12/26/2024   Date of Service: 12/28/2024 I Hospital Day: 2    Assessment & Plan  AMS (altered mental status)  Initial NIHSS 4  Presenting deficits were: right sided droop and leg/arm weakness  Interventions: Patient not a candidate. Unclear time of onset outside appropriate time window. and Symptoms resolved/clearly non disabling.   These new symptoms resolved and patient came in with new vertigo and AMS 12/26 as well, which complicates LKW.    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   TTE/FROILAN: pending    Suspect patient's symptoms in the setting of embolic source based on patient's MRI findings    Discussed with neurology attending, Dr. Wilkinson  Pending: TTE  BP: Permissive HTN <180/90 for first 24 hours, and then gradual return to normotension  AC/AP: aspirin 325 load, then 81 mg daily.  No AC at this time unless A fib or other etiology detected  Atorvastatin 40mg QHS  Glucose <180   Neuro checks- Every 1 hour x 4 hours, then every 2 hours x 4 hours, then every 4 hours x 72 hours     Stat CT Head for change in neuro status.  Monitor on telemetry  DVT ppx per primary team, SCDs  PT/OT/ST/PM&R evaluations   Medical management as per primary team appreciated.    Patient will need OP Zio patch then Loop recorder if needed, cardiology referral to be placed on dc.    Recommendations for outpatient neurological follow up have yet to be determined.  I have discussed with Dr. Wilkinson the above plan to treat pt.  He agrees with the plan.    Subjective   Patient was seen and evaluated.   accompanies the patient, notes that she continues to feel fuzzy.  Otherwise no other significant deficits are noted.  No other complaints at this time.    Review of Systems  negative    Objective :  Temp:  [97.9 °F (36.6 °C)-98.6 °F (37 °C)] 97.9 °F (36.6 °C)  HR:  [67-84] 75  BP: (148-176)/(62-83) 150/65  Resp:  [16] 16  SpO2:  [90 %-98 %] 91 %    Physical Exam  Constitutional:       General: She is not in acute distress.  HENT:      Head: Normocephalic.      Mouth/Throat:      Mouth: Mucous membranes are moist.   Eyes:      General: Lids are normal.      Extraocular Movements: Extraocular movements intact.      Conjunctiva/sclera: Conjunctivae normal.      Pupils: Pupils are equal, round, and reactive to light.   Neurological:      Mental Status: She is alert.      Motor: Motor strength is normal.     Deep Tendon Reflexes:      Reflex Scores:       Tricep reflexes are 2+ on the right side and 2+ on the left side.       Bicep reflexes are 2+ on the right side and 2+ on the left side.       Brachioradialis reflexes are 2+ on the right side and 2+ on the left side.       Patellar reflexes are 2+ on the right side and 2+ on the left side.       Achilles reflexes are 2+ on the right side and 2+ on the left side.  Psychiatric:         Speech: Speech normal.     Neurological Exam  Mental Status  Alert. Oriented only to person, place and situation. Unable to state month or year.  Said that the current month is January.. Speech is normal.    Cranial Nerves  CN II: Visual fields full to confrontation.  CN III, IV, VI: Extraocular movements intact bilaterally. Normal lids and orbits bilaterally. Pupils equal round and reactive to light bilaterally.  CN V: Facial sensation is normal.  CN VII: Full and symmetric facial movement.  CN VIII: Hearing is normal.  CN IX, X: Palate elevates symmetrically  CN XI: Shoulder shrug strength is normal.  CN  XII: Tongue midline without atrophy or fasciculations.    Motor   Strength is 5/5 throughout all four extremities.    Sensory  Sensation is intact to light touch, pinprick, vibration and proprioception in all four extremities.    Reflexes                                            Right                      Left  Brachioradialis                    2+                         2+  Biceps                                 2+                         2+  Triceps                                2+                         2+  Patellar                                2+                         2+  Achilles                                2+                         2+    Coordination  Right: Finger-to-nose abnormality: Mild dysmetria to the right upper extremity. Heel-to-shin normal.Left: Finger-to-nose normal. Heel-to-shin normal.        Lab Results: I have reviewed the following results:  Imaging Results Review: I personally reviewed the following image studies in PACS and associated radiology reports: MRI brain. My interpretation of the radiology images/reports is: As above.  Other Study Results Review: No additional pertinent studies reviewed.    VTE Pharmacologic Prophylaxis: VTE covered by:  enoxaparin, Subcutaneous, 40 mg at 12/28/24 0913

## 2024-12-28 NOTE — SPEECH THERAPY NOTE
Speech-Language Pathology Bedside Swallow Evaluation      Patient Name: Mayra Echevarria    Today's Date: 12/28/2024     Problem List  Principal Problem:    AMS (altered mental status)  Active Problems:    Essential hypertension    Vertigo    Memory impairment    Ambulatory dysfunction    Past Medical History  Past Medical History:   Diagnosis Date    Allergic years ago    Iodine    Chronic leukopenia     Depression 04/22/2014    Hypercalcemia     Hyperlipidemia     Hypertension     labile    Hypertension     Migraines     Osteoarthritis     Osteoporosis     Vertigo      Past Surgical History  Past Surgical History:   Procedure Laterality Date    EYE SURGERY      TOOTH EXTRACTION      TUBAL LIGATION         Summary  Pt presented with functional appearing oral and pharyngeal stage swallowing skills with materials administered today. No s/s aspiration with thin liquids and regular solids. Pt reports no pain or discomfort with swallowing. Speech is clear and intelligible. No additional ST f/u needed at this time. Please re consult with any new changes or concerns.    Risk/s for Aspiration: low    Recommended Diet: regular diet and thin liquids   Recommended Form of Meds: whole with liquid   Aspiration precautions and swallowing strategies: upright posture  Other Recommendations: Continue frequent oral care      Current Medical Status per H&P 12/26/24  Mayra Echevarria is a 69 y.o. female with a PMH of hypertension, hyperlipidemia, vertigo, arthritis, depression who presents with ambulatory dysfunction and altered mental status.  Patient was last known normal yesterday morning before 6:00.  She woke up with worsening vertigo for which she took meclizine.  This morning, patient was noted to be more confused today continue to have repetitive questioning and asking about family members that were no longer alive.  Vertigo appears to have resolved however continues to have generalized weakness and difficulty ambulating.  Labs  are stable in the ED.  # UA unremarkable in the ED.  CT head and neck unremarkable.  Patient was not a stroke alert.  Patient was OOW.  ED requests admission for MRI and PT/OT evaluation.  Family at bedside reports patient has tardive dyskinesia from one of her prior medications previously.      Special Studies:  MRI brain 12/27/24 IMPRESSION:  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  Mild chronic white matter microangiopathic changes involving both cerebral hemispheres.  CXR 12/26/24 IMPRESSION:  No acute cardiopulmonary disease.    Social/Education/Vocational Hx:  Pt lives with family    Swallow Information   Current Risks for Dysphagia & Aspiration: CVA  Current Diet: regular diet and thin liquids   Baseline Diet: regular diet and thin liquids      Baseline Assessment   Behavior/Cognition: alert  Speech/Language Status: able to participate in conversation and able to follow commands  Patient Positioning: upright in bed  Pain Status/Interventions/Response to Interventions: No report of or nonverbal indications of pain.       Swallow Mechanism Exam  Facial: symmetrical  Labial: WFL  Lingual: WFL  Velum: symmetrical  Mandible: adequate ROM  Dentition: adequate  Vocal quality:clear/adequate   Volitional Cough: strong/productive   Respiratory Status: on RA      Consistencies Assessed and Performance   Consistencies Administered: thin liquids and hard solids    Oral Stage: WFL  Mastication was adequate with the materials administered today. Bolus formation and transfer were functional with no significant oral residue noted. No overt s/s reduced oral control.    Pharyngeal Stage: WFL  Swallow Mechanics: Swallowing initiation appeared prompt. Laryngeal rise was palpated and judged to be within functional limits. No coughing, throat clearing, change in vocal  quality or respiratory status noted today.     Esophageal Concerns: none reported      Summary and Recommendations (see above)    Results Reviewed with: patient and RN     Treatment Recommended: No additional ST f/u needed at this time. Please re consult with any new changes or concerns.

## 2024-12-28 NOTE — PROGRESS NOTES
Progress Note - Hospitalist   Name: Mayra Echevarria 69 y.o. female I MRN: [id number]  Unit/Bed#: [facility] 703-01 I Date of Admission: 12/26/2024   Date of Service: 12/28/2024 I Hospital Day: 2    Assessment & Plan  Acute CVA (cerebrovascular accident) (HCC)  History of vertigo and patient is presenting with worsening dizziness and altered mental status.   CTA head/neck : High-grade stenosis of a left middle cerebral artery inferior division M3 branch extending posteriorly in the left sylvian fissure   MRI brain 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   Neurology is following.  Currently on aspirin 81 mg daily and atorvastatin 40 mg daily.  Continue stroke pathway.  Follow-up TTE continue telemetry  PT OT evaluation recommending inpatient rehab.  Essential hypertension  Continue amlodipine and losartan  Vertigo  Likely in the setting of CVA.  Memory impairment  History noted  Ambulatory dysfunction  PT/OT recommending inpatient rehab    VTE Pharmacologic Prophylaxis:   Moderate Risk (Score 3-4) - Pharmacological DVT Prophylaxis Ordered: enoxaparin (Lovenox).    Mobility:   Basic Mobility Inpatient Raw Score: 17  JH-HLM Goal: 5: Stand one or more mins  JH-HLM Achieved: 6: Walk 10 steps or more  JH-HLM Goal achieved. Continue to encourage appropriate mobility.    Patient Centered Rounds: I performed bedside rounds with nursing staff today.   Discussions with Specialists or Other Care Team Provider: Neurology    Education and Discussions with Family / Patient: Updated  () at bedside.    Current Length of Stay: 2 day(s)  Current Patient Status: Inpatient   Certification Statement: The patient will continue to require additional inpatient hospital stay due to stroke pathway  Discharge Plan: Anticipate discharge in 48 hrs to rehab  facility.    Code Status: Level 1 - Full Code    Subjective   Patient reports feeling better.  Was tearful during encounter because of her current medical conditions.    Objective :  Temp:  [97.9 °F (36.6 °C)-98.6 °F (37 °C)] 98 °F (36.7 °C)  HR:  [67-82] 82  BP: (148-176)/(62-83) 168/77  Resp:  [16] 16  SpO2:  [90 %-98 %] 93 %    Body mass index is 27.44 kg/m².     Input and Output Summary (last 24 hours):     Intake/Output Summary (Last 24 hours) at 12/28/2024 1442  Last data filed at 12/28/2024 1106  Gross per 24 hour   Intake 195 ml   Output --   Net 195 ml       Physical Exam  Cardiovascular:      Rate and Rhythm: Normal rate and regular rhythm.   Pulmonary:      Effort: Pulmonary effort is normal.      Breath sounds: Normal breath sounds.   Abdominal:      General: Bowel sounds are normal.      Palpations: Abdomen is soft.   Neurological:      Mental Status: She is alert and oriented to person, place, and time.      Motor: Weakness (RUE) present.           Lines/Drains:        Telemetry:  Telemetry Orders (From admission, onward)               24 Hour Telemetry Monitoring  Continuous x 24 Hours (Telem)        Expiring   Question:  Reason for 24 Hour Telemetry  Answer:  TIA/Suspected CVA/ Confirmed CVA                     Telemetry Reviewed: Normal Sinus Rhythm  Indication for Continued Telemetry Use: Acute CVA               Lab Results: I have reviewed the following results:   Results from last 7 days   Lab Units 12/28/24  0508 12/27/24  0652   WBC Thousand/uL 5.79 6.91   HEMOGLOBIN g/dL 12.5 11.6   HEMATOCRIT % 39.1 36.5   PLATELETS Thousands/uL 331 333   SEGS PCT %  --  75   LYMPHO PCT %  --  14   MONO PCT %  --  8   EOS PCT %  --  3     Results from last 7 days   Lab Units 12/28/24  0508 12/27/24  0652 12/26/24  1105   SODIUM mmol/L 142   < > 139   POTASSIUM mmol/L 3.9   < > 3.8   CHLORIDE mmol/L 110*   < > 106   CO2 mmol/L 24   < > 24   BUN mg/dL 18   < > 25   CREATININE mg/dL 0.79   < > 0.89   ANION GAP  mmol/L 8   < > 9   CALCIUM mg/dL 9.0   < > 9.7   ALBUMIN g/dL  --   --  4.3   TOTAL BILIRUBIN mg/dL  --   --  0.47   ALK PHOS U/L  --   --  23*   ALT U/L  --   --  22   AST U/L  --   --  32   GLUCOSE RANDOM mg/dL 104   < > 118    < > = values in this interval not displayed.         Results from last 7 days   Lab Units 12/27/24  1633   POC GLUCOSE mg/dl 97     Results from last 7 days   Lab Units 12/27/24 2003   HEMOGLOBIN A1C % 6.1*           Recent Cultures (last 7 days):         Imaging Results Review: I reviewed radiology reports from this admission including: CT head and MRI brain.  Other Study Results Review: No additional pertinent studies reviewed.    Last 24 Hours Medication List:     Current Facility-Administered Medications:     amLODIPine (NORVASC) tablet 5 mg, Daily    aspirin chewable tablet 81 mg, Daily    atorvastatin (LIPITOR) tablet 40 mg, Daily With Dinner    enoxaparin (LOVENOX) subcutaneous injection 40 mg, Daily    fenofibrate (TRICOR) tablet 145 mg, Daily    losartan (COZAAR) tablet 100 mg, Daily    meclizine (ANTIVERT) tablet 25 mg, Q8H PRN    nortriptyline (PAMELOR) capsule 30 mg, HS    Administrative Statements   Today, Patient Was Seen By: Peter Vila MD  I have spent a total time of 35 minutes in caring for this patient on the day of the visit/encounter including Diagnostic results, Documenting in the medical record, Reviewing / ordering tests, medicine, procedures  , Obtaining or reviewing history  , and Communicating with other healthcare professionals .    **Please Note: This note may have been constructed using a voice recognition system.**

## 2024-12-28 NOTE — ASSESSMENT & PLAN NOTE
History of vertigo and patient is presenting with worsening dizziness and altered mental status.   CTA head/neck : High-grade stenosis of a left middle cerebral artery inferior division M3 branch extending posteriorly in the left sylvian fissure   MRI brain 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   Neurology is following.  Currently on aspirin 81 mg daily and atorvastatin 40 mg daily.  Continue stroke pathway.  Follow-up TTE continue telemetry  PT OT evaluation recommending inpatient rehab.

## 2024-12-28 NOTE — PLAN OF CARE
Problem: PAIN - ADULT  Goal: Verbalizes/displays adequate comfort level or baseline comfort level  Description: Interventions:  - Encourage patient to monitor pain and request assistance  - Assess pain using appropriate pain scale  - Administer analgesics based on type and severity of pain and evaluate response  - Implement non-pharmacological measures as appropriate and evaluate response  - Consider cultural and social influences on pain and pain management  - Notify physician/advanced practitioner if interventions unsuccessful or patient reports new pain  Outcome: Progressing     Problem: INFECTION - ADULT  Goal: Absence or prevention of progression during hospitalization  Description: INTERVENTIONS:  - Assess and monitor for signs and symptoms of infection  - Monitor lab/diagnostic results  - Monitor all insertion sites, i.e. indwelling lines, tubes, and drains  - Monitor endotracheal if appropriate and nasal secretions for changes in amount and color  - [place] appropriate cooling/warming therapies per order  - Administer medications as ordered  - Instruct and encourage patient and family to use good hand hygiene technique  - Identify and instruct in appropriate isolation precautions for identified infection/condition  Outcome: Progressing  Goal: Absence of fever/infection during neutropenic period  Description: INTERVENTIONS:  - Monitor WBC    Outcome: Progressing     Problem: SAFETY ADULT  Goal: Patient will remain free of falls  Description: INTERVENTIONS:  - Educate patient/family on patient safety including physical limitations  - Instruct patient to call for assistance with activity   - Consult OT/PT to assist with strengthening/mobility   - Keep Call bell within reach  - Keep bed low and locked with side rails adjusted as appropriate  - Keep care items and personal belongings within reach  - Initiate and maintain comfort rounds  - Make Fall Risk Sign visible to staff  - Offer Toileting every 2 Hours,  in advance of need  - Initiate/Maintain bed and chair alarm  - Obtain necessary fall risk management equipment: non skid socks   - Apply yellow socks and bracelet for high fall risk patients  - Consider moving patient to room near nurses station  Outcome: Progressing  Goal: Maintain or return to baseline ADL function  Description: INTERVENTIONS:  -  Assess patient's ability to carry out ADLs; assess patient's baseline for ADL function and identify physical deficits which impact ability to perform ADLs (bathing, care of mouth/teeth, toileting, grooming, dressing, etc.)  - Assess/evaluate cause of self-care deficits   - Assess range of motion  - Assess patient's mobility; develop plan if impaired  - Assess patient's need for assistive devices and provide as appropriate  - Encourage maximum independence but intervene and supervise when necessary  - Involve family in performance of ADLs  - Assess for home care needs following discharge   - Consider OT consult to assist with ADL evaluation and planning for discharge  - Provide patient education as appropriate  Outcome: Progressing  Goal: Maintains/Returns to pre admission functional level  Description: INTERVENTIONS:  - Perform AM-PAC 6 Click Basic Mobility/ Daily Activity assessment daily.  - Set and communicate daily mobility goal to care team and patient/family/caregiver.   - Collaborate with rehabilitation services on mobility goals if consulted  - Perform Range of Motion 3 times a day.  - Reposition patient every 2 hours.  - Dangle patient 3 times a day  - Stand patient 3 times a day  - Ambulate patient 3 times a day  - Out of bed to chair 3 times a day   - Out of bed for meals 3 times a day  - Out of bed for toileting  - Record patient progress and toleration of activity level   Outcome: Progressing     Problem: DISCHARGE PLANNING  Goal: Discharge to home or other facility with appropriate resources  Description: INTERVENTIONS:  - Identify barriers to discharge  w/patient and caregiver  - Arrange for needed discharge resources and transportation as appropriate  - Identify discharge learning needs (meds, wound care, etc.)  - Arrange for interpretive services to assist at discharge as needed  - Refer to Case Management Department for coordinating discharge planning if the patient needs post-hospital services based on physician/advanced practitioner order or complex needs related to functional status, cognitive ability, or social support system  Outcome: Progressing     Problem: Knowledge Deficit  Goal: Patient/family/caregiver demonstrates understanding of disease process, treatment plan, medications, and discharge instructions  Description: Complete learning assessment and assess knowledge base.  Interventions:  - Provide teaching at level of understanding  - Provide teaching via preferred learning methods  Outcome: Progressing     Problem: Prexisting or High Potential for Compromised Skin Integrity  Goal: Skin integrity is maintained or improved  Description: INTERVENTIONS:  - Identify patients at risk for skin breakdown  - Assess and monitor skin integrity  - Assess and monitor nutrition and hydration status  - Monitor labs   - Assess for incontinence   - Turn and reposition patient  - Assist with mobility/ambulation  - Relieve pressure over bony prominences  - Avoid friction and shearing  - Provide appropriate hygiene as needed including keeping skin clean and dry  - Evaluate need for skin moisturizer/barrier cream  - Collaborate with interdisciplinary team   - Patient/family teaching  - Consider wound care consult   Outcome: Progressing

## 2024-12-28 NOTE — ASSESSMENT & PLAN NOTE
Initial NIHSS 4  Presenting deficits were: right sided droop and leg/arm weakness  Interventions: Patient not a candidate. Unclear time of onset outside appropriate time window. and Symptoms resolved/clearly non disabling.   These new symptoms resolved and patient came in with new vertigo and AMS 12/26 as well, which complicates LKW.    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   TTE/FROILAN: pending    Suspect patient's symptoms in the setting of embolic source based on patient's MRI findings    Discussed with neurology attending, Dr. Wilkinson  Pending: TTE  BP: Permissive HTN <180/90 for first 24 hours, and then gradual return to normotension  AC/AP: aspirin 325 load, then 81 mg daily.  No AC at this time unless A fib or other etiology detected  Atorvastatin 40mg QHS  Glucose <180   Neuro checks- Every 1 hour x 4 hours, then every 2 hours x 4 hours, then every 4 hours x 72 hours     Stat CT Head for change in neuro status.  Monitor on telemetry  DVT ppx per primary team, SCDs  PT/OT/ST/PM&R evaluations   Medical management as per primary team appreciated.    Patient will need OP Zio patch then Loop recorder if needed, cardiology referral to be placed on dc.

## 2024-12-28 NOTE — QUICK NOTE
Reviewed MRI brain findings w/ neurology attending, Dr. Wilkinson.  Strokes in posterior circulation distribution.  Continue monotherapy ASA.   Neurochecks per stroke protocol. Nursing aware.  Stat CTH if acute neurological decline. Nursing aware.  Given strokes in b/l alex, low threshold to consult NCC and neurosurgery team if needed.

## 2024-12-28 NOTE — UTILIZATION REVIEW
Continued Stay Review    SEE INITIAL REVIEW AT BOTTOM    Date: 12/28/24                          Current Patient Class: Inpatient    Current Level of Care: med surg, telemetry  HPI:69 y.o. female initially admitted on 12/26/24     Assessment/Plan:   Date: 12/28/24  Day 3: Has surpassed a 2nd midnight with active treatments and services.  AMS POA. Per neuro: Strokes in posterior circulation distribution. Continue monotherapy ASA. Continue to monitor neuro status, functional ability for safety, telemetry monitoring. Monitor VS, neuro checks, follow labs.      Medications:   Scheduled Medications:  amLODIPine, 5 mg, Oral, Daily  aspirin, 81 mg, Oral, Daily  atorvastatin, 40 mg, Oral, Daily With Dinner  enoxaparin, 40 mg, Subcutaneous, Daily  fenofibrate, 145 mg, Oral, Daily  losartan, 100 mg, Oral, Daily  nortriptyline, 30 mg, Oral, HS      PRN Meds:  meclizine, 25 mg, Oral, Q8H PRN      Discharge Plan: tbd    Vital Signs (last 3 days)       Date/Time Temp Pulse Resp BP MAP (mmHg) SpO2 O2 Device Patient Position - Orthostatic VS Nydia Coma Scale Score Pain    12/28/24 07:53:13 97.9 °F (36.6 °C) 75 16 150/65 93 91 % -- -- -- --    12/28/24 0715 -- 75 -- 152/62 92 90 % -- -- 14 --    12/28/24 0515 -- -- -- -- -- -- -- -- 14 --    12/28/24 05:11:55 -- 70 -- 158/67 97 98 % -- -- -- --    12/28/24 0315 -- -- -- -- -- -- -- -- 14 --    12/28/24 0314 -- 71 -- 149/67 94 97 % -- -- -- --    12/28/24 0115 -- -- -- -- -- -- -- -- 14 --    12/28/24 0114 -- 67 -- 158/70 99 96 % -- -- -- --    12/27/24 2315 -- 71 -- 148/71 97 93 % -- -- 14 --    12/27/24 2215 -- 71 -- 148/72 97 93 % -- -- 14 --    12/27/24 2200 -- -- -- -- -- 92 % -- -- -- --    12/27/24 21:45:46 98.6 °F (37 °C) 73 -- 149/74 99 94 % -- -- -- --    12/27/24 2115 -- 82 -- 148/73 98 96 % -- -- 14 --    12/27/24 2015 -- 76 -- 154/80 105 96 % -- -- 14 No Pain    12/27/24 16:41:38 -- 80 -- 176/83 -- 96 % -- -- -- --    12/27/24 16:33:32 98 °F (36.7 °C) 75 -- 176/83 --  96 % -- -- -- --    12/27/24 1630 -- -- -- -- -- -- -- -- 15 --    12/27/24 1615 -- -- -- -- -- -- -- -- 15 --    12/27/24 1545 -- -- -- -- -- -- -- -- 15 --    12/27/24 1500 -- -- -- -- -- -- -- -- 15 --    12/27/24 14:58:49 97.9 °F (36.6 °C) 76 -- 170/77 108 95 % -- -- -- --    12/27/24 1100 -- 84 -- -- -- 95 % -- -- -- --    12/27/24 0845 -- -- -- -- -- -- -- -- -- No Pain    12/27/24 0844 -- -- -- -- -- -- -- -- -- No Pain    12/27/24 08:14:17 -- 77 -- 150/68 95 97 % -- -- -- --    12/27/24 07:25:35 98.4 °F (36.9 °C) 70 -- 149/68 95 98 % -- -- -- --    12/27/24 02:04:08 98.4 °F (36.9 °C) 71 -- 165/63 97 95 % -- -- -- --    12/27/24 00:59:51 -- 71 -- 170/68 102 95 % -- -- -- --    12/26/24 21:51:52 98.3 °F (36.8 °C) 75 -- 174/79 111 98 % -- -- -- --    12/26/24 20:34:06 98.1 °F (36.7 °C) 76 -- 147/78 101 98 % None (Room air) -- 15 --    12/26/24 2034 98.1 °F (36.7 °C) 75 20 147/78 101 -- -- Lying -- No Pain    12/26/24 19:08:59 97.7 °F (36.5 °C) 79 -- 144/80 101 99 % -- -- -- --    12/26/24 18:34:48 97.7 °F (36.5 °C) 77 14 170/76 107 97 % -- -- -- --    12/26/24 1537 -- -- -- 197/81 -- -- -- -- -- --    12/26/24 1519 98.4 °F (36.9 °C) -- -- -- -- -- -- -- 15 No Pain    12/26/24 1515 -- 70 14 215/87 125 96 % None (Room air) -- -- --    12/26/24 1330 -- 72 16 183/79 114 97 % None (Room air) Lying -- --    12/26/24 1130 -- -- -- -- -- -- -- -- 15 No Pain    12/26/24 1033 -- 71 20 174/78 -- 99 % None (Room air) Sitting -- No Pain          Weight (last 2 days)       Date/Time Weight    12/26/24 2034 68 (150)            Pertinent Labs/Diagnostic Results:   Radiology:  MRI brain wo contrast   Final Interpretation by Juve Villegas MD (12/27 1943)      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      Mild chronic white matter microangiopathic changes involving  both cerebral hemispheres.         I personally discussed this study with Dr. Akers on 12/27/2024 7:40 PM.            Workstation performed: [id number]         CT stroke alert brain   Final Interpretation by Juve Villegas MD (12/27 3261)      No acute intracranial abnormality.      Findings were directly discussed with James Wilkinson at approximately 5:00 p.m. on 12/27/2024.      Workstation performed: [id number]         CTA stroke alert (head/neck)   Final Interpretation by Juve Villegas MD (12/27 4421)      High-grade stenosis of a left middle cerebral artery inferior division M3 branch extending posteriorly in the left sylvian fissure as described above. No large vessel occlusion or intracranial aneurysm noted.      No hemodynamically significant stenosis, occlusion or dissection identified on CT angiogram of the neck.         Findings were directly discussed with James Wilkinson at 5:40 p.m. on 12/27/2024..      Workstation performed: [id number]         CTA head and neck with and without contrast   Final Interpretation by Melba Miguel MD (12/26 6853)      CT Brain:  No acute intracranial CT abnormality.      CT Angiography:      1.  Limited evaluation of the neck vasculature due to technique and artifacts related to motion and prior dental work, especially suboptimal evaluation of the vertebral arteries. Patent and grossly unremarkable vertebral arteries within the limits of    suboptimal image quality. No significant stenosis in the cervical carotid arteries.   2.  Patent major vessels of the [place] without high-grade stenosis.                        Workstation performed: [id number]         XR chest 2 views   ED Interpretation by Segundo Curran MD (12/26 1143)   No acute cardiopulmonary abnormalities      Final Interpretation by Ector Tovar MD (12/26 1343)      No acute cardiopulmonary disease.            Workstation performed: [id number]           Cardiology:  ECG 12 lead   Final Result by  "Omar Valentino MD (12/27 1695)    Poor data quality, interpretation may be adversely affected   Normal sinus rhythm   Normal ECG   When compared with ECG of 01-Jan-2018 11:24,   Criteria for Septal infarct are no longer Present   Confirmed by Omar Valentino ([id number]) on 12/27/2024 2:00:59 PM        GI:  No orders to display           Results from last 7 days   Lab Units 12/28/24  0508 12/27/24 0652 12/26/24  1105   WBC Thousand/uL 5.79 6.91 7.04   HEMOGLOBIN g/dL 12.5 11.6 13.3   HEMATOCRIT % 39.1 36.5 41.8   PLATELETS Thousands/uL 331 333 424*   TOTAL NEUT ABS Thousands/µL  --  5.15 5.30         Results from last 7 days   Lab Units 12/28/24  0508 12/27/24 2003 12/27/24 0652 12/26/24  1105   SODIUM mmol/L 142 138 139 139   POTASSIUM mmol/L 3.9 4.1 3.4* 3.8   CHLORIDE mmol/L 110* 107 110* 106   CO2 mmol/L 24 23 21 24   ANION GAP mmol/L 8 8 8 9   BUN mg/dL 18 16 20 25   CREATININE mg/dL 0.79 0.82 0.68 0.89   EGFR ml/min/1.73sq m 76 72 88 65   CALCIUM mg/dL 9.0 9.5 8.7 9.7   MAGNESIUM mg/dL 2.0  --   --   --      Results from last 7 days   Lab Units 12/26/24  1105   AST U/L 32   ALT U/L 22   ALK PHOS U/L 23*   TOTAL PROTEIN g/dL 7.1   ALBUMIN g/dL 4.3   TOTAL BILIRUBIN mg/dL 0.47     Results from last 7 days   Lab Units 12/27/24  1633   POC GLUCOSE mg/dl 97     Results from last 7 days   Lab Units 12/28/24  0508 12/27/24 2003 12/27/24  0652 12/26/24  1105   GLUCOSE RANDOM mg/dL 104 112 93 118         Results from last 7 days   Lab Units 12/27/24 2003   HEMOGLOBIN A1C % 6.1*   EAG mg/dl 128     No results found for: \"BETA-HYDROXYBUTYRATE\"                   Results from last 7 days   Lab Units 12/27/24 2003 12/26/24  1542 12/26/24  1333 12/26/24  1105   HS TNI 0HR ng/L 16  --   --  12   HS TNI 2HR ng/L  --   --  15  --    HSTNI D2 ng/L  --   --  3  --    HS TNI 4HR ng/L  --  15  --   --    HSTNI D4 ng/L  --  3  --   --              Results from last 7 days   Lab Units 12/27/24 2003 12/26/24  1105   [address] " uIU/mL 1.486 0.601                                                         Results from last 7 days   Lab Units 12/26/24  1630   CLARITY UA  Clear   COLOR UA  Light Yellow   SPEC GRAV UA  >=1.050*   PH UA  7.5   GLUCOSE UA mg/dl Negative   KETONES UA mg/dl Trace*   BLOOD UA  Negative   PROTEIN UA mg/dl 50 (1+)*   NITRITE UA  Negative   BILIRUBIN UA  Negative   UROBILINOGEN UA (BE) mg/dl <2.0   LEUKOCYTES UA  Negative   WBC UA /hpf None Seen   RBC UA /hpf None Seen   BACTERIA UA /hpf None Seen   EPITHELIAL CELLS WET PREP /hpf Occasional                                                   Network Utilization Review Department  ATTENTION: Please call with any questions or concerns to [phone] and carefully listen to the prompts so that you are directed to the right person. All voicemails are confidential.   For Discharge needs, contact Care Management DC Support Team at [phone] opt. 2  Send all requests for admission clinical reviews, approved or denied determinations and any other requests to dedicated fax number below belonging to the [facility] where the patient is receiving treatment. List of dedicated fax numbers for the Facilities:  FACILITY NAME UR FAX NUMBER   ADMISSION DENIALS (Administrative/Medical Necessity) [phone]   DISCHARGE SUPPORT TEAM (NETWORK) [phone]   PARENT CHILD HEALTH (Maternity/NICU/Pediatrics) [phone]   [facility] [phone]   [facility] [phone]   [facility] [phone]   [facility] [phone]   [facility] [phone]   [facility] [phone]   [facility] [phone]   [facility] [phone]   [facility] [phone]   [facility] [phone]   General acute hospital  [phone]   [facility] [phone]

## 2024-12-29 ENCOUNTER — APPOINTMENT (INPATIENT)
Dept: NON INVASIVE DIAGNOSTICS | Facility: HOSPITAL | Age: 69
DRG: 064 | End: 2024-12-29
Payer: COMMERCIAL

## 2024-12-29 LAB
AORTIC ROOT: 3.7 CM
APICAL FOUR CHAMBER EJECTION FRACTION: 62 %
ASCENDING AORTA: 3.5 CM
BSA FOR ECHO PROCEDURE: 1.69 M2
E WAVE DECELERATION TIME: 287 MS
E/A RATIO: 0.9
FRACTIONAL SHORTENING: 17 (ref 28–44)
INTERVENTRICULAR SEPTUM IN DIASTOLE (PARASTERNAL SHORT AXIS VIEW): 1.4 CM
INTERVENTRICULAR SEPTUM: 1.4 CM (ref 0.6–1.1)
LAAS-AP2: 12.2 CM2
LAAS-AP4: 10.6 CM2
LEFT ATRIUM SIZE: 3 CM
LEFT ATRIUM VOLUME (MOD BIPLANE): 26 ML
LEFT ATRIUM VOLUME INDEX (MOD BIPLANE): 15.4 ML/M2
LEFT INTERNAL DIMENSION IN SYSTOLE: 3.4 CM (ref 2.1–4)
LEFT VENTRICULAR INTERNAL DIMENSION IN DIASTOLE: 4.1 CM (ref 3.5–6)
LEFT VENTRICULAR POSTERIOR WALL IN END DIASTOLE: 1.1 CM
LEFT VENTRICULAR STROKE VOLUME: 25 ML
LVSV (TEICH): 25 ML
MV E'TISSUE VEL-LAT: 8 CM/S
MV E'TISSUE VEL-SEP: 6 CM/S
MV PEAK A VEL: 0.87 M/S
MV PEAK E VEL: 78 CM/S
MV STENOSIS PRESSURE HALF TIME: 83 MS
MV VALVE AREA P 1/2 METHOD: 2.65
RA PRESSURE ESTIMATED: 3 MMHG
RIGHT ATRIUM AREA SYSTOLE A4C: 7.5 CM2
RIGHT VENTRICLE ID DIMENSION: 2.9 CM
RV PSP: 23 MMHG
SINOTUBULAR JUNCTION: 2.8 CM
SL CV LEFT ATRIUM LENGTH A2C: 3.9 CM
SL CV LV EF: 60
SL CV PED ECHO LEFT VENTRICLE DIASTOLIC VOLUME (MOD BIPLANE) 2D: 72 ML
SL CV PED ECHO LEFT VENTRICLE SYSTOLIC VOLUME (MOD BIPLANE) 2D: 47 ML
SL CV SINUS OF VALSALVA 2D: 3.6 CM
STJ: 2.8 CM
TR MAX PG: 20 MMHG
TR PEAK VELOCITY: 2.2 M/S
TRICUSPID VALVE PEAK REGURGITATION VELOCITY: 2.23 M/S

## 2024-12-29 PROCEDURE — 99232 SBSQ HOSP IP/OBS MODERATE 35: CPT | Performed by: INTERNAL MEDICINE

## 2024-12-29 PROCEDURE — 93306 TTE W/DOPPLER COMPLETE: CPT | Performed by: INTERNAL MEDICINE

## 2024-12-29 PROCEDURE — 93306 TTE W/DOPPLER COMPLETE: CPT

## 2024-12-29 RX ADMIN — ENOXAPARIN SODIUM 40 MG: 40 INJECTION SUBCUTANEOUS at 09:24

## 2024-12-29 RX ADMIN — AMLODIPINE BESYLATE 5 MG: 5 TABLET ORAL at 09:24

## 2024-12-29 RX ADMIN — ASPIRIN 81 MG CHEWABLE TABLET 81 MG: 81 TABLET CHEWABLE at 09:23

## 2024-12-29 RX ADMIN — FENOFIBRATE 145 MG: 145 TABLET ORAL at 09:23

## 2024-12-29 RX ADMIN — NORTRIPTYLINE HYDROCHLORIDE 30 MG: 10 CAPSULE ORAL at 20:52

## 2024-12-29 RX ADMIN — LOSARTAN POTASSIUM 100 MG: 50 TABLET, FILM COATED ORAL at 09:23

## 2024-12-29 NOTE — PLAN OF CARE
Problem: PAIN - ADULT  Goal: Verbalizes/displays adequate comfort level or baseline comfort level  Description: Interventions:  - Encourage patient to monitor pain and request assistance  - Assess pain using appropriate pain scale  - Administer analgesics based on type and severity of pain and evaluate response  - Implement non-pharmacological measures as appropriate and evaluate response  - Consider cultural and social influences on pain and pain management  - Notify physician/advanced practitioner if interventions unsuccessful or patient reports new pain  Outcome: Progressing     Problem: INFECTION - ADULT  Goal: Absence or prevention of progression during hospitalization  Description: INTERVENTIONS:  - Assess and monitor for signs and symptoms of infection  - Monitor lab/diagnostic results  - Monitor all insertion sites, i.e. indwelling lines, tubes, and drains  - Monitor endotracheal if appropriate and nasal secretions for changes in amount and color  - [place] appropriate cooling/warming therapies per order  - Administer medications as ordered  - Instruct and encourage patient and family to use good hand hygiene technique  - Identify and instruct in appropriate isolation precautions for identified infection/condition  Outcome: Progressing  Goal: Absence of fever/infection during neutropenic period  Description: INTERVENTIONS:  - Monitor WBC    Outcome: Progressing     Problem: SAFETY ADULT  Goal: Patient will remain free of falls  Description: INTERVENTIONS:  - Educate patient/family on patient safety including physical limitations  - Instruct patient to call for assistance with activity   - Consult OT/PT to assist with strengthening/mobility   - Keep Call bell within reach  - Keep bed low and locked with side rails adjusted as appropriate  - Keep care items and personal belongings within reach  - Initiate and maintain comfort rounds  - Make Fall Risk Sign visible to staff  - Offer Toileting every 2 Hours,  in advance of need  - Initiate/Maintain bed and chair alarm  - Obtain necessary fall risk management equipment: non skid socks   - Apply yellow socks and bracelet for high fall risk patients  - Consider moving patient to room near nurses station  Outcome: Progressing  Goal: Maintain or return to baseline ADL function  Description: INTERVENTIONS:  -  Assess patient's ability to carry out ADLs; assess patient's baseline for ADL function and identify physical deficits which impact ability to perform ADLs (bathing, care of mouth/teeth, toileting, grooming, dressing, etc.)  - Assess/evaluate cause of self-care deficits   - Assess range of motion  - Assess patient's mobility; develop plan if impaired  - Assess patient's need for assistive devices and provide as appropriate  - Encourage maximum independence but intervene and supervise when necessary  - Involve family in performance of ADLs  - Assess for home care needs following discharge   - Consider OT consult to assist with ADL evaluation and planning for discharge  - Provide patient education as appropriate  Outcome: Progressing  Goal: Maintains/Returns to pre admission functional level  Description: INTERVENTIONS:  - Perform AM-PAC 6 Click Basic Mobility/ Daily Activity assessment daily.  - Set and communicate daily mobility goal to care team and patient/family/caregiver.   - Collaborate with rehabilitation services on mobility goals if consulted  - Perform Range of Motion 3 times a day.  - Reposition patient every 2 hours.  - Dangle patient 3 times a day  - Stand patient 3 times a day  - Ambulate patient 3 times a day  - Out of bed to chair 3 times a day   - Out of bed for meals 3 times a day  - Out of bed for toileting  - Record patient progress and toleration of activity level   Outcome: Progressing     Problem: SAFETY ADULT  Goal: Patient will remain free of falls  Description: INTERVENTIONS:  - Educate patient/family on patient safety including physical  limitations  - Instruct patient to call for assistance with activity   - Consult OT/PT to assist with strengthening/mobility   - Keep Call bell within reach  - Keep bed low and locked with side rails adjusted as appropriate  - Keep care items and personal belongings within reach  - Initiate and maintain comfort rounds  - Make Fall Risk Sign visible to staff  - Offer Toileting every 2 Hours, in advance of need  - Initiate/Maintain bed and chair alarm  - Obtain necessary fall risk management equipment: non skid socks   - Apply yellow socks and bracelet for high fall risk patients  - Consider moving patient to room near nurses station  Outcome: Progressing  Goal: Maintain or return to baseline ADL function  Description: INTERVENTIONS:  -  Assess patient's ability to carry out ADLs; assess patient's baseline for ADL function and identify physical deficits which impact ability to perform ADLs (bathing, care of mouth/teeth, toileting, grooming, dressing, etc.)  - Assess/evaluate cause of self-care deficits   - Assess range of motion  - Assess patient's mobility; develop plan if impaired  - Assess patient's need for assistive devices and provide as appropriate  - Encourage maximum independence but intervene and supervise when necessary  - Involve family in performance of ADLs  - Assess for home care needs following discharge   - Consider OT consult to assist with ADL evaluation and planning for discharge  - Provide patient education as appropriate  Outcome: Progressing  Goal: Maintains/Returns to pre admission functional level  Description: INTERVENTIONS:  - Perform AM-PAC 6 Click Basic Mobility/ Daily Activity assessment daily.  - Set and communicate daily mobility goal to care team and patient/family/caregiver.   - Collaborate with rehabilitation services on mobility goals if consulted  - Perform Range of Motion 3 times a day.  - Reposition patient every 2 hours.  - Dangle patient 3 times a day  - Stand patient 3 times a  day  - Ambulate patient 3 times a day  - Out of bed to chair 3 times a day   - Out of bed for meals 3 times a day  - Out of bed for toileting  - Record patient progress and toleration of activity level   Outcome: Progressing     Problem: DISCHARGE PLANNING  Goal: Discharge to home or other facility with appropriate resources  Description: INTERVENTIONS:  - Identify barriers to discharge w/patient and caregiver  - Arrange for needed discharge resources and transportation as appropriate  - Identify discharge learning needs (meds, wound care, etc.)  - Arrange for interpretive services to assist at discharge as needed  - Refer to Case Management Department for coordinating discharge planning if the patient needs post-hospital services based on physician/advanced practitioner order or complex needs related to functional status, cognitive ability, or social support system  Outcome: Progressing     Problem: Knowledge Deficit  Goal: Patient/family/caregiver demonstrates understanding of disease process, treatment plan, medications, and discharge instructions  Description: Complete learning assessment and assess knowledge base.  Interventions:  - Provide teaching at level of understanding  - Provide teaching via preferred learning methods  Outcome: Progressing     Problem: Prexisting or High Potential for Compromised Skin Integrity  Goal: Skin integrity is maintained or improved  Description: INTERVENTIONS:  - Identify patients at risk for skin breakdown  - Assess and monitor skin integrity  - Assess and monitor nutrition and hydration status  - Monitor labs   - Assess for incontinence   - Turn and reposition patient  - Assist with mobility/ambulation  - Relieve pressure over bony prominences  - Avoid friction and shearing  - Provide appropriate hygiene as needed including keeping skin clean and dry  - Evaluate need for skin moisturizer/barrier cream  - Collaborate with interdisciplinary team   - Patient/family teaching  -  Consider wound care consult   Outcome: Progressing

## 2024-12-29 NOTE — PLAN OF CARE
Problem: PAIN - ADULT  Goal: Verbalizes/displays adequate comfort level or baseline comfort level  Description: Interventions:  - Encourage patient to monitor pain and request assistance  - Assess pain using appropriate pain scale  - Administer analgesics based on type and severity of pain and evaluate response  - Implement non-pharmacological measures as appropriate and evaluate response  - Consider cultural and social influences on pain and pain management  - Notify physician/advanced practitioner if interventions unsuccessful or patient reports new pain  Outcome: Progressing     Problem: INFECTION - ADULT  Goal: Absence or prevention of progression during hospitalization  Description: INTERVENTIONS:  - Assess and monitor for signs and symptoms of infection  - Monitor lab/diagnostic results  - Monitor all insertion sites, i.e. indwelling lines, tubes, and drains  - Monitor endotracheal if appropriate and nasal secretions for changes in amount and color  - [place] appropriate cooling/warming therapies per order  - Administer medications as ordered  - Instruct and encourage patient and family to use good hand hygiene technique  - Identify and instruct in appropriate isolation precautions for identified infection/condition  Outcome: Progressing     Problem: SAFETY ADULT  Goal: Patient will remain free of falls  Description: INTERVENTIONS:  - Educate patient/family on patient safety including physical limitations  - Instruct patient to call for assistance with activity   - Consult OT/PT to assist with strengthening/mobility   - Keep Call bell within reach  - Keep bed low and locked with side rails adjusted as appropriate  - Keep care items and personal belongings within reach  - Initiate and maintain comfort rounds  - Make Fall Risk Sign visible to staff  - Offer Toileting every 2 Hours, in advance of need  - Initiate/Maintain bed and chair alarm  - Obtain necessary fall risk management equipment: non skid socks    - Apply yellow socks and bracelet for high fall risk patients  - Consider moving patient to room near nurses station  Outcome: Progressing     Problem: DISCHARGE PLANNING  Goal: Discharge to home or other facility with appropriate resources  Description: INTERVENTIONS:  - Identify barriers to discharge w/patient and caregiver  - Arrange for needed discharge resources and transportation as appropriate  - Identify discharge learning needs (meds, wound care, etc.)  - Arrange for interpretive services to assist at discharge as needed  - Refer to Case Management Department for coordinating discharge planning if the patient needs post-hospital services based on physician/advanced practitioner order or complex needs related to functional status, cognitive ability, or social support system  Outcome: Progressing     Problem: Knowledge Deficit  Goal: Patient/family/caregiver demonstrates understanding of disease process, treatment plan, medications, and discharge instructions  Description: Complete learning assessment and assess knowledge base.  Interventions:  - Provide teaching at level of understanding  - Provide teaching via preferred learning methods  Outcome: Progressing

## 2024-12-29 NOTE — ASSESSMENT & PLAN NOTE
Initial NIHSS 4  Presenting deficits were: right sided droop and leg/arm weakness  Interventions: Patient not a candidate. Unclear time of onset outside appropriate time window. and Symptoms resolved/clearly non disabling.   These new symptoms resolved and patient came in with new vertigo and AMS 12/26 as well, which complicates LKW.    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   TTE/FROILAN: read pending    Suspect patient's symptoms in the setting of embolic source based on patient's MRI findings. ESUS    Discussed with neurology attending, Dr. Wilkinson  Pending: TTE  BP: Normotension  AC/AP: aspirin 81 mg daily.  No AC at this time pending echo  Atorvastatin 40mg QHS  Glucose <180   Neuro checks- Every 1 hour x 4 hours, then every 2 hours x 4 hours, then every 4 hours x 72 hours     Stat CT Head for change in neuro status.  Monitor on telemetry  DVT ppx per primary team, SCDs  PT/OT/ST/PM&R evaluations   Medical management as per primary team appreciated.    Patient will need OP Zio patch then Loop recorder if needed, cardiology referral to be placed on dc.  Given husbands concerns for short term memory decline in past 9 months, recommend MOCA o/p

## 2024-12-29 NOTE — PROGRESS NOTES
Progress Note - Neurology   Name: Mayra Echevarria 69 y.o. female I MRN: [id number]  [facility] I Date of Admission: 12/26/2024   Date of Service: 12/29/2024 I Hospital Day: 3    Assessment & Plan  Acute CVA (cerebrovascular accident) (HCC)  Initial NIHSS 4  Presenting deficits were: right sided droop and leg/arm weakness  Interventions: Patient not a candidate. Unclear time of onset outside appropriate time window. and Symptoms resolved/clearly non disabling.   These new symptoms resolved and patient came in with new vertigo and AMS 12/26 as well, which complicates LKW.    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   TTE/FROILAN: read pending    Suspect patient's symptoms in the setting of embolic source based on patient's MRI findings. ESUS    Discussed with neurology attending, Dr. Wilkinson  Pending: TTE  BP: Normotension  AC/AP: aspirin 81 mg daily.  No AC at this time pending echo  Atorvastatin 40mg QHS  Glucose <180   Neuro checks- Every 1 hour x 4 hours, then every 2 hours x 4 hours, then every 4 hours x 72 hours     Stat CT Head for change in neuro status.  Monitor on telemetry  DVT ppx per primary team, SCDs  PT/OT/ST/PM&R evaluations   Medical management as per primary team appreciated.    Patient will need OP Zio patch then Loop recorder if needed, cardiology referral to be placed on dc.  Given husbands concerns for short term memory decline in past 9 months, recommend MOCA o/p      Recommendations for outpatient neurological follow up have yet to be determined.         SUBJECTIVE     Patient was seen and examined. No  "acute events overnight. MRI results discussed.  and daughter at bedside express concern for short term memory in past 9 months. They feel she is more confused here at night.     OBJECTIVE     Patient ID: Mayra Echevarria is a 69 y.o. female.    Vitals:    24 0504 24 0735 24 0845 24 1103   BP: 129/62 142/70 142/70 141/73   BP Location:    Left arm   Pulse: 75 68 68 85   Resp:    16   Temp:  98.2 °F (36.8 °C)     TempSrc:       SpO2: 95% 91%  98%   Weight:   68 kg (150 lb)    Height:   5' 2\" (1.575 m)       Temperature:   Temp (24hrs), Av.9 °F (36.6 °C), Min:97.8 °F (36.6 °C), Max:98.2 °F (36.8 °C)    Temperature: 98.2 °F (36.8 °C)      Physical Exam  Eyes:      Extraocular Movements: EOM normal.      Pupils: Pupils are equal, round, and reactive to light.   Neurological:      Mental Status: She is oriented to person, place, and time.      Motor: Motor strength is normal.     Coordination: Finger-Nose-Finger Test abnormal. Heel to Felix Test normal.      Deep Tendon Reflexes:      Reflex Scores:       Bicep reflexes are 2+ on the right side and 2+ on the left side.       Brachioradialis reflexes are 2+ on the right side and 2+ on the left side.       Patellar reflexes are 2+ on the right side and 2+ on the left side.  Psychiatric:         Speech: Speech normal.      Comments: tearful          Neurologic Exam     Mental Status   Oriented to person, place, and time.   Speech: speech is normal   Level of consciousness: alert  Able to name object.     Cranial Nerves     CN III, IV, VI   Pupils are equal, round, and reactive to light.  Extraocular motions are normal.     CN V   Facial sensation intact.     CN VII   Facial expression full, symmetric.     CN XII   CN XII normal.     Motor Exam     Strength   Strength 5/5 throughout.     Sensory Exam   Light touch normal.     Gait, Coordination, and Reflexes     Coordination   Finger to nose coordination: abnormal  Heel to shin coordination: " normal    Tremor   Resting tremor: absent    Reflexes   Right brachioradialis: 2+  Left brachioradialis: 2+  Right biceps: 2+  Left biceps: 2+  Right patellar: 2+  Left patellar: 2+  Mild R FNF dysmetria            LABORATORY DATA     Labs: I have personally reviewed pertinent reports.    Results from last 7 days   Lab Units 12/28/24  0508 12/27/24 0652 12/26/24  1105   WBC Thousand/uL 5.79 6.91 7.04   HEMOGLOBIN g/dL 12.5 11.6 13.3   HEMATOCRIT % 39.1 36.5 41.8   PLATELETS Thousands/uL 331 333 424*   SEGS PCT %  --  75 74   MONO PCT %  --  8 7   EOS PCT %  --  3 4      Results from last 7 days   Lab Units 12/28/24  0508 12/27/24 2003 12/27/24 0652 12/26/24  1105   SODIUM mmol/L 142 138 139 139   POTASSIUM mmol/L 3.9 4.1 3.4* 3.8   CHLORIDE mmol/L 110* 107 110* 106   CO2 mmol/L 24 23 21 24   BUN mg/dL 18 16 20 25   CREATININE mg/dL 0.79 0.82 0.68 0.89   CALCIUM mg/dL 9.0 9.5 8.7 9.7   ALK PHOS U/L  --   --   --  23*   ALT U/L  --   --   --  22   AST U/L  --   --   --  32     Results from last 7 days   Lab Units 12/28/24  0508   MAGNESIUM mg/dL 2.0                        IMAGING & DIAGNOSTIC TESTING     Radiology Results: I have personally reviewed pertinent reports.      MRI brain wo contrast   Final Result by Juve Villegas MD (12/27 1943)      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      Mild chronic white matter microangiopathic changes involving both cerebral hemispheres.         I personally discussed this study with Dr. Akers on 12/27/2024 7:40 PM.            Workstation performed: [id number]         CT stroke alert brain   Final Result by Juve Villegas MD (12/27 1712)      No acute intracranial abnormality.      Findings were directly discussed with James Wilkinson at approximately 5:00 p.m. on 12/27/2024.      Workstation performed: [id number]          CTA stroke alert (head/neck)   Final Result by Juve Villegas MD (12/27 2290)      High-grade stenosis of a left middle cerebral artery inferior division M3 branch extending posteriorly in the left sylvian fissure as described above. No large vessel occlusion or intracranial aneurysm noted.      No hemodynamically significant stenosis, occlusion or dissection identified on CT angiogram of the neck.         Findings were directly discussed with James Wilkinson at 5:40 p.m. on 12/27/2024..      Workstation performed: [id number]         CTA head and neck with and without contrast   Final Result by Melba Miguel MD (12/26 8742)      CT Brain:  No acute intracranial CT abnormality.      CT Angiography:      1.  Limited evaluation of the neck vasculature due to technique and artifacts related to motion and prior dental work, especially suboptimal evaluation of the vertebral arteries. Patent and grossly unremarkable vertebral arteries within the limits of    suboptimal image quality. No significant stenosis in the cervical carotid arteries.   2.  Patent major vessels of the [place] without high-grade stenosis.                        Workstation performed: [id number]         XR chest 2 views   ED Interpretation by Segundo Curran MD (12/26 1143)   No acute cardiopulmonary abnormalities      Final Result by Ector Tovar MD (12/26 1343)      No acute cardiopulmonary disease.            Workstation performed: [id number]             Other Diagnostic Testing: I have personally reviewed pertinent reports.      ACTIVE MEDICATIONS       Current Facility-Administered Medications:     amLODIPine (NORVASC) tablet 5 mg, Daily    aspirin chewable tablet 81 mg, Daily    atorvastatin (LIPITOR) tablet 40 mg, Daily With Dinner    enoxaparin (LOVENOX) subcutaneous injection 40 mg, Daily    fenofibrate (TRICOR) tablet 145 mg, Daily    losartan (COZAAR) tablet 100 mg, Daily    meclizine (ANTIVERT) tablet 25 mg, Q8H PRN     nortriptyline (PAMELOR) capsule 30 mg, HS    Prior to Admission medications    Medication Sig Start Date End Date Taking? Authorizing Provider   amLODIPine (NORVASC) 5 mg tablet TAKE 1 TABLET DAILY 8/12/24  Yes Namita Melendez DO   fenofibrate (TRICOR) 145 mg tablet TAKE 1 TABLET DAILY 12/24/24  Yes Namita Melendez DO   Lactobacillus (Acidophilus Probiotic) CAPS Take by mouth   Yes Historical Provider, MD   losartan (COZAAR) 100 MG tablet TAKE 1 TABLET DAILY 7/15/24  Yes Namita Melendez DO   meclizine (ANTIVERT) 25 mg tablet Take 1 tablet (25 mg total) by mouth 3 (three) times a day as needed for dizziness for up to 10 days 3/24/24 12/26/24 Yes Namita Melendez DO   nortriptyline (PAMELOR) 10 mg capsule TAKE 1 CAPSULE THREE TIMES A DAY  Patient taking differently: Take 30 mg by mouth daily at bedtime 7/22/24  Yes Namita Melendez DO   calcium carbonate (Calcium 600) 600 MG tablet Take 600 mg by mouth 2 (two) times a day with meals    Historical Provider, MD   methylPREDNISolone 4 MG tablet therapy pack Use as directed on package 10/11/24   Reggie Foster III, DO   multivitamin (THERAGRAN) TABS Take 1 tablet by mouth daily    Historical Provider, MD   TURMERIC CURCUMIN PO Take by mouth    Historical Provider, MD         VTE Pharmacologic Prophylaxis: VTE covered by:  enoxaparin, Subcutaneous, 40 mg at 12/29/24 0924      VTE Mechanical Prophylaxis: sequential compression device    ======    I have discussed the patient's history, physical exam findings, assessment, and plan in detail with attending, Dr. Wilkinson    Thank you for allowing me to participate in the care of your patient, Mayra Echevarria.    Kristy Bailey MD  [facility] Neurology Residency, PGY-2

## 2024-12-29 NOTE — ASSESSMENT & PLAN NOTE
History of vertigo and patient is presenting with worsening dizziness and altered mental status.   CTA head/neck : High-grade stenosis of a left middle cerebral artery inferior division M3 branch extending posteriorly in the left sylvian fissure   MRI brain 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   Neurology is following.  Currently on aspirin 81 mg daily and atorvastatin 40 mg daily.  Continue stroke pathway.  Follow-up TTE   No events on telemetry. Discontinue,   PT OT evaluation recommending inpatient rehab.

## 2024-12-29 NOTE — PROGRESS NOTES
Progress Note - Hospitalist   Name: Mayra Echevarria 69 y.o. female I MRN: [id number]  Unit/Bed#: [facility] 703-01 I Date of Admission: 12/26/2024   Date of Service: 12/29/2024 I Hospital Day: 3    Assessment & Plan  Acute CVA (cerebrovascular accident) (HCC)  History of vertigo and patient is presenting with worsening dizziness and altered mental status.   CTA head/neck : High-grade stenosis of a left middle cerebral artery inferior division M3 branch extending posteriorly in the left sylvian fissure   MRI brain 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   Neurology is following.  Currently on aspirin 81 mg daily and atorvastatin 40 mg daily.  Continue stroke pathway.  Follow-up TTE   No events on telemetry. Discontinue,   PT OT evaluation recommending inpatient rehab.  Essential hypertension  Continue amlodipine and losartan  Vertigo  Likely in the setting of CVA.  Memory impairment  History noted  Ambulatory dysfunction  PT/OT recommending inpatient rehab    VTE Pharmacologic Prophylaxis:   Moderate Risk (Score 3-4) - Pharmacological DVT Prophylaxis Ordered: enoxaparin (Lovenox).    Mobility:   Basic Mobility Inpatient Raw Score: 17  JH-HLM Goal: 5: Stand one or more mins  JH-HLM Achieved: 6: Walk 10 steps or more  JH-HLM Goal achieved. Continue to encourage appropriate mobility.    Patient Centered Rounds: I performed bedside rounds with nursing staff today.   Discussions with Specialists or Other Care Team Provider: Neurology    Education and Discussions with Family / Patient: Updated  (significant other) at bedside.    Current Length of Stay: 3 day(s)  Current Patient Status: Inpatient   Certification Statement: The patient will continue to require additional inpatient hospital stay due to TTE, placement   Discharge Plan: Anticipate discharge  in 24-48 hrs to rehab facility.    Code Status: Level 1 - Full Code    Subjective   Feels better today.     Objective :  Temp:  [97.8 °F (36.6 °C)-98.2 °F (36.8 °C)] 98.2 °F (36.8 °C)  HR:  [64-99] 85  BP: (122-183)/(59-90) 141/73  Resp:  [16-18] 16  SpO2:  [91 %-98 %] 98 %  O2 Device: None (Room air)    Body mass index is 27.44 kg/m².     Input and Output Summary (last 24 hours):     Intake/Output Summary (Last 24 hours) at 12/29/2024 1409  Last data filed at 12/29/2024 1103  Gross per 24 hour   Intake 720 ml   Output --   Net 720 ml       Physical Exam  Vitals and nursing note reviewed.   Constitutional:       General: She is not in acute distress.     Appearance: She is well-developed.   HENT:      Head: Normocephalic and atraumatic.   Eyes:      Conjunctiva/sclera: Conjunctivae normal.   Cardiovascular:      Rate and Rhythm: Normal rate and regular rhythm.      Heart sounds: No murmur heard.  Pulmonary:      Effort: Pulmonary effort is normal. No respiratory distress.      Breath sounds: Normal breath sounds.   Abdominal:      Palpations: Abdomen is soft.      Tenderness: There is no abdominal tenderness.   Musculoskeletal:         General: No swelling.      Cervical back: Neck supple.   Skin:     General: Skin is warm and dry.      Capillary Refill: Capillary refill takes less than 2 seconds.   Neurological:      Mental Status: She is alert.      Motor: Weakness (RUE) present.   Psychiatric:         Mood and Affect: Mood normal.           Lines/Drains:              Lab Results: I have reviewed the following results:   Results from last 7 days   Lab Units 12/28/24  0508 12/27/24  0652   WBC Thousand/uL 5.79 6.91   HEMOGLOBIN g/dL 12.5 11.6   HEMATOCRIT % 39.1 36.5   PLATELETS Thousands/uL 331 333   SEGS PCT %  --  75   LYMPHO PCT %  --  14   MONO PCT %  --  8   EOS PCT %  --  3     Results from last 7 days   Lab Units 12/28/24  0508 12/27/24  0652 12/26/24  1105   SODIUM mmol/L 142   < > 139   POTASSIUM mmol/L  3.9   < > 3.8   CHLORIDE mmol/L 110*   < > 106   CO2 mmol/L 24   < > 24   BUN mg/dL 18   < > 25   CREATININE mg/dL 0.79   < > 0.89   ANION GAP mmol/L 8   < > 9   CALCIUM mg/dL 9.0   < > 9.7   ALBUMIN g/dL  --   --  4.3   TOTAL BILIRUBIN mg/dL  --   --  0.47   ALK PHOS U/L  --   --  23*   ALT U/L  --   --  22   AST U/L  --   --  32   GLUCOSE RANDOM mg/dL 104   < > 118    < > = values in this interval not displayed.         Results from last 7 days   Lab Units 12/27/24  1633   POC GLUCOSE mg/dl 97     Results from last 7 days   Lab Units 12/27/24 2003   HEMOGLOBIN A1C % 6.1*           Recent Cultures (last 7 days):         Imaging Results Review: I reviewed radiology reports from this admission including: MRI brain.  Other Study Results Review: No additional pertinent studies reviewed.    Last 24 Hours Medication List:     Current Facility-Administered Medications:     amLODIPine (NORVASC) tablet 5 mg, Daily    aspirin chewable tablet 81 mg, Daily    atorvastatin (LIPITOR) tablet 40 mg, Daily With Dinner    enoxaparin (LOVENOX) subcutaneous injection 40 mg, Daily    fenofibrate (TRICOR) tablet 145 mg, Daily    losartan (COZAAR) tablet 100 mg, Daily    meclizine (ANTIVERT) tablet 25 mg, Q8H PRN    nortriptyline (PAMELOR) capsule 30 mg, HS    Administrative Statements   Today, Patient Was Seen By: Peter Vila MD  I have spent a total time of 26 minutes in caring for this patient on the day of the visit/encounter including Diagnostic results, Documenting in the medical record, Reviewing / ordering tests, medicine, procedures  , Obtaining or reviewing history  , and Communicating with other healthcare professionals .    **Please Note: This note may have been constructed using a voice recognition system.**

## 2024-12-30 ENCOUNTER — DOCUMENTATION (OUTPATIENT)
Dept: NEUROLOGY | Facility: CLINIC | Age: 69
End: 2024-12-30

## 2024-12-30 VITALS
TEMPERATURE: 98.3 F | HEART RATE: 85 BPM | DIASTOLIC BLOOD PRESSURE: 72 MMHG | RESPIRATION RATE: 16 BRPM | SYSTOLIC BLOOD PRESSURE: 139 MMHG | OXYGEN SATURATION: 93 % | WEIGHT: 150 LBS | HEIGHT: 62 IN | BODY MASS INDEX: 27.6 KG/M2

## 2024-12-30 PROBLEM — R42 VERTIGO: Status: RESOLVED | Noted: 2021-12-22 | Resolved: 2024-12-30

## 2024-12-30 PROCEDURE — 97116 GAIT TRAINING THERAPY: CPT

## 2024-12-30 PROCEDURE — 97530 THERAPEUTIC ACTIVITIES: CPT

## 2024-12-30 PROCEDURE — 99222 1ST HOSP IP/OBS MODERATE 55: CPT | Performed by: PSYCHIATRY & NEUROLOGY

## 2024-12-30 PROCEDURE — 97535 SELF CARE MNGMENT TRAINING: CPT

## 2024-12-30 PROCEDURE — 99239 HOSP IP/OBS DSCHRG MGMT >30: CPT | Performed by: INTERNAL MEDICINE

## 2024-12-30 PROCEDURE — NC001 PR NO CHARGE: Performed by: INTERNAL MEDICINE

## 2024-12-30 RX ORDER — ATORVASTATIN CALCIUM 40 MG/1
40 TABLET, FILM COATED ORAL
Qty: 30 TABLET | Refills: 5 | Status: SHIPPED | OUTPATIENT
Start: 2024-12-30

## 2024-12-30 RX ORDER — NORTRIPTYLINE HYDROCHLORIDE 10 MG/1
30 CAPSULE ORAL
Start: 2024-12-30 | End: 2025-01-07

## 2024-12-30 RX ORDER — ASPIRIN 81 MG/1
81 TABLET, CHEWABLE ORAL DAILY
Qty: 30 TABLET | Refills: 5 | Status: SHIPPED | OUTPATIENT
Start: 2024-12-31

## 2024-12-30 RX ADMIN — FENOFIBRATE 145 MG: 145 TABLET ORAL at 10:03

## 2024-12-30 RX ADMIN — ASPIRIN 81 MG CHEWABLE TABLET 81 MG: 81 TABLET CHEWABLE at 10:03

## 2024-12-30 RX ADMIN — LOSARTAN POTASSIUM 100 MG: 50 TABLET, FILM COATED ORAL at 10:03

## 2024-12-30 RX ADMIN — ENOXAPARIN SODIUM 40 MG: 40 INJECTION SUBCUTANEOUS at 09:59

## 2024-12-30 RX ADMIN — AMLODIPINE BESYLATE 5 MG: 5 TABLET ORAL at 10:03

## 2024-12-30 NOTE — PLAN OF CARE
Problem: PHYSICAL THERAPY ADULT  Goal: Performs mobility at highest level of function for planned discharge setting.  See evaluation for individualized goals.  Description: Treatment/Interventions: Functional transfer training, LE strengthening/ROM, Endurance training, Elevations, Therapeutic exercise, Cognitive reorientation, Patient/family training, Gait training, Bed mobility, Equipment eval/education, Spoke to nursing, Spoke to case management, OT  Equipment Recommended: Walker       See flowsheet documentation for full assessment, interventions and recommendations.  Outcome: Progressing  Note: Prognosis: Good  Problem List: Decreased strength, Decreased endurance, Impaired balance, Decreased mobility  Assessment: Pt pleasant and agreeable to participate in PT session. Completes mobility and therapeutic activity as outlined above. Pt demonstrates improvement in ambulation distance today with RW. Pt appears more confident and steady with RW compared to without AD, no overt LOB throughout functional mobility. Completed stair trial today, pt requires min Ax1 and VC for safety. Pt reports no concerns returning home at current level of function and reports her  will be home with her and can provide assistance as needed. Pt will continue to benefit from skilled acute PT services to improve functional mobility. Pt has progressed towards her goals and has supportive spouse at home. Recommend level 3 resources post acute care to promote PLOF. The patient's AM-PAC Basic Mobility Inpatient Short Form Raw Score is 18. A Raw score of greater than or equal to 16 suggests the patient may benefit from discharge to home. Please also refer to the recommendation of the Physical Therapist for safe discharge planning. Pt left supine in bed with bed alarm donned, call bell and personal items within reach and all needs met.  Barriers to Discharge: None     Rehab Resource Intensity Level, PT: (S) III (Minimum Resource  Intensity)    See flowsheet documentation for full assessment.

## 2024-12-30 NOTE — PROGRESS NOTES
"Met with patient and patient's spouse, Reggie, at bedside in room 703. Introduced my role. Provided stroke education including the stroke education booklet and magnet. Reviewed stroke-like symptoms and stroke risk factors.     Per neurology notes, \"Recommendations for outpatient neurological follow up have yet to be determined.\"    Patient denies any questions or concerns at this time. Call bell is within reach. Both were appreciative. Provided emotional support throughout the encounter.   "

## 2024-12-30 NOTE — DISCHARGE SUMMARY
Discharge Summary - Hospitalist   Name: Mayra Echevarria 69 y.o. female I MRN: [id number]  Unit/Bed#: [facility] 703-01 I Date of Admission: 12/26/2024   Date of Service: 12/30/2024 I Hospital Day: 4     Assessment & Plan  Acute CVA (cerebrovascular accident) (HCC)  History of vertigo and patient is presenting with worsening dizziness and altered mental status.   CTA head/neck : High-grade stenosis of a left middle cerebral artery inferior division M3 branch extending posteriorly in the left sylvian fissure   MRI brain 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   Neurology is following.  Currently on aspirin 81 mg daily and atorvastatin 40 mg daily.  Continue stroke pathway.  Follow-up TTE > small PFO  No events on telemetry. Discontinue,   Initial PT OT evaluation recommending inpatient rehab however she progressed well and now they are recommending OP PT/OT  Outpatient cardiology referral for Zio patch and evaluation of PFT  Essential hypertension  Continue amlodipine and losartan  Vertigo (Resolved: 12/30/2024)  Likely in the setting of CVA.  Memory impairment  History noted  Ambulatory dysfunction  Improved. OP PT/PT     Medical Problems       Resolved Problems  Date Reviewed: 12/27/2024          Resolved    Vertigo 12/30/2024     Resolved by  Peter Vila MD        Discharging Physician / Practitioner: Peter Vila MD  PCP: Namita Melendez DO  Admission Date:   Admission Orders (From admission, onward)       Ordered        12/26/24 1701  INPATIENT ADMISSION  Once                          Discharge Date: 12/30/24        Complications:  none    Reason for Admission: stroke like symptoms    Hospital Course:   Mayra Echevarria is a 69 y.o. female patient who originally presented to the hospital on 12/26/2024 due to resenting with worsening dizziness and  altered mental status. She was admitted for stroke rule out then had right sided droop and leg/arm weakness on next day for which RR called and she was sent for repeat CTH then eventually underwent MRI brain 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 Telemetry with no events. TTE showed small PFO. Started on ASA 81mg qd and high intensity atorvastatin by neurology. Cleared for discharge home. Outpatient referral to cardiology placed.     Please see above list of diagnoses and related plan for additional information.     Condition at Discharge: good    Discharge Day Visit / Exam:   * Please refer to separate progress note for these details *    Discussion with Family: Updated  () at bedside.    Discharge instructions/Information to patient and family:   See after visit summary for information provided to patient and family.      Provisions for Follow-Up Care:  See after visit summary for information related to follow-up care and any pertinent home health orders.      Mobility at time of Discharge:   Basic Mobility Inpatient Raw Score: 18  JH-HLM Goal: 6: Walk 10 steps or more  JH-HLM Achieved: 7: Walk 25 feet or more  HLM Goal achieved. Continue to encourage appropriate mobility.     Disposition:   Home    Planned Readmission: no    Discharge Medications:  See after visit summary for reconciled discharge medications provided to patient and/or family.      Administrative Statements   Discharge Statement:  I have spent a total time of 31 minutes in caring for this patient on the day of the visit/encounter. .    **Please Note: This note may have been constructed using a voice recognition system**

## 2024-12-30 NOTE — UTILIZATION REVIEW
"NOTIFICATION OF INPATIENT ADMISSION   AUTHORIZATION REQUEST   SERVICING FACILITY:   [facility]  Address: [address] [place] [address]  Tax ID: [id number]  NPI: [id number] ATTENDING PROVIDER:  Attending Name and NPI#: Peter Vila Md [[id number]]  Address: [address] [place] [address]  Phone: [phone]   ADMISSION INFORMATION:  Place of Service: Inpatient [facility]  Place of Service Code: 21  Inpatient Admission Date/Time: 12/26/24  5:01 PM  Discharge Date/Time: No discharge date for patient encounter.  Admitting Diagnosis Code/Description:  Dizziness [R42]  Confusion [R41.0]     UTILIZATION REVIEW CONTACT:  Louisa Peters"Sammie\"Ever Utilization   Network Utilization Review Department  Phone: [phone]  Fax: [phone]  Email: [email or web address]  Contact for approvals/pending authorizations, clinical reviews, and discharge.     PHYSICIAN ADVISORY SERVICES:  Medical Necessity Denial & Iskg-nm-Gzmo Review  Phone: [phone]  Fax: [phone]  Email: [email or web address]     DISCHARGE SUPPORT TEAM:  For Patients Discharge Needs & Updates  Phone: [phone] opt. 2 Fax: [phone]  Email: [email or web address]     "

## 2024-12-30 NOTE — CASE MANAGEMENT
Case Management Discharge Planning Note    Patient name Mayra Echevarria  Location [facility] 703/[facility] 703-01 MRN [id number]  : 1955 Date 2024       Current Admission Date: 2024  Current Admission Diagnosis:Acute CVA (cerebrovascular accident) (HCC)   Patient Active Problem List    Diagnosis Date Noted Date Diagnosed    Acute CVA (cerebrovascular accident) (HCC) 2024     Ambulatory dysfunction 2024     MATT (obstructive sleep apnea) 2024     Other sleep disorders 2024     Sleep apnea-like behavior 2024     Insomnia 2024     Memory impairment 2024     Encounter for screening mammogram for breast cancer 2023     Other osteoporosis without current pathological fracture 2023     Impingement syndrome of left shoulder 2023     Impingement syndrome of right shoulder 2023     History of rib fracture 2022     Hypercalcemia 2021     Primary generalized (osteo)arthritis 2021     Vertigo 2021     Migraine with aura and without status migrainosus, not intractable 2021     Anxiety 2021     Labile hypertension 2021     Quadriceps tendonitis 2021     COVID-19 2021     Atrophic vaginitis 2019     Aseptic necrosis bone (HCC) 2019     Greater trochanteric bursitis of right hip 2017     It band syndrome, right 2017     Patellofemoral syndrome, right 2017     Senile osteoporosis 2015     Hyperlipidemia 2012     Essential hypertension 2009       LOS (days): 4  Geometric Mean LOS (GMLOS) (days): 1.9  Days to GMLOS:-1.9     OBJECTIVE:  Risk of Unplanned Readmission Score: 8.22         Current admission status: Inpatient   Preferred Pharmacy:   [facility] [address]  [place] [address]  Phone: [phone] Fax: [phone]    [facility] - [address]  [facility] [address]  Phone: [phone] Fax: [phone]    Primary Care Provider: Namita Melendez DO    Primary Insurance: [employer]  Secondary Insurance:     DISCHARGE DETAILS:    Discharge planning discussed with:: Patient and   Freedom of Choice: Yes  Comments - Freedom of Choice: Agreeable to home with OP PT  CM contacted family/caregiver?: Yes  Were Treatment Team discharge recommendations reviewed with patient/caregiver?: Yes  Did patient/caregiver verbalize understanding of patient care needs?: Yes  Were patient/caregiver advised of the risks associated with not following Treatment Team discharge recommendations?: Yes    Contacts  Patient Contacts: Reggie Echevarria ()  Relationship to Patient:: Family  Contact Method: In Person  Reason/Outcome: Continuity of Care, Emergency Contact, Discharge Planning     Treatment Team Recommendation: Home, Outpatient Rehab  Discharge Destination Plan:: Home, Outpatient Rehab  Transport at Discharge : Family         IMM Given (Date):: 12/30/24  IMM Given to:: Family     Additional Comments: CM reviewed recommendations with pt and  at bedside.  Pt is now recommended for home with OP PT.  Pt and  are agreeable and know where to go for OP rehab.  CM confirmed that they have a RW at home and one is not needed.

## 2024-12-30 NOTE — PROGRESS NOTES
Progress Note - Neurology   Name: Mayra Echevarria 69 y.o. female I MRN: [id number]  Unit/Bed#: [facility] 703-01 I Date of Admission: 12/26/2024   Date of Service: 12/30/2024 I Hospital Day: 4    Assessment & Plan  Acute CVA (cerebrovascular accident) (HCC)  Initial NIHSS 4  Presenting deficits were: right sided droop and leg/arm weakness  Interventions: Patient not a candidate. Unclear time of onset outside appropriate time window. and Symptoms resolved/clearly non disabling.   These new symptoms resolved and patient came in with new vertigo and AMS 12/26 as well, which complicates LKW.    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   TTE/FROILAN: small PFO, and unlikely source of CVA    Suspect patient's symptoms in the setting of embolic source based on patient's MRI findings. ESUS    AP aspirin 81 mg daily  Atorvastatin 40mg QHS    Patient will need OP Zio patch then Loop recorder if needed  Cardiology referral  CT CAP o/p setting  Given husbands concerns for STML past 9 months rec MOCA o/p    Mayra Echevarria will need follow up in in 6 weeks with neurovascular attending or advance practitioner. She will require Zio patch & CT CAP in next few weeks.  I have discussed with Dr. Herrmann the above plan to treat pt. He agrees with the plan.  Neurology service signing off.  Please contact the SecureChat role for the Neurology service with any questions/concerns.    Subjective   Patient reports complete resolution of symptoms, NAD, NAEO.    ROS:  negative    Objective :  Temp:  [98 °F (36.7 °C)-98.3 °F (36.8 °C)] 98.3 °F (36.8 °C)  HR:  [72-85]  85  BP: (139-148)/(71-73) 139/72  Resp:  [16] 16  SpO2:  [93 %-95 %] 93 %  O2 Device: None (Room air)    Physical Exam  Constitutional:       General: She is awake. She is not in acute distress.     Appearance: Normal appearance.   HENT:      Head: Normocephalic and atraumatic.      Nose: Nose normal.   Eyes:      General: Lids are normal. No visual field deficit.     Extraocular Movements: Extraocular movements intact.      Conjunctiva/sclera: Conjunctivae normal.      Pupils: Pupils are equal, round, and reactive to light.   Neck:      Thyroid: No thyroid tenderness.      Trachea: Trachea normal.   Pulmonary:      Breath sounds: Normal air entry.   Neurological:      General: No focal deficit present.      Mental Status: She is alert and oriented to person, place, and time. Mental status is at baseline.      Cranial Nerves: No cranial nerve deficit or facial asymmetry.      Sensory: Sensation is intact. No sensory deficit.      Motor: Motor function is intact. No weakness.      Coordination: Coordination is intact. Coordination normal.      Gait: Gait normal.      Deep Tendon Reflexes: Reflexes normal.   Psychiatric:         Attention and Perception: Attention and perception normal.         Mood and Affect: Mood and affect normal.         Speech: Speech normal.         Behavior: Behavior normal.         Thought Content: Thought content normal.         Cognition and Memory: Cognition and memory normal.         Judgment: Judgment normal.     Neurological Exam  Mental Status  Awake and alert. Oriented to person, place, time and situation. Oriented to person, place, and time. Memory is normal. Recent and remote memory are intact. Speech is normal. Language is fluent with no aphasia. Attention and concentration are normal.    Cranial Nerves  CN I: Sense of smell is normal.  CN II: Visual acuity is normal. Visual fields full to confrontation.  CN III, IV, VI: Extraocular movements intact bilaterally. Normal lids and  orbits bilaterally. Pupils equal round and reactive to light bilaterally.  CN V: Facial sensation is normal.  CN VII: Full and symmetric facial movement.  CN VIII: Hearing is normal.  CN IX, X: Palate elevates symmetrically. Normal gag reflex.  CN XI: Shoulder shrug strength is normal.  CN XII: Tongue midline without atrophy or fasciculations.    Motor  Normal muscle bulk throughout. No fasciculations present. Normal muscle tone. No abnormal involuntary movements. Strength is 5/5 in all four extremities except as noted.    Sensory  Normal sensation.Light touch is normal in upper and lower extremities.     Reflexes  Deep tendon reflexes are 2+ and symmetric except as noted.    Coordination    Finger-to-nose, rapid alternating movements and heel-to-shin normal bilaterally without dysmetria.    Gait   Normal gait.Casual gait is normal including stance, stride, and arm swing.        Lab Results: I have reviewed the following results:  Imaging Results Review: I personally reviewed the following image studies in PACS and associated radiology reports: MRI brain. My interpretation of the radiology images/reports is: In agreement w/ above.  Other Study Results Review: EKG was reviewed.     VTE Pharmacologic Prophylaxis: VTE covered by:  enoxaparin, Subcutaneous, 40 mg at 12/30/24 0959       Administrative Statements   I have spent a total time of 50 minutes in caring for this patient on the day of the visit/encounter including Counseling / Coordination of care, Documenting in the medical record, Reviewing / ordering tests, medicine, procedures  , Obtaining or reviewing history  , and Communicating with other healthcare professionals .

## 2024-12-30 NOTE — PROGRESS NOTES
Progress Note - Hospitalist   Name: Mayra Echevarria 69 y.o. female I MRN: [id number]  Unit/Bed#: [facility] 703-01 I Date of Admission: 12/26/2024   Date of Service: 12/30/2024 I Hospital Day: 4    Assessment & Plan  Acute CVA (cerebrovascular accident) (HCC)  History of vertigo and patient is presenting with worsening dizziness and altered mental status.   CTA head/neck : High-grade stenosis of a left middle cerebral artery inferior division M3 branch extending posteriorly in the left sylvian fissure   MRI brain 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   Neurology is following.  Currently on aspirin 81 mg daily and atorvastatin 40 mg daily.  Continue stroke pathway.  Follow-up TTE > small PFO  No events on telemetry. Discontinue,   PT OT evaluation recommending inpatient rehab.  Outpatient cardiology referral for Zio patch and evaluation of PFT  Essential hypertension  Continue amlodipine and losartan  Vertigo  Likely in the setting of CVA.  Memory impairment  History noted  Ambulatory dysfunction  PT/OT recommending inpatient rehab. CM following  Medically cleared for DC    VTE Pharmacologic Prophylaxis:    ordered     Mobility:   Basic Mobility Inpatient Raw Score: 17  JH-HLM Goal: 5: Stand one or more mins  JH-HLM Achieved: 6: Walk 10 steps or more  JH-HLM Goal achieved. Continue to encourage appropriate mobility.    Patient Centered Rounds: I performed bedside rounds with nursing staff today.   Discussions with Specialists or Other Care Team Provider: neurology     Education and Discussions with Family / Patient: Updated  () at bedside.    Current Length of Stay: 4 day(s)  Current Patient Status: Inpatient   Certification Statement: The patient will continue to require additional inpatient hospital stay due to Placement,   Discharge  Plan:  Medically clear    Code Status: Level 1 - Full Code    Subjective   Feeling better    Objective :  Temp:  [98 °F (36.7 °C)-98.3 °F (36.8 °C)] 98.3 °F (36.8 °C)  HR:  [72-85] 85  BP: (139-148)/(71-73) 139/72  Resp:  [16] 16  SpO2:  [93 %-95 %] 93 %  O2 Device: None (Room air)    Body mass index is 27.44 kg/m².     Input and Output Summary (last 24 hours):   No intake or output data in the 24 hours ending 12/30/24 1143    Physical Exam  Vitals and nursing note reviewed.   Cardiovascular:      Rate and Rhythm: Normal rate and regular rhythm.   Pulmonary:      Effort: Pulmonary effort is normal.      Breath sounds: Normal breath sounds.   Abdominal:      General: Bowel sounds are normal.      Palpations: Abdomen is soft.   Musculoskeletal:      Right lower leg: No edema.      Left lower leg: No edema.   Neurological:      Mental Status: She is alert.      Motor: Weakness (Rt sided4/5) present.           Lines/Drains:              Lab Results: I have reviewed the following results:   Results from last 7 days   Lab Units 12/28/24  0508 12/27/24  0652   WBC Thousand/uL 5.79 6.91   HEMOGLOBIN g/dL 12.5 11.6   HEMATOCRIT % 39.1 36.5   PLATELETS Thousands/uL 331 333   SEGS PCT %  --  75   LYMPHO PCT %  --  14   MONO PCT %  --  8   EOS PCT %  --  3     Results from last 7 days   Lab Units 12/28/24  0508 12/27/24  0652 12/26/24  1105   SODIUM mmol/L 142   < > 139   POTASSIUM mmol/L 3.9   < > 3.8   CHLORIDE mmol/L 110*   < > 106   CO2 mmol/L 24   < > 24   BUN mg/dL 18   < > 25   CREATININE mg/dL 0.79   < > 0.89   ANION GAP mmol/L 8   < > 9   CALCIUM mg/dL 9.0   < > 9.7   ALBUMIN g/dL  --   --  4.3   TOTAL BILIRUBIN mg/dL  --   --  0.47   ALK PHOS U/L  --   --  23*   ALT U/L  --   --  22   AST U/L  --   --  32   GLUCOSE RANDOM mg/dL 104   < > 118    < > = values in this interval not displayed.         Results from last 7 days   Lab Units 12/27/24  1633   POC GLUCOSE mg/dl 97     Results from last 7 days   Lab Units  12/27/24 2003   HEMOGLOBIN A1C % 6.1*           Recent Cultures (last 7 days):         Imaging Results Review: I reviewed radiology reports from this admission including: MRI brain.  Other Study Results Review: No additional pertinent studies reviewed.    Last 24 Hours Medication List:     Current Facility-Administered Medications:     amLODIPine (NORVASC) tablet 5 mg, Daily    aspirin chewable tablet 81 mg, Daily    atorvastatin (LIPITOR) tablet 40 mg, Daily With Dinner    enoxaparin (LOVENOX) subcutaneous injection 40 mg, Daily    fenofibrate (TRICOR) tablet 145 mg, Daily    losartan (COZAAR) tablet 100 mg, Daily    meclizine (ANTIVERT) tablet 25 mg, Q8H PRN    nortriptyline (PAMELOR) capsule 30 mg, HS    Administrative Statements   Today, Patient Was Seen By: Peter Vila MD  I have spent a total time of 35 minutes in caring for this patient on the day of the visit/encounter including Diagnostic results, Documenting in the medical record, Reviewing / ordering tests, medicine, procedures  , Obtaining or reviewing history  , and Communicating with other healthcare professionals .    **Please Note: This note may have been constructed using a voice recognition system.**

## 2024-12-30 NOTE — PLAN OF CARE
Problem: OCCUPATIONAL THERAPY ADULT  Goal: Performs self-care activities at highest level of function for planned discharge setting.  See evaluation for individualized goals.  Description: Treatment Interventions: ADL retraining, Functional transfer training, Endurance training, Patient/family training, Compensatory technique education, Continued evaluation          See flowsheet documentation for full assessment, interventions and recommendations.   Outcome: Progressing  Note: Limitation: Decreased ADL status, Decreased Safe judgement during ADL, Decreased cognition, Decreased endurance, Decreased self-care trans, Decreased high-level ADLs  Prognosis: Good  Assessment: Patient participated in Skilled OT session this date with interventions consisting of ADL re training with the use of correct body mechnaics, Energy Conservation techniques,  therapeutic activities to: increase activity tolerance, increase dynamic sit/ stand balance during functional activity , and increase OOB/ sitting tolerance .Upon arrival patient was found supine in bed. Pt demonstrated the following tasks: S sup <> sit, STS, toilet transfer, and fnxl mobility with RW. Pt performed grooming tasks independently, S for UB ADLs, LB ADLs, and toileting. Patient continues to be functioning below baseline level, occupational performance remains limited secondary to factors listed above and increased risk for falls and injury.   From OT standpoint, recommendation at time of d/c would be home with HHOT vs OPOT for cognition and increased social support. Pt with supportive spouse - present during session and confirms he will be home with pt and able to assist as needed upon d/c.  Patient to benefit from continued Occupational Therapy treatment while in the hospital to address deficits as defined above and maximize level of functional independence with ADLs and functional mobility. Pt was left after session with all current needs met. The patient's raw  score on the AM-PAC Daily Activity Inpatient Short Form is 20. A raw score of greater than or equal to 19 suggests the patient may benefit from discharge to home. Please refer to the recommendation of the Occupational Therapist for safe discharge planning.     Rehab Resource Intensity Level, OT: III (Minimum Resource Intensity) (home with skilled therapy vs OPOT for cognition and increased social support - spouse reports he will be with pt at all times upon d/c)

## 2024-12-30 NOTE — CONSULTS
PHYSICAL MEDICINE AND REHABILITATION CONSULT NOTE  Mayra Echevarria 69 y.o. female MRN: [id number]  Unit/Bed#: [facility] 703-01 Encounter: [id number]    Requested by (Physician/Service): Peter Vila MD  Reason for Consultation:  Assessment of rehabilitation needs    Assessment:  Rehabilitation Diagnosis:   Multiple acute/recent infarcts left occipital lobe, bilateral alex, left greater than right and left cerebellar hemisphere   Cytotoxic edema   Right hemiparesis   Impaired mobility and self care  Impaired cognition     Recommendations:  Rehabilitation Plan:  Continue PT/OT (SLP) while on acute care.  PT/OT to re-evaluate patient today as she and her spouse feel she has improved enough to go home. PT will trial stairs today. Spouse was able to observe her ADLs and ambulation today and feels he can care for her at home. They are agreeable to outpatient therapy. If patient okay to d/c to home would recommend outpatient therapy.     Medical Co-morbidities Plan:  Hypertension   Vertigo improved   Memory impairment   Ambulatory dysfunction   Hyperlipidemia   Bowel plan: continent   Bladder plan: continent   DVT ppx: Lovenox and SCD    Thank you for this consultation.  Do not hesitate to contact service with further questions.      BLANCA Sweet  PM&R    I have spent a total time of 30 minutes on 12/30/24 in caring for this patient including Patient and family education, Counseling / Coordination of care, Documenting in the medical record, Reviewing / ordering tests, medicine, procedures  , Obtaining or reviewing history  , and Communicating with other healthcare professionals .    History of Present Illness:  Mayra Echevarria is a 69 y.o. female with a PMH of  hypertension, hyperlipidemia, vertigo, arthritis, depression who presented to the [facility] on 12/26/2024 with ambulatory dysfunction and altered mental status. CT head/neck was unremarkable. CTA head/neck showed high grade stenosis of  "a left middle cerebral artery inferior M3 branch extending posteriorly in the left sylvian fissure. MRI showed multiple acute/recent infarcts involving the left occipital lobe, bilateral alex, left greater than right and the left cerebellar hemisphere, with associated cytotoxic edema, but no associated hemorrhage. PM&R are consulted for rehabilitation recommendations.    The patient was seen in her room with her spouse at bedside. Per spouse he feels she is going really well and he is interested and d/c to home with outpatient therapy. The patient currently denies any weakness, numbness, tingling, blurred vision or double vision.     Review of Systems: 10 point ROS negative except for what is noted in HPI    Function:  Prior level of function and living situation: the patient lives in a multi-level home with her spouse and was independent. Spouse is home and able to provide 24/7 assistance supervision.       Current level of function:  Physical Therapy: Minimal assist for bed mobility, transfers and ambulation   Occupational Therapy: Supervision for eating, minimal assist for grooming, UB bathing/dressing, moderate assist for LB bathing/dressing and toileting     Physical Exam:  /72   Pulse 85   Temp 98.3 °F (36.8 °C)   Resp 16   Ht 5' 2\" (1.575 m)   Wt 68 kg (150 lb)   LMP  (LMP Unknown)   SpO2 93%   BMI 27.44 kg/m²        Intake/Output Summary (Last 24 hours) at 12/30/2024 1050  Last data filed at 12/29/2024 1103  Gross per 24 hour   Intake 480 ml   Output --   Net 480 ml       Body mass index is 27.44 kg/m².      Physical Exam  Constitutional:       General: She is not in acute distress.     Appearance: She is not toxic-appearing.   HENT:      Head: Normocephalic and atraumatic.      Right Ear: External ear normal.      Left Ear: External ear normal.      Nose: Nose normal.      Mouth/Throat:      Mouth: Mucous membranes are moist.      Pharynx: Oropharynx is clear.   Eyes:      Extraocular " Movements: Extraocular movements intact.   Pulmonary:      Effort: Pulmonary effort is normal. No respiratory distress.   Abdominal:      General: There is no distension.   Musculoskeletal:      Comments: Mild right hemiparesis grossly 4 to 4+/5, able to ambulate in the hallway with rolling walker.    Neurological:      Mental Status: She is alert and oriented to person, place, and time.   Psychiatric:         Mood and Affect: Mood normal.          Social History:    Social History     Socioeconomic History    Marital status: /Civil Union     Spouse name: None    Number of children: None    Years of education: None    Highest education level: None   Occupational History    None   Tobacco Use    Smoking status: Never     Passive exposure: Never    Smokeless tobacco: Never   Vaping Use    Vaping status: Never Used   Substance and Sexual Activity    Alcohol use: No    Drug use: No    Sexual activity: Yes     Partners: Male     Birth control/protection: Female Sterilization   Other Topics Concern    None   Social History Narrative    None     Social Drivers of Health     Financial Resource Strain: Low Risk  (4/17/2023)    Overall Financial Resource Strain (CARDIA)     Difficulty of Paying Living Expenses: Not hard at all   Food Insecurity: No Food Insecurity (12/27/2024)    Hunger Vital Sign     Worried About Running Out of Food in the Last Year: Never true     Ran Out of Food in the Last Year: Never true   Transportation Needs: No Transportation Needs (12/27/2024)    PRAPARE - Transportation     Lack of Transportation (Medical): No     Lack of Transportation (Non-Medical): No   Physical Activity: Not on file   Stress: Not on file   Social Connections: Not on file   Intimate Partner Violence: Patient Unable To Answer (12/26/2024)    Nursing IPS     Feels Physically and Emotionally Safe: Not on file     Physically Hurt by Someone: Not on file     Humiliated or Emotionally Abused by Someone: Not on file      Physically Hurt by Someone: 97     Hurt or Threatened by Someone: 97   Housing Stability: Low Risk  (12/27/2024)    Housing Stability Vital Sign     Unable to Pay for Housing in the Last Year: No     Number of Times Moved in the Last Year: 0     Homeless in the Last Year: No        Family History:    Family History   Problem Relation Age of Onset    Colon cancer Mother 51    Breast cancer Mother 53    Cancer Mother         two primary sites  breast and colon    Heart attack Father     Prostate cancer Father 70    Hypertension Father     Heart attack Sister     No Known Problems Sister     No Known Problems Daughter     No Known Problems Daughter     No Known Problems Maternal Grandmother     No Known Problems Maternal Grandfather     No Known Problems Paternal Grandmother     No Known Problems Paternal Grandfather     Stroke Brother     No Known Problems Son     No Known Problems Maternal Aunt     No Known Problems Maternal Aunt     No Known Problems Maternal Aunt          Medications:     Current Facility-Administered Medications:     amLODIPine (NORVASC) tablet 5 mg, 5 mg, Oral, Daily, Harnishkumar Quiroz, DO, 5 mg at 12/30/24 1003    aspirin chewable tablet 81 mg, 81 mg, Oral, Daily, Kristy Bailey MD, 81 mg at 12/30/24 1003    atorvastatin (LIPITOR) tablet 40 mg, 40 mg, Oral, Daily With Dinner, Megan Mccartney MD, 40 mg at 12/28/24 1550    enoxaparin (LOVENOX) subcutaneous injection 40 mg, 40 mg, Subcutaneous, Daily, Harnishkumar Quiroz, DO, 40 mg at 12/30/24 0959    fenofibrate (TRICOR) tablet 145 mg, 145 mg, Oral, Daily, Harnishkumar Quiroz, DO, 145 mg at 12/30/24 1003    losartan (COZAAR) tablet 100 mg, 100 mg, Oral, Daily, Harnishkumar Quiroz, DO, 100 mg at 12/30/24 1003    meclizine (ANTIVERT) tablet 25 mg, 25 mg, Oral, Q8H PRN, Harnishkumar Quiroz, DO    nortriptyline (PAMELOR) capsule 30 mg, 30 mg, Oral, HS, Harnishkumar Quiroz, DO, 30 mg at 12/29/24 2052    Past Medical History:     Past Medical History:  "  Diagnosis Date    Allergic years ago    Iodine    Chronic leukopenia     Depression 04/22/2014    Hypercalcemia     Hyperlipidemia     Hypertension     labile    Hypertension     Migraines     Osteoarthritis     Osteoporosis     Vertigo         Past Surgical History:     Past Surgical History:   Procedure Laterality Date    EYE SURGERY      TOOTH EXTRACTION      TUBAL LIGATION           Allergies:     Allergies   Allergen Reactions    Iodine - Food Allergy Rash           LABORATORY RESULTS:      Lab Results   Component Value Date    HGB 12.5 12/28/2024    HCT 39.1 12/28/2024    WBC 5.79 12/28/2024     Lab Results   Component Value Date    BUN 18 12/28/2024    BUN 21 03/04/2023    K 3.9 12/28/2024    K 4.4 03/04/2023     (H) 12/28/2024     03/04/2023    CREATININE 0.79 12/28/2024    CREATININE 0.97 03/04/2023     No results found for: \"PROTIME\", \"INR\"     DIAGNOSTIC STUDIES: Reviewed  MRI brain wo contrast  Result Date: 12/27/2024  Impression: 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 Mild chronic white matter microangiopathic changes involving both cerebral hemispheres. I personally discussed this study with Dr. Akers on 12/27/2024 7:40 PM. Workstation performed: [id number]     CTA stroke alert (head/neck)  Result Date: 12/27/2024  Impression: High-grade stenosis of a left middle cerebral artery inferior division M3 branch extending posteriorly in the left sylvian fissure as described above. No large vessel occlusion or intracranial aneurysm noted. No hemodynamically significant stenosis, occlusion or dissection identified on CT angiogram of the neck. Findings were directly discussed with James Wilkinson at 5:40 p.m. on 12/27/2024.. Workstation performed: [id number]     CT stroke alert brain  Result Date: 12/27/2024  Impression: No acute " intracranial abnormality. Findings were directly discussed with James Wilkinson at approximately 5:00 p.m. on 12/27/2024. Workstation performed: [id number]     CTA head and neck with and without contrast  Result Date: 12/26/2024  Impression: CT Brain:  No acute intracranial CT abnormality. CT Angiography: 1.  Limited evaluation of the neck vasculature due to technique and artifacts related to motion and prior dental work, especially suboptimal evaluation of the vertebral arteries. Patent and grossly unremarkable vertebral arteries within the limits of suboptimal image quality. No significant stenosis in the cervical carotid arteries. 2.  Patent major vessels of the [place] without high-grade stenosis. Workstation performed: [id number]     XR chest 2 views  Result Date: 12/26/2024  Impression: No acute cardiopulmonary disease. Workstation performed: [id number]

## 2024-12-30 NOTE — PLAN OF CARE
Problem: PAIN - ADULT  Goal: Verbalizes/displays adequate comfort level or baseline comfort level  Description: Interventions:  - Encourage patient to monitor pain and request assistance  - Assess pain using appropriate pain scale  - Administer analgesics based on type and severity of pain and evaluate response  - Implement non-pharmacological measures as appropriate and evaluate response  - Consider cultural and social influences on pain and pain management  - Notify physician/advanced practitioner if interventions unsuccessful or patient reports new pain  Outcome: Progressing     Problem: INFECTION - ADULT  Goal: Absence or prevention of progression during hospitalization  Description: INTERVENTIONS:  - Assess and monitor for signs and symptoms of infection  - Monitor lab/diagnostic results  - Monitor all insertion sites, i.e. indwelling lines, tubes, and drains  - Monitor endotracheal if appropriate and nasal secretions for changes in amount and color  - [place] appropriate cooling/warming therapies per order  - Administer medications as ordered  - Instruct and encourage patient and family to use good hand hygiene technique  - Identify and instruct in appropriate isolation precautions for identified infection/condition  Outcome: Progressing  Goal: Absence of fever/infection during neutropenic period  Description: INTERVENTIONS:  - Monitor WBC    Outcome: Progressing     Problem: SAFETY ADULT  Goal: Patient will remain free of falls  Description: INTERVENTIONS:  - Educate patient/family on patient safety including physical limitations  - Instruct patient to call for assistance with activity   - Consult OT/PT to assist with strengthening/mobility   - Keep Call bell within reach  - Keep bed low and locked with side rails adjusted as appropriate  - Keep care items and personal belongings within reach  - Initiate and maintain comfort rounds  - Make Fall Risk Sign visible to staff  - Offer Toileting every 2 Hours,  in advance of need  - Initiate/Maintain bed and chair alarm  - Obtain necessary fall risk management equipment: non skid socks   - Apply yellow socks and bracelet for high fall risk patients  - Consider moving patient to room near nurses station  Outcome: Progressing  Goal: Maintain or return to baseline ADL function  Description: INTERVENTIONS:  -  Assess patient's ability to carry out ADLs; assess patient's baseline for ADL function and identify physical deficits which impact ability to perform ADLs (bathing, care of mouth/teeth, toileting, grooming, dressing, etc.)  - Assess/evaluate cause of self-care deficits   - Assess range of motion  - Assess patient's mobility; develop plan if impaired  - Assess patient's need for assistive devices and provide as appropriate  - Encourage maximum independence but intervene and supervise when necessary  - Involve family in performance of ADLs  - Assess for home care needs following discharge   - Consider OT consult to assist with ADL evaluation and planning for discharge  - Provide patient education as appropriate  Outcome: Progressing  Goal: Maintains/Returns to pre admission functional level  Description: INTERVENTIONS:  - Perform AM-PAC 6 Click Basic Mobility/ Daily Activity assessment daily.  - Set and communicate daily mobility goal to care team and patient/family/caregiver.   - Collaborate with rehabilitation services on mobility goals if consulted  - Perform Range of Motion 3 times a day.  - Reposition patient every 2 hours.  - Dangle patient 3 times a day  - Stand patient 3 times a day  - Ambulate patient 3 times a day  - Out of bed to chair 3 times a day   - Out of bed for meals 3 times a day  - Out of bed for toileting  - Record patient progress and toleration of activity level   Outcome: Progressing     Problem: DISCHARGE PLANNING  Goal: Discharge to home or other facility with appropriate resources  Description: INTERVENTIONS:  - Identify barriers to discharge  w/patient and caregiver  - Arrange for needed discharge resources and transportation as appropriate  - Identify discharge learning needs (meds, wound care, etc.)  - Arrange for interpretive services to assist at discharge as needed  - Refer to Case Management Department for coordinating discharge planning if the patient needs post-hospital services based on physician/advanced practitioner order or complex needs related to functional status, cognitive ability, or social support system  Outcome: Progressing     Problem: Knowledge Deficit  Goal: Patient/family/caregiver demonstrates understanding of disease process, treatment plan, medications, and discharge instructions  Description: Complete learning assessment and assess knowledge base.  Interventions:  - Provide teaching at level of understanding  - Provide teaching via preferred learning methods  Outcome: Progressing     Problem: Prexisting or High Potential for Compromised Skin Integrity  Goal: Skin integrity is maintained or improved  Description: INTERVENTIONS:  - Identify patients at risk for skin breakdown  - Assess and monitor skin integrity  - Assess and monitor nutrition and hydration status  - Monitor labs   - Assess for incontinence   - Turn and reposition patient  - Assist with mobility/ambulation  - Relieve pressure over bony prominences  - Avoid friction and shearing  - Provide appropriate hygiene as needed including keeping skin clean and dry  - Evaluate need for skin moisturizer/barrier cream  - Collaborate with interdisciplinary team   - Patient/family teaching  - Consider wound care consult   Outcome: Progressing     Problem: Nutrition/Hydration-ADULT  Goal: Nutrient/Hydration intake appropriate for improving, restoring or maintaining nutritional needs  Description: Monitor and assess patient's nutrition/hydration status for malnutrition. Collaborate with interdisciplinary team and initiate plan and interventions as ordered.  Monitor patient's  weight and dietary intake as ordered or per policy. Utilize nutrition screening tool and intervene as necessary. Determine patient's food preferences and provide high-protein, high-caloric foods as appropriate.     INTERVENTIONS:  - Monitor oral intake, urinary output, labs, and treatment plans  - Assess nutrition and hydration status and recommend course of action  - Evaluate amount of meals eaten  - Assist patient with eating if necessary   - Allow adequate time for meals  - Recommend/ encourage appropriate diets, oral nutritional supplements, and vitamin/mineral supplements  - Order, calculate, and assess calorie counts as needed  - Recommend, monitor, and adjust tube feedings and TPN/PPN based on assessed needs  - Assess need for intravenous fluids  - Provide specific nutrition/hydration education as appropriate  - Include patient/family/caregiver in decisions related to nutrition  Outcome: Progressing

## 2024-12-30 NOTE — ASSESSMENT & PLAN NOTE
Initial NIHSS 4  Presenting deficits were: right sided droop and leg/arm weakness  Interventions: Patient not a candidate. Unclear time of onset outside appropriate time window. and Symptoms resolved/clearly non disabling.   These new symptoms resolved and patient came in with new vertigo and AMS 12/26 as well, which complicates LKW.    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   TTE/FROILAN: small PFO, and unlikely source of CVA    Suspect patient's symptoms in the setting of embolic source based on patient's MRI findings. ESUS    AP aspirin 81 mg daily  Atorvastatin 40mg QHS    Patient will need OP Zio patch then Loop recorder if needed  Cardiology referral  CT CAP o/p setting  Given husbands concerns for STML past 9 months rec MOCA o/p

## 2024-12-30 NOTE — CASE MANAGEMENT
Case Management Discharge Planning Note    Patient name Mayra Echevarria  Location [facility] 703/[facility] 703-01 MRN [id number]  : 1955 Date 2024       Current Admission Date: 2024  Current Admission Diagnosis:Acute CVA (cerebrovascular accident) (HCC)   Patient Active Problem List    Diagnosis Date Noted Date Diagnosed    Acute CVA (cerebrovascular accident) (HCC) 2024     Ambulatory dysfunction 2024     MATT (obstructive sleep apnea) 2024     Other sleep disorders 2024     Sleep apnea-like behavior 2024     Insomnia 2024     Memory impairment 2024     Encounter for screening mammogram for breast cancer 2023     Other osteoporosis without current pathological fracture 2023     Impingement syndrome of left shoulder 2023     Impingement syndrome of right shoulder 2023     History of rib fracture 2022     Hypercalcemia 2021     Primary generalized (osteo)arthritis 2021     Vertigo 2021     Migraine with aura and without status migrainosus, not intractable 2021     Anxiety 2021     Labile hypertension 2021     Quadriceps tendonitis 2021     COVID-19 2021     Atrophic vaginitis 2019     Aseptic necrosis bone (HCC) 2019     Greater trochanteric bursitis of right hip 2017     It band syndrome, right 2017     Patellofemoral syndrome, right 2017     Senile osteoporosis 2015     Hyperlipidemia 2012     Essential hypertension 2009       LOS (days): 4  Geometric Mean LOS (GMLOS) (days): 1.9  Days to GMLOS:-1.8     OBJECTIVE:  Risk of Unplanned Readmission Score: 8.2         Current admission status: Inpatient   Preferred Pharmacy:   [facility] [address]  [place] [address]  Phone: [phone] Fax: [phone]    [facility] - [address]  [facility] [address]  Phone: [phone] Fax: [phone]    Primary Care Provider: Namita Melendez DO    Primary Insurance: [employer] RE[employer]  Secondary Insurance:     DISCHARGE DETAILS:     CM reached out to PT/TO to request they see pt today.  CM reached out to [facility] and [facility] to see if they are able to accept pt.

## 2024-12-30 NOTE — ASSESSMENT & PLAN NOTE
History of vertigo and patient is presenting with worsening dizziness and altered mental status.   CTA head/neck : High-grade stenosis of a left middle cerebral artery inferior division M3 branch extending posteriorly in the left sylvian fissure   MRI brain 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   Neurology is following.  Currently on aspirin 81 mg daily and atorvastatin 40 mg daily.  Continue stroke pathway.  Follow-up TTE > small PFO  No events on telemetry. Discontinue,   Initial PT OT evaluation recommending inpatient rehab however she progressed well and now they are recommending OP PT/OT  Outpatient cardiology referral for Zio patch and evaluation of PFT

## 2024-12-30 NOTE — RESTORATIVE TECHNICIAN NOTE
Restorative Technician Note      Patient Name: Mayra Echevarria     Note Type: Mobility  Patient Position Upon Consult: Supine  Activity Performed: Ambulated; Dangled; Stood  Assistive Device: Roller walker  Education Provided: Yes  Patient Position at End of Consult: Supine; All needs within reach; Bed/Chair alarm activated    Harriett HEARD, Restorative Technician,

## 2024-12-30 NOTE — OCCUPATIONAL THERAPY NOTE
Occupational Therapy Progress Note     Patient Name: Mayra Echevarria  Today's Date: 12/30/2024  Problem List  Principal Problem:    Acute CVA (cerebrovascular accident) ([facility])  Active Problems:    Essential hypertension    Vertigo    Memory impairment    Ambulatory dysfunction          12/30/24 0935   OT Last Visit   OT Visit Date 12/30/24   Note Type   Note Type Treatment   Pain Assessment   Pain Assessment Tool 0-10   Pain Score No Pain   Restrictions/Precautions   Weight Bearing Precautions Per Order No   Other Precautions Cognitive;Chair Alarm;Bed Alarm;Fall Risk   Lifestyle   Autonomy pta pt reports I in ADLs/IADLs/functional mobility   Reciprocal Relationships supportive spouse   Service to Others retired    Intrinsic Gratification reading   ADL   Where Assessed Standing at sink  (standing + sitting at sink)   Grooming Assistance 7  Independent   Grooming Deficit Wash/dry face;Teeth care   UB Bathing Assistance 5  Supervision/Setup   UB Bathing Deficit Chest;Right arm;Left arm;Abdomen   LB Bathing Assistance 5  Supervision/Setup   LB Bathing Deficit Perineal area;Buttocks;Left upper leg;Right upper leg;Right lower leg including foot;Left lower leg including foot   UB Dressing Assistance 5  Supervision/Setup   UB Dressing Deficit Thread RUE;Thread LUE   LB Dressing Assistance 5  Supervision/Setup   LB Dressing Deficit Don/doff R sock;Don/doff L sock;Thread RLE into pants;Thread LLE into pants;Thread RLE into underwear;Thread LLE into underwear;Pull up over hips   Toileting Assistance  5  Supervision/Setup   Toileting Deficit Perineal hygiene   Bed Mobility   Supine to Sit 5  Supervision   Additional items HOB elevated;Increased time required   Sit to Supine 5  Supervision   Additional items HOB elevated;Increased time required   Transfers   Sit to Stand 5  Supervision   Additional items Increased time required   Stand to Sit 5  Supervision   Additional items Increased time required   Toilet transfer 5  " Supervision   Additional items Standard toilet   Additional Comments transfers with RW   Functional Mobility   Functional Mobility 5  Supervision   Additional Comments cues for hand placement   Additional items Rolling walker   Subjective   Subjective \"much better\"   Cognition   Arousal/Participation Responsive;Cooperative   Attention Attends with cues to redirect   Orientation Level Oriented X4   Following Commands Follows one step commands with increased time or repetition   Comments Pt very pleasant and cooperative t/o session, decreased attention at times   Activity Tolerance   Activity Tolerance Patient tolerated treatment well   Medical Staff Made Aware RN clearance for session. Spoke with CM and PT   Assessment   Assessment Patient participated in Skilled OT session this date with interventions consisting of ADL re training with the use of correct body mechnaics, Energy Conservation techniques,  therapeutic activities to: increase activity tolerance, increase dynamic sit/ stand balance during functional activity , and increase OOB/ sitting tolerance .Upon arrival patient was found supine in bed. Pt demonstrated the following tasks: S sup <> sit, STS, toilet transfer, and fnxl mobility with RW. Pt performed grooming tasks independently, S for UB ADLs, LB ADLs, and toileting. Patient continues to be functioning below baseline level, occupational performance remains limited secondary to factors listed above and increased risk for falls and injury.   From OT standpoint, recommendation at time of d/c would be home with HHOT vs OPOT for cognition and increased social support. Pt with supportive spouse - present during session and confirms he will be home with pt and able to assist as needed upon d/c.  Patient to benefit from continued Occupational Therapy treatment while in the hospital to address deficits as defined above and maximize level of functional independence with ADLs and functional mobility. Pt was left " after session with all current needs met. The patient's raw score on the AM-PAC Daily Activity Inpatient Short Form is 20. A raw score of greater than or equal to 19 suggests the patient may benefit from discharge to home. Please refer to the recommendation of the Occupational Therapist for safe discharge planning.   Plan   Treatment Interventions ADL retraining;Functional transfer training;Endurance training;Patient/family training;Equipment evaluation/education;Compensatory technique education;Continued evaluation;Energy conservation;Activityengagement   Goal Expiration Date 01/10/24   OT Treatment Day 1   OT Frequency 3-5x/wk   Discharge Recommendation   Rehab Resource Intensity Level, OT III (Minimum Resource Intensity)  (home with skilled therapy vs OPOT for cognition and increased social support - spouse reports he will be with pt at all times upon d/c)   [facility] Daily Activity Inpatient   Lower Body Dressing 3   Bathing 3   Toileting 3   Upper Body Dressing 3   Grooming 4   Eating 4   Daily Activity Raw Score 20   Daily Activity Standardized Score (Calc for Raw Score >=11) 42.03   -[facility] Applied Cognition Inpatient   Following a Speech/Presentation 3   Understanding Ordinary Conversation 4   Taking Medications 3   Remembering Where Things Are Placed or Put Away 3   Remembering List of 4-5 Errands 3   Taking Care of Complicated Tasks 3   Applied Cognition Raw Score 19   Applied Cognition Standardized Score 39.77     Juliet Garcia MS, OTR/L

## 2024-12-31 ENCOUNTER — TRANSITIONAL CARE MANAGEMENT (OUTPATIENT)
Dept: FAMILY MEDICINE CLINIC | Facility: CLINIC | Age: 69
End: 2024-12-31

## 2024-12-31 ENCOUNTER — TELEPHONE (OUTPATIENT)
Age: 69
End: 2024-12-31

## 2024-12-31 NOTE — TELEPHONE ENCOUNTER
HFU/SLB/ Acute CVA (cerebrovascular accident) (HCC) Dc date 12/30/2024 to Home /Self Care           Neurology Consult Note: Mayra Swathi will need follow-up in in 6 weeks with neurovascular team for Other = ATTENDING ESUS in 60 minute appointment. They will not require outpatient neurological testing other than ordered Zio & CT CAP. Thx            Patient scheduled :   HFU LONG/5-/Manoj/[facility] office /10 am       Patient placed on the high priority wait list with multiple Stroke Attendings

## 2025-01-02 ENCOUNTER — APPOINTMENT (EMERGENCY)
Dept: RADIOLOGY | Facility: HOSPITAL | Age: 70
DRG: 917 | End: 2025-01-02
Payer: COMMERCIAL

## 2025-01-02 ENCOUNTER — RA CDI HCC (OUTPATIENT)
Dept: OTHER | Facility: HOSPITAL | Age: 70
End: 2025-01-02

## 2025-01-02 ENCOUNTER — HOSPITAL ENCOUNTER (INPATIENT)
Facility: HOSPITAL | Age: 70
LOS: 5 days | Discharge: HOME | DRG: 917 | End: 2025-01-07
Attending: EMERGENCY MEDICINE
Payer: COMMERCIAL

## 2025-01-02 ENCOUNTER — APPOINTMENT (INPATIENT)
Dept: RADIOLOGY | Facility: HOSPITAL | Age: 70
DRG: 917 | End: 2025-01-02
Payer: COMMERCIAL

## 2025-01-02 DIAGNOSIS — I63.9 CVA (CEREBRAL VASCULAR ACCIDENT) (HCC): ICD-10-CM

## 2025-01-02 DIAGNOSIS — E83.52 HYPERCALCEMIA: Primary | ICD-10-CM

## 2025-01-02 PROBLEM — I16.0 HYPERTENSIVE URGENCY: Status: ACTIVE | Noted: 2025-01-02

## 2025-01-02 PROBLEM — Z86.73 HISTORY OF CVA (CEREBROVASCULAR ACCIDENT): Status: ACTIVE | Noted: 2024-12-26

## 2025-01-02 PROBLEM — N17.9 AKI (ACUTE KIDNEY INJURY) (HCC): Status: ACTIVE | Noted: 2025-01-02

## 2025-01-02 LAB
25(OH)D3 SERPL-MCNC: 54.2 NG/ML (ref 30–100)
2HR DELTA HS TROPONIN: 7 NG/L
4HR DELTA HS TROPONIN: 12 NG/L
ALBUMIN SERPL BCG-MCNC: 4 G/DL (ref 3.5–5)
ALBUMIN SERPL BCG-MCNC: 4.4 G/DL (ref 3.5–5)
ALP SERPL-CCNC: 28 U/L (ref 34–104)
ALP SERPL-CCNC: 31 U/L (ref 34–104)
ALT SERPL W P-5'-P-CCNC: 26 U/L (ref 7–52)
ALT SERPL W P-5'-P-CCNC: 30 U/L (ref 7–52)
ANION GAP SERPL CALCULATED.3IONS-SCNC: 11 MMOL/L (ref 4–13)
ANION GAP SERPL CALCULATED.3IONS-SCNC: 8 MMOL/L (ref 4–13)
AST SERPL W P-5'-P-CCNC: 30 U/L (ref 13–39)
AST SERPL W P-5'-P-CCNC: 39 U/L (ref 13–39)
BACTERIA UR QL AUTO: ABNORMAL /HPF
BASOPHILS # BLD AUTO: 0.03 THOUSANDS/ΜL (ref 0–0.1)
BASOPHILS NFR BLD AUTO: 0 % (ref 0–1)
BILIRUB DIRECT SERPL-MCNC: 0.16 MG/DL (ref 0–0.2)
BILIRUB SERPL-MCNC: 0.46 MG/DL (ref 0.2–1)
BILIRUB SERPL-MCNC: 0.56 MG/DL (ref 0.2–1)
BILIRUB UR QL STRIP: NEGATIVE
BUN SERPL-MCNC: 31 MG/DL (ref 5–25)
BUN SERPL-MCNC: 32 MG/DL (ref 5–25)
CA-I BLD-SCNC: 2 MMOL/L (ref 1.12–1.32)
CALCIUM SERPL-MCNC: 16.3 MG/DL (ref 8.4–10.2)
CALCIUM SERPL-MCNC: 17 MG/DL (ref 8.4–10.2)
CARDIAC TROPONIN I PNL SERPL HS: 27 NG/L (ref ?–50)
CARDIAC TROPONIN I PNL SERPL HS: 34 NG/L (ref ?–50)
CARDIAC TROPONIN I PNL SERPL HS: 39 NG/L (ref ?–50)
CHLORIDE SERPL-SCNC: 100 MMOL/L (ref 96–108)
CHLORIDE SERPL-SCNC: 102 MMOL/L (ref 96–108)
CLARITY UR: CLEAR
CO2 SERPL-SCNC: 28 MMOL/L (ref 21–32)
CO2 SERPL-SCNC: 29 MMOL/L (ref 21–32)
COLOR UR: COLORLESS
CREAT SERPL-MCNC: 1.5 MG/DL (ref 0.6–1.3)
CREAT SERPL-MCNC: 1.59 MG/DL (ref 0.6–1.3)
EOSINOPHIL # BLD AUTO: 0.08 THOUSAND/ΜL (ref 0–0.61)
EOSINOPHIL NFR BLD AUTO: 1 % (ref 0–6)
ERYTHROCYTE [DISTWIDTH] IN BLOOD BY AUTOMATED COUNT: 14.3 % (ref 11.6–15.1)
GFR SERPL CREATININE-BSD FRML MDRD: 32 ML/MIN/1.73SQ M
GFR SERPL CREATININE-BSD FRML MDRD: 35 ML/MIN/1.73SQ M
GLUCOSE SERPL-MCNC: 123 MG/DL (ref 65–140)
GLUCOSE SERPL-MCNC: 138 MG/DL (ref 65–140)
GLUCOSE SERPL-MCNC: 157 MG/DL (ref 65–140)
GLUCOSE UR STRIP-MCNC: NEGATIVE MG/DL
HCT VFR BLD AUTO: 42.3 % (ref 34.8–46.1)
HGB BLD-MCNC: 13.5 G/DL (ref 11.5–15.4)
HGB UR QL STRIP.AUTO: NEGATIVE
HYALINE CASTS #/AREA URNS LPF: ABNORMAL /LPF
IMM GRANULOCYTES # BLD AUTO: 0.02 THOUSAND/UL (ref 0–0.2)
IMM GRANULOCYTES NFR BLD AUTO: 0 % (ref 0–2)
KETONES UR STRIP-MCNC: NEGATIVE MG/DL
LEUKOCYTE ESTERASE UR QL STRIP: NEGATIVE
LYMPHOCYTES # BLD AUTO: 0.66 THOUSANDS/ΜL (ref 0.6–4.47)
LYMPHOCYTES NFR BLD AUTO: 6 % (ref 14–44)
MAGNESIUM SERPL-MCNC: 1.8 MG/DL (ref 1.9–2.7)
MCH RBC QN AUTO: 26.8 PG (ref 26.8–34.3)
MCHC RBC AUTO-ENTMCNC: 31.9 G/DL (ref 31.4–37.4)
MCV RBC AUTO: 84 FL (ref 82–98)
MONOCYTES # BLD AUTO: 0.77 THOUSAND/ΜL (ref 0.17–1.22)
MONOCYTES NFR BLD AUTO: 7 % (ref 4–12)
NEUTROPHILS # BLD AUTO: 9.22 THOUSANDS/ΜL (ref 1.85–7.62)
NEUTS SEG NFR BLD AUTO: 86 % (ref 43–75)
NITRITE UR QL STRIP: NEGATIVE
NON-SQ EPI CELLS URNS QL MICRO: ABNORMAL /HPF
NRBC BLD AUTO-RTO: 0 /100 WBCS
PH UR STRIP.AUTO: 7 [PH]
PHOSPHATE SERPL-MCNC: 4.8 MG/DL (ref 2.3–4.1)
PLATELET # BLD AUTO: 379 THOUSANDS/UL (ref 149–390)
PLATELET # BLD AUTO: 384 THOUSANDS/UL (ref 149–390)
PMV BLD AUTO: 10.5 FL (ref 8.9–12.7)
PMV BLD AUTO: 10.6 FL (ref 8.9–12.7)
POTASSIUM SERPL-SCNC: 3.8 MMOL/L (ref 3.5–5.3)
POTASSIUM SERPL-SCNC: 4 MMOL/L (ref 3.5–5.3)
PROT SERPL-MCNC: 6.7 G/DL (ref 6.4–8.4)
PROT SERPL-MCNC: 7.5 G/DL (ref 6.4–8.4)
PROT UR STRIP-MCNC: NEGATIVE MG/DL
PTH-INTACT SERPL-MCNC: 3.5 PG/ML (ref 12–88)
RBC # BLD AUTO: 5.03 MILLION/UL (ref 3.81–5.12)
RBC #/AREA URNS AUTO: ABNORMAL /HPF
SODIUM SERPL-SCNC: 138 MMOL/L (ref 135–147)
SODIUM SERPL-SCNC: 140 MMOL/L (ref 135–147)
SP GR UR STRIP.AUTO: 1.01 (ref 1–1.03)
UROBILINOGEN UR STRIP-ACNC: <2 MG/DL
WBC # BLD AUTO: 10.78 THOUSAND/UL (ref 4.31–10.16)
WBC #/AREA URNS AUTO: ABNORMAL /HPF

## 2025-01-02 PROCEDURE — 80048 BASIC METABOLIC PNL TOTAL CA: CPT

## 2025-01-02 PROCEDURE — 84100 ASSAY OF PHOSPHORUS: CPT | Performed by: STUDENT IN AN ORGANIZED HEALTH CARE EDUCATION/TRAINING PROGRAM

## 2025-01-02 PROCEDURE — 85025 COMPLETE CBC W/AUTO DIFF WBC: CPT

## 2025-01-02 PROCEDURE — 99285 EMERGENCY DEPT VISIT HI MDM: CPT | Performed by: EMERGENCY MEDICINE

## 2025-01-02 PROCEDURE — 99285 EMERGENCY DEPT VISIT HI MDM: CPT

## 2025-01-02 PROCEDURE — 83970 ASSAY OF PARATHORMONE: CPT

## 2025-01-02 PROCEDURE — 84484 ASSAY OF TROPONIN QUANT: CPT

## 2025-01-02 PROCEDURE — 83735 ASSAY OF MAGNESIUM: CPT

## 2025-01-02 PROCEDURE — 80053 COMPREHEN METABOLIC PANEL: CPT

## 2025-01-02 PROCEDURE — 82330 ASSAY OF CALCIUM: CPT

## 2025-01-02 PROCEDURE — 36415 COLL VENOUS BLD VENIPUNCTURE: CPT

## 2025-01-02 PROCEDURE — 82652 VIT D 1 25-DIHYDROXY: CPT

## 2025-01-02 PROCEDURE — 85049 AUTOMATED PLATELET COUNT: CPT

## 2025-01-02 PROCEDURE — 93005 ELECTROCARDIOGRAM TRACING: CPT

## 2025-01-02 PROCEDURE — 81001 URINALYSIS AUTO W/SCOPE: CPT

## 2025-01-02 PROCEDURE — 71046 X-RAY EXAM CHEST 2 VIEWS: CPT

## 2025-01-02 PROCEDURE — 82397 CHEMILUMINESCENT ASSAY: CPT

## 2025-01-02 PROCEDURE — 82948 REAGENT STRIP/BLOOD GLUCOSE: CPT

## 2025-01-02 PROCEDURE — NC001 PR NO CHARGE: Performed by: INTERNAL MEDICINE

## 2025-01-02 PROCEDURE — 99222 1ST HOSP IP/OBS MODERATE 55: CPT

## 2025-01-02 PROCEDURE — 82306 VITAMIN D 25 HYDROXY: CPT

## 2025-01-02 PROCEDURE — 80076 HEPATIC FUNCTION PANEL: CPT

## 2025-01-02 PROCEDURE — 96360 HYDRATION IV INFUSION INIT: CPT

## 2025-01-02 PROCEDURE — 70450 CT HEAD/BRAIN W/O DYE: CPT

## 2025-01-02 RX ORDER — ASPIRIN 81 MG/1
81 TABLET, CHEWABLE ORAL DAILY
Status: DISCONTINUED | OUTPATIENT
Start: 2025-01-03 | End: 2025-01-07 | Stop reason: HOSPADM

## 2025-01-02 RX ORDER — FUROSEMIDE 10 MG/ML
40 INJECTION INTRAMUSCULAR; INTRAVENOUS ONCE
Status: COMPLETED | OUTPATIENT
Start: 2025-01-02 | End: 2025-01-02

## 2025-01-02 RX ORDER — AMLODIPINE BESYLATE 5 MG/1
5 TABLET ORAL DAILY
Status: DISCONTINUED | OUTPATIENT
Start: 2025-01-03 | End: 2025-01-07 | Stop reason: HOSPADM

## 2025-01-02 RX ORDER — LOSARTAN POTASSIUM 50 MG/1
100 TABLET ORAL DAILY
Status: DISCONTINUED | OUTPATIENT
Start: 2025-01-03 | End: 2025-01-03

## 2025-01-02 RX ORDER — NITROGLYCERIN 20 MG/100ML
5-200 INJECTION INTRAVENOUS
Status: DISCONTINUED | OUTPATIENT
Start: 2025-01-02 | End: 2025-01-04

## 2025-01-02 RX ORDER — ATORVASTATIN CALCIUM 40 MG/1
40 TABLET, FILM COATED ORAL
Status: DISCONTINUED | OUTPATIENT
Start: 2025-01-03 | End: 2025-01-07 | Stop reason: HOSPADM

## 2025-01-02 RX ORDER — CALCITONIN SALMON 200 [USP'U]/ML
4 INJECTION, SOLUTION INTRAMUSCULAR; SUBCUTANEOUS EVERY 12 HOURS SCHEDULED
Status: COMPLETED | OUTPATIENT
Start: 2025-01-02 | End: 2025-01-02

## 2025-01-02 RX ORDER — CALCITONIN SALMON 200 [USP'U]/ML
4 INJECTION, SOLUTION INTRAMUSCULAR; SUBCUTANEOUS EVERY 12 HOURS SCHEDULED
Status: CANCELLED | OUTPATIENT
Start: 2025-01-02 | End: 2025-01-03

## 2025-01-02 RX ORDER — HEPARIN SODIUM 5000 [USP'U]/ML
5000 INJECTION, SOLUTION INTRAVENOUS; SUBCUTANEOUS EVERY 8 HOURS SCHEDULED
Status: DISCONTINUED | OUTPATIENT
Start: 2025-01-02 | End: 2025-01-07 | Stop reason: HOSPADM

## 2025-01-02 RX ORDER — METOCLOPRAMIDE HYDROCHLORIDE 5 MG/ML
10 INJECTION INTRAMUSCULAR; INTRAVENOUS EVERY 6 HOURS PRN
Status: DISCONTINUED | OUTPATIENT
Start: 2025-01-02 | End: 2025-01-07 | Stop reason: HOSPADM

## 2025-01-02 RX ORDER — ONDANSETRON 2 MG/ML
4 INJECTION INTRAMUSCULAR; INTRAVENOUS EVERY 6 HOURS PRN
Status: DISCONTINUED | OUTPATIENT
Start: 2025-01-02 | End: 2025-01-07 | Stop reason: HOSPADM

## 2025-01-02 RX ADMIN — CALCITONIN SALMON 272 UNITS: 200 INJECTION, SOLUTION INTRAMUSCULAR; SUBCUTANEOUS at 21:00

## 2025-01-02 RX ADMIN — SODIUM CHLORIDE 1000 ML: 0.9 INJECTION, SOLUTION INTRAVENOUS at 11:59

## 2025-01-02 RX ADMIN — HEPARIN SODIUM 5000 UNITS: 5000 INJECTION, SOLUTION INTRAVENOUS; SUBCUTANEOUS at 15:09

## 2025-01-02 RX ADMIN — NITROGLYCERIN 5 MCG/MIN: 20 INJECTION INTRAVENOUS at 15:43

## 2025-01-02 RX ADMIN — ONDANSETRON 4 MG: 2 INJECTION INTRAMUSCULAR; INTRAVENOUS at 15:43

## 2025-01-02 RX ADMIN — FUROSEMIDE 40 MG: 10 INJECTION, SOLUTION INTRAMUSCULAR; INTRAVENOUS at 15:01

## 2025-01-02 RX ADMIN — SODIUM CHLORIDE 1000 ML: 0.9 INJECTION, SOLUTION INTRAVENOUS at 14:25

## 2025-01-02 RX ADMIN — HEPARIN SODIUM 5000 UNITS: 5000 INJECTION, SOLUTION INTRAVENOUS; SUBCUTANEOUS at 21:00

## 2025-01-02 RX ADMIN — CALCITONIN SALMON 272 UNITS: 200 INJECTION, SOLUTION INTRAMUSCULAR; SUBCUTANEOUS at 14:24

## 2025-01-02 RX ADMIN — METOCLOPRAMIDE HYDROCHLORIDE 10 MG: 5 INJECTION INTRAMUSCULAR; INTRAVENOUS at 18:26

## 2025-01-02 NOTE — ASSESSMENT & PLAN NOTE
Noted after recent stroke at end of December 2024.  Was discharged home with PT OT.    Plan:  PT OT, can see patient on 1/3 onwards as patient is rather fatigued today due to hypercalcemia

## 2025-01-02 NOTE — H&P
H&P - Hospitalist   Name: Mayra Echevarria 69 y.o. female I MRN: [id number]  Unit/Bed#: [facility] 726-01 I Date of Admission: 1/2/2025   Date of Service: 1/2/2025 I Hospital Day: 0     Assessment & Plan  Hypercalcemia  Severe, 17 on admission.  Recently hospitalized within past 2 weeks for multifocal strokes, was sent home with outpatient PT OT.  Was oral supplementation of milligram calcium tabs twice daily, but unfortunately was taking 1200 mg twice daily, double instructed dose.    On admission, Phos elevated to 4.8    Prior calcium between 8-9, suggesting an acute hypercalcemic state, likely provoked by calcium hyperalimentation but possibly with other underlying disorder.    Last TSH from 2 weeks ago within normal limits, ruling out thyroid contribution    Plan:  Give second liter bolus of normal saline, will continue giving fluids for total of 3 L given small body habitus  Diurese with 40 mg IV Lasix x 1 given hypertension  Monitor EKG -currently normal sinus rhythm without any QTc issues  Check PTH  Check 25 OH vitamin D  Check 1,25 OH vitamin D  Trend Phos in a.m.  Check PTH rp  Hold off on SPEP, UPEP, serum free light chains pending results of above serologies   Will give calcitonin subcu x 2 doses every 12 hours  Hold off on giving zoledronic acid 4 mg x 1 given recent outpatient Prolia injections.    Endocrinology consult    Hypertensive urgency  Noted on admission.BP initially 191/90, as high as 202/80 on manual.  Diuresing as above and hypercalcemia  CT head from admission Noncon is negative for any new changes/new strokes    Plan:  Continue home antihypertensives  Lasix x 1 for hypocalcemia 40 mg IV  Initiate nitroglycerin drip for target SBP subone 40 given recent stroke and high risk of hemorrhagic conversion  Neurochecks every 2 hours for daytime today, followed by q4 hours thereafter  Senile osteoporosis  On prolia outpatient, last injection Sept 2023    See hypercalcemia plan  Likely will hold off on  calcium at time of discharge, if not reduced oral dosing such as 600 mg daily (vs previous 600 mg BID, as pt taking 1200 mg BID)    Plan:  Hold off on calcium as inpatient   Hold off on zolendronate due to recent prolia in Sept 2023   Essential hypertension  Blood Pressure: (!) 197/115    Patient is awake enough to tolerate oral medications    Plan:  Continue home amlodipine 5 mg p.o. daily  Continue losartan 100 mg p.o. daily  See plan for hypercalcemia regarding diuresis  MATT (obstructive sleep apnea)  BiPAP as needed  History of CVA (cerebrovascular accident)  Recently admitted with multiple small acute infarctions, visualized on 12/27/2024 MRI involving left occipital lobe, bilateral alex, left greater than right, and left cerebellar hemisphere with cytotoxic edema but no hemorrhage.  Also has chronic mild white matter microangiopathic changes in bilateral cerebral hemispheres.    CT head Noncon from this admission is unremarkable for any new strokes or pathologies.    Plan:  Continue aspirin, statin  BP control as noted under hypertensive urgency  Ambulatory dysfunction  Noted after recent stroke at end of December 2024.  Was discharged home with PT OT.    Plan:  PT OT, can see patient on 1/3 onwards as patient is rather fatigued today due to hypercalcemia  CAROLINE (acute kidney injury) (HCC)  Recent Labs     01/02/25  1107   CREATININE 1.50*   EGFR 35     Estimated Creatinine Clearance: 31.6 mL/min (A) (by C-G formula based on SCr of 1.5 mg/dL (H)).    Noted on admission, no hx CKD. Likely from hypercalcemia.    Plan:  IV fluids as per plan for hypercalcemia along w diuresis       VTE Pharmacologic Prophylaxis: VTE Score: 8 High Risk (Score >/= 5) - Pharmacological DVT Prophylaxis Ordered: heparin. Sequential Compression Devices Ordered.  Code Status: Level 1 - Full Code confirmed w pt and family  Discussion with family: Updated  ( and daughter) at bedside.    Anticipated Length of Stay:  Patient will be admitted on an inpatient basis with an anticipated length of stay of greater than 2 midnights secondary to hypercalcemia workup and treatment.    History of Present Illness   Chief Complaint: Lethargy x 24 hours    Mayra Echevarria is a 69 y.o. female with a PMH of HTN, osteoporosis on Prolia, recent multifocal stroke December 2024, who presents from home with lethargy x 24 hours.    Patient typically sleeps about 8 to 9 hours, but yesterday was noted to sleep over 12 hours and remained fatigued throughout the day.  Family was concerned about her new onset fatigue, and although she was answering questions appropriately and still able to eat and drink, they saw emergency evaluation as patient recently had a stroke several days ago on 12/27 for which she was recently discharged.    Patient was discharged to home with PT OT because she did very well on PT exam while in hospital recently for acute multifocal stroke.  She has still been awaiting completion of full workup, including outpatient cardiac monitoring which has not been delivered to the patient's house yet to monitor for any signs of A-fib.  Since discharge, patient was advised to take 600 mg p.o. twice daily calcium tabs given her history of osteoporosis.  She is also on Prolia injections as an outpatient.  Last documented injection was in September 2024.  However, she was taking twice the instructed dose of calcium upon discharge, and that patient took 1200 mg twice daily    Looking at her past blood work from November and December 24, patient has consistently had a normal calcium level between 8-9.  Her albumin is normal at 4.0.    ED course: Patient was confirmed to have hypercalcemia with iCal of 2.00.  She was given 1 L normal saline bolus.    Review of Systems   Constitutional:  Positive for fatigue (increased sleepiness). Negative for chills and fever.   Respiratory:  Negative for cough, chest tightness and shortness of breath.     Cardiovascular:  Negative for chest pain and palpitations.   Gastrointestinal:  Negative for diarrhea, nausea and vomiting.   Skin:  Negative for rash.   Neurological:  Negative for weakness, light-headedness and numbness.       Historical Information   Past Medical History:   Diagnosis Date    Allergic years ago    Iodine    Chronic leukopenia     Depression 04/22/2014    Hypercalcemia     Hyperlipidemia     Hypertension     labile    Hypertension     Migraines     Osteoarthritis     Osteoporosis     Vertigo      Past Surgical History:   Procedure Laterality Date    EYE SURGERY      TOOTH EXTRACTION      TUBAL LIGATION       Social History     Tobacco Use    Smoking status: Never     Passive exposure: Never    Smokeless tobacco: Never   Vaping Use    Vaping status: Never Used   Substance and Sexual Activity    Alcohol use: No    Drug use: No    Sexual activity: Yes     Partners: Male     Birth control/protection: Female Sterilization     E-Cigarette/Vaping    E-Cigarette Use Never User      E-Cigarette/Vaping Substances    Nicotine No     THC No     CBD No     Flavoring No     Other No     Unknown No      Family history non-contributory  Social History:  Marital Status: /Civil Union   Occupation: retired  Patient Pre-hospital Living Situation: Home  Patient Pre-hospital Level of Mobility: walks  Patient Pre-hospital Diet Restrictions: none    Meds/Allergies   I have reviewed home medications with patient family member.  Prior to Admission medications    Medication Sig Start Date End Date Taking? Authorizing Provider   amLODIPine (NORVASC) 5 mg tablet TAKE 1 TABLET DAILY 8/12/24  Yes Namita Melendez DO   aspirin 81 mg chewable tablet Chew 1 tablet (81 mg total) daily 12/31/24  Yes Peter Vila MD   atorvastatin (LIPITOR) 40 mg tablet Take 1 tablet (40 mg total) by mouth daily with dinner 12/30/24  Yes Peter Vila MD   calcium carbonate (Calcium 600) 600 MG tablet Take 600 mg by mouth 2 (two)  times a day with meals   Yes Historical Provider, MD   Lactobacillus (Acidophilus Probiotic) CAPS Take by mouth   Yes Historical Provider, MD   losartan (COZAAR) 100 MG tablet TAKE 1 TABLET DAILY 7/15/24  Yes Namita Melendez DO   multivitamin (THERAGRAN) TABS Take 1 tablet by mouth daily   Yes Historical Provider, MD   TURMERIC CURCUMIN PO Take by mouth   Yes Historical Provider, MD   meclizine (ANTIVERT) 25 mg tablet Take 1 tablet (25 mg total) by mouth 3 (three) times a day as needed for dizziness for up to 10 days 3/24/24 12/26/24  Namita Melendez DO   nortriptyline (PAMELOR) 10 mg capsule Take 3 capsules (30 mg total) by mouth daily at bedtime  Patient not taking: Reported on 1/2/2025 12/30/24   Peter Vila MD     Allergies   Allergen Reactions    Iodine - Food Allergy Rash       Objective :  Temp:  [97.3 °F (36.3 °C)-98.3 °F (36.8 °C)] 98.3 °F (36.8 °C)  HR:  [72-92] 86  BP: (154-211)/() 197/115  Resp:  [14-20] 20  SpO2:  [91 %-96 %] 91 %  O2 Device: None (Room air)  Nasal Cannula O2 Flow Rate (L/min):  [2 L/min] 2 L/min    Physical Exam  Vitals reviewed.   Constitutional:       General: She is not in acute distress.     Appearance: She is ill-appearing. She is not diaphoretic.      Comments: Appears fatigued, but is able to partake in conversation speaking full sentences   HENT:      Mouth/Throat:      Mouth: Mucous membranes are moist.      Pharynx: Oropharynx is clear.   Eyes:      General: No scleral icterus.     Extraocular Movements: Extraocular movements intact.      Pupils: Pupils are equal, round, and reactive to light.      Comments: Pupils symmetric 3 mm bilaterally   Cardiovascular:      Rate and Rhythm: Normal rate and regular rhythm.      Heart sounds: No murmur heard.     No friction rub. No gallop.   Pulmonary:      Effort: Pulmonary effort is normal. No respiratory distress.      Breath sounds: No wheezing or rales.   Abdominal:      General: There is no distension.       Palpations: Abdomen is soft.      Tenderness: There is no abdominal tenderness. There is no guarding.   Musculoskeletal:      Right lower leg: No edema.      Left lower leg: No edema.      Comments:  strength, biceps flexion, triceps extension 5 out of 5 bilaterally and symmetric.  Dorsi, plantarflexion 5 out of 5 bilaterally.  5 out of 5 left hip flexion, 4+ out of 5 right hip flexion.   Skin:     General: Skin is warm and dry.   Neurological:      Mental Status: She is alert and oriented to person, place, and time.      Comments: No dysarthria, no pronator drift   Psychiatric:         Mood and Affect: Mood normal.         Behavior: Behavior normal.         Thought Content: Thought content normal.          Lines/Drains: none            Lab Results: I have reviewed the following results:  Results from last 7 days   Lab Units 01/02/25  1107   WBC Thousand/uL 10.78*   HEMOGLOBIN g/dL 13.5   HEMATOCRIT % 42.3   PLATELETS Thousands/uL 379   SEGS PCT % 86*   LYMPHO PCT % 6*   MONO PCT % 7   EOS PCT % 1     Results from last 7 days   Lab Units 01/02/25  1154 01/02/25  1107   SODIUM mmol/L  --  138   POTASSIUM mmol/L  --  3.8   CHLORIDE mmol/L  --  102   CO2 mmol/L  --  28   BUN mg/dL  --  32*   CREATININE mg/dL  --  1.50*   ANION GAP mmol/L  --  8   CALCIUM mg/dL  --  17.0*   ALBUMIN g/dL 4.0  --    TOTAL BILIRUBIN mg/dL 0.46  --    ALK PHOS U/L 28*  --    ALT U/L 26  --    AST U/L 30  --    GLUCOSE RANDOM mg/dL  --  138         Results from last 7 days   Lab Units 01/02/25  1504 12/27/24  1633   POC GLUCOSE mg/dl 123 97     Lab Results   Component Value Date    HGBA1C 6.1 (H) 12/27/2024           Imaging Results Review: I reviewed radiology reports from this admission including: chest xray and CT chest.  Other Study Results Review: EKG was reviewed.  EKG was personally reviewed and my interpretation is: NSR. HR 80s, normal QtC..    Administrative Statements   I have spent a total time of 45 minutes in caring for  this patient on the day of the visit/encounter including Diagnostic results, Impressions, Counseling / Coordination of care, Documenting in the medical record, Reviewing / ordering tests, medicine, procedures  , Obtaining or reviewing history  , and Communicating with other healthcare professionals .    ** Please Note: This note has been constructed using a voice recognition system. **      Laurel Fairchild MD  [facility] Internal Medicine ([facility])  [facility]

## 2025-01-02 NOTE — ED ATTENDING ATTESTATION
"I, Kimani Harrison MD, saw and evaluated the patient. I have discussed the patient with the resident and agree with the resident's findings, Plan of Care, and MDM as documented in the resident's note, except where noted. All available labs and Radiology studies were reviewed.  I was present for key portions of any procedure(s) performed by the resident and I was immediately available to provide assistance.    At this point I agree with the current assessment done in the Emergency Department.  I have conducted an independent evaluation of this patient a history and physical is as follows:    68 yo female with a history of hyperlipidemia, HTN, migraine headaches, depression, osteoporosis, prior CVA, and BPPV brought to the ED by family for evaluation of a mental status change.  says that the patient was in her usual state of health last night when she went to bed but he had marked difficulty getting her out of bed this morning. She said she was \"too tired\" and didn't want to get up. She eventually got up to use the bathroom but has been complaining of severe fatigue since. The patient otherwise has no specific complaints. She denies chest pain, shortness of breath, nausea, vomiting, and diaphoresis. She was diagnosed with a CVA last week --> no residual deficits. No facial droop, focal weakness, numbness, or speech difficulty. She denies fevers and chills. She claims to be compliant with all medications.    ROS: per resident physician note    Gen: NAD, AA&Ox3  HEENT: PERRL, EOMI, (+) dry mm  Neck: supple  CV: RRR  Lungs: CTA B/L  Abdomen: soft, NT/ND  Ext: no swelling or deformity  Neuro: 5/5 strength all extremities, sensation grossly intact  Skin: no rash    ED Course  The patient is well appearing with stable vital signs and a benign physical examination. Unclear etiology of severe fatigue. Dehydration vs electrolyte disturbance vs CVA vs ACS vs infectious process? Will check EKG, CXR, basic labs, " troponin, UA, and CT head. IVFs initiated, will continue to monitor in the ED. Disposition per workup and reassessment.      Critical Care Time  Procedures

## 2025-01-02 NOTE — ASSESSMENT & PLAN NOTE
Recently admitted with multiple small acute infarctions, visualized on 12/27/2024 MRI involving left occipital lobe, bilateral alex, left greater than right, and left cerebellar hemisphere with cytotoxic edema but no hemorrhage.  Also has chronic mild white matter microangiopathic changes in bilateral cerebral hemispheres.    CT head Noncon from this admission is unremarkable for any new strokes or pathologies.    Plan:  Continue aspirin, statin  BP control as noted under hypertensive urgency

## 2025-01-02 NOTE — ASSESSMENT & PLAN NOTE
On prolia outpatient, last injection Sept 2023    See hypercalcemia plan  Likely will hold off on calcium at time of discharge, if not reduced oral dosing such as 600 mg daily (vs previous 600 mg BID, as pt taking 1200 mg BID)    Plan:  Hold off on calcium as inpatient   Hold off on zolendronate due to recent prolia in Sept 2023

## 2025-01-02 NOTE — ASSESSMENT & PLAN NOTE
Blood Pressure: (!) 197/115    Patient is awake enough to tolerate oral medications    Plan:  Continue home amlodipine 5 mg p.o. daily  Continue losartan 100 mg p.o. daily  See plan for hypercalcemia regarding diuresis

## 2025-01-02 NOTE — ED PROVIDER NOTES
Rapid response called for altered mental status.  On my arrival, patient with intact airway.  No immediate airway interventions indicated.     Segundo Curran MD  01/02/25 8253

## 2025-01-02 NOTE — ASSESSMENT & PLAN NOTE
Severe, 17 on admission.  Recently hospitalized within past 2 weeks for multifocal strokes, was sent home with outpatient PT OT.  Was oral supplementation of milligram calcium tabs twice daily, but unfortunately was taking 1200 mg twice daily, double instructed dose.    On admission, Phos elevated to 4.8    Prior calcium between 8-9, suggesting an acute hypercalcemic state, likely provoked by calcium hyperalimentation but possibly with other underlying disorder.    Last TSH from 2 weeks ago within normal limits, ruling out thyroid contribution    Plan:  Give second liter bolus of normal saline, will continue giving fluids for total of 3 L given small body habitus  Diurese with 40 mg IV Lasix x 1 given hypertension  Monitor EKG -currently normal sinus rhythm without any QTc issues  Check PTH  Check 25 OH vitamin D  Check 1,25 OH vitamin D  Trend Phos in a.m.  Check PTH rp  Hold off on SPEP, UPEP, serum free light chains pending results of above serologies   Will give calcitonin subcu x 2 doses every 12 hours  Hold off on giving zoledronic acid 4 mg x 1 given recent outpatient Prolia injections.    Endocrinology consult

## 2025-01-02 NOTE — ASSESSMENT & PLAN NOTE
Recent Labs     01/02/25  1107   CREATININE 1.50*   EGFR 35     Estimated Creatinine Clearance: 31.6 mL/min (A) (by C-G formula based on SCr of 1.5 mg/dL (H)).    Noted on admission, no hx CKD. Likely from hypercalcemia.    Plan:  IV fluids as per plan for hypercalcemia along w diuresis

## 2025-01-02 NOTE — ED PROVIDER NOTES
Time reflects when diagnosis was documented in both MDM as applicable and the Disposition within this note       Time User Action Codes Description Comment    1/2/2025  1:14 PM Mee Christopher Add [E83.52] Hypercalcemia     1/2/2025  1:22 PM Mee Christopher Add [I63.9] CVA (cerebral vascular accident) (HCC)           ED Disposition       ED Disposition   Admit    Condition   Stable    Date/Time   u Jan 2, 2025  1:22 PM    Comment                  Assessment & Plan       Medical Decision Making  Patient presents for evaluation of fatigue.  On exam she is alert but tired.  Vitals are stable.  Differential includes ICH, dehydration, electrolyte abnormality, viral syndrome.  Will order EKG, CBC, BMP, troponin, CT head.    No ischemic changes or arrhythmias on EKG.  Calcium is elevated to 17.  Ionized calcium was ordered to confirm levels and it was 2.  She was given fluids.  Patient also has an CAROLINE. Discussed results with patient and her . CT head is stable.  She was admitted to medicine for further management.     Amount and/or Complexity of Data Reviewed  Labs: ordered. Decision-making details documented in ED Course.  Radiology: ordered.    Risk  Decision regarding hospitalization.        ED Course as of 01/03/25 1442   Thu Jan 02, 2025   1151 Calcium(!!): 17.0       Medications   sodium chloride 0.9 % bolus 1,000 mL (0 mL Intravenous Stopped 1/2/25 1416)   sodium chloride 0.9 % bolus 1,000 mL (1,000 mL Intravenous New Bag 1/2/25 1425)   calcitonin (salmon) (MIACALCIN) injection 272 Units (272 Units Subcutaneous Given 1/2/25 1424)       ED Risk Strat Scores                                              History of Present Illness       Chief Complaint   Patient presents with    Altered Mental Status     Pt was recently admitted for stroke, woke up this morning and was more confused than when she left the hospital. Pt woke up around 0900. Family endorses lethargy and generalized weakness        Past Medical History:    Diagnosis Date    Allergic years ago    Iodine    Chronic leukopenia     Depression 04/22/2014    Hypercalcemia     Hyperlipidemia     Hypertension     labile    Hypertension     Migraines     Osteoarthritis     Osteoporosis     Vertigo       Past Surgical History:   Procedure Laterality Date    EYE SURGERY      TOOTH EXTRACTION      TUBAL LIGATION        Family History   Problem Relation Age of Onset    Colon cancer Mother 51    Breast cancer Mother 53    Cancer Mother         two primary sites  breast and colon    Heart attack Father     Prostate cancer Father 70    Hypertension Father     Heart attack Sister     No Known Problems Sister     No Known Problems Daughter     No Known Problems Daughter     No Known Problems Maternal Grandmother     No Known Problems Maternal Grandfather     No Known Problems Paternal Grandmother     No Known Problems Paternal Grandfather     Stroke Brother     No Known Problems Son     No Known Problems Maternal Aunt     No Known Problems Maternal Aunt     No Known Problems Maternal Aunt       Social History     Tobacco Use    Smoking status: Never     Passive exposure: Never    Smokeless tobacco: Never   Vaping Use    Vaping status: Never Used   Substance Use Topics    Alcohol use: No    Drug use: No      E-Cigarette/Vaping    E-Cigarette Use Never User       E-Cigarette/Vaping Substances    Nicotine No     THC No     CBD No     Flavoring No     Other No     Unknown No       I have reviewed and agree with the history as documented.     HPI    Patient is 69-year-old female with history of a stroke who presents with fatigue.  Patient's  is at bedside helping to provide history.  He states that she went to bed around 6 PM last night and when he went to wake her up he had some difficulty because she said she was just too tired. She did get up around 6am this morning to go to the bathroom and felt normal at that time. She states she feels very fatigued. She denies other symptoms.   She was diagnosed with a stroke last week but she presented with garbled speech and right sided weakness at that time.  She has no residual deficits from her stroke.  She is on aspirin.  She was recently told to start taking calcium and has been taking 600mg two time daily.  She felt well yesterday before she went to bed.  She has been eating and drinking normally.  She denies fevers, chest pain, shortness of breath, cough congestion.    Review of Systems   Constitutional:  Negative for chills and fever.   HENT:  Negative for congestion and sore throat.    Respiratory:  Negative for cough and shortness of breath.    Cardiovascular:  Negative for chest pain and palpitations.   Gastrointestinal:  Negative for abdominal pain and vomiting.   Genitourinary:  Negative for dysuria and hematuria.   Musculoskeletal:  Negative for back pain and neck pain.   Neurological:  Negative for syncope and headaches.   All other systems reviewed and are negative.          Objective       ED Triage Vitals   Temperature Pulse Blood Pressure Respirations SpO2 Patient Position - Orthostatic VS   01/02/25 1022 01/02/25 1022 01/02/25 1022 01/02/25 1022 01/02/25 1022 01/02/25 1453   (!) 97.3 °F (36.3 °C) 92 (!) 191/90 16 95 % Lying      Temp Source Heart Rate Source BP Location FiO2 (%) Pain Score    01/02/25 1453 -- 01/02/25 1453 -- 01/02/25 1513    Oral  Right arm  No Pain      Vitals      Date and Time Temp Pulse SpO2 Resp BP Pain Score FACES Pain Rating User   01/03/25 1436 -- 86 90 % -- 156/77 -- -- DII   01/03/25 1312 -- 85 92 % -- 152/83 -- -- DII   01/03/25 1157 -- 79 90 % -- 161/76 -- -- DII   01/03/25 0946 -- 87 91 % -- 152/77 -- -- DII   01/03/25 0900 -- -- -- -- -- No Pain -- AMD   01/03/25 0757 98.8 °F (37.1 °C) 80 92 % -- 164/76 -- -- DII   01/03/25 0735 -- 82 91 % -- 154/73 -- -- DII   01/03/25 0425 -- 68 92 % -- 143/67 -- -- RS   01/03/25 0420 -- 68 91 % -- 135/64 -- -- RS   01/03/25 0415 -- 68 91 % -- 143/61 -- -- RS    01/03/25 0410 -- 69 90 % -- 153/70 -- -- RS   01/03/25 0405 -- 71 90 % -- 148/72 -- -- RS   01/03/25 0325 -- 71 91 % -- 144/60 -- -- RS   01/03/25 0320 -- 73 89 % -- 141/59 -- -- RS   01/03/25 0315 -- 89 91 % -- 126/65 -- -- RS   01/03/25 0310 -- 72 89 % -- 134/62 -- -- RS   01/03/25 0305 -- 74 82 % -- 139/63 -- -- RS   01/03/25 0300 -- 70 84 % -- 136/62 -- -- RS   01/03/25 0255 -- 70 83 % -- 135/58 -- -- RS   01/03/25 0250 -- 69 84 % -- 141/56 -- -- RS   01/03/25 0245 -- 68 84 % -- 138/57 -- -- RS   01/03/25 0240 -- 69 83 % -- 132/57 -- -- RS   01/03/25 0235 -- 69 83 % -- 133/55 -- -- RS   01/03/25 0230 -- 69 84 % -- 136/58 -- -- RS   01/03/25 0225 -- 68 84 % -- 140/58 -- -- RS   01/03/25 0220 -- 68 83 % -- 148/60 -- -- RS   01/03/25 0215 -- 69 86 % -- 141/63 -- -- RS   01/03/25 0210 -- 69 85 % -- 136/64 -- -- RS   01/03/25 0205 -- 80 90 % -- 125/50 -- -- RS   01/03/25 0200 -- 66 88 % -- -- -- -- RS   01/03/25 0155 -- 66 84 % -- -- -- -- RS   01/03/25 0150 -- 66 83 % -- -- -- -- RS   01/03/25 0145 -- 68 88 % -- -- -- -- RS   01/03/25 0140 -- 75 90 % -- -- -- -- RS   01/03/25 0135 -- 85 89 % -- 144/68 -- -- RS   01/03/25 0130 -- 76 90 % -- -- -- -- RS   01/03/25 0125 -- 82 89 % -- 140/62 -- -- RS   01/03/25 0120 -- 85 89 % -- -- -- -- RS   01/03/25 0115 -- 79 89 % -- -- -- -- RS   01/03/25 0110 -- 95 90 % -- 150/77 -- -- RS   01/03/25 0105 -- 72 92 % -- 145/65 -- -- RS   01/03/25 0100 -- 72 91 % -- 139/59 -- -- RS   01/03/25 0052 -- 94 90 % -- 131/63 -- -- DII   01/03/25 0050 -- 89 87 % -- 131/63 -- -- RS   01/03/25 0045 -- 87 89 % -- -- -- -- RS   01/03/25 0040 -- 90 89 % -- -- -- -- RS   01/03/25 0035 -- 97 86 % -- -- -- -- RS   01/03/25 0030 -- 85 87 % -- 155/72 -- -- RS   01/03/25 0025 -- 107 92 % -- -- -- -- RS   01/03/25 0020 -- 84 90 % -- -- -- -- RS   01/03/25 0015 -- 88 89 % -- -- -- -- RS   01/03/25 0010 -- 90 86 % -- -- -- -- RS   01/03/25 0005 -- 91 89 % -- -- -- -- RS   01/03/25 0000 -- 69 90 % --  135/59 -- -- RS   01/02/25 2330 -- 67 89 % -- 130/54 -- -- RS   01/02/25 2300 -- 72 88 % -- 139/62 -- -- RS   01/02/25 2230 -- 90 87 % -- 145/78 -- -- RS   01/02/25 2226 -- -- -- 19 -- -- -- SK   01/02/25 2226 98.6 °F (37 °C) 90 90 % -- 146/76 -- -- DII   01/02/25 2211 -- 95 88 % -- 168/81 -- -- DII   01/02/25 2203 -- 96 90 % -- 168/81 -- -- DII   01/02/25 2100 -- 102 93 % -- 171/85 -- -- DII   01/02/25 2027 -- 96 95 % -- 176/83 -- -- DII   01/02/25 2000 -- -- -- -- -- No Pain -- RS   01/02/25 1949 -- -- -- -- 154/83 -- -- DII   01/02/25 1930 -- -- -- -- 148/78 -- -- NMM   01/02/25 1915 -- -- -- -- 149/73 -- -- NMM   01/02/25 1914 -- -- -- -- 149/73 -- -- DII   01/02/25 1900 -- -- -- -- 167/72 -- -- NMM   01/02/25 1845 -- -- -- -- 163/83 -- -- NMM   01/02/25 1830 -- -- -- -- 143/89 -- -- NMM   01/02/25 1823 -- -- -- -- 143/89 -- -- DII   01/02/25 1818 -- 97 -- -- 191/100 -- -- NMM   01/02/25 1727 -- -- -- -- 144/73 -- -- DII   01/02/25 1707 -- -- -- -- 147/70 -- -- DII   01/02/25 1645 -- -- -- -- 177/75 -- -- NM   01/02/25 1630 -- -- -- -- 176/76 -- -- NM   01/02/25 1543 -- 86 -- -- -- -- -- NM   01/02/25 1530 -- -- -- -- 197/115 -- -- NM   01/02/25 1515 -- -- -- -- 184/83 -- -- NM   01/02/25 1513 -- -- 91 % -- -- No Pain -- NM   01/02/25 1504 -- -- -- -- 154/99 -- -- NM   01/02/25 1500 -- -- -- -- 211/92 -- -- NM   01/02/25 1455 -- -- -- -- 202/80 -- -- NM   01/02/25 1453 -- 88 -- -- -- -- -- [facility]   01/02/25 1453 98.3 °F (36.8 °C) -- -- 20 211/92 -- -- DII   01/02/25 1415 -- 72 96 % 14 -- -- -- J[place]   01/02/25 1045 -- 80 96 % 15 177/95 -- -- JK   01/02/25 1022 97.3 °F (36.3 °C) 92 95 % 16 191/90 -- -- [place]            Physical Exam  Vitals and nursing note reviewed.   HENT:      Head: Normocephalic and atraumatic.      Nose: No congestion or rhinorrhea.      Mouth/Throat:      Mouth: Mucous membranes are dry.   Eyes:      Extraocular Movements: Extraocular movements intact.   Cardiovascular:      Rate and  Rhythm: Normal rate and regular rhythm.   Pulmonary:      Effort: Pulmonary effort is normal.      Breath sounds: No wheezing or rhonchi.   Abdominal:      Palpations: Abdomen is soft.      Tenderness: There is no abdominal tenderness.   Musculoskeletal:         General: Normal range of motion.      Cervical back: Normal range of motion.      Right lower leg: No edema.      Left lower leg: No edema.   Skin:     General: Skin is warm and dry.      Capillary Refill: Capillary refill takes less than 2 seconds.   Neurological:      Mental Status: She is alert and oriented to person, place, and time.      Sensory: No sensory deficit.      Motor: No weakness.         Results Reviewed       Procedure Component Value Units Date/Time    Protein electrophoresis, serum [[id number]]  (Abnormal) Collected: 01/03/25 0505    Lab Status: Final result Specimen: Blood from Arm, Right Updated: 01/03/25 1353     A/G Ratio 1.20     Albumin Electrophoresis 54.6 %      Albumin CONC 3.77 g/dl      Alpha 1 6.1 %      ALPHA 1 CONC 0.42 g/dL      Alpha 2 13.7 %      ALPHA 2 CONC 0.95 g/dL      Beta-1 7.8 %      BETA 1 CONC 0.54 g/dL      Beta-2 7.4 %      BETA 2 CONC 0.51 g/dL      Gamma Globulin 10.4 %      GAMMA CONC 0.72 g/dL      Total Protein 6.9 g/dL      SPEP Interpretation See Comment    Narrative:      The SPEP shows an abnormal distribution in the gamma region. Immunofixation to be performed. Reviewed by:  Avani Lara MD  **Electronic Signature**    Vitamin D 1,25 dihydroxy [[id number]]  (Normal) Collected: 01/02/25 1354    Lab Status: Final result Specimen: Blood from Arm, Right Updated: 01/03/25 1018     Vitamin D 1, 25 Dihydroxy 16.7 pg/mL     Immunoglobulin free LT chains blood [[id number]] Collected: 01/03/25 0505    Lab Status: In process Specimen: Blood from Arm, Right Updated: 01/03/25 0522    Platelet count [[id number]]  (Normal) Collected: 01/02/25 1913    Lab Status: Final result Specimen: Blood from Arm, Right Updated: 01/02/25  1923     Platelets 384 Thousands/uL      MPV 10.5 fL     HS Troponin I 4hr [[id number]]  (Normal) Collected: 01/02/25 1649    Lab Status: Final result Specimen: Blood from Arm, Right Updated: 01/02/25 1724     hs TnI 4hr 39 ng/L      Delta 4hr hsTnI 12 ng/L     Urinalysis with microscopic [[id number]]  (Abnormal) Collected: 01/02/25 1616    Lab Status: Final result Specimen: Urine, Clean Catch Updated: 01/02/25 1718     Color, UA Colorless     Clarity, UA Clear     Specific Gravity, UA 1.007     pH, UA 7.0     Leukocytes, UA Negative     Nitrite, UA Negative     Protein, UA Negative mg/dl      Glucose, UA Negative mg/dl      Ketones, UA Negative mg/dl      Urobilinogen, UA <2.0 mg/dl      Bilirubin, UA Negative     Occult Blood, UA Negative     RBC, UA 1-2 /hpf      WBC, UA 1-2 /hpf      Epithelial Cells None Seen /hpf      Bacteria, UA None Seen /hpf      Hyaline Casts, UA 0-3 /lpf     Phosphorus [[id number]]  (Abnormal) Collected: 01/02/25 1107    Lab Status: Final result Specimen: Blood from Arm, Left Updated: 01/02/25 1442     Phosphorus 4.8 mg/dL     Vitamin D 25 hydroxy [[id number]]  (Normal) Collected: 01/02/25 1154    Lab Status: Final result Specimen: Blood from Line, Venous Updated: 01/02/25 1433     Vit D, 25-Hydroxy 54.2 ng/mL     PTH, intact [[id number]]  (Abnormal) Collected: 01/02/25 1154    Lab Status: Final result Specimen: Blood from Line, Venous Updated: 01/02/25 1433     PTH 3.5 pg/mL     Hepatic function panel [[id number]]  (Abnormal) Collected: 01/02/25 1154    Lab Status: Final result Specimen: Blood from Line, Venous Updated: 01/02/25 1433     Total Bilirubin 0.46 mg/dL      Bilirubin, Direct 0.16 mg/dL      Alkaline Phosphatase 28 U/L      AST 30 U/L      ALT 26 U/L      Total Protein 6.7 g/dL      Albumin 4.0 g/dL     HS Troponin I 2hr [[id number]]  (Normal) Collected: 01/02/25 1354    Lab Status: Final result Specimen: Blood from Arm, Right Updated: 01/02/25 1428     hs TnI 2hr 34 ng/L      Delta  2hr hsTnI 7 ng/L     PTH-related peptide [[id number]] Collected: 01/02/25 1354    Lab Status: In process Specimen: Blood from Arm, Right Updated: 01/02/25 1359    Magnesium [[id number]]  (Abnormal) Collected: 01/02/25 1154    Lab Status: Final result Specimen: Blood from Line, Venous Updated: 01/02/25 1227     Magnesium 1.8 mg/dL     Calcium, ionized [[id number]]  (Abnormal) Collected: 01/02/25 1154    Lab Status: Final result Specimen: Blood from Arm, Left Updated: 01/02/25 1215     Calcium, Ionized 2.00 mmol/L     Basic metabolic panel [[id number]]  (Abnormal) Collected: 01/02/25 1107    Lab Status: Final result Specimen: Blood from Arm, Left Updated: 01/02/25 1146     Sodium 138 mmol/L      Potassium 3.8 mmol/L      Chloride 102 mmol/L      CO2 28 mmol/L      ANION GAP 8 mmol/L      BUN 32 mg/dL      Creatinine 1.50 mg/dL      Glucose 138 mg/dL      Calcium 17.0 mg/dL      eGFR 35 ml/min/1.73sq m     Narrative:      [employer] guidelines for Chronic Kidney Disease (CKD):     Stage 1 with normal or high GFR (GFR > 90 mL/min/1.73 square meters)    Stage 2 Mild CKD (GFR = 60-89 mL/min/1.73 square meters)    Stage 3A Moderate CKD (GFR = 45-59 mL/min/1.73 square meters)    Stage 3B Moderate CKD (GFR = 30-44 mL/min/1.73 square meters)    Stage 4 Severe CKD (GFR = 15-29 mL/min/1.73 square meters)    Stage 5 End Stage CKD (GFR <15 mL/min/1.73 square meters)  Note: GFR calculation is accurate only with a steady state creatinine    HS Troponin 0hr (reflex protocol) [[id number]]  (Normal) Collected: 01/02/25 1107    Lab Status: Final result Specimen: Blood from Arm, Left Updated: 01/02/25 1141     hs TnI 0hr 27 ng/L     CBC and differential [[id number]]  (Abnormal) Collected: 01/02/25 1107    Lab Status: Final result Specimen: Blood from Arm, Left Updated: 01/02/25 1115     WBC 10.78 Thousand/uL      RBC 5.03 Million/uL      Hemoglobin 13.5 g/dL      Hematocrit 42.3 %      MCV 84 fL      MCH 26.8 pg       MCHC 31.9 g/dL      RDW 14.3 %      MPV 10.6 fL      Platelets 379 Thousands/uL      nRBC 0 /100 WBCs      Segmented % 86 %      Immature Grans % 0 %      Lymphocytes % 6 %      Monocytes % 7 %      Eosinophils Relative 1 %      Basophils Relative 0 %      Absolute Neutrophils 9.22 Thousands/µL      Absolute Immature Grans 0.02 Thousand/uL      Absolute Lymphocytes 0.66 Thousands/µL      Absolute Monocytes 0.77 Thousand/µL      Eosinophils Absolute 0.08 Thousand/µL      Basophils Absolute 0.03 Thousands/µL             CT head wo contrast   Final Interpretation by Juve Villegas MD (01/03 0647)      Evolving left pontine recent infarct, without associated hemorrhage.      Additional small recent infarcts identified on brain MRI from 12/27/2024 is not visualized on this examination. Old lacunar infarcts involving the bilateral similar hemispheres.      Mild chronic white matter microangiopathic changes.                  Workstation performed: [id number]         CT head wo contrast   Final Interpretation by Solomon Santos MD (01/02 1239)      The previously seen multiple small acute infarctions on the preceding brain MRI are largely occult on this CT with exception of ill-defined hypoattenuation in the left hemipons which likely corresponds to at least some of the recent ischemia. No    territorial infarction. No intracranial hemorrhage.                  Workstation performed: [id number]         XR chest pa and lateral   Final Interpretation by Darinel Feliz DO (01/02 1859)      No acute cardiopulmonary disease on this examination, which is somewhat limited secondary to low lung volumes.            Resident: Angel Fallon I, the attending radiologist, have reviewed the images and agree with the final report above.      Workstation performed: [id number]             ECG 12 Lead Documentation Only    Date/Time: 1/2/2025 11:15 AM    Performed by: Mee Christopher MD  Authorized by: Mee Christopher MD    Patient  location:  ED  Interpretation:     Interpretation: normal    Rate:     ECG rate:  89    ECG rate assessment: normal    Rhythm:     Rhythm: sinus rhythm    Ectopy:     Ectopy: none    QRS:     QRS axis:  Normal    QRS intervals:  Normal  Conduction:     Conduction: normal    ST segments:     ST segments:  Normal  T waves:     T waves: normal        ED Medication and Procedure Management   Prior to Admission Medications   Prescriptions Last Dose Informant Patient Reported? Taking?   Lactobacillus (Acidophilus Probiotic) CAPS 1/2/2025  Yes Yes   Sig: Take by mouth   TURMERIC CURCUMIN PO 1/2/2025 Self Yes Yes   Sig: Take by mouth   amLODIPine (NORVASC) 5 mg tablet 1/2/2025  No Yes   Sig: TAKE 1 TABLET DAILY   aspirin 81 mg chewable tablet 1/2/2025  No Yes   Sig: Chew 1 tablet (81 mg total) daily   atorvastatin (LIPITOR) 40 mg tablet 1/2/2025  No Yes   Sig: Take 1 tablet (40 mg total) by mouth daily with dinner   calcium carbonate (Calcium 600) 600 MG tablet 1/2/2025  Yes Yes   Sig: Take 600 mg by mouth 2 (two) times a day with meals   losartan (COZAAR) 100 MG tablet 1/2/2025  No Yes   Sig: TAKE 1 TABLET DAILY   meclizine (ANTIVERT) 25 mg tablet   No No   Sig: Take 1 tablet (25 mg total) by mouth 3 (three) times a day as needed for dizziness for up to 10 days   multivitamin (THERAGRAN) TABS 1/2/2025 Self Yes Yes   Sig: Take 1 tablet by mouth daily   nortriptyline (PAMELOR) 10 mg capsule Not Taking  No No   Sig: Take 3 capsules (30 mg total) by mouth daily at bedtime   Patient not taking: Reported on 1/2/2025      Facility-Administered Medications Last Administration Doses Remaining   denosumab (PROLIA) subcutaneous injection 60 mg None recorded 1   denosumab (PROLIA) subcutaneous injection 60 mg None recorded 1        Current Discharge Medication List        CONTINUE these medications which have NOT CHANGED    Details   amLODIPine (NORVASC) 5 mg tablet TAKE 1 TABLET DAILY  Qty: 90 tablet, Refills: 1    Associated  Diagnoses: Essential hypertension      aspirin 81 mg chewable tablet Chew 1 tablet (81 mg total) daily  Qty: 30 tablet, Refills: 5    Associated Diagnoses: Acute CVA (cerebrovascular accident) (Formerly [facility])      atorvastatin (LIPITOR) 40 mg tablet Take 1 tablet (40 mg total) by mouth daily with dinner  Qty: 30 tablet, Refills: 5    Associated Diagnoses: Acute CVA (cerebrovascular accident) (HCC)      calcium carbonate (Calcium 600) 600 MG tablet Take 600 mg by mouth 2 (two) times a day with meals      Lactobacillus (Acidophilus Probiotic) CAPS Take by mouth      losartan (COZAAR) 100 MG tablet TAKE 1 TABLET DAILY  Qty: 100 tablet, Refills: 1    Associated Diagnoses: Essential hypertension      multivitamin (THERAGRAN) TABS Take 1 tablet by mouth daily      TURMERIC CURCUMIN PO Take by mouth      meclizine (ANTIVERT) 25 mg tablet Take 1 tablet (25 mg total) by mouth 3 (three) times a day as needed for dizziness for up to 10 days  Qty: 30 tablet, Refills: 0    Associated Diagnoses: Eustachian tube dysfunction, bilateral      nortriptyline (PAMELOR) 10 mg capsule Take 3 capsules (30 mg total) by mouth daily at bedtime    Associated Diagnoses: Depression, unspecified depression type           No discharge procedures on file.  ED SEPSIS DOCUMENTATION   Time reflects when diagnosis was documented in both MDM as applicable and the Disposition within this note       Time User Action Codes Description Comment    1/2/2025  1:14 PM Mee Christopher Add [E83.52] Hypercalcemia     1/2/2025  1:22 PM Mee Christopher Add [I63.9] CVA (cerebral vascular accident) (Formerly [facility])                  Mee Christopher MD  01/03/25 1263

## 2025-01-02 NOTE — ASSESSMENT & PLAN NOTE
Noted on admission.BP initially 191/90, as high as 202/80 on manual.  Diuresing as above and hypercalcemia  CT head from admission Noncon is negative for any new changes/new strokes    Plan:  Continue home antihypertensives  Lasix x 1 for hypocalcemia 40 mg IV  Initiate nitroglycerin drip for target SBP subone 40 given recent stroke and high risk of hemorrhagic conversion  Neurochecks every 2 hours for daytime today, followed by q4 hours thereafter

## 2025-01-02 NOTE — RAPID RESPONSE
Rapid Response Note  Mayra Echevarria 69 y.o. female MRN: [id number]  Unit/Bed#: [facility] 726-01 Encounter: [id number]    Rapid Response Notification(s):   Response called date/time:  1/2/2025 3:01 PM  Response team arrival date/time:  1/2/2025 3:03 PM  Response end date/time:  1/2/2025 3:10 PM  [facility]  Rapid response location:  [facility] unit  Primary reason for rapid response call:  Acute change in neuro status    Rapid Response Intervention(s):   Airway:  None  Breathing:  None  Circulation:  None  Fluids administered:  None  Medications administered:  Furosemide       Assessment:   Acute Metabolic Encephalopathy   Severe Hypercalcemia  Hypertension    Plan:   Mngt per primary - Lasix 40 IV, Nitroglycerin drip  Neuro checks q 4, stat CTH if change in neuro examination.  EKG stat, monitor Telemetry  Continue with Hypercalcemia tx per primary team     Rapid Response Outcome:   Transfer:  Remain on floor  Code Status: Level 1 (Full Code)      Family notified: Yes, Name of Family member contacted Family present at bedside          Background/Situation:   Mayra Echevarria is a 69 y.o. female with a PMH of HTN, osteoporosis on Prolia, recent multifocal stroke December 2024, who presented from home with lethargy. Initial lab investigations in ED significant for severe hypercalcemia of 17 for which patient started on IVF, Calcitonin & Lasix.    RRT called today for hypertension and concern for Acute change in neurological status.   Upon arrival to bedside, pt hypertensive @ 184/83, HR 88, sats > 95% on R/A, .Patient being evaluated by primary team who notes no significant neurological change from presentation. Lasix 40 mg IV administered per primary team and patient on Nitroglycerin drip.   No labs or additional investigations ordered.   On my evaluation patient AAOx3, moving all extremities 5/5 strength throughout, no FND noted.     CTH 1/2 reviewed which demonstrates previously seen multiple small acute  infarctions on the preceding brain MRI are largely occult on this CT with exception of ill-defined hypoattenuation in the left hemipons which likely corresponds to at least some of the recent ischemia. No   territorial infarction. No intracranial hemorrhage.    Review of Systems   Constitutional:  Negative for activity change and fatigue.   Cardiovascular:  Negative for chest pain.   Gastrointestinal:  Negative for abdominal distention and abdominal pain.   Neurological:  Negative for syncope, weakness, numbness and headaches.       Objective:   Vitals:    01/02/25 1513 01/02/25 1515 01/02/25 1530 01/02/25 1543   BP:  (!) 184/83 (!) 197/115    BP Location:       Pulse:    86   Resp:       Temp:       TempSrc:       SpO2: 91%      Height:         Physical Exam  Constitutional:       General: She is not in acute distress.     Appearance: She is not ill-appearing.   HENT:      Head: Normocephalic and atraumatic.   Cardiovascular:      Rate and Rhythm: Normal rate and regular rhythm.      Heart sounds: No murmur heard.  Pulmonary:      Effort: Pulmonary effort is normal. No respiratory distress.      Breath sounds: Normal breath sounds. No wheezing.   Abdominal:      General: There is no distension.      Palpations: Abdomen is soft.      Tenderness: There is no abdominal tenderness.   Neurological:      Mental Status: She is alert.      GCS: GCS eye subscore is 4. GCS verbal subscore is 4. GCS motor subscore is 6.      Cranial Nerves: No cranial nerve deficit, dysarthria or facial asymmetry.      Sensory: No sensory deficit.      Motor: No weakness.   Psychiatric:         Mood and Affect: Mood normal. Mood is not anxious.         Behavior: Behavior normal.

## 2025-01-03 PROBLEM — I16.1 HYPERTENSIVE EMERGENCY: Status: ACTIVE | Noted: 2025-01-02

## 2025-01-03 LAB
1,25(OH)2D SERPL-MCNC: 16.7 PG/ML (ref 5–200)
ALBUMIN SERPL ELPH-MCNC: 3.77 G/DL (ref 3.2–5.1)
ALBUMIN SERPL ELPH-MCNC: 54.6 % (ref 48–70)
ALPHA1 GLOB SERPL ELPH-MCNC: 0.42 G/DL (ref 0.15–0.47)
ALPHA1 GLOB SERPL ELPH-MCNC: 6.1 % (ref 1.8–7)
ALPHA2 GLOB SERPL ELPH-MCNC: 0.95 G/DL (ref 0.42–1.04)
ALPHA2 GLOB SERPL ELPH-MCNC: 13.7 % (ref 5.9–14.9)
ANION GAP SERPL CALCULATED.3IONS-SCNC: 10 MMOL/L (ref 4–13)
ANION GAP SERPL CALCULATED.3IONS-SCNC: 5 MMOL/L (ref 4–13)
ATRIAL RATE: 89 BPM
BETA GLOB ABNORMAL SERPL ELPH-MCNC: 0.54 G/DL (ref 0.31–0.57)
BETA1 GLOB SERPL ELPH-MCNC: 7.8 % (ref 4.7–7.7)
BETA2 GLOB SERPL ELPH-MCNC: 7.4 % (ref 3.1–7.9)
BETA2+GAMMA GLOB SERPL ELPH-MCNC: 0.51 G/DL (ref 0.2–0.58)
BUN SERPL-MCNC: 37 MG/DL (ref 5–25)
BUN SERPL-MCNC: 45 MG/DL (ref 5–25)
CALCIUM SERPL-MCNC: 11.6 MG/DL (ref 8.4–10.2)
CALCIUM SERPL-MCNC: 14.5 MG/DL (ref 8.4–10.2)
CHLORIDE SERPL-SCNC: 100 MMOL/L (ref 96–108)
CHLORIDE SERPL-SCNC: 109 MMOL/L (ref 96–108)
CO2 SERPL-SCNC: 29 MMOL/L (ref 21–32)
CO2 SERPL-SCNC: 32 MMOL/L (ref 21–32)
CREAT SERPL-MCNC: 1.89 MG/DL (ref 0.6–1.3)
CREAT SERPL-MCNC: 1.9 MG/DL (ref 0.6–1.3)
GAMMA GLOB ABNORMAL SERPL ELPH-MCNC: 0.72 G/DL (ref 0.4–1.66)
GAMMA GLOB SERPL ELPH-MCNC: 10.4 % (ref 6.9–22.3)
GFR SERPL CREATININE-BSD FRML MDRD: 26 ML/MIN/1.73SQ M
GFR SERPL CREATININE-BSD FRML MDRD: 26 ML/MIN/1.73SQ M
GLUCOSE SERPL-MCNC: 119 MG/DL (ref 65–140)
GLUCOSE SERPL-MCNC: 156 MG/DL (ref 65–140)
IGG/ALB SER: 1.2 {RATIO} (ref 1.1–1.8)
INTERPRETATION UR IFE-IMP: NORMAL
P AXIS: 16 DEGREES
PHOSPHATE SERPL-MCNC: 4 MG/DL (ref 2.3–4.1)
POTASSIUM SERPL-SCNC: 3.6 MMOL/L (ref 3.5–5.3)
POTASSIUM SERPL-SCNC: 3.8 MMOL/L (ref 3.5–5.3)
PR INTERVAL: 144 MS
PROT PATTERN SERPL ELPH-IMP: ABNORMAL
PROT SERPL-MCNC: 6.9 G/DL (ref 6.4–8.2)
QRS AXIS: -11 DEGREES
QRSD INTERVAL: 102 MS
QT INTERVAL: 334 MS
QTC INTERVAL: 407 MS
SODIUM SERPL-SCNC: 142 MMOL/L (ref 135–147)
SODIUM SERPL-SCNC: 143 MMOL/L (ref 135–147)
T WAVE AXIS: -10 DEGREES
VENTRICULAR RATE: 89 BPM

## 2025-01-03 PROCEDURE — 80048 BASIC METABOLIC PNL TOTAL CA: CPT

## 2025-01-03 PROCEDURE — 84165 PROTEIN E-PHORESIS SERUM: CPT

## 2025-01-03 PROCEDURE — 84100 ASSAY OF PHOSPHORUS: CPT

## 2025-01-03 PROCEDURE — 99222 1ST HOSP IP/OBS MODERATE 55: CPT | Performed by: STUDENT IN AN ORGANIZED HEALTH CARE EDUCATION/TRAINING PROGRAM

## 2025-01-03 PROCEDURE — 86334 IMMUNOFIX E-PHORESIS SERUM: CPT

## 2025-01-03 PROCEDURE — 83521 IG LIGHT CHAINS FREE EACH: CPT

## 2025-01-03 PROCEDURE — 84165 PROTEIN E-PHORESIS SERUM: CPT | Performed by: PATHOLOGY

## 2025-01-03 PROCEDURE — 99232 SBSQ HOSP IP/OBS MODERATE 35: CPT | Performed by: INTERNAL MEDICINE

## 2025-01-03 PROCEDURE — 93010 ELECTROCARDIOGRAM REPORT: CPT | Performed by: INTERNAL MEDICINE

## 2025-01-03 PROCEDURE — 80048 BASIC METABOLIC PNL TOTAL CA: CPT | Performed by: STUDENT IN AN ORGANIZED HEALTH CARE EDUCATION/TRAINING PROGRAM

## 2025-01-03 PROCEDURE — 86334 IMMUNOFIX E-PHORESIS SERUM: CPT | Performed by: PATHOLOGY

## 2025-01-03 RX ORDER — ACETAMINOPHEN 325 MG/1
650 TABLET ORAL EVERY 6 HOURS PRN
Status: DISCONTINUED | OUTPATIENT
Start: 2025-01-03 | End: 2025-01-07 | Stop reason: HOSPADM

## 2025-01-03 RX ORDER — OXYCODONE HYDROCHLORIDE 5 MG/1
5 TABLET ORAL EVERY 4 HOURS PRN
Refills: 0 | Status: DISCONTINUED | OUTPATIENT
Start: 2025-01-03 | End: 2025-01-07 | Stop reason: HOSPADM

## 2025-01-03 RX ORDER — SODIUM CHLORIDE 9 MG/ML
100 INJECTION, SOLUTION INTRAVENOUS CONTINUOUS
Status: DISCONTINUED | OUTPATIENT
Start: 2025-01-03 | End: 2025-01-05

## 2025-01-03 RX ORDER — LABETALOL HYDROCHLORIDE 5 MG/ML
10 INJECTION, SOLUTION INTRAVENOUS EVERY 4 HOURS PRN
Status: DISCONTINUED | OUTPATIENT
Start: 2025-01-03 | End: 2025-01-07 | Stop reason: HOSPADM

## 2025-01-03 RX ORDER — HYDRALAZINE HYDROCHLORIDE 25 MG/1
25 TABLET, FILM COATED ORAL EVERY 8 HOURS SCHEDULED
Status: DISCONTINUED | OUTPATIENT
Start: 2025-01-03 | End: 2025-01-07 | Stop reason: HOSPADM

## 2025-01-03 RX ADMIN — ASPIRIN 81 MG CHEWABLE TABLET 81 MG: 81 TABLET CHEWABLE at 08:42

## 2025-01-03 RX ADMIN — SODIUM CHLORIDE 200 ML/HR: 0.9 INJECTION, SOLUTION INTRAVENOUS at 16:40

## 2025-01-03 RX ADMIN — SODIUM CHLORIDE 100 ML/HR: 0.9 INJECTION, SOLUTION INTRAVENOUS at 10:38

## 2025-01-03 RX ADMIN — ATORVASTATIN CALCIUM 40 MG: 40 TABLET, FILM COATED ORAL at 16:40

## 2025-01-03 RX ADMIN — HEPARIN SODIUM 5000 UNITS: 5000 INJECTION, SOLUTION INTRAVENOUS; SUBCUTANEOUS at 22:43

## 2025-01-03 RX ADMIN — SODIUM CHLORIDE 1000 ML: 0.9 INJECTION, SOLUTION INTRAVENOUS at 05:13

## 2025-01-03 RX ADMIN — LOSARTAN POTASSIUM 100 MG: 50 TABLET, FILM COATED ORAL at 08:42

## 2025-01-03 RX ADMIN — ONDANSETRON 4 MG: 2 INJECTION INTRAMUSCULAR; INTRAVENOUS at 08:42

## 2025-01-03 RX ADMIN — SODIUM CHLORIDE 200 ML/HR: 0.9 INJECTION, SOLUTION INTRAVENOUS at 22:43

## 2025-01-03 RX ADMIN — HYDRALAZINE HYDROCHLORIDE 25 MG: 25 TABLET ORAL at 15:07

## 2025-01-03 RX ADMIN — AMLODIPINE BESYLATE 5 MG: 5 TABLET ORAL at 08:42

## 2025-01-03 RX ADMIN — ONDANSETRON 4 MG: 2 INJECTION INTRAMUSCULAR; INTRAVENOUS at 17:50

## 2025-01-03 RX ADMIN — ACETAMINOPHEN 650 MG: 325 TABLET, FILM COATED ORAL at 15:07

## 2025-01-03 RX ADMIN — HEPARIN SODIUM 5000 UNITS: 5000 INJECTION, SOLUTION INTRAVENOUS; SUBCUTANEOUS at 05:25

## 2025-01-03 RX ADMIN — HEPARIN SODIUM 5000 UNITS: 5000 INJECTION, SOLUTION INTRAVENOUS; SUBCUTANEOUS at 13:10

## 2025-01-03 RX ADMIN — HYDRALAZINE HYDROCHLORIDE 25 MG: 25 TABLET ORAL at 22:43

## 2025-01-03 RX ADMIN — B-COMPLEX W/ C & FOLIC ACID TAB 1 TABLET: TAB at 08:42

## 2025-01-03 NOTE — ASSESSMENT & PLAN NOTE
Noted on admission.BP initially 191/90, as high as 202/80 on manual.  And CAROLINE  Will hold losartan due to CAROLINE  Add PO hydralazine for now  Wean off nitroglycerin  Adjust BP meds PRN

## 2025-01-03 NOTE — ASSESSMENT & PLAN NOTE
Severe, symptomatic   Presenting with fatigue, confusion/AMS, generalized weakness, nausea, vomiting, and CAROLINE     On admission PTH was suppressed, with elevated Phosphorous, low magnesium and Low ALP (low bone turnover?). Vitamin D,25 and 1,25 are within normal range     Clinical presentation can be attributed to increase calcium intake (possible CKD vs Acute Milk Alkali syndrome: clinical toxemic presentation, renal insufficiency, mild metabolic alkalosis,elevated phosphate, hypomagnesemia).    Differentials still include, but less likely, malignancy, adynamic bone disease, and other iatrogenic causes  - pending PTHrp    Responding well to 2 doses of calcitonin and fluid bolus.   At this time recommend supportive therapy; continuous IV fluid, Normal saline 200cc/hr  Monitor calcium and renal function q 12hours     Endocrinology team will continue to monitor and adjust as needed

## 2025-01-03 NOTE — TELEPHONE ENCOUNTER
1/3/25 - Called pt to confirm sooner appt. Provided for 3/5/25 at 230 w/Aranda in [facility] - No answer, LMOM (Home/Mobile)  Pt remains on Wait List     If pt returns call, please confirm appt., if there is nothing available sooner

## 2025-01-03 NOTE — UTILIZATION REVIEW
Initial Clinical Review    Admission: Date/Time/Statement:   Admission Orders (From admission, onward)       Ordered        01/02/25 1322  INPATIENT ADMISSION  Once                          Orders Placed This Encounter   Procedures    INPATIENT ADMISSION     Standing Status:   Standing     Number of Occurrences:   1     Level of Care:   Med Surg [16]     Estimated length of stay:   More than 2 Midnights     Certification:   I certify that inpatient services are medically necessary for this patient for a duration of greater than two midnights. See H&P and MD Progress Notes for additional information about the patient's course of treatment.     ED Arrival Information       Expected   -    Arrival   1/2/2025 10:18    Acuity   Emergent              Means of arrival   Walk-In    Escorted by   Family Member    Service   Hospitalist    Admission type   Emergency              Arrival complaint   Confusion             Chief Complaint   Patient presents with    Altered Mental Status     Pt was recently admitted for stroke, woke up this morning and was more confused than when she left the hospital. Pt woke up around 0900. Family endorses lethargy and generalized weakness        Initial Presentation: 69 y.o. female presents to ED from h ome with lethargy for  24 hours.  Normally sleeps  8-9 hours,  slept  > 12 hours the day before admission, fatigued all day.  Family concerned. Still answered questions appropriately, eating and drinking, but had a  recent stroke  on  12/27 and recently discharged.  Was  d/c  home  on  calcium  600 mg  BID, was taking twice  that dose  at home.  Found with Ca  elevated  at  17  and given  1  L NS.  Creatinine elevated at  1.50.  Additional PMH is  HTN.  Ct head  shows  no new stroke.  BP   191/90,  202/80.   Admit  Ip with  Hypercalcemia, Hypertensive  urgency  and plan is  neuro checks  Q 2 hrs  the day of admission, then Q 4 hrs, monitor labs, IV  lasix  X1, NTG  drip, IVF  for total  3 L   bolus, SQ  calcitonin, endocrine consult,  hold on  calcium supplements,  and continue  home antihypertensives.     1/2    3  PM   Rapid response call for acute  change in neuro status.BP   184/83.   No significant neuro change noted.  Alert and oriented  X 3, moves all extremities.         Anticipated Length of Stay/Certification Statement: Patient will be admitted on an inpatient basis with an anticipated length of stay of greater than 2 midnights secondary to hypercalcemia workup and treatment.     Date:   1/3   Day 2:   Endocrine consult  Responding  well to  2 doses  calcitonin and  IVF  bolus.  Recommend  continue  IVF NS  200/hr.   Clinical presentation can be attributed to increase calcium intake (possible CKD vs Acute Milk Alkali syndrome: clinical toxemic presentation, renal insufficiency, mild metabolic alkalosis,elevated phosphate, hypomagnesemia).     Differentials still include, but less likely, malignancy, adynamic bone disease, and other iatrogenic causes    Eyes closed, opens  eyes  to  stimuli.  Lethargic, disoriented, confused.    ED Treatment-Medication Administration from 01/02/2025 1018 to 01/02/2025 1436         Date/Time Order Dose Route Action     01/02/2025 1159 sodium chloride 0.9 % bolus 1,000 mL 1,000 mL Intravenous New Bag     01/02/2025 1425 sodium chloride 0.9 % bolus 1,000 mL 1,000 mL Intravenous New Bag     01/02/2025 1424 calcitonin (salmon) (MIACALCIN) injection 272 Units 272 Units Subcutaneous Given            Scheduled Medications:  amLODIPine, 5 mg, Oral, Daily  aspirin, 81 mg, Oral, Daily  atorvastatin, 40 mg, Oral, Daily With Dinner  heparin (porcine), 5,000 Units, Subcutaneous, Q8H EVON  losartan, 100 mg, Oral, Daily  multivitamin stress formula, 1 tablet, Oral, Daily      Continuous IV Infusions:  nitroGLYcerin, 5-200 mcg/min, Intravenous, Titrated      PRN Meds:  metoclopramide, 10 mg, Intravenous, Q6H PRN  ondansetron, 4 mg, Intravenous, Q6H PRN      ED Triage Vitals    Temperature Pulse Respirations Blood Pressure SpO2 Pain Score   01/02/25 1022 01/02/25 1022 01/02/25 1022 01/02/25 1022 01/02/25 1022 01/02/25 1513   (!) 97.3 °F (36.3 °C) 92 16 (!) 191/90 95 % No Pain     Weight (last 2 days)       Date/Time Weight    01/03/25 0600 68.1 (150.1)            Vital Signs (last 3 days)       Date/Time Temp Pulse Resp BP MAP (mmHg) SpO2 Calculated FIO2 (%) - Nasal Cannula Nasal Cannula O2 Flow Rate (L/min) O2 Device Patient Position - Orthostatic VS Nydia Coma Scale Score Pain    01/03/25 09:46:01 -- 87 -- 152/77 102 91 % -- -- -- -- -- --    01/03/25 07:57:38 98.8 °F (37.1 °C) 80 -- 164/76 105 92 % -- -- -- -- -- --    01/03/25 07:35:14 -- 82 -- 154/73 100 91 % -- -- -- -- -- --    01/03/25 0425 -- 68 -- 143/67 92 92 % -- -- -- -- -- --    01/03/25 0420 -- 68 -- 135/64 88 91 % -- -- -- -- -- --    01/03/25 0415 -- 68 -- 143/61 88 91 % -- -- -- -- -- --    01/03/25 0410 -- 69 -- 153/70 98 90 % -- -- -- -- -- --    01/03/25 0405 -- 71 -- 148/72 97 90 % -- -- -- -- -- --    01/03/25 0400 -- -- -- -- -- -- -- -- -- -- 14 --    01/03/25 0325 -- 71 -- 144/60 88 91 % -- -- -- -- -- --    01/03/25 0320 -- 73 -- 141/59 86 89 % -- -- -- -- -- --    01/03/25 0315 -- 89 -- 126/65 85 91 % -- -- -- -- -- --    01/03/25 0310 -- 72 -- 134/62 86 89 % -- -- -- -- -- --    01/03/25 0305 -- 74 -- 139/63 88 82 % -- -- -- -- -- --    01/03/25 0300 -- 70 -- 136/62 87 84 % -- -- -- -- -- --    01/03/25 0255 -- 70 -- 135/58 84 83 % -- -- -- -- -- --    01/03/25 0250 -- 69 -- 141/56 84 84 % -- -- -- -- -- --    01/03/25 0245 -- 68 -- 138/57 84 84 % -- -- -- -- -- --    01/03/25 0240 -- 69 -- 132/57 82 83 % -- -- -- -- -- --    01/03/25 0235 -- 69 -- 133/55 81 83 % -- -- -- -- -- --    01/03/25 0230 -- 69 -- 136/58 84 84 % -- -- -- -- -- --    01/03/25 0225 -- 68 -- 140/58 85 84 % -- -- -- -- -- --    01/03/25 0220 -- 68 -- 148/60 89 83 % -- -- -- -- -- --    01/03/25 0215 -- 69 -- 141/63 89 86 % -- -- -- --  -- --    01/03/25 0210 -- 69 -- 136/64 88 85 % -- -- -- -- -- --    01/03/25 0205 -- 80 -- 125/50 75 90 % -- -- -- -- -- --    01/03/25 0200 -- 66 -- -- -- 88 % -- -- -- -- -- --    01/03/25 0155 -- 66 -- -- -- 84 % -- -- -- -- -- --    01/03/25 0150 -- 66 -- -- -- 83 % -- -- -- -- -- --    01/03/25 0145 -- 68 -- -- -- 88 % -- -- -- -- -- --    01/03/25 0140 -- 75 -- -- -- 90 % -- -- -- -- -- --    01/03/25 0135 -- 85 -- 144/68 93 89 % -- -- -- -- -- --    01/03/25 0130 -- 76 -- -- -- 90 % -- -- -- -- -- --    01/03/25 0125 -- 82 -- 140/62 88 89 % -- -- -- -- -- --    01/03/25 0120 -- 85 -- -- -- 89 % -- -- -- -- -- --    01/03/25 0115 -- 79 -- -- -- 89 % -- -- -- -- -- --    01/03/25 0110 -- 95 -- 150/77 101 90 % -- -- -- -- -- --    01/03/25 0105 -- 72 -- 145/65 92 92 % -- -- -- -- -- --    01/03/25 0100 -- 72 -- 139/59 86 91 % -- -- -- -- -- --    01/03/25 00:52:25 -- 94 -- 131/63 86 90 % -- -- -- -- -- --    01/03/25 0050 -- 89 -- 131/63 86 87 % -- -- -- -- -- --    01/03/25 0045 -- 87 -- -- -- 89 % -- -- -- -- -- --    01/03/25 0040 -- 90 -- -- -- 89 % -- -- -- -- -- --    01/03/25 0035 -- 97 -- -- -- 86 % -- -- -- -- -- --    01/03/25 0030 -- 85 -- 155/72 100 87 % -- -- -- -- -- --    01/03/25 0025 -- 107 -- -- -- 92 % -- -- -- -- -- --    01/03/25 0020 -- 84 -- -- -- 90 % -- -- -- -- -- --    01/03/25 0015 -- 88 -- -- -- 89 % -- -- -- -- -- --    01/03/25 0010 -- 90 -- -- -- 86 % -- -- -- -- -- --    01/03/25 0005 -- 91 -- -- -- 89 % -- -- -- -- -- --    01/03/25 0000 -- 69 -- 135/59 84 90 % -- -- -- -- 14 --    01/02/25 2330 -- 67 -- 130/54 79 89 % -- -- -- -- -- --    01/02/25 2300 -- 72 -- 139/62 88 88 % -- -- -- -- -- --    01/02/25 2230 -- 90 -- 145/78 100 87 % -- -- -- -- -- --    01/02/25 22:26:07 98.6 °F (37 °C) 90 19 146/76 99 90 % -- -- None (Room air) Lying -- --    01/02/25 22:11:03 -- 95 -- 168/81 110 88 % -- -- -- -- -- --    01/02/25 22:03:44 -- 96 -- 168/81 110 90 % -- -- -- -- -- --     01/02/25 21:00:13 -- 102 -- 171/85 114 93 % -- -- -- -- -- --    01/02/25 20:27:30 -- 96 -- 176/83 114 95 % -- -- -- -- -- --    01/02/25 2000 -- -- -- -- -- -- 28 2 L/min Nasal cannula -- 14 No Pain    01/02/25 19:49:36 -- -- -- 154/83 107 -- -- -- -- -- -- --    01/02/25 1930 -- -- -- 148/78 101 -- -- -- -- -- -- --    01/02/25 1915 -- -- -- 149/73 98 -- -- -- -- -- -- --    01/02/25 19:14:42 -- -- -- 149/73 98 -- -- -- -- -- -- --    01/02/25 1900 -- -- -- 167/72 104 -- -- -- -- -- -- --    01/02/25 1845 -- -- -- 163/83 110 -- -- -- -- -- -- --    01/02/25 1830 -- -- -- 143/89 107 -- -- -- -- -- -- --    01/02/25 18:23:16 -- -- -- 143/89 107 -- -- -- -- -- -- --    01/02/25 1818 -- 97 -- 191/100 -- -- -- -- -- -- -- --    01/02/25 17:27:32 -- -- -- 144/73 97 -- -- -- -- -- -- --    01/02/25 1715 -- -- -- -- -- -- -- -- -- -- 15 --    01/02/25 17:07:49 -- -- -- 147/70 96 -- -- -- -- -- -- --    01/02/25 1645 -- -- -- 177/75 109 -- -- -- -- -- -- --    01/02/25 1630 -- -- -- 176/76 109 -- -- -- -- -- -- --    01/02/25 1543 -- 86 -- -- -- -- -- -- -- -- -- --    01/02/25 1530 -- -- -- 197/115 142 -- -- -- -- -- -- --    01/02/25 1515 -- -- -- 184/83 117 -- -- -- -- -- -- --    01/02/25 1513 -- -- -- -- -- 91 % -- -- None (Room air) -- 15 No Pain    01/02/25 15:04:57 -- -- -- 154/99 -- -- -- -- -- -- -- --    01/02/25 1500 -- -- -- 211/92 132 -- -- -- -- -- -- --    01/02/25 1455 -- -- -- 202/80 -- -- -- -- -- Lying -- --    01/02/25 14:53:03 98.3 °F (36.8 °C) 88 20 211/92 132 -- -- -- -- Lying -- --    01/02/25 1415 -- 72 14 -- -- 96 % -- -- -- -- -- --    01/02/25 1110 -- -- -- -- -- -- -- -- Nasal cannula -- -- --    01/02/25 1109 -- -- -- -- -- -- -- -- -- -- 14 --    01/02/25 1045 -- 80 15 177/95 129 96 % 28 2 L/min Nasal cannula -- -- --    01/02/25 1022 97.3 °F (36.3 °C) 92 16 191/90 129 95 % -- -- -- -- -- --              Pertinent Labs/Diagnostic Test Results:   Radiology:  CT head wo contrast   Final  Interpretation by Juve Villegas MD (01/03 0647)      Evolving left pontine recent infarct, without associated hemorrhage.      Additional small recent infarcts identified on brain MRI from 12/27/2024 is not visualized on this examination. Old lacunar infarcts involving the bilateral similar hemispheres.      Mild chronic white matter microangiopathic changes.                  Workstation performed: [id number]         CT head wo contrast   Final Interpretation by Solomon Santos MD (01/02 1239)      The previously seen multiple small acute infarctions on the preceding brain MRI are largely occult on this CT with exception of ill-defined hypoattenuation in the left hemipons which likely corresponds to at least some of the recent ischemia. No    territorial infarction. No intracranial hemorrhage.                  Workstation performed: [id number]         XR chest pa and lateral   Final Interpretation by Darinel Feliz DO (01/02 1859)      No acute cardiopulmonary disease on this examination, which is somewhat limited secondary to low lung volumes.            Resident: Angel Fallon I, the attending radiologist, have reviewed the images and agree with the final report above.      Workstation performed: [id number]           Cardiology:  ECG 12 lead   Final Result by Jamarcus Perez MD (01/03 0938)   Normal sinus rhythm   Voltage criteria for left ventricular hypertrophy   Nonspecific ST and T wave abnormality   Abnormal ECG   When compared with ECG of 27-Dec-2024 19:37,   ST now depressed in Lateral leads   T wave inversion now evident in Inferior leads   T wave inversion now evident in Anterior leads   Confirmed by Jamarcus Perez ([id number]) on 1/3/2025 9:38:34 AM            Results from last 7 days   Lab Units 01/02/25  1913 01/02/25  1107 12/28/24  0508   WBC Thousand/uL  --  10.78* 5.79   HEMOGLOBIN g/dL  --  13.5 12.5   HEMATOCRIT %  --  42.3 39.1   PLATELETS Thousands/uL 384 379 331   TOTAL NEUT ABS  Thousands/µL  --  9.22*  --          Results from last 7 days   Lab Units 01/03/25  0505 01/02/25  1913 01/02/25  1154 01/02/25  1107 12/28/24  0508 12/27/24 2003   SODIUM mmol/L 142 140  --  138 142 138   POTASSIUM mmol/L 3.8 4.0  --  3.8 3.9 4.1   CHLORIDE mmol/L 100 100  --  102 110* 107   CO2 mmol/L 32 29  --  28 24 23   ANION GAP mmol/L 10 11  --  8 8 8   BUN mg/dL 37* 31*  --  32* 18 16   CREATININE mg/dL 1.89* 1.59*  --  1.50* 0.79 0.82   EGFR ml/min/1.73sq m 26 32  --  35 76 72   CALCIUM mg/dL 14.5* 16.3*  --  17.0* 9.0 9.5   CALCIUM, IONIZED mmol/L  --   --  2.00*  --   --   --    MAGNESIUM mg/dL  --   --  1.8*  --  2.0  --    PHOSPHORUS mg/dL 4.0  --   --  4.8*  --   --      Results from last 7 days   Lab Units 01/02/25  1913 01/02/25  1154   AST U/L 39 30   ALT U/L 30 26   ALK PHOS U/L 31* 28*   TOTAL PROTEIN g/dL 7.5 6.7   ALBUMIN g/dL 4.4 4.0   TOTAL BILIRUBIN mg/dL 0.56 0.46   BILIRUBIN DIRECT mg/dL  --  0.16     Results from last 7 days   Lab Units 01/02/25  1504 12/27/24  1633   POC GLUCOSE mg/dl 123 97     Results from last 7 days   Lab Units 01/03/25  0505 01/02/25  1913 01/02/25  1107 12/28/24  0508 12/27/24 2003   GLUCOSE RANDOM mg/dL 156* 157* 138 104 112         Results from last 7 days   Lab Units 12/27/24 2003   HEMOGLOBIN A1C % 6.1*   EAG mg/dl 128       Results from last 7 days   Lab Units 01/02/25  1649 01/02/25  1354 01/02/25  1107 12/27/24 2003   HS TNI 0HR ng/L  --   --  27 16   HS TNI 2HR ng/L  --  34  --   --    HSTNI D2 ng/L  --  7  --   --    HS TNI 4HR ng/L 39  --   --   --    HSTNI D4 ng/L 12  --   --   --              Results from last 7 days   Lab Units 12/27/24 2003   TSH 3RD GENERATON uIU/mL 1.486               Results from last 7 days   Lab Units 01/02/25  1616   CLARITY UA  Clear   COLOR UA  Colorless   SPEC GRAV UA  1.007   PH UA  7.0   GLUCOSE UA mg/dl Negative   KETONES UA mg/dl Negative   BLOOD UA  Negative   PROTEIN UA mg/dl Negative   NITRITE UA  Negative    BILIRUBIN UA  Negative   UROBILINOGEN UA (BE) mg/dl <2.0   LEUKOCYTES UA  Negative   WBC UA /hpf 1-2   RBC UA /hpf 1-2   BACTERIA UA /hpf None Seen   EPITHELIAL CELLS WET PREP /hpf None Seen                       Present on Admission:   MATT (obstructive sleep apnea)   Hypercalcemia   Essential hypertension   Senile osteoporosis   Ambulatory dysfunction   Hypertensive urgency   CAROLINE (acute kidney injury) ([facility])      Admitting Diagnosis: Hypercalcemia [E83.52]  Altered mental status [R41.82]  CVA (cerebral vascular accident) ([facility]) [I63.9]  Age/Sex: 69 y.o. female    Network Utilization Review Department  ATTENTION: Please call with any questions or concerns to [phone] and carefully listen to the prompts so that you are directed to the right person. All voicemails are confidential.   For Discharge needs, contact Care Management DC Support Team at [phone] opt. [phone]  Send all requests for admission clinical reviews, approved or denied determinations and any other requests to dedicated fax number below belonging to the campus where the patient is receiving treatment. List of dedicated fax numbers for the Facilities:  FACILITY NAME UR FAX NUMBER   ADMISSION DENIALS (Administrative/Medical Necessity) [phone]   DISCHARGE SUPPORT TEAM (NETWORK) [phone]   PARENT CHILD HEALTH (Maternity/NICU/Pediatrics) [phone]   [facility] [phone]   [facility] [phone]   [facility] [phone]   [facility] [phone]   [facility] [phone]   [facility] [phone]   [facility] [phone]   [facility] [phone]   [facility] [phone]   [facility] [phone]   [facility] [phone]   [facility]  [facility] [phone]

## 2025-01-03 NOTE — CASE MANAGEMENT
Case Management Assessment & Discharge Planning Note    Patient name Mayra Echevarria  Location [facility] 726/[facility] 726-01 MRN [id number]  : 1955 Date 1/3/2025       Current Admission Date: 2025  Current Admission Diagnosis:Hypercalcemia   Patient Active Problem List    Diagnosis Date Noted Date Diagnosed    Hypertensive emergency 2025     CAROLINE (acute kidney injury) (HCC) 2025     History of CVA (cerebrovascular accident) 2024     Ambulatory dysfunction 2024     MATT (obstructive sleep apnea) 2024     Other sleep disorders 2024     Sleep apnea-like behavior 2024     Insomnia 2024     Memory impairment 2024     Encounter for screening mammogram for breast cancer 2023     Other osteoporosis without current pathological fracture 2023     Impingement syndrome of left shoulder 2023     Impingement syndrome of right shoulder 2023     History of rib fracture 2022     Hypercalcemia 2021     Primary generalized (osteo)arthritis 2021     Migraine with aura and without status migrainosus, not intractable 2021     Anxiety 2021     Labile hypertension 2021     Quadriceps tendonitis 2021     COVID-19 2021     Atrophic vaginitis 2019     Aseptic necrosis bone (HCC) 2019     Greater trochanteric bursitis of right hip 2017     It band syndrome, right 2017     Patellofemoral syndrome, right 2017     Senile osteoporosis 2015     Hyperlipidemia 2012     Essential hypertension 2009       LOS (days): 1  Geometric Mean LOS (GMLOS) (days):   Days to GMLOS:     OBJECTIVE:  PATIENT READMITTED TO HOSPITAL  Risk of Unplanned Readmission Score: 13.67         Current admission status: Inpatient       Preferred Pharmacy:   [address] [place] Naidu [address]  [place] [address]  Phone: [phone] Fax: [phone]    [facility] - [place], [place] - [address]  [address] [facility] [address]  Phone: [phone] Fax: [phone]    Primary Care Provider: Namita Melendez DO    Primary Insurance: [employer] REP  Secondary Insurance:     ASSESSMENT:  Active Health Care Proxies       Reggie Echevarria Health Care Representative - Spouse   Primary Phone: [phone] (Mobile)  Home Phone: [phone]                           Readmission Root Cause  30 Day Readmission: Yes  During previous admission, was a post-acute recommendation made?: No  Patient was readmitted due to: Hypercalcemia    Patient Information  Admitted from:: Home  Mental Status: Alert  During Assessment patient was accompanied by: Spouse  Assessment information provided by:: Spouse, Daughter  Primary Caregiver: Family  Support Systems: Self, Spouse/significant other, Daughter  County of Residence: [place]  [place]  Home entry access options. Select all that apply.: Stairs  Number of steps to enter home.: 2  Do the steps have railings?: Yes  Type of Current Residence: Bi-level  Upon entering residence, is there a bedroom on the main floor (no further steps)?: No  A bedroom is located on the following floor levels of residence (select all that apply):: 2nd Floor  Upon entering residence, is there a bathroom on the main floor (no further steps)?: Yes  Number of steps to 2nd floor from main floor: 5  Living Arrangements: Lives w/ Spouse/significant other  Is patient a ?: No    Activities of Daily Living Prior to Admission  Functional Status: Independent  Completes ADLs independently?: Yes  Ambulates independently?: Yes  Does patient use assisted devices?: Yes  Assisted Devices (DME) used: Walker  Does patient currently own DME?: Yes  What DME does the patient currently own?: Walker  Does patient have a history of Outpatient Therapy (PT/OT)?: Yes  Does the patient have a history of  Short-Term Rehab?: No  Does patient have a history of HHC?: No  Does patient currently have HHC?: No         Patient Information Continued  Income Source: Pension/residential  Does patient have prescription coverage?: Yes  Does patient receive dialysis treatments?: No  Does patient have a history of substance abuse?: No  Does patient have a history of Mental Health Diagnosis?: No         Means of Transportation  Means of Transport to Appts:: Family transport          DISCHARGE DETAILS:    Discharge planning discussed with:: Chart reviewed. Cm met with pt, spouse and dtr at bedside, introduced self, role and discharged process. Pt read[facility] 12/[facility]. Spouse confirmed pt indep with ADLS PTA and assisted as needed. Spouse expressed need for rehab due to lethargic prior to admission and requested [facility] pending PT/OT eval. Referral placed. NO WKND discharged anticipated. Cm will continue to follow and assist with discharged planning needs.  Freedom of Choice: Yes                   Contacts  Relationship to Patient:: Family  Contact Method: In Person  Reason/Outcome: Continuity of Care, Discharge Planning    Requested Home Health Care         Is the patient interested in HHC at discharge?: No         Other Referral/Resources/Interventions Provided:  Interventions: Acute Rehab         Treatment Team Recommendation: Acute Rehab  Discharge Destination Plan:: Acute Rehab

## 2025-01-03 NOTE — ASSESSMENT & PLAN NOTE
Recent Labs     01/02/25  1107 01/02/25  1913 01/03/25  0505   CREATININE 1.50* 1.59* 1.89*   EGFR 35 32 26     Estimated Creatinine Clearance: 25.1 mL/min (A) (by C-G formula based on SCr of 1.89 mg/dL (H)).    Noted on admission, no hx CKD. Likely from hypercalcemia.    Plan:  IV fluids as per plan for hypercalcemia along w diuresis   Holding losartan

## 2025-01-03 NOTE — TELEPHONE ENCOUNTER
Porsche, from [facility] Care Management Team, called to verify that the patient is scheduled for a TCM and to request the discharge summary to be faxed to them as they are trying to establish the patient in their transition of care program.  They are requesting the discharge summary to be faxed to [phone].  Thank you!

## 2025-01-03 NOTE — ASSESSMENT & PLAN NOTE
Severe, 17 on admission.  Recently hospitalized within past 2 weeks for multifocal strokes, was sent home with outpatient PT OT.  Was oral supplementation of milligram calcium tabs twice daily, but unfortunately was taking 1200 mg twice daily, double instructed dose.    improving  Discontinued calcium supplements  Continue IV Fluids  Discontinue calcitonin  BMP daily

## 2025-01-03 NOTE — CONSULTS
Consultation - Endocrinology   Name: Mayra Echevarria 69 y.o. female I MRN: [id number]  Unit/Bed#: [facility] 726-01 I Date of Admission: 1/2/2025   Date of Service: 1/3/2025 I Hospital Day: 1  Inpatient consult to Endocrinology  Consult performed by: Gina Leal MD  Consult ordered by: Laurel Fairchild MD        Physician Requesting Evaluation: Cain Smith MD   Reason for Evaluation / Principal Problem: Hypercalcemia    Assessment & Plan  Hypercalcemia  Severe, symptomatic   Presenting with fatigue, confusion/AMS, generalized weakness, nausea, vomiting, and CAROLINE     On admission PTH was suppressed, with elevated Phosphorous, low magnesium and Low ALP (low bone turnover?). Vitamin D,25 and 1,25 are within normal range     Clinical presentation can be attributed to increase calcium intake (possible CKD vs Acute Milk Alkali syndrome: clinical toxemic presentation, renal insufficiency, mild metabolic alkalosis,elevated phosphate, hypomagnesemia).    Differentials still include, but less likely, malignancy, adynamic bone disease, and other iatrogenic causes  - pending PTHrp    Responding well to 2 doses of calcitonin and fluid bolus.   At this time recommend supportive therapy; continuous IV fluid, Normal saline 200cc/hr  Monitor calcium and renal function q 12hours     Endocrinology team will continue to monitor and adjust as needed      History of Present Illness   History limited due to acute encephalopathy; HPI provided by chart review and patient's  at bedside.     Mayra Echevarria is a 69 y.o. female with history of Osteoarthritis, Osteoporosis, HTN, MATT, HLD who presents to the ER on 1/2 with acute change in mental status, lethargy, and generalized weakness.   Patient was recently discharged on 12/30 after being hospitalized on 12/26 when she presented with right hemiparesis. MRI confirmed multiple acute infarcts involving left occipital lobe, bilateral alex, and the left cerebellar hemisphere. She was  "discharged home on aspirin, atorvastatin, amlodipine, calcium carbonate 600 mg BID, losartan, and multivitamin.    reports that they placed all her medication in daily pill-box organizer, and perhaps they misunderstood the instructions because they potentially gave her 2400 mg of calcium carbonate for 2 days as well as a multivitamin once a day. They don't believe she was taking vitamin D (or other supplements). On the second day she reported feeling incredible tired and went to bed early around 7-8pm. He woke her up the next day because she had slept 12-14 hours, but he found her confused and still reporting that she was \"so tired\". When she was unable to get out of bed he brought her to ER to be evaluated.     Chart review shows that she is being followed by Rheumatology for osteoporosis, she completed 4th dose of Prolia in October 2024. She has been on calcium carbonate since March 2024.     Review of Systems   Unable to perform ROS: Mental status change     I have reviewed the patient's PMH, PSH, Social History, Family History, Meds, and Allergies    Objective :  Temp:  [98.3 °F (36.8 °C)-98.8 °F (37.1 °C)] 98.8 °F (37.1 °C)  HR:  [] 87  BP: (125-211)/() 152/77  Resp:  [14-20] 19  SpO2:  [82 %-96 %] 91 %  O2 Device: None (Room air)  Nasal Cannula O2 Flow Rate (L/min):  [2 L/min] 2 L/min    Physical Exam  Constitutional:       Appearance: She is normal weight.   HENT:      Head: Normocephalic and atraumatic.      Nose: Nose normal.      Mouth/Throat:      Mouth: Mucous membranes are dry.      Pharynx: Oropharynx is clear.   Eyes:      Comments: Patient has eyes closed, opens them with stimuli   Cardiovascular:      Rate and Rhythm: Normal rate and regular rhythm.      Pulses: Normal pulses.   Pulmonary:      Effort: Pulmonary effort is normal. No respiratory distress.      Breath sounds: Normal breath sounds. No rhonchi or rales.   Abdominal:      General: Abdomen is flat.   Musculoskeletal:    "      General: No swelling. Normal range of motion.   Skin:     General: Skin is warm and dry.   Neurological:      Mental Status: She is lethargic, disoriented and confused.   Psychiatric:         Speech: She is noncommunicative.         Cognition and Memory: Cognition is impaired.            Lab Results: I have reviewed the following results:CBC/BMP:   .     01/02/25  1154 01/02/25 1913 01/02/25 1913 01/03/25  0505   PLT  --  384  --   --    SODIUM  --  140   < > 142   K  --  4.0   < > 3.8   CL  --  100   < > 100   CO2  --  29   < > 32   BUN  --  31*   < > 37*   CREATININE  --  1.59*   < > 1.89*   GLUC  --  157*   < > 156*   CAIONIZED 2.00*  --   --   --    MG 1.8*  --   --   --    PHOS  --   --   --  4.0    < > = values in this interval not displayed.    , Creatinine Clearance: Estimated Creatinine Clearance: 25.1 mL/min (A) (by C-G formula based on SCr of 1.89 mg/dL (H)).

## 2025-01-03 NOTE — PROGRESS NOTES
Progress Note - Hospitalist   Name: Mayra Echevarria 69 y.o. female I MRN: [id number]  Unit/Bed#: [facility] 726-01 I Date of Admission: 1/2/2025   Date of Service: 1/3/2025 I Hospital Day: 1    Assessment & Plan  Hypercalcemia  Severe, 17 on admission.  Recently hospitalized within past 2 weeks for multifocal strokes, was sent home with outpatient PT OT.  Was oral supplementation of milligram calcium tabs twice daily, but unfortunately was taking 1200 mg twice daily, double instructed dose.    improving  Discontinued calcium supplements  Continue IV Fluids  Discontinue calcitonin  BMP daily    Hypertensive emergency  Noted on admission.BP initially 191/90, as high as 202/80 on manual.  And CAROLINE  Will hold losartan due to CAROLINE  Add PO hydralazine for now  Wean off nitroglycerin  Adjust BP meds PRN  Senile osteoporosis  On prolia outpatient, last injection Sept 2023    See hypercalcemia plan  Likely will hold off on calcium at time of discharge, if not reduced oral dosing such as 600 mg daily (vs previous 600 mg BID, as pt taking 1200 mg BID)    Plan:  Hold off on calcium as inpatient   Hold off on zolendronate due to recent prolia in Sept 2023   Essential hypertension  Blood Pressure: 156/77    Patient is awake enough to tolerate oral medications    Plan:  As above  MATT (obstructive sleep apnea)  BiPAP as needed  History of CVA (cerebrovascular accident)  Recently admitted with multiple small acute infarctions, visualized on 12/27/2024 MRI involving left occipital lobe, bilateral alex, left greater than right, and left cerebellar hemisphere with cytotoxic edema but no hemorrhage.  Also has chronic mild white matter microangiopathic changes in bilateral cerebral hemispheres.    CT head Noncon from this admission is unremarkable for any new strokes or pathologies.    Plan:  Continue aspirin, statin  BP control as noted under hypertensive urgency  Ambulatory dysfunction  Noted after recent stroke at end of December 2024.  Was  discharged home with PT OT.    Plan:  PT OT, can see patient on 1/3 onwards as patient is rather fatigued today due to hypercalcemia  CAROLINE (acute kidney injury) (HCC)  Recent Labs     01/02/25  1107 01/02/25  1913 01/03/25  0505   CREATININE 1.50* 1.59* 1.89*   EGFR 35 32 26     Estimated Creatinine Clearance: 25.1 mL/min (A) (by C-G formula based on SCr of 1.89 mg/dL (H)).    Noted on admission, no hx CKD. Likely from hypercalcemia.    Plan:  IV fluids as per plan for hypercalcemia along w diuresis   Holding losartan    VTE Pharmacologic Prophylaxis: VTE Score: 8 High Risk (Score >/= 5) - Pharmacological DVT Prophylaxis Ordered: heparin. Sequential Compression Devices Ordered.    Mobility:   Basic Mobility Inpatient Raw Score: 17  JH-HLM Goal: 5: Stand one or more mins  JH-HLM Achieved: 1: Laying in bed  JH-HLM Goal NOT achieved. Continue with multidisciplinary rounding and encourage appropriate mobility to improve upon JH-HLM goals.    Patient Centered Rounds: I performed bedside rounds with nursing staff today.   Discussions with Specialists or Other Care Team Provider: nurse, CM    Education and Discussions with Family / Patient: Updated  () at bedside.    Current Length of Stay: 1 day(s)  Current Patient Status: Inpatient   Certification Statement: The patient will continue to require additional inpatient hospital stay due to hyperCa treatment  Discharge Plan: Anticipate discharge in 48-72 hrs to rehab facility.    Code Status: Level 1 - Full Code    Subjective   Limited due to lethargy, feel tired. Otherwise no new complaints.    Objective :  Temp:  [97.7 °F (36.5 °C)-98.8 °F (37.1 °C)] 97.7 °F (36.5 °C)  HR:  [] 85  BP: (125-197)/() 156/77  Resp:  [19] 19  SpO2:  [82 %-95 %] 91 %  O2 Device: None (Room air)  Nasal Cannula O2 Flow Rate (L/min):  [2 L/min] 2 L/min    Body mass index is 27.45 kg/m².     Input and Output Summary (last 24 hours):     Intake/Output Summary (Last 24  hours) at 1/3/2025 1505  Last data filed at 1/3/2025 1300  Gross per 24 hour   Intake 0 ml   Output 900 ml   Net -900 ml       Physical Exam  Constitutional:       Appearance: Normal appearance.   HENT:      Head: Normocephalic and atraumatic.      Nose: Nose normal.   Eyes:      Extraocular Movements: Extraocular movements intact.   Cardiovascular:      Rate and Rhythm: Normal rate and regular rhythm.   Pulmonary:      Effort: Pulmonary effort is normal.      Breath sounds: No wheezing or rales.   Musculoskeletal:      Right lower leg: No edema.      Left lower leg: No edema.   Skin:     General: Skin is warm and dry.   Neurological:      Mental Status: She is alert and oriented to person, place, and time.   Psychiatric:         Mood and Affect: Mood normal.         Behavior: Behavior normal.           Lines/Drains:              Lab Results: I have reviewed the following results:   Results from last 7 days   Lab Units 01/02/25  1913 01/02/25  1107   WBC Thousand/uL  --  10.78*   HEMOGLOBIN g/dL  --  13.5   HEMATOCRIT %  --  42.3   PLATELETS Thousands/uL 384 379   SEGS PCT %  --  86*   LYMPHO PCT %  --  6*   MONO PCT %  --  7   EOS PCT %  --  1     Results from last 7 days   Lab Units 01/03/25  0505 01/02/25  1913   SODIUM mmol/L 142 140   POTASSIUM mmol/L 3.8 4.0   CHLORIDE mmol/L 100 100   CO2 mmol/L 32 29   BUN mg/dL 37* 31*   CREATININE mg/dL 1.89* 1.59*   ANION GAP mmol/L 10 11   CALCIUM mg/dL 14.5* 16.3*   ALBUMIN g/dL  --  4.4   TOTAL BILIRUBIN mg/dL  --  0.56   ALK PHOS U/L  --  31*   ALT U/L  --  30   AST U/L  --  39   GLUCOSE RANDOM mg/dL 156* 157*         Results from last 7 days   Lab Units 01/02/25  1504 12/27/24  1633   POC GLUCOSE mg/dl 123 97     Results from last 7 days   Lab Units 12/27/24 2003   HEMOGLOBIN A1C % 6.1*           Recent Cultures (last 7 days):         Imaging Results Review: No pertinent imaging studies reviewed.  Other Study Results Review: No additional pertinent studies  reviewed.    Last 24 Hours Medication List:     Current Facility-Administered Medications:     acetaminophen (TYLENOL) tablet 650 mg, Q6H PRN    amLODIPine (NORVASC) tablet 5 mg, Daily    aspirin chewable tablet 81 mg, Daily    atorvastatin (LIPITOR) tablet 40 mg, Daily With Dinner    heparin (porcine) subcutaneous injection 5,000 Units, Q8H EVON **AND** [COMPLETED] Platelet count, Once    hydrALAZINE (APRESOLINE) tablet 25 mg, Q8H EVON    labetalol (NORMODYNE) injection 10 mg, Q4H PRN    metoclopramide (REGLAN) injection 10 mg, Q6H PRN    multivitamin stress formula tablet 1 tablet, Daily    nitroGLYcerin (TRIDIL) 50 mg in 250 ml infusion (premix), Titrated, Last Rate: 45 mcg/min (01/03/25 1437)    ondansetron (ZOFRAN) injection 4 mg, Q6H PRN    oxyCODONE (ROXICODONE) IR tablet 5 mg, Q4H PRN    oxyCODONE (ROXICODONE) split tablet 2.5 mg, Q4H PRN    sodium chloride 0.9 % infusion, Continuous, Last Rate: 200 mL/hr (01/03/25 1155)    Administrative Statements   Today, Patient Was Seen By: Cain Smith MD  I have spent a total time of 40 minutes in caring for this patient on the day of the visit/encounter including Diagnostic results, Instructions for management, Patient and family education, Impressions, Counseling / Coordination of care, Documenting in the medical record, Reviewing / ordering tests, medicine, procedures  , Obtaining or reviewing history  , and Communicating with other healthcare professionals .    **Please Note: This note may have been constructed using a voice recognition system.**

## 2025-01-03 NOTE — PLAN OF CARE
Problem: PAIN - ADULT  Goal: Verbalizes/displays adequate comfort level or baseline comfort level  Description: Interventions:  - Encourage patient to monitor pain and request assistance  - Assess pain using appropriate pain scale  - Administer analgesics based on type and severity of pain and evaluate response  - Implement non-pharmacological measures as appropriate and evaluate response  - Consider cultural and social influences on pain and pain management  - Notify physician/advanced practitioner if interventions unsuccessful or patient reports new pain  Outcome: Progressing     Problem: INFECTION - ADULT  Goal: Absence or prevention of progression during hospitalization  Description: INTERVENTIONS:  - Assess and monitor for signs and symptoms of infection  - Monitor lab/diagnostic results  - Monitor all insertion sites, i.e. indwelling lines, tubes, and drains  - Monitor endotracheal if appropriate and nasal secretions for changes in amount and color  - [place] appropriate cooling/warming therapies per order  - Administer medications as ordered  - Instruct and encourage patient and family to use good hand hygiene technique  - Identify and instruct in appropriate isolation precautions for identified infection/condition  Outcome: Progressing     Problem: SAFETY ADULT  Goal: Patient will remain free of falls  Description: INTERVENTIONS:  - Educate patient/family on patient safety including physical limitations  - Instruct patient to call for assistance with activity   - Consult OT/PT to assist with strengthening/mobility   - Keep Call bell within reach  - Keep bed low and locked with side rails adjusted as appropriate  - Keep care items and personal belongings within reach  - Initiate and maintain comfort rounds  - Make Fall Risk Sign visible to staff  - Apply yellow socks and bracelet for high fall risk patients  - Consider moving patient to room near nurses station  Outcome: Progressing  Goal: Maintain or  return to baseline ADL function  Description: INTERVENTIONS:  -  Assess patient's ability to carry out ADLs; assess patient's baseline for ADL function and identify physical deficits which impact ability to perform ADLs (bathing, care of mouth/teeth, toileting, grooming, dressing, etc.)  - Assess/evaluate cause of self-care deficits   - Assess range of motion  - Assess patient's mobility; develop plan if impaired  - Assess patient's need for assistive devices and provide as appropriate  - Encourage maximum independence but intervene and supervise when necessary  - Involve family in performance of ADLs  - Assess for home care needs following discharge   - Consider OT consult to assist with ADL evaluation and planning for discharge  - Provide patient education as appropriate  Outcome: Progressing  Goal: Maintains/Returns to pre admission functional level  Description: INTERVENTIONS:  - Perform AM-PAC 6 Click Basic Mobility/ Daily Activity assessment daily.  - Set and communicate daily mobility goal to care team and patient/family/caregiver.   - Collaborate with rehabilitation services on mobility goals if consulted  - Out of bed for toileting  - Record patient progress and toleration of activity level   Outcome: Progressing     Problem: DISCHARGE PLANNING  Goal: Discharge to home or other facility with appropriate resources  Description: INTERVENTIONS:  - Identify barriers to discharge w/patient and caregiver  - Arrange for needed discharge resources and transportation as appropriate  - Identify discharge learning needs (meds, wound care, etc.)  - Arrange for interpretive services to assist at discharge as needed  - Refer to Case Management Department for coordinating discharge planning if the patient needs post-hospital services based on physician/advanced practitioner order or complex needs related to functional status, cognitive ability, or social support system  Outcome: Progressing     Problem: Knowledge  Deficit  Goal: Patient/family/caregiver demonstrates understanding of disease process, treatment plan, medications, and discharge instructions  Description: Complete learning assessment and assess knowledge base.  Interventions:  - Provide teaching at level of understanding  - Provide teaching via preferred learning methods  Outcome: Progressing     Problem: Prexisting or High Potential for Compromised Skin Integrity  Goal: Skin integrity is maintained or improved  Description: INTERVENTIONS:  - Identify patients at risk for skin breakdown  - Assess and monitor skin integrity  - Assess and monitor nutrition and hydration status  - Monitor labs   - Assess for incontinence   - Turn and reposition patient  - Assist with mobility/ambulation  - Relieve pressure over bony prominences  - Avoid friction and shearing  - Provide appropriate hygiene as needed including keeping skin clean and dry  - Evaluate need for skin moisturizer/barrier cream  - Collaborate with interdisciplinary team   - Patient/family teaching  - Consider wound care consult   Outcome: Progressing

## 2025-01-04 LAB
ANION GAP SERPL CALCULATED.3IONS-SCNC: 4 MMOL/L (ref 4–13)
ANION GAP SERPL CALCULATED.3IONS-SCNC: 8 MMOL/L (ref 4–13)
BUN SERPL-MCNC: 29 MG/DL (ref 5–25)
BUN SERPL-MCNC: 35 MG/DL (ref 5–25)
CALCIUM SERPL-MCNC: 10.1 MG/DL (ref 8.4–10.2)
CALCIUM SERPL-MCNC: 9.7 MG/DL (ref 8.4–10.2)
CHLORIDE SERPL-SCNC: 112 MMOL/L (ref 96–108)
CHLORIDE SERPL-SCNC: 117 MMOL/L (ref 96–108)
CO2 SERPL-SCNC: 23 MMOL/L (ref 21–32)
CO2 SERPL-SCNC: 24 MMOL/L (ref 21–32)
CREAT SERPL-MCNC: 1.28 MG/DL (ref 0.6–1.3)
CREAT SERPL-MCNC: 1.46 MG/DL (ref 0.6–1.3)
GFR SERPL CREATININE-BSD FRML MDRD: 36 ML/MIN/1.73SQ M
GFR SERPL CREATININE-BSD FRML MDRD: 42 ML/MIN/1.73SQ M
GLUCOSE SERPL-MCNC: 93 MG/DL (ref 65–140)
GLUCOSE SERPL-MCNC: 96 MG/DL (ref 65–140)
KAPPA LC FREE SER-MCNC: 15.8 MG/L (ref 3.3–19.4)
KAPPA LC FREE/LAMBDA FREE SER: 1.35 {RATIO} (ref 0.26–1.65)
LAMBDA LC FREE SERPL-MCNC: 11.7 MG/L (ref 5.7–26.3)
POTASSIUM SERPL-SCNC: 3.1 MMOL/L (ref 3.5–5.3)
POTASSIUM SERPL-SCNC: 3.4 MMOL/L (ref 3.5–5.3)
SODIUM SERPL-SCNC: 143 MMOL/L (ref 135–147)
SODIUM SERPL-SCNC: 145 MMOL/L (ref 135–147)

## 2025-01-04 PROCEDURE — 84166 PROTEIN E-PHORESIS/URINE/CSF: CPT

## 2025-01-04 PROCEDURE — 99232 SBSQ HOSP IP/OBS MODERATE 35: CPT | Performed by: INTERNAL MEDICINE

## 2025-01-04 PROCEDURE — 80048 BASIC METABOLIC PNL TOTAL CA: CPT | Performed by: STUDENT IN AN ORGANIZED HEALTH CARE EDUCATION/TRAINING PROGRAM

## 2025-01-04 RX ORDER — POTASSIUM CHLORIDE 1500 MG/1
40 TABLET, EXTENDED RELEASE ORAL ONCE
Status: COMPLETED | OUTPATIENT
Start: 2025-01-04 | End: 2025-01-04

## 2025-01-04 RX ADMIN — HYDRALAZINE HYDROCHLORIDE 25 MG: 25 TABLET ORAL at 14:07

## 2025-01-04 RX ADMIN — HYDRALAZINE HYDROCHLORIDE 25 MG: 25 TABLET ORAL at 05:41

## 2025-01-04 RX ADMIN — POTASSIUM CHLORIDE 40 MEQ: 1500 TABLET, EXTENDED RELEASE ORAL at 08:33

## 2025-01-04 RX ADMIN — HEPARIN SODIUM 5000 UNITS: 5000 INJECTION, SOLUTION INTRAVENOUS; SUBCUTANEOUS at 14:07

## 2025-01-04 RX ADMIN — SODIUM CHLORIDE 100 ML/HR: 0.9 INJECTION, SOLUTION INTRAVENOUS at 14:12

## 2025-01-04 RX ADMIN — HEPARIN SODIUM 5000 UNITS: 5000 INJECTION, SOLUTION INTRAVENOUS; SUBCUTANEOUS at 05:42

## 2025-01-04 RX ADMIN — SODIUM CHLORIDE 200 ML/HR: 0.9 INJECTION, SOLUTION INTRAVENOUS at 08:30

## 2025-01-04 RX ADMIN — ASPIRIN 81 MG CHEWABLE TABLET 81 MG: 81 TABLET CHEWABLE at 08:33

## 2025-01-04 RX ADMIN — B-COMPLEX W/ C & FOLIC ACID TAB 1 TABLET: TAB at 08:33

## 2025-01-04 RX ADMIN — HYDRALAZINE HYDROCHLORIDE 25 MG: 25 TABLET ORAL at 22:26

## 2025-01-04 RX ADMIN — HEPARIN SODIUM 5000 UNITS: 5000 INJECTION, SOLUTION INTRAVENOUS; SUBCUTANEOUS at 22:26

## 2025-01-04 RX ADMIN — ATORVASTATIN CALCIUM 40 MG: 40 TABLET, FILM COATED ORAL at 17:15

## 2025-01-04 RX ADMIN — OXYCODONE HYDROCHLORIDE 5 MG: 5 TABLET ORAL at 22:25

## 2025-01-04 RX ADMIN — AMLODIPINE BESYLATE 5 MG: 5 TABLET ORAL at 08:33

## 2025-01-04 NOTE — ASSESSMENT & PLAN NOTE
Recent Labs     01/03/25  0505 01/03/25  2100 01/04/25  0748   CREATININE 1.89* 1.90* 1.46*   EGFR 26 26 36     Estimated Creatinine Clearance: 32.4 mL/min (A) (by C-G formula based on SCr of 1.46 mg/dL (H)).    Noted on admission, no hx CKD. Likely from hypercalcemia.    Plan:  IV fluids as per plan for hypercalcemia along w diuresis   Holding losartan

## 2025-01-04 NOTE — PLAN OF CARE
Problem: PAIN - ADULT  Goal: Verbalizes/displays adequate comfort level or baseline comfort level  Description: Interventions:  - Encourage patient to monitor pain and request assistance  - Assess pain using appropriate pain scale  - Administer analgesics based on type and severity of pain and evaluate response  - Implement non-pharmacological measures as appropriate and evaluate response  - Consider cultural and social influences on pain and pain management  - Notify physician/advanced practitioner if interventions unsuccessful or patient reports new pain  Outcome: Progressing     Problem: INFECTION - ADULT  Goal: Absence or prevention of progression during hospitalization  Description: INTERVENTIONS:  - Assess and monitor for signs and symptoms of infection  - Monitor lab/diagnostic results  - Monitor all insertion sites, i.e. indwelling lines, tubes, and drains  - Monitor endotracheal if appropriate and nasal secretions for changes in amount and color  - [place] appropriate cooling/warming therapies per order  - Administer medications as ordered  - Instruct and encourage patient and family to use good hand hygiene technique  - Identify and instruct in appropriate isolation precautions for identified infection/condition  Outcome: Progressing     Problem: SAFETY ADULT  Goal: Patient will remain free of falls  Description: INTERVENTIONS:  - Educate patient/family on patient safety including physical limitations  - Instruct patient to call for assistance with activity   - Consult OT/PT to assist with strengthening/mobility   - Keep Call bell within reach  - Keep bed low and locked with side rails adjusted as appropriate  - Keep care items and personal belongings within reach  - Initiate and maintain comfort rounds  - Make Fall Risk Sign visible to staff  - Offer Toileting every  Hours, in advance of need  - Initiate/Maintain alarm  - Obtain necessary fall risk management equipment:   - Apply yellow socks and  bracelet for high fall risk patients  - Consider moving patient to room near nurses station  Outcome: Progressing  Goal: Maintain or return to baseline ADL function  Description: INTERVENTIONS:  -  Assess patient's ability to carry out ADLs; assess patient's baseline for ADL function and identify physical deficits which impact ability to perform ADLs (bathing, care of mouth/teeth, toileting, grooming, dressing, etc.)  - Assess/evaluate cause of self-care deficits   - Assess range of motion  - Assess patient's mobility; develop plan if impaired  - Assess patient's need for assistive devices and provide as appropriate  - Encourage maximum independence but intervene and supervise when necessary  - Involve family in performance of ADLs  - Assess for home care needs following discharge   - Consider OT consult to assist with ADL evaluation and planning for discharge  - Provide patient education as appropriate  Outcome: Progressing  Goal: Maintains/Returns to pre admission functional level  Description: INTERVENTIONS:  - Perform AM-PAC 6 Click Basic Mobility/ Daily Activity assessment daily.  - Set and communicate daily mobility goal to care team and patient/family/caregiver.   - Collaborate with rehabilitation services on mobility goals if consulted  - Perform Range of Motion \ times a day.  - Reposition patient every \ hours.  - Dangle patient \ times a day  - Stand patient \ times a day  - Ambulate patient \ times a day  - Out of bed to chair \ times a day   - Out of bed for meals  times a day  - Out of bed for toileting  - Record patient progress and toleration of activity level   Outcome: Progressing     Problem: DISCHARGE PLANNING  Goal: Discharge to home or other facility with appropriate resources  Description: INTERVENTIONS:  - Identify barriers to discharge w/patient and caregiver  - Arrange for needed discharge resources and transportation as appropriate  - Identify discharge learning needs (meds, wound care,  etc.)  - Arrange for interpretive services to assist at discharge as needed  - Refer to Case Management Department for coordinating discharge planning if the patient needs post-hospital services based on physician/advanced practitioner order or complex needs related to functional status, cognitive ability, or social support system  Outcome: Progressing     Problem: Knowledge Deficit  Goal: Patient/family/caregiver demonstrates understanding of disease process, treatment plan, medications, and discharge instructions  Description: Complete learning assessment and assess knowledge base.  Interventions:  - Provide teaching at level of understanding  - Provide teaching via preferred learning methods  Outcome: Progressing     Problem: Prexisting or High Potential for Compromised Skin Integrity  Goal: Skin integrity is maintained or improved  Description: INTERVENTIONS:  - Identify patients at risk for skin breakdown  - Assess and monitor skin integrity  - Assess and monitor nutrition and hydration status  - Monitor labs   - Assess for incontinence   - Turn and reposition patient  - Assist with mobility/ambulation  - Relieve pressure over bony prominences  - Avoid friction and shearing  - Provide appropriate hygiene as needed including keeping skin clean and dry  - Evaluate need for skin moisturizer/barrier cream  - Collaborate with interdisciplinary team   - Patient/family teaching  - Consider wound care consult   Outcome: Progressing

## 2025-01-04 NOTE — ASSESSMENT & PLAN NOTE
Noted on admission.BP initially 191/90, as high as 202/80 on manual.  And CAROLINE  Will hold losartan due to CAROLINE  continue PO hydralazine for now  Wean off nitroglycerin as BP tolerates  Adjust BP meds PRN

## 2025-01-04 NOTE — ASSESSMENT & PLAN NOTE
Noted after recent stroke at end of December 2024.  Was discharged home with PT OT.    Plan: PT/OT  Discharge planning to IP rehabilitation

## 2025-01-04 NOTE — ASSESSMENT & PLAN NOTE
Previous sleep study is likely falsely negative  Upon Masimo monitor, when sleeping, she has visible apneic events with associated hypoxia  D/w her  that she will likely need another sleep titration study  Due to her recent history of CVA will try to enroll her in the Sleep for Stroke Management And Recovery Trial  Check overnight pulse oximetry and AM ABG

## 2025-01-04 NOTE — ASSESSMENT & PLAN NOTE
Severe, 16 on admission.  Recently hospitalized within past 2 weeks for multifocal strokes, was sent home with outpatient PT OT.  Was oral supplementation of milligram calcium tabs twice daily, but unfortunately was taking 1200 mg twice daily, double instructed dose.    resolved  Discontinued calcium supplements  Continue IV Fluids due to CAROLINE as mentioned below  completed calcitonin  BMP daily

## 2025-01-04 NOTE — UTILIZATION REVIEW
Continued Stay Review    SEE INITIAL REVIEW AT BOTTOM         Current Patient Class: Inpatient  Current Level of Care: med surg    HPI:69 y.o. female initially admitted on 1/2/25     Date: 1/4/25  Day 3: Has surpassed a 2nd midnight with active treatments and services. Patient remain tired but arousable. Continue IVF, daily BMP. Completed calcitonin. Wean off nitro as BP tolerates, adjust BP meds. Check overnight pulse ox and AM ABG. Continue PT OT, plan for IP rehab. CM following. The patient will continue to require additional inpatient hospital stay due to CAROLINE, discharge planning.    Medications:   Scheduled Medications:  amLODIPine, 5 mg, Oral, Daily  aspirin, 81 mg, Oral, Daily  atorvastatin, 40 mg, Oral, Daily With Dinner  heparin (porcine), 5,000 Units, Subcutaneous, Q8H EVON  hydrALAZINE, 25 mg, Oral, Q8H EVON  multivitamin stress formula, 1 tablet, Oral, Daily      Continuous IV Infusions:  sodium chloride, 100 mL/hr, Intravenous, Continuous      PRN Meds:  acetaminophen, 650 mg, Oral, Q6H PRN x1  labetalol, 10 mg, Intravenous, Q4H PRN  metoclopramide, 10 mg, Intravenous, Q6H PRN x1  ondansetron, 4 mg, Intravenous, Q6H PRN x3  oxyCODONE, 5 mg, Oral, Q4H PRN  oxyCODONE, 2.5 mg, Oral, Q4H PRN      Discharge Plan: tbd    Vital Signs (last 3 days)       Date/Time Temp Pulse Resp BP MAP (mmHg) SpO2 Calculated FIO2 (%) - Nasal Cannula Nasal Cannula O2 Flow Rate (L/min) O2 Device Patient Position - Orthostatic VS Newman Coma Scale Score Pain    01/04/25 15:26:23 99.1 °F (37.3 °C) 78 -- 164/84 111 93 % -- -- -- -- -- --    01/04/25 14:09:55 -- 78 -- 157/75 102 91 % -- -- -- -- -- --    01/04/25 1407 -- -- -- 157/75 -- -- -- -- -- -- -- --    01/04/25 08:29:18 -- 80 -- 154/72 99 93 % -- -- -- -- -- --    01/04/25 0800 -- -- -- -- -- -- -- -- -- -- 15 No Pain    01/04/25 07:14:07 98.8 °F (37.1 °C) 75 18 139/57 84 91 % -- -- -- -- -- --    01/04/25 05:40:48 -- 76 -- 147/64 92 92 % -- -- -- -- -- --    01/04/25 03:33:14  -- 81 -- 149/59 89 91 % -- -- -- -- -- --    01/03/25 22:12:30 98.7 °F (37.1 °C) 86 -- 159/73 102 95 % -- -- -- -- -- --    01/03/25 2100 -- -- -- -- -- -- -- -- -- -- -- No Pain    01/03/25 2020 -- -- -- -- -- -- -- -- -- -- 15 --    01/03/25 19:27:01 -- 83 -- 146/74 98 94 % -- -- -- -- -- --    01/03/25 18:32:34 -- 81 -- 142/67 92 94 % -- -- -- -- -- --    01/03/25 16:41:19 -- 82 -- 154/73 100 94 % -- -- -- -- -- --    01/03/25 14:47:30 97.7 °F (36.5 °C) 85 -- 156/77 103 91 % -- -- -- -- -- --    01/03/25 14:36:43 -- 86 -- 156/77 103 90 % -- -- -- -- -- --    01/03/25 13:12:47 -- 85 -- 152/83 106 92 % -- -- -- -- -- --    01/03/25 11:57:44 -- 79 -- 161/76 104 90 % -- -- -- -- -- --    01/03/25 09:46:01 -- 87 -- 152/77 102 91 % -- -- -- -- -- --    01/03/25 0900 -- -- -- -- -- -- -- -- -- -- -- No Pain    01/03/25 0800 -- -- -- -- -- -- -- -- None (Room air) -- 15 --    01/03/25 07:57:38 98.8 °F (37.1 °C) 80 -- 164/76 105 92 % -- -- -- -- -- --    01/03/25 07:35:14 -- 82 -- 154/73 100 91 % -- -- -- -- -- --    01/03/25 0425 -- 68 -- 143/67 92 92 % -- -- -- -- -- --    01/03/25 0420 -- 68 -- 135/64 88 91 % -- -- -- -- -- --    01/03/25 0415 -- 68 -- 143/61 88 91 % -- -- -- -- -- --    01/03/25 0410 -- 69 -- 153/70 98 90 % -- -- -- -- -- --    01/03/25 0405 -- 71 -- 148/72 97 90 % -- -- -- -- -- --    01/03/25 0400 -- -- -- -- -- -- -- -- -- -- 14 --    01/03/25 0325 -- 71 -- 144/60 88 91 % -- -- -- -- -- --    01/03/25 0320 -- 73 -- 141/59 86 89 % -- -- -- -- -- --    01/03/25 0315 -- 89 -- 126/65 85 91 % -- -- -- -- -- --    01/03/25 0310 -- 72 -- 134/62 86 89 % -- -- -- -- -- --    01/03/25 0305 -- 74 -- 139/63 88 82 % -- -- -- -- -- --    01/03/25 0300 -- 70 -- 136/62 87 84 % -- -- -- -- -- --    01/03/25 0255 -- 70 -- 135/58 84 83 % -- -- -- -- -- --    01/03/25 0250 -- 69 -- 141/56 84 84 % -- -- -- -- -- --    01/03/25 0245 -- 68 -- 138/57 84 84 % -- -- -- -- -- --    01/03/25 0240 -- 69 -- 132/57 82 83 % -- --  -- -- -- --    01/03/25 0235 -- 69 -- 133/55 81 83 % -- -- -- -- -- --    01/03/25 0230 -- 69 -- 136/58 84 84 % -- -- -- -- -- --    01/03/25 0225 -- 68 -- 140/58 85 84 % -- -- -- -- -- --    01/03/25 0220 -- 68 -- 148/60 89 83 % -- -- -- -- -- --    01/03/25 0215 -- 69 -- 141/63 89 86 % -- -- -- -- -- --    01/03/25 0210 -- 69 -- 136/64 88 85 % -- -- -- -- -- --    01/03/25 0205 -- 80 -- 125/50 75 90 % -- -- -- -- -- --    01/03/25 0200 -- 66 -- -- -- 88 % -- -- -- -- -- --    01/03/25 0155 -- 66 -- -- -- 84 % -- -- -- -- -- --    01/03/25 0150 -- 66 -- -- -- 83 % -- -- -- -- -- --    01/03/25 0145 -- 68 -- -- -- 88 % -- -- -- -- -- --    01/03/25 0140 -- 75 -- -- -- 90 % -- -- -- -- -- --    01/03/25 0135 -- 85 -- 144/68 93 89 % -- -- -- -- -- --    01/03/25 0130 -- 76 -- -- -- 90 % -- -- -- -- -- --    01/03/25 0125 -- 82 -- 140/62 88 89 % -- -- -- -- -- --    01/03/25 0120 -- 85 -- -- -- 89 % -- -- -- -- -- --    01/03/25 0115 -- 79 -- -- -- 89 % -- -- -- -- -- --    01/03/25 0110 -- 95 -- 150/77 101 90 % -- -- -- -- -- --    01/03/25 0105 -- 72 -- 145/65 92 92 % -- -- -- -- -- --    01/03/25 0100 -- 72 -- 139/59 86 91 % -- -- -- -- -- --    01/03/25 00:52:25 -- 94 -- 131/63 86 90 % -- -- -- -- -- --    01/03/25 0050 -- 89 -- 131/63 86 87 % -- -- -- -- -- --    01/03/25 0045 -- 87 -- -- -- 89 % -- -- -- -- -- --    01/03/25 0040 -- 90 -- -- -- 89 % -- -- -- -- -- --    01/03/25 0035 -- 97 -- -- -- 86 % -- -- -- -- -- --    01/03/25 0030 -- 85 -- 155/72 100 87 % -- -- -- -- -- --    01/03/25 0025 -- 107 -- -- -- 92 % -- -- -- -- -- --    01/03/25 0020 -- 84 -- -- -- 90 % -- -- -- -- -- --    01/03/25 0015 -- 88 -- -- -- 89 % -- -- -- -- -- --    01/03/25 0010 -- 90 -- -- -- 86 % -- -- -- -- -- --    01/03/25 0005 -- 91 -- -- -- 89 % -- -- -- -- -- --    01/03/25 0000 -- 69 -- 135/59 84 90 % -- -- -- -- 14 --    01/02/25 2330 -- 67 -- 130/54 79 89 % -- -- -- -- -- --    01/02/25 2300 -- 72 -- 139/62 88 88 % -- --  -- -- -- --    01/02/25 2230 -- 90 -- 145/78 100 87 % -- -- -- -- -- --    01/02/25 22:26:07 98.6 °F (37 °C) 90 19 146/76 99 90 % -- -- None (Room air) Lying -- --    01/02/25 22:11:03 -- 95 -- 168/81 110 88 % -- -- -- -- -- --    01/02/25 22:03:44 -- 96 -- 168/81 110 90 % -- -- -- -- -- --    01/02/25 21:00:13 -- 102 -- 171/85 114 93 % -- -- -- -- -- --    01/02/25 20:27:30 -- 96 -- 176/83 114 95 % -- -- -- -- -- --    01/02/25 2000 -- -- -- -- -- -- 28 2 L/min Nasal cannula -- 14 No Pain    01/02/25 19:49:36 -- -- -- 154/83 107 -- -- -- -- -- -- --    01/02/25 1930 -- -- -- 148/78 101 -- -- -- -- -- -- --    01/02/25 1915 -- -- -- 149/73 98 -- -- -- -- -- -- --    01/02/25 19:14:42 -- -- -- 149/73 98 -- -- -- -- -- -- --    01/02/25 1900 -- -- -- 167/72 104 -- -- -- -- -- -- --    01/02/25 1845 -- -- -- 163/83 110 -- -- -- -- -- -- --    01/02/25 1830 -- -- -- 143/89 107 -- -- -- -- -- -- --    01/02/25 18:23:16 -- -- -- 143/89 107 -- -- -- -- -- -- --    01/02/25 1818 -- 97 -- 191/100 -- -- -- -- -- -- -- --    01/02/25 17:27:32 -- -- -- 144/73 97 -- -- -- -- -- -- --    01/02/25 1715 -- -- -- -- -- -- -- -- -- -- 15 --    01/02/25 17:07:49 -- -- -- 147/70 96 -- -- -- -- -- -- --    01/02/25 1645 -- -- -- 177/75 109 -- -- -- -- -- -- --    01/02/25 1630 -- -- -- 176/76 109 -- -- -- -- -- -- --    01/02/25 1543 -- 86 -- -- -- -- -- -- -- -- -- --    01/02/25 1530 -- -- -- 197/115 142 -- -- -- -- -- -- --    01/02/25 1515 -- -- -- 184/83 117 -- -- -- -- -- -- --    01/02/25 1513 -- -- -- -- -- 91 % -- -- None (Room air) -- 15 No Pain    01/02/25 15:04:57 -- -- -- 154/99 -- -- -- -- -- -- -- --    01/02/25 1500 -- -- -- 211/92 132 -- -- -- -- -- -- --    01/02/25 1455 -- -- -- 202/80 -- -- -- -- -- Lying -- --    01/02/25 14:53:03 98.3 °F (36.8 °C) 88 20 211/92 132 -- -- -- -- Lying -- --    01/02/25 1415 -- 72 14 -- -- 96 % -- -- -- -- -- --    01/02/25 1110 -- -- -- -- -- -- -- -- Nasal cannula -- -- --     01/02/25 1109 -- -- -- -- -- -- -- -- -- -- 14 --    01/02/25 1045 -- 80 15 177/95 129 96 % 28 2 L/min Nasal cannula -- -- --    01/02/25 1022 97.3 °F (36.3 °C) 92 16 191/90 129 95 % -- -- -- -- -- --          Weight (last 2 days)       Date/Time Weight    01/03/25 0600 68.1 (150.1)            Pertinent Labs/Diagnostic Results:   Radiology:  CT head wo contrast   Final Interpretation by Juve Villegas MD (01/03 0647)      Evolving left pontine recent infarct, without associated hemorrhage.      Additional small recent infarcts identified on brain MRI from 12/27/2024 is not visualized on this examination. Old lacunar infarcts involving the bilateral similar hemispheres.      Mild chronic white matter microangiopathic changes.                  Workstation performed: [id number]         CT head wo contrast   Final Interpretation by Solomon Santos MD (01/02 1239)      The previously seen multiple small acute infarctions on the preceding brain MRI are largely occult on this CT with exception of ill-defined hypoattenuation in the left hemipons which likely corresponds to at least some of the recent ischemia. No    territorial infarction. No intracranial hemorrhage.                  Workstation performed: [id number]         XR chest pa and lateral   Final Interpretation by Darinel Feliz DO (01/02 6669)      No acute cardiopulmonary disease on this examination, which is somewhat limited secondary to low lung volumes.            Resident: Angel Fallon I, the attending radiologist, have reviewed the images and agree with the final report above.      Workstation performed: [id number]           Cardiology:  ECG 12 lead   Final Result by Jamarcus Perez MD (01/03 0938)   Normal sinus rhythm   Voltage criteria for left ventricular hypertrophy   Nonspecific ST and T wave abnormality   Abnormal ECG   When compared with ECG of 27-Dec-2024 19:37,   ST now depressed in Lateral leads   T wave inversion now evident in  Inferior leads   T wave inversion now evident in Anterior leads   Confirmed by Jamarcus Perez ([id number]) on 1/3/2025 9:38:34 AM        GI:  No orders to display           Results from last 7 days   Lab Units 01/02/25 1913 01/02/25  1107   WBC Thousand/uL  --  10.78*   HEMOGLOBIN g/dL  --  13.5   HEMATOCRIT %  --  42.3   PLATELETS Thousands/uL 384 379   TOTAL NEUT ABS Thousands/µL  --  9.22*         Results from last 7 days   Lab Units 01/04/25  0748 01/03/25  2100 01/03/25  0505 01/02/25 1913 01/02/25  1154 01/02/25  1107   SODIUM mmol/L 145 143 142 140  --  138   POTASSIUM mmol/L 3.1* 3.6 3.8 4.0  --  3.8   CHLORIDE mmol/L 117* 109* 100 100  --  102   CO2 mmol/L 24 29 32 29  --  28   ANION GAP mmol/L 4 5 10 11  --  8   BUN mg/dL 35* 45* 37* 31*  --  32*   CREATININE mg/dL 1.46* 1.90* 1.89* 1.59*  --  1.50*   EGFR ml/min/1.73sq m 36 26 26 32  --  35   CALCIUM mg/dL 9.7 11.6* 14.5* 16.3*  --  17.0*   CALCIUM, IONIZED mmol/L  --   --   --   --  2.00*  --    MAGNESIUM mg/dL  --   --   --   --  1.8*  --    PHOSPHORUS mg/dL  --   --  4.0  --   --  4.8*     Results from last 7 days   Lab Units 01/03/25  0505 01/02/25 1913 01/02/25  1154   AST U/L  --  39 30   ALT U/L  --  30 26   ALK PHOS U/L  --  31* 28*   TOTAL PROTEIN g/dL 6.9 7.5 6.7   ALBUMIN g/dL  --  4.4 4.0   TOTAL BILIRUBIN mg/dL  --  0.56 0.46   BILIRUBIN DIRECT mg/dL  --   --  0.16     Results from last 7 days   Lab Units 01/02/25  1504   POC GLUCOSE mg/dl 123     Results from last 7 days   Lab Units 01/04/25  0748 01/03/25  2100 01/03/25  0505 01/02/25  1913 01/02/25  1107   GLUCOSE RANDOM mg/dL 96 119 156* 157* 138     Results from last 7 days   Lab Units 01/02/25  1649 01/02/25  1354 01/02/25  1107   HS TNI 0HR ng/L  --   --  27   HS TNI 2HR ng/L  --  34  --    HSTNI D2 ng/L  --  7  --    HS TNI 4HR ng/L 39  --   --    HSTNI D4 ng/L 12  --   --      Results from last 7 days   Lab Units 01/02/25  1616   CLARITY UA  Clear   COLOR UA  Colorless   SPEC GRAV UA   1.007   PH UA  7.0   GLUCOSE UA mg/dl Negative   KETONES UA mg/dl Negative   BLOOD UA  Negative   PROTEIN UA mg/dl Negative   NITRITE UA  Negative   BILIRUBIN UA  Negative   UROBILINOGEN UA (BE) mg/dl <2.0   LEUKOCYTES UA  Negative   WBC UA /hpf 1-2   RBC UA /hpf 1-2   BACTERIA UA /hpf None Seen   EPITHELIAL CELLS WET PREP /hpf None Seen     Network Utilization Review Department  ATTENTION: Please call with any questions or concerns to [phone] and carefully listen to the prompts so that you are directed to the right person. All voicemails are confidential.   For Discharge needs, contact Care Management DC Support Team at [phone] opt. [phone]  Send all requests for admission clinical reviews, approved or denied determinations and any other requests to dedicated fax number below belonging to the campus where the patient is receiving treatment. List of dedicated fax numbers for the Facilities:  FACILITY NAME UR FAX NUMBER   ADMISSION DENIALS (Administrative/Medical Necessity) [phone]   DISCHARGE SUPPORT TEAM (NETWORK) [phone]   PARENT CHILD HEALTH (Maternity/NICU/Pediatrics) [phone]   [facility] [phone]   [facility] [phone]   [facility] [phone]   [facility] [phone]   [facility] [phone]   [facility] [phone]   [facility] [phone]   [facility] [phone]   [facility] [phone]   [facility] [phone]   [facility] [phone]   [facility] [phone]

## 2025-01-04 NOTE — PROGRESS NOTES
Progress Note - Hospitalist   Name: Mayra Echevarria 69 y.o. female I MRN: [id number]  Unit/Bed#: [facility] 726-01 I Date of Admission: 1/2/2025   Date of Service: 1/4/2025 I Hospital Day: 2    Assessment & Plan  Hypercalcemia  Severe, 17 on admission.  Recently hospitalized within past 2 weeks for multifocal strokes, was sent home with outpatient PT OT.  Was oral supplementation of milligram calcium tabs twice daily, but unfortunately was taking 1200 mg twice daily, double instructed dose.    resolved  Discontinued calcium supplements  Continue IV Fluids due to CAROLINE as mentioned below  completed calcitonin  BMP daily    Hypertensive emergency  Noted on admission.BP initially 191/90, as high as 202/80 on manual.  And CAROLINE  Will hold losartan due to CAROLINE  continue PO hydralazine for now  Wean off nitroglycerin as BP tolerates  Adjust BP meds PRN  Senile osteoporosis  On prolia outpatient, last injection Sept 2023    See hypercalcemia plan  Likely will hold off on calcium at time of discharge, if not reduced oral dosing such as 600 mg daily (vs previous 600 mg BID, as pt taking 1200 mg BID)    Plan:  Hold off on calcium as inpatient   Hold off on zolendronate due to recent prolia in Sept 2023   Essential hypertension  Blood Pressure: 164/84    Patient is awake enough to tolerate oral medications    Plan:  As above  MATT (obstructive sleep apnea)  Previous sleep study is likely falsely negative  Upon Masimo monitor, when sleeping, she has visible apneic events with associated hypoxia  D/w her  that she will likely need another sleep titration study  Due to her recent history of CVA will try to enroll her in the Sleep for Stroke Management And Recovery Trial  Check overnight pulse oximetry and AM ABG  History of CVA (cerebrovascular accident)  Recently admitted with multiple small acute infarctions, visualized on 12/27/2024 MRI involving left occipital lobe, bilateral alex, left greater than right, and left cerebellar  hemisphere with cytotoxic edema but no hemorrhage.  Also has chronic mild white matter microangiopathic changes in bilateral cerebral hemispheres.  CT head Noncon from this admission is unremarkable for any new strokes or pathologies.    Plan:  Continue aspirin, statin  BP control as noted under hypertensive urgency  Ambulatory dysfunction  Noted after recent stroke at end of December 2024.  Was discharged home with PT OT.    Plan: PT/OT  Discharge planning to IP rehabilitation  CAROLINE (acute kidney injury) (HCC)  Recent Labs     01/03/25  0505 01/03/25  2100 01/04/25  0748   CREATININE 1.89* 1.90* 1.46*   EGFR 26 26 36     Estimated Creatinine Clearance: 32.4 mL/min (A) (by C-G formula based on SCr of 1.46 mg/dL (H)).    Noted on admission, no hx CKD. Likely from hypercalcemia.    Plan:  IV fluids as per plan for hypercalcemia along w diuresis   Holding losartan    VTE Pharmacologic Prophylaxis: VTE Score: 8 High Risk (Score >/= 5) - Pharmacological DVT Prophylaxis Ordered: heparin. Sequential Compression Devices Ordered.    Mobility:   Basic Mobility Inpatient Raw Score: 17  JH-HLM Goal: 5: Stand one or more mins  JH-HLM Achieved: 6: Walk 10 steps or more  JH-HLM Goal NOT achieved. Continue with multidisciplinary rounding and encourage appropriate mobility to improve upon JH-HLM goals.    Patient Centered Rounds: I performed bedside rounds with nursing staff today.   Discussions with Specialists or Other Care Team Provider: nurse, CM    Education and Discussions with Family / Patient: Updated  () at bedside.    Current Length of Stay: 2 day(s)  Current Patient Status: Inpatient   Certification Statement: The patient will continue to require additional inpatient hospital stay due to CAROLINE, discharge planning  Discharge Plan: Anticipate discharge in 24-48 hrs to rehab facility.    Code Status: Level 1 - Full Code    Subjective   Denies any new complaints. Remains tired but arousal. Denies any  abdominal pain, back pain.    Objective :  Temp:  [98.7 °F (37.1 °C)-99.1 °F (37.3 °C)] 99.1 °F (37.3 °C)  HR:  [75-86] 78  BP: (139-164)/(57-84) 164/84  Resp:  [18] 18  SpO2:  [91 %-95 %] 93 %    Body mass index is 27.45 kg/m².     Input and Output Summary (last 24 hours):     Intake/Output Summary (Last 24 hours) at 1/4/2025 1535  Last data filed at 1/4/2025 1252  Gross per 24 hour   Intake 3960 ml   Output 700 ml   Net 3260 ml       Physical Exam  Constitutional:       Appearance: Normal appearance.   HENT:      Head: Normocephalic and atraumatic.      Nose: Nose normal.   Eyes:      Extraocular Movements: Extraocular movements intact.   Cardiovascular:      Rate and Rhythm: Normal rate and regular rhythm.   Pulmonary:      Effort: Pulmonary effort is normal.      Breath sounds: No wheezing or rales.   Abdominal:      General: There is no distension.      Palpations: Abdomen is soft.      Tenderness: There is no abdominal tenderness.   Musculoskeletal:      Right lower leg: No edema.      Left lower leg: No edema.   Skin:     General: Skin is warm and dry.   Neurological:      Mental Status: She is alert and oriented to person, place, and time.   Psychiatric:         Mood and Affect: Mood normal.         Behavior: Behavior normal.           Lines/Drains:              Lab Results: I have reviewed the following results:   Results from last 7 days   Lab Units 01/02/25  1913 01/02/25  1107   WBC Thousand/uL  --  10.78*   HEMOGLOBIN g/dL  --  13.5   HEMATOCRIT %  --  42.3   PLATELETS Thousands/uL 384 379   SEGS PCT %  --  86*   LYMPHO PCT %  --  6*   MONO PCT %  --  7   EOS PCT %  --  1     Results from last 7 days   Lab Units 01/04/25  0748 01/03/25  0505 01/02/25  1913   SODIUM mmol/L 145   < > 140   POTASSIUM mmol/L 3.1*   < > 4.0   CHLORIDE mmol/L 117*   < > 100   CO2 mmol/L 24   < > 29   BUN mg/dL 35*   < > 31*   CREATININE mg/dL 1.46*   < > 1.59*   ANION GAP mmol/L 4   < > 11   CALCIUM mg/dL 9.7   < > 16.3*    ALBUMIN g/dL  --   --  4.4   TOTAL BILIRUBIN mg/dL  --   --  0.56   ALK PHOS U/L  --   --  31*   ALT U/L  --   --  30   AST U/L  --   --  39   GLUCOSE RANDOM mg/dL 96   < > 157*    < > = values in this interval not displayed.         Results from last 7 days   Lab Units 01/02/25  1504   POC GLUCOSE mg/dl 123               Recent Cultures (last 7 days):         Imaging Results Review: No pertinent imaging studies reviewed.  Other Study Results Review: No additional pertinent studies reviewed.    Last 24 Hours Medication List:     Current Facility-Administered Medications:     acetaminophen (TYLENOL) tablet 650 mg, Q6H PRN    amLODIPine (NORVASC) tablet 5 mg, Daily    aspirin chewable tablet 81 mg, Daily    atorvastatin (LIPITOR) tablet 40 mg, Daily With Dinner    heparin (porcine) subcutaneous injection 5,000 Units, Q8H EVON **AND** [COMPLETED] Platelet count, Once    hydrALAZINE (APRESOLINE) tablet 25 mg, Q8H EVON    labetalol (NORMODYNE) injection 10 mg, Q4H PRN    metoclopramide (REGLAN) injection 10 mg, Q6H PRN    multivitamin stress formula tablet 1 tablet, Daily    nitroGLYcerin (TRIDIL) 50 mg in 250 ml infusion (premix), Titrated, Last Rate: Stopped (01/03/25 1927)    ondansetron (ZOFRAN) injection 4 mg, Q6H PRN    oxyCODONE (ROXICODONE) IR tablet 5 mg, Q4H PRN    oxyCODONE (ROXICODONE) split tablet 2.5 mg, Q4H PRN    sodium chloride 0.9 % infusion, Continuous, Last Rate: 100 mL/hr (01/04/25 1412)    Administrative Statements   Today, Patient Was Seen By: Cain Smith MD  I have spent a total time of 40 minutes in caring for this patient on the day of the visit/encounter including Diagnostic results, Instructions for management, Patient and family education, Impressions, Counseling / Coordination of care, Documenting in the medical record, Reviewing / ordering tests, medicine, procedures  , Obtaining or reviewing history  , and Communicating with other healthcare professionals .    **Please Note: This note  may have been constructed using a voice recognition system.**

## 2025-01-05 LAB
ANION GAP SERPL CALCULATED.3IONS-SCNC: 7 MMOL/L (ref 4–13)
BASE EX.OXY STD BLDV CALC-SCNC: 96.8 % (ref 60–80)
BASE EXCESS BLDV CALC-SCNC: -3.2 MMOL/L
BUN SERPL-MCNC: 25 MG/DL (ref 5–25)
CALCIUM SERPL-MCNC: 9.4 MG/DL (ref 8.4–10.2)
CHLORIDE SERPL-SCNC: 113 MMOL/L (ref 96–108)
CO2 SERPL-SCNC: 23 MMOL/L (ref 21–32)
CREAT SERPL-MCNC: 1.08 MG/DL (ref 0.6–1.3)
GFR SERPL CREATININE-BSD FRML MDRD: 52 ML/MIN/1.73SQ M
GLUCOSE SERPL-MCNC: 87 MG/DL (ref 65–140)
HCO3 BLDV-SCNC: 21.7 MMOL/L (ref 24–30)
O2 CT BLDV-SCNC: 14.7 ML/DL
PCO2 BLDV: 37.9 MM HG (ref 42–50)
PH BLDV: 7.38 [PH] (ref 7.3–7.4)
PO2 BLDV: 132.1 MM HG (ref 35–45)
POTASSIUM SERPL-SCNC: 3.1 MMOL/L (ref 3.5–5.3)
SODIUM SERPL-SCNC: 143 MMOL/L (ref 135–147)

## 2025-01-05 PROCEDURE — 99232 SBSQ HOSP IP/OBS MODERATE 35: CPT | Performed by: INTERNAL MEDICINE

## 2025-01-05 PROCEDURE — 80048 BASIC METABOLIC PNL TOTAL CA: CPT | Performed by: STUDENT IN AN ORGANIZED HEALTH CARE EDUCATION/TRAINING PROGRAM

## 2025-01-05 PROCEDURE — 94762 N-INVAS EAR/PLS OXIMTRY CONT: CPT

## 2025-01-05 PROCEDURE — 84207 ASSAY OF VITAMIN B-6: CPT | Performed by: STUDENT IN AN ORGANIZED HEALTH CARE EDUCATION/TRAINING PROGRAM

## 2025-01-05 PROCEDURE — 82805 BLOOD GASES W/O2 SATURATION: CPT | Performed by: STUDENT IN AN ORGANIZED HEALTH CARE EDUCATION/TRAINING PROGRAM

## 2025-01-05 RX ORDER — POTASSIUM CHLORIDE 1500 MG/1
20 TABLET, EXTENDED RELEASE ORAL
Status: DISPENSED | OUTPATIENT
Start: 2025-01-05 | End: 2025-01-07

## 2025-01-05 RX ORDER — SENNOSIDES 8.6 MG
2 TABLET ORAL ONCE
Status: COMPLETED | OUTPATIENT
Start: 2025-01-05 | End: 2025-01-05

## 2025-01-05 RX ADMIN — HYDRALAZINE HYDROCHLORIDE 25 MG: 25 TABLET ORAL at 22:41

## 2025-01-05 RX ADMIN — POTASSIUM CHLORIDE 20 MEQ: 1500 TABLET, EXTENDED RELEASE ORAL at 17:27

## 2025-01-05 RX ADMIN — HYDRALAZINE HYDROCHLORIDE 25 MG: 25 TABLET ORAL at 05:54

## 2025-01-05 RX ADMIN — SENNOSIDES 17.2 MG: 8.6 TABLET, FILM COATED ORAL at 11:17

## 2025-01-05 RX ADMIN — HYDRALAZINE HYDROCHLORIDE 25 MG: 25 TABLET ORAL at 14:19

## 2025-01-05 RX ADMIN — ATORVASTATIN CALCIUM 40 MG: 40 TABLET, FILM COATED ORAL at 17:27

## 2025-01-05 RX ADMIN — HEPARIN SODIUM 5000 UNITS: 5000 INJECTION, SOLUTION INTRAVENOUS; SUBCUTANEOUS at 14:19

## 2025-01-05 RX ADMIN — POTASSIUM CHLORIDE 20 MEQ: 1500 TABLET, EXTENDED RELEASE ORAL at 11:17

## 2025-01-05 RX ADMIN — HEPARIN SODIUM 5000 UNITS: 5000 INJECTION, SOLUTION INTRAVENOUS; SUBCUTANEOUS at 05:54

## 2025-01-05 RX ADMIN — ASPIRIN 81 MG CHEWABLE TABLET 81 MG: 81 TABLET CHEWABLE at 08:45

## 2025-01-05 RX ADMIN — HEPARIN SODIUM 5000 UNITS: 5000 INJECTION, SOLUTION INTRAVENOUS; SUBCUTANEOUS at 22:41

## 2025-01-05 RX ADMIN — AMLODIPINE BESYLATE 5 MG: 5 TABLET ORAL at 08:46

## 2025-01-05 RX ADMIN — B-COMPLEX W/ C & FOLIC ACID TAB 1 TABLET: TAB at 08:45

## 2025-01-05 NOTE — PLAN OF CARE
Problem: PAIN - ADULT  Goal: Verbalizes/displays adequate comfort level or baseline comfort level  Description: Interventions:  - Encourage patient to monitor pain and request assistance  - Assess pain using appropriate pain scale  - Administer analgesics based on type and severity of pain and evaluate response  - Implement non-pharmacological measures as appropriate and evaluate response  - Consider cultural and social influences on pain and pain management  - Notify physician/advanced practitioner if interventions unsuccessful or patient reports new pain  Outcome: Progressing     Problem: INFECTION - ADULT  Goal: Absence or prevention of progression during hospitalization  Description: INTERVENTIONS:  - Assess and monitor for signs and symptoms of infection  - Monitor lab/diagnostic results  - Monitor all insertion sites, i.e. indwelling lines, tubes, and drains  - Monitor endotracheal if appropriate and nasal secretions for changes in amount and color  - [place] appropriate cooling/warming therapies per order  - Administer medications as ordered  - Instruct and encourage patient and family to use good hand hygiene technique  - Identify and instruct in appropriate isolation precautions for identified infection/condition  Outcome: Progressing     Problem: SAFETY ADULT  Goal: Patient will remain free of falls  Description: INTERVENTIONS:  - Educate patient/family on patient safety including physical limitations  - Instruct patient to call for assistance with activity   - Consult OT/PT to assist with strengthening/mobility   - Keep Call bell within reach  - Keep bed low and locked with side rails adjusted as appropriate  - Keep care items and personal belongings within reach  - Initiate and maintain comfort rounds  - Make Fall Risk Sign visible to staff  - Apply yellow socks and bracelet for high fall risk patients  - Consider moving patient to room near nurses station  Outcome: Progressing  Goal: Maintain or  return to baseline ADL function  Description: INTERVENTIONS:  -  Assess patient's ability to carry out ADLs; assess patient's baseline for ADL function and identify physical deficits which impact ability to perform ADLs (bathing, care of mouth/teeth, toileting, grooming, dressing, etc.)  - Assess/evaluate cause of self-care deficits   - Assess range of motion  - Assess patient's mobility; develop plan if impaired  - Assess patient's need for assistive devices and provide as appropriate  - Encourage maximum independence but intervene and supervise when necessary  - Involve family in performance of ADLs  - Assess for home care needs following discharge   - Consider OT consult to assist with ADL evaluation and planning for discharge  - Provide patient education as appropriate  Outcome: Progressing  Goal: Maintains/Returns to pre admission functional level  Description: INTERVENTIONS:  - Perform AM-PAC 6 Click Basic Mobility/ Daily Activity assessment daily.  - Set and communicate daily mobility goal to care team and patient/family/caregiver.   - Collaborate with rehabilitation services on mobility goals if consulted    - Out of bed for toileting  - Record patient progress and toleration of activity level   Outcome: Progressing     Problem: DISCHARGE PLANNING  Goal: Discharge to home or other facility with appropriate resources  Description: INTERVENTIONS:  - Identify barriers to discharge w/patient and caregiver  - Arrange for needed discharge resources and transportation as appropriate  - Identify discharge learning needs (meds, wound care, etc.)  - Arrange for interpretive services to assist at discharge as needed  - Refer to Case Management Department for coordinating discharge planning if the patient needs post-hospital services based on physician/advanced practitioner order or complex needs related to functional status, cognitive ability, or social support system  Outcome: Progressing     Problem: Knowledge  Deficit  Goal: Patient/family/caregiver demonstrates understanding of disease process, treatment plan, medications, and discharge instructions  Description: Complete learning assessment and assess knowledge base.  Interventions:  - Provide teaching at level of understanding  - Provide teaching via preferred learning methods  Outcome: Progressing     Problem: Prexisting or High Potential for Compromised Skin Integrity  Goal: Skin integrity is maintained or improved  Description: INTERVENTIONS:  - Identify patients at risk for skin breakdown  - Assess and monitor skin integrity  - Assess and monitor nutrition and hydration status  - Monitor labs   - Assess for incontinence   - Turn and reposition patient  - Assist with mobility/ambulation  - Relieve pressure over bony prominences  - Avoid friction and shearing  - Provide appropriate hygiene as needed including keeping skin clean and dry  - Evaluate need for skin moisturizer/barrier cream  - Collaborate with interdisciplinary team   - Patient/family teaching  - Consider wound care consult   Outcome: Progressing     Problem: NEUROSENSORY - ADULT  Goal: Achieves stable or improved neurological status  Description: INTERVENTIONS  - Monitor and report changes in neurological status  - Monitor vital signs such as temperature, blood pressure, glucose, and any other labs ordered   - Initiate measures to prevent increased intracranial pressure  - Monitor for seizure activity and implement precautions if appropriate      Outcome: Progressing  Goal: Achieves maximal functionality and self care  Description: INTERVENTIONS  - Monitor swallowing and airway patency with patient fatigue and changes in neurological status  - Encourage and assist patient to increase activity and self care.   - Encourage visually impaired, hearing impaired and aphasic patients to use assistive/communication devices  Outcome: Progressing

## 2025-01-05 NOTE — ASSESSMENT & PLAN NOTE
Severe, symptomatic   Acute Milk Alkali syndrome    Responded well to 2 doses of calcitonin and 48 hours of fluid infusion.    She states that she is of English ancestry with [place] descent. She denies previous bone fractures (even stress fractures), loss of teeth. But admits to history of arthritis and osteoporosis.   Upon further review of chart, patient has labs with persistently low ALP levels prior to Prolia therapy. With current presentation, we discussed with patient and  at bedside that she requires further evaluation of possible underlying hypophosphatasia (bone mineralization disorder).  She may require testing for Pyridoxal-5?-phosphate, Phosphoethanolamine, and pyrophosphate, this can be done as outpatient. Explained that she may even require genetic testing.  We recommend she be formally evaluated by Dr. Quintanilla in [facility]-endocrinology office, she is agreeable.     Discharge recommendations:   - she may continue regular diet, she does not need to avoid calcium in her food/drinks. But we do not recommend further calcium supplementation (either OTC or in multivitamin).   - follow up with endocrinologist (Dr. Quintanilla) in 6-8 weeks for further recommendations

## 2025-01-05 NOTE — ASSESSMENT & PLAN NOTE
Recent Labs     01/04/25  0748 01/04/25 2036 01/05/25  0712   CREATININE 1.46* 1.28 1.08   EGFR 36 42 52     Estimated Creatinine Clearance: 43.8 mL/min (by C-G formula based on SCr of 1.08 mg/dL).    Noted on admission, no hx CKD. Likely from hypercalcemia.    Plan:  IV fluids as per plan for hypercalcemia along w diuresis   Holding losartan

## 2025-01-05 NOTE — PROGRESS NOTES
Progress Note - Endocrinology   Name: Mayra Echevarria 69 y.o. female I MRN: [id number]  [facility] I Date of Admission: 1/2/2025   Date of Service: 1/5/2025 I Hospital Day: 3     Assessment & Plan  Hypercalcemia  Severe, symptomatic   Acute Milk Alkali syndrome    Responded well to 2 doses of calcitonin and 48 hours of fluid infusion.    She states that she is of [place] ancestry with [place] descent. She denies previous bone fractures (even stress fractures), loss of teeth. But admits to history of arthritis and osteoporosis.   Upon further review of chart, patient has labs with persistently low ALP levels prior to Prolia therapy. With current presentation, we discussed with patient and  at bedside that she requires further evaluation of possible underlying hypophosphatasia (bone mineralization disorder).  She may require testing for Pyridoxal-5?-phosphate, Phosphoethanolamine, and pyrophosphate, this can be done as outpatient. Explained that she may even require genetic testing.  We recommend she be formally evaluated by Dr. Quintanilla in [facility]-end[facility] office, she is agreeable.     Discharge recommendations:   - she may continue regular diet, she does not need to avoid calcium in her food/drinks. But we do not recommend further calcium supplementation (either OTC or in multivitamin).   - follow up with endocrinologist (Dr. Quintanilla) in 6-8 weeks for further recommendations    Subjective : Mrs. Echevarria is feeling better and more awake this morning. She is eating all her meals and feels stronger. Just a little tired from not sleeping well the night before.         Objective :  Temp:  [98.7 °F (37.1 °C)-99.1 °F (37.3 °C)] 98.7 °F (37.1 °C)  HR:  [64-82] 64  BP: (134-164)/(55-84) 143/63  Resp:  [15-16] 15  SpO2:  [88 %-93 %] 91 %  O2 Device: Nasal cannula  Nasal Cannula O2 Flow Rate (L/min):  [2 L/min] 2 L/min    Physical Exam  Constitutional:       Appearance: Normal  appearance.   HENT:      Mouth/Throat:      Mouth: Mucous membranes are moist.      Pharynx: Oropharynx is clear.   Eyes:      Extraocular Movements: Extraocular movements intact.      Conjunctiva/sclera: Conjunctivae normal.   Cardiovascular:      Rate and Rhythm: Normal rate and regular rhythm.      Pulses: Normal pulses.   Pulmonary:      Effort: Pulmonary effort is normal. No respiratory distress.   Musculoskeletal:         General: Normal range of motion.   Skin:     General: Skin is warm and dry.   Neurological:      Mental Status: She is alert and oriented to person, place, and time.   Psychiatric:         Mood and Affect: Mood normal.         Behavior: Behavior normal.         Thought Content: Thought content normal.         Judgment: Judgment normal.         Lab Results: I have reviewed the following results:CBC/BMP:   .     01/05/25  0712   SODIUM 143   K 3.1*   *   CO2 23   BUN 25   CREATININE 1.08   GLUC 87

## 2025-01-05 NOTE — ASSESSMENT & PLAN NOTE
Severe, 17 on admission.  Recently hospitalized within past 2 weeks for multifocal strokes, was sent home with outpatient PT OT.  Was oral supplementation of milligram calcium tabs twice daily, but unfortunately was taking 1200 mg twice daily, double instructed dose.    resolved  Discontinued calcium supplements  completed calcitonin  BMP daily  D/c IV fluids

## 2025-01-05 NOTE — ASSESSMENT & PLAN NOTE
Previous sleep study is likely falsely negative  Upon Masimo monitor, when sleeping, she has visible apneic events with associated hypoxia  D/w her  that she will likely need another sleep titration study  Due to her recent history of CVA will try to enroll her in the Sleep for Stroke Management And Recovery Trial  Awaiting overnight oximetry results

## 2025-01-05 NOTE — PROGRESS NOTES
Progress Note - Hospitalist   Name: Mayra Echevarria 69 y.o. female I MRN: [id number]  Unit/Bed#: [facility] 726-01 I Date of Admission: 1/2/2025   Date of Service: 1/5/2025 I Hospital Day: 3    Assessment & Plan  Hypercalcemia  Severe, 17 on admission.  Recently hospitalized within past 2 weeks for multifocal strokes, was sent home with outpatient PT OT.  Was oral supplementation of milligram calcium tabs twice daily, but unfortunately was taking 1200 mg twice daily, double instructed dose.    resolved  Discontinued calcium supplements  completed calcitonin  BMP daily  D/c IV fluids    Hypertensive emergency  Noted on admission.BP initially 191/90, as high as 202/80 on manual.  And CAROLINE  Will hold losartan due to CAROLINE resume in 24-48h  continue PO hydralazine for now  Adjust BP meds PRN  Senile osteoporosis  On prolia outpatient, last injection Sept 2023    See hypercalcemia plan  Likely will hold off on calcium at time of discharge, if not reduced oral dosing such as 600 mg daily (vs previous 600 mg BID, as pt taking 1200 mg BID)    Plan:  Hold off on calcium as inpatient   Hold off on zolendronate due to recent prolia in Sept 2023   Essential hypertension  Blood Pressure: 143/63    Patient is awake enough to tolerate oral medications    Plan:  As above  MATT (obstructive sleep apnea)  Previous sleep study is likely falsely negative  Upon Masimo monitor, when sleeping, she has visible apneic events with associated hypoxia  D/w her  that she will likely need another sleep titration study  Due to her recent history of CVA will try to enroll her in the Sleep for Stroke Management And Recovery Trial  Awaiting overnight oximetry results  History of CVA (cerebrovascular accident)  Recently admitted with multiple small acute infarctions, visualized on 12/27/2024 MRI involving left occipital lobe, bilateral alex, left greater than right, and left cerebellar hemisphere with cytotoxic edema but no hemorrhage.  Also has chronic  mild white matter microangiopathic changes in bilateral cerebral hemispheres.  CT head Noncon from this admission is unremarkable for any new strokes or pathologies.    Plan:  Continue aspirin, statin  BP control as noted under hypertensive urgency  Ambulatory dysfunction  Noted after recent stroke at end of December 2024.  Was discharged home with PT OT.    Plan: PT/OT  Discharge planning to IP rehabilitation  CAROLINE (acute kidney injury) (HCC)  Recent Labs     01/04/25  0748 01/04/25 2036 01/05/25  0712   CREATININE 1.46* 1.28 1.08   EGFR 36 42 52     Estimated Creatinine Clearance: 43.8 mL/min (by C-G formula based on SCr of 1.08 mg/dL).    Noted on admission, no hx CKD. Likely from hypercalcemia.    Plan:  IV fluids as per plan for hypercalcemia along w diuresis   Holding losartan    VTE Pharmacologic Prophylaxis: VTE Score: 8 High Risk (Score >/= 5) - Pharmacological DVT Prophylaxis Ordered: heparin. Sequential Compression Devices Ordered.    Mobility:   Basic Mobility Inpatient Raw Score: 18  JH-HLM Goal: 6: Walk 10 steps or more  JH-HLM Achieved: 6: Walk 10 steps or more  JH-HLM Goal achieved. Continue to encourage appropriate mobility.    Patient Centered Rounds: I performed bedside rounds with nursing staff today.   Discussions with Specialists or Other Care Team Provider: nurse, CM    Education and Discussions with Family / Patient: Updated  () at bedside.    Current Length of Stay: 3 day(s)  Current Patient Status: Inpatient   Certification Statement: The patient will continue to require additional inpatient hospital stay due to discharge planning  Discharge Plan: Anticipate discharge tomorrow to rehab facility.    Code Status: Level 1 - Full Code    Subjective   Denies any new complaints.    Objective :  Temp:  [98.7 °F (37.1 °C)-99.1 °F (37.3 °C)] 98.7 °F (37.1 °C)  HR:  [64-82] 64  BP: (134-164)/(55-84) 143/63  Resp:  [15-16] 15  SpO2:  [88 %-93 %] 91 %  O2 Device: Nasal  cannula  Nasal Cannula O2 Flow Rate (L/min):  [2 L/min] 2 L/min    Body mass index is 27.38 kg/m².     Input and Output Summary (last 24 hours):     Intake/Output Summary (Last 24 hours) at 1/5/2025 1334  Last data filed at 1/5/2025 1322  Gross per 24 hour   Intake 1482.33 ml   Output --   Net 1482.33 ml       Physical Exam  Constitutional:       Appearance: Normal appearance.   HENT:      Head: Normocephalic and atraumatic.      Nose: Nose normal.   Eyes:      Extraocular Movements: Extraocular movements intact.   Cardiovascular:      Rate and Rhythm: Normal rate and regular rhythm.   Pulmonary:      Effort: Pulmonary effort is normal.      Breath sounds: No wheezing or rales.   Abdominal:      Palpations: Abdomen is soft.   Musculoskeletal:      Right lower leg: No edema.      Left lower leg: No edema.   Skin:     General: Skin is warm and dry.   Neurological:      Mental Status: She is alert and oriented to person, place, and time.   Psychiatric:         Mood and Affect: Mood normal.         Behavior: Behavior normal.           Lines/Drains:              Lab Results: I have reviewed the following results:   Results from last 7 days   Lab Units 01/02/25  1913 01/02/25  1107   WBC Thousand/uL  --  10.78*   HEMOGLOBIN g/dL  --  13.5   HEMATOCRIT %  --  42.3   PLATELETS Thousands/uL 384 379   SEGS PCT %  --  86*   LYMPHO PCT %  --  6*   MONO PCT %  --  7   EOS PCT %  --  1     Results from last 7 days   Lab Units 01/05/25  0712 01/03/25  0505 01/02/25 1913   SODIUM mmol/L 143   < > 140   POTASSIUM mmol/L 3.1*   < > 4.0   CHLORIDE mmol/L 113*   < > 100   CO2 mmol/L 23   < > 29   BUN mg/dL 25   < > 31*   CREATININE mg/dL 1.08   < > 1.59*   ANION GAP mmol/L 7   < > 11   CALCIUM mg/dL 9.4   < > 16.3*   ALBUMIN g/dL  --   --  4.4   TOTAL BILIRUBIN mg/dL  --   --  0.56   ALK PHOS U/L  --   --  31*   ALT U/L  --   --  30   AST U/L  --   --  39   GLUCOSE RANDOM mg/dL 87   < > 157*    < > = values in this interval not  displayed.         Results from last 7 days   Lab Units 01/02/25  1504   POC GLUCOSE mg/dl 123               Recent Cultures (last 7 days):         Imaging Results Review: No pertinent imaging studies reviewed.  Other Study Results Review: No additional pertinent studies reviewed.    Last 24 Hours Medication List:     Current Facility-Administered Medications:     acetaminophen (TYLENOL) tablet 650 mg, Q6H PRN    amLODIPine (NORVASC) tablet 5 mg, Daily    aspirin chewable tablet 81 mg, Daily    atorvastatin (LIPITOR) tablet 40 mg, Daily With Dinner    heparin (porcine) subcutaneous injection 5,000 Units, Q8H EVON **AND** [COMPLETED] Platelet count, Once    hydrALAZINE (APRESOLINE) tablet 25 mg, Q8H EVON    labetalol (NORMODYNE) injection 10 mg, Q4H PRN    metoclopramide (REGLAN) injection 10 mg, Q6H PRN    multivitamin stress formula tablet 1 tablet, Daily    ondansetron (ZOFRAN) injection 4 mg, Q6H PRN    oxyCODONE (ROXICODONE) IR tablet 5 mg, Q4H PRN    oxyCODONE (ROXICODONE) split tablet 2.5 mg, Q4H PRN    potassium chloride (Klor-Con M20) CR tablet 20 mEq, TID With Meals    Administrative Statements   Today, Patient Was Seen By: Cain Smith MD  I have spent a total time of 40 minutes in caring for this patient on the day of the visit/encounter including Diagnostic results, Instructions for management, Patient and family education, Impressions, Counseling / Coordination of care, Documenting in the medical record, Reviewing / ordering tests, medicine, procedures  , Obtaining or reviewing history  , and Communicating with other healthcare professionals .    **Please Note: This note may have been constructed using a voice recognition system.**

## 2025-01-05 NOTE — ASSESSMENT & PLAN NOTE
Noted on admission.BP initially 191/90, as high as 202/80 on manual.  And CAROLINE  Will hold losartan due to CAROLINE resume in 24-48h  continue PO hydralazine for now  Adjust BP meds PRN

## 2025-01-05 NOTE — PLAN OF CARE
Problem: PAIN - ADULT  Goal: Verbalizes/displays adequate comfort level or baseline comfort level  Description: Interventions:  - Encourage patient to monitor pain and request assistance  - Assess pain using appropriate pain scale  - Administer analgesics based on type and severity of pain and evaluate response  - Implement non-pharmacological measures as appropriate and evaluate response  - Consider cultural and social influences on pain and pain management  - Notify physician/advanced practitioner if interventions unsuccessful or patient reports new pain  Outcome: Progressing     Problem: INFECTION - ADULT  Goal: Absence or prevention of progression during hospitalization  Description: INTERVENTIONS:  - Assess and monitor for signs and symptoms of infection  - Monitor lab/diagnostic results  - Monitor all insertion sites, i.e. indwelling lines, tubes, and drains  - Monitor endotracheal if appropriate and nasal secretions for changes in amount and color  - [place] appropriate cooling/warming therapies per order  - Administer medications as ordered  - Instruct and encourage patient and family to use good hand hygiene technique  - Identify and instruct in appropriate isolation precautions for identified infection/condition  Outcome: Progressing     Problem: SAFETY ADULT  Goal: Patient will remain free of falls  Description: INTERVENTIONS:  - Educate patient/family on patient safety including physical limitations  - Instruct patient to call for assistance with activity   - Consult OT/PT to assist with strengthening/mobility   - Keep Call bell within reach  - Keep bed low and locked with side rails adjusted as appropriate  - Keep care items and personal belongings within reach  - Initiate and maintain comfort rounds  - Make Fall Risk Sign visible to staff  - Offer Toileting every  Hours, in advance of need  - Initiate/Maintain alarm  - Obtain necessary fall risk management equipment:   - Apply yellow socks and  bracelet for high fall risk patients  - Consider moving patient to room near nurses station  Outcome: Progressing  Goal: Maintain or return to baseline ADL function  Description: INTERVENTIONS:  -  Assess patient's ability to carry out ADLs; assess patient's baseline for ADL function and identify physical deficits which impact ability to perform ADLs (bathing, care of mouth/teeth, toileting, grooming, dressing, etc.)  - Assess/evaluate cause of self-care deficits   - Assess range of motion  - Assess patient's mobility; develop plan if impaired  - Assess patient's need for assistive devices and provide as appropriate  - Encourage maximum independence but intervene and supervise when necessary  - Involve family in performance of ADLs  - Assess for home care needs following discharge   - Consider OT consult to assist with ADL evaluation and planning for discharge  - Provide patient education as appropriate  Outcome: Progressing  Goal: Maintains/Returns to pre admission functional level  Description: INTERVENTIONS:  - Perform AM-PAC 6 Click Basic Mobility/ Daily Activity assessment daily.  - Set and communicate daily mobility goal to care team and patient/family/caregiver.   - Collaborate with rehabilitation services on mobility goals if consulted  - Perform Range of Motion  times a day.  - Reposition patient every  hours.  - Dangle patient  times a day  - Stand patient  times a day  - Ambulate patient  times a day  - Out of bed to chair  times a day   - Out of bed for meals  times a day  - Out of bed for toileting  - Record patient progress and toleration of activity level   Outcome: Progressing     Problem: DISCHARGE PLANNING  Goal: Discharge to home or other facility with appropriate resources  Description: INTERVENTIONS:  - Identify barriers to discharge w/patient and caregiver  - Arrange for needed discharge resources and transportation as appropriate  - Identify discharge learning needs (meds, wound care, etc.)  -  Arrange for interpretive services to assist at discharge as needed  - Refer to Case Management Department for coordinating discharge planning if the patient needs post-hospital services based on physician/advanced practitioner order or complex needs related to functional status, cognitive ability, or social support system  Outcome: Progressing     Problem: Knowledge Deficit  Goal: Patient/family/caregiver demonstrates understanding of disease process, treatment plan, medications, and discharge instructions  Description: Complete learning assessment and assess knowledge base.  Interventions:  - Provide teaching at level of understanding  - Provide teaching via preferred learning methods  Outcome: Progressing     Problem: Prexisting or High Potential for Compromised Skin Integrity  Goal: Skin integrity is maintained or improved  Description: INTERVENTIONS:  - Identify patients at risk for skin breakdown  - Assess and monitor skin integrity  - Assess and monitor nutrition and hydration status  - Monitor labs   - Assess for incontinence   - Turn and reposition patient  - Assist with mobility/ambulation  - Relieve pressure over bony prominences  - Avoid friction and shearing  - Provide appropriate hygiene as needed including keeping skin clean and dry  - Evaluate need for skin moisturizer/barrier cream  - Collaborate with interdisciplinary team   - Patient/family teaching  - Consider wound care consult   Outcome: Progressing     Problem: NEUROSENSORY - ADULT  Goal: Achieves stable or improved neurological status  Description: INTERVENTIONS  - Monitor and report changes in neurological status  - Monitor vital signs such as temperature, blood pressure, glucose, and any other labs ordered   - Initiate measures to prevent increased intracranial pressure  - Monitor for seizure activity and implement precautions if appropriate      Outcome: Progressing  Goal: Achieves maximal functionality and self care  Description:  INTERVENTIONS  - Monitor swallowing and airway patency with patient fatigue and changes in neurological status  - Encourage and assist patient to increase activity and self care.   - Encourage visually impaired, hearing impaired and aphasic patients to use assistive/communication devices  Outcome: Progressing

## 2025-01-06 PROCEDURE — 97163 PT EVAL HIGH COMPLEX 45 MIN: CPT

## 2025-01-06 PROCEDURE — 99232 SBSQ HOSP IP/OBS MODERATE 35: CPT | Performed by: INTERNAL MEDICINE

## 2025-01-06 PROCEDURE — 97167 OT EVAL HIGH COMPLEX 60 MIN: CPT

## 2025-01-06 PROCEDURE — 99222 1ST HOSP IP/OBS MODERATE 55: CPT | Performed by: NURSE PRACTITIONER

## 2025-01-06 RX ADMIN — B-COMPLEX W/ C & FOLIC ACID TAB 1 TABLET: TAB at 09:55

## 2025-01-06 RX ADMIN — POTASSIUM CHLORIDE 20 MEQ: 1500 TABLET, EXTENDED RELEASE ORAL at 17:24

## 2025-01-06 RX ADMIN — HYDRALAZINE HYDROCHLORIDE 25 MG: 25 TABLET ORAL at 13:46

## 2025-01-06 RX ADMIN — HYDRALAZINE HYDROCHLORIDE 25 MG: 25 TABLET ORAL at 22:10

## 2025-01-06 RX ADMIN — HEPARIN SODIUM 5000 UNITS: 5000 INJECTION, SOLUTION INTRAVENOUS; SUBCUTANEOUS at 05:48

## 2025-01-06 RX ADMIN — HYDRALAZINE HYDROCHLORIDE 25 MG: 25 TABLET ORAL at 05:48

## 2025-01-06 RX ADMIN — ACETAMINOPHEN 650 MG: 325 TABLET, FILM COATED ORAL at 22:16

## 2025-01-06 RX ADMIN — POTASSIUM CHLORIDE 20 MEQ: 1500 TABLET, EXTENDED RELEASE ORAL at 13:46

## 2025-01-06 RX ADMIN — POTASSIUM CHLORIDE 20 MEQ: 1500 TABLET, EXTENDED RELEASE ORAL at 09:55

## 2025-01-06 RX ADMIN — ASPIRIN 81 MG CHEWABLE TABLET 81 MG: 81 TABLET CHEWABLE at 09:55

## 2025-01-06 RX ADMIN — AMLODIPINE BESYLATE 5 MG: 5 TABLET ORAL at 09:54

## 2025-01-06 RX ADMIN — HEPARIN SODIUM 5000 UNITS: 5000 INJECTION, SOLUTION INTRAVENOUS; SUBCUTANEOUS at 13:46

## 2025-01-06 RX ADMIN — ATORVASTATIN CALCIUM 40 MG: 40 TABLET, FILM COATED ORAL at 17:24

## 2025-01-06 RX ADMIN — HEPARIN SODIUM 5000 UNITS: 5000 INJECTION, SOLUTION INTRAVENOUS; SUBCUTANEOUS at 22:11

## 2025-01-06 NOTE — OCCUPATIONAL THERAPY NOTE
Occupational Therapy Evaluation     Patient Name: Mayra Echevarria  Today's Date: 1/6/2025  Problem List  Principal Problem:    Hypercalcemia  Active Problems:    Senile osteoporosis    Essential hypertension    MATT (obstructive sleep apnea)    History of CVA (cerebrovascular accident)    Ambulatory dysfunction    Hypertensive emergency    CAROLINE (acute kidney injury) (HCC)    Past Medical History  Past Medical History:   Diagnosis Date    Allergic years ago    Iodine    Chronic leukopenia     Depression 04/22/2014    Hypercalcemia     Hyperlipidemia     Hypertension     labile    Hypertension     Migraines     Osteoarthritis     Osteoporosis     Vertigo      Past Surgical History  Past Surgical History:   Procedure Laterality Date    EYE SURGERY      TOOTH EXTRACTION      TUBAL LIGATION        01/06/25 1130   OT Last Visit   OT Visit Date 01/06/25   Note Type   Note type Evaluation   Pain Assessment   Pain Assessment Tool 0-10   Pain Score No Pain   Restrictions/Precautions   Weight Bearing Precautions Per Order No   Other Precautions Cognitive;Chair Alarm;Bed Alarm;Telemetry;Fall Risk;Visual impairment   Home Living   Type of Home House   Home Layout Multi-level  (Bi-level home with 2STE)   Bathroom Shower/Tub Tub/shower unit   Bathroom Toilet Raised   Bathroom Equipment Grab bars in shower   Home Equipment Walker   Prior Function   Level of [place] Independent with ADLs   Lives With Spouse   Receives Help From Family~daughter   Lifestyle   Autonomy Prior to recent CVA I with all ADL's/iADL's, no AD with funtional mobility, +drives, discharged home with outpatient PT/OT and assistance with LB ADL's/IADL's from spouse   Reciprocal Relationships spouse -retired and home to assist PRN   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)   Intrinsic Gratification spend time with family   General   Family/Caregiver Present Yes  (spouse)   ADL   Eating Assistance 5  Supervision/Setup   Eating Deficit  "Supervision/safety   Grooming Assistance 5  Supervision/Setup   UB Bathing Assistance 5  Supervision/Setup   LB Bathing Assistance 3  Moderate Assistance   UB Dressing Assistance 4  Minimal Assistance   LB Dressing Assistance 2  Maximal Assistance   Toileting Assistance  4  Minimal Assistance   Bed Mobility   Supine to Sit 4  Minimal assistance   Additional items Assist x 1   Sit to Supine 4  Minimal assistance   Additional items Assist x 1   Transfers   Sit to Stand 4  Minimal assistance   Additional items Assist x 1   Stand to Sit 4  Minimal assistance   Additional items Assist x 1   Additional Comments +RW   Functional Mobility   Functional Mobility 4  Minimal assistance   Additional Comments min a x 1 with increased time short distance functional mobility into hallway   Balance   Static Sitting Fair   Dynamic Sitting Fair -   Static Standing Poor +   Dynamic Standing Poor +   Ambulatory Poor +   Activity Tolerance   Activity Tolerance Patient limited by fatigue   Medical Staff Made Aware PT Elliot due to the patient's co-morbidities, clinically unstable presentation, and present impairments which are a regression from the patient's baseline.    Nurse Made Aware RN cleared pt for therapy   RUE Assessment   RUE Assessment X  (residual mild right  weakness 4-/5 ,otherwise WFL throughout)   LUE Assessment   LUE Assessment WFL   Hand Function   Gross Motor Coordination Impaired  (finger to nose slow B/l Hand motor movements requiring increased focus on reaching targets with accuracy)   Fine Motor Coordination Impaired  (slow finger to thumb improving with 2nd trial)   Sensation   Light Touch No apparent deficits   Vision-Basic Assessment   Visual History (per spouse pt had a \"lazy eye\" in childhood and was \"never corrected\" now has poor vision/blurriness, per spouse pt still able to read)   Vision - Complex Assessment   Ocular Range of Motion Impaired  (left)   Tracking Impaired  (impaired smooth pursuits) "   Convergence (impaired with left eye)   Visual Screen Results Ocular motor dysfunction~ baseline   (misalignment/stabisumus in left eye (floating up and slightly to right))   Cognition   Overall Cognitive Status (S)  Impaired  (History of memory impairment 8/24)   Arousal/Participation Alert;Responsive;Cooperative   Attention Attends with cues to redirect   Orientation Level Oriented to person;Oriented to place;Oriented to time;Disoriented to situation   Memory Decreased recall of precautions;Decreased recall of recent events;Decreased short term memory   Following Commands Follows one step commands with increased time or repetition   Comments pt pleasant and cooperative, tearful/emotional with stating years of marriage with spouse (per spouse baseline) slow processing, difficulty recalling multi-step directions with finger to nose testing, per spouse history of memory deficits with CVA, impaired insight and self awareness with deficits. Will continue to assess.   Assessment   Limitation Decreased ADL status;Decreased UE ROM;Decreased Safe judgement during ADL;Decreased UE strength;Decreased cognition;Decreased endurance;Decreased self-care trans;Decreased high-level ADLs;Decreased fine motor control;Visual deficit   Prognosis Good   Assessment Pt is a 69 y.o. female who was admitted to [facility] on 1/2/2025 with Hypercalcemia, hypertensive emergency Tariq, senile osteoporosis, , history of CAV 12/24, CAROLINE. Patient  has a past medical history of Allergic (years ago), Chronic leukopenia, Depression (04/22/2014), Hypercalcemia, Hyperlipidemia, Hypertension, Hypertension, Migraines, Osteoarthritis, Osteoporosis, and Vertigo.  At baseline pt was completing I with ADL's/IaDL's prior to CVA no AD with functional mobility +drives. Pt lives with spouse in a bilevel home with 2 CARMITA. Currently pt requires min/mod a LB for overall ADLS and  min a with RW for functional mobility/transfers. Pt currently presents with  impairments in the following categories -steps to enter environment, difficulty performing ADLS, difficulty performing IADLS , and limited insight into deficits activity tolerance, endurance, standing balance/tolerance, sitting balance/tolerance, UE strength, memory, insight, safety , judgement , and attention . These impairments, as well as pt's fatigue and risk for falls  limit pt's ability to safely engage in all baseline areas of occupation, includingeating, grooming, bathing, dressing, toileting, functional mobility/transfers, community mobility, driving, house maintenance, medication management, meal prep, cleaning, social participation , and leisure activities  From OT standpoint, recommend max level I upon D/C.  The patient's raw score on the AM-PAC Daily Activity Inpatient Short Form is 16. A raw score of less than 19 suggests the patient may benefit from discharge to post-acute rehabilitation services. Please refer to the recommendation of the Occupational Therapist for safe discharge planning. OT will continue to follow to address the below stated goals.   Goals   Patient Goals feel better   LTG Time Frame 10-14   Long Term Goal #1 see goals below   Plan   Treatment Interventions ADL retraining;Functional transfer training;UE strengthening/ROM;Endurance training;Cognitive reorientation;Patient/family training;Equipment evaluation/education;Visual perceptual retraining;Fine motor coordination activities;Compensatory technique education;Continued evaluation;Activityengagement   Goal Expiration Date 01/20/25   OT Frequency 2-3x/wk   Discharge Recommendation   Rehab Resource Intensity Level, OT I (Maximum Resource Intensity)   Equipment Recommended (tbd)   AM-PAC Daily Activity Inpatient   Lower Body Dressing 2   Bathing 2   Toileting 3   Upper Body Dressing 3   Grooming 3   Eating 3   Daily Activity Raw Score 16   Daily Activity Standardized Score (Calc for Raw Score >=11) 35.96   AM-PAC Applied Cognition  Inpatient   Following a Speech/Presentation 2   Understanding Ordinary Conversation 4   Taking Medications 2   Remembering Where Things Are Placed or Put Away 3   Remembering List of 4-5 Errands 2   Taking Care of Complicated Tasks 2   Applied Cognition Raw Score 15   Applied Cognition Standardized Score 33.54   End of Consult   Patient Position at End of Consult All needs within reach;Bed/Chair alarm activated;Supine  (with spouse present)   Nurse Communication Nurse aware of consult    Occupational Therapy Goals:    *Mod I with bed mobility to engage in functional tasks.  *Mod I Adl's after setup with use of AE PRN  *Mod I toileting and clothing management   *Mod I functional mobility and transfers to/from all surfaces with Fair + dynamic balance and safety for participation in dynamic adls and iadl tasks   *Demonstrate good carryover with safe use of RW during functional tasks   *Assess DME needs   *Increase activity tolerance to 25-30 minutes for participation in adls and enjoyable activities  *Pt to participate in further cognitive testing with good attention and participation to assist with safe d/c recommendations  *Mod I with Simulated IADL management task.  *Demonstrate good carryover of pt/family education and training with good tolerance for increased safety and independence with ADL's/ADl's.  *Pt will improve standing balance to 4-5 minutes with functional tasks to increase I with toileting/transfers.  *Pt will follow 100% simple one step verbal commands and be A/Ox4 consistently t/o use of external environmental cues w/ mod I  Sunita Hanley OTR/L

## 2025-01-06 NOTE — CONSULTS
PHYSICAL MEDICINE AND REHABILITATION CONSULT NOTE  Mayra Echevarria 69 y.o. 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  At current level of function the patient would benefit from a short course of acute inpatient rehabilitation once medically stable. She is able to tolerate 3 hours of therapy a day 5 to 6 days a week. She will require insurance authorization.     Medical Co-morbidities Plan:  Hypertension with hypertensive emergency   Senile osteoporosis on Prolia   Hypertension   MATT  Ambulatory dysfunction   Bowel plan: continent   Bladder plan: continent   DVT ppx: heparin SQ and SCD    Thank you for this consultation.  Do not hesitate to contact service with further questions.      BLANCA Sweet  PM&R    I have spent a total time of 30 minutes on 01/06/25 in caring for this patient including Patient and family education, Counseling / Coordination of care, Documenting in the medical record, Reviewing / ordering tests, medicine, procedures  , Obtaining or reviewing history  , and Communicating with other healthcare professionals .      History of Present Illness:  Mayra Echevarria is a 69 y.o. female with a PMH of HTN, osteoporosis on Prolia, recent infarct 2024 who presented to the [facility] on 1/2/2025 with lethargy x 24 hours. She was recently d/c to home on 12/27 following new stroke with PT/OT and was doing well. She was hypertensive in the ER and found to have CAROLINE. She also had severe hypercalcemia and spouse reported she may have  "accidentally taken too much calcium at home. CT head was unremarkable for any new strokes or pathologies. MRI from previous admission on 12/27/2024 showed multiple acute/recent infarcts involving the left occipital lobe, bilateral alex, left greater than right and the left cerebellar hemisphere with associated cytotoxic edema, but no associated hemorrhage. PM&R are consulted for rehabilitation recommendations.     The patient was seen in her room with her spouse at bedside. She reports that she thought she was doing well at home. Per spouse she did not have any falls at home but would get up if he wasn't watching her closely.     Review of Systems: 10 point ROS negative except for what is noted in HPI    Function:  Prior level of function and living situation: The patient lives in a multi-level home with 2 CARMITA and was independent prior to CVA. Her spouse is home and can assist at needed.       Current level of function:  Physical Therapy: Minimal assist for bed mobility, transfers, minimal assist for ambulation.   Occupational Therapy: Supervision for eating, grooming, UB bathing, moderate assist for LB bathing, minimal assist for UB bressing and toileting  Speech Therapy: Regular diet with thins      Physical Exam:  /75   Pulse 73   Temp 98.5 °F (36.9 °C)   Resp 18   Ht 5' 2\" (1.575 m)   Wt 67.9 kg (149 lb 11.1 oz)   LMP  (LMP Unknown)   SpO2 92%   BMI 27.38 kg/m²        Intake/Output Summary (Last 24 hours) at 1/6/2025 1342  Last data filed at 1/5/2025 1801  Gross per 24 hour   Intake 118 ml   Output 200 ml   Net -82 ml       Body mass index is 27.38 kg/m².      Physical Exam  Constitutional:       General: She is not in acute distress.     Appearance: She is not toxic-appearing.   HENT:      Head: Normocephalic and atraumatic.   Eyes:      Extraocular Movements: Extraocular movements intact.   Pulmonary:      Effort: Pulmonary effort is normal. No respiratory distress.   Abdominal:      General: " There is no distension.   Musculoskeletal:      Right lower leg: No edema.      Left lower leg: No edema.   Skin:     General: Skin is warm and dry.   Neurological:      Mental Status: She is alert and oriented to person, place, and time.   Psychiatric:         Mood and Affect: Mood normal.          Social History:    Social History     Socioeconomic History    Marital status: /Civil Union     Spouse name: None    Number of children: None    Years of education: None    Highest education level: None   Occupational History    None   Tobacco Use    Smoking status: Never     Passive exposure: Never    Smokeless tobacco: Never   Vaping Use    Vaping status: Never Used   Substance and Sexual Activity    Alcohol use: No    Drug use: No    Sexual activity: Yes     Partners: Male     Birth control/protection: Female Sterilization   Other Topics Concern    None   Social History Narrative    None     Social Drivers of Health     Financial Resource Strain: Low Risk  (4/17/2023)    Overall Financial Resource Strain (CARDIA)     Difficulty of Paying Living Expenses: Not hard at all   Food Insecurity: No Food Insecurity (1/2/2025)    Nursing - Inadequate Food Risk Classification     Worried About Running Out of Food in the Last Year: Never true     Ran Out of Food in the Last Year: Never true     Ran Out of Food in the Last Year: Never true   Transportation Needs: No Transportation Needs (1/2/2025)    Nursing - Transportation Risk Classification     Lack of Transportation: Not on file     Lack of Transportation: No   Physical Activity: Not on file   Stress: Not on file   Social Connections: Not on file   Intimate Partner Violence: Unknown (1/2/2025)    Nursing IPS     Feels Physically and Emotionally Safe: Not on file     Physically Hurt by Someone: Not on file     Humiliated or Emotionally Abused by Someone: Not on file     Physically Hurt by Someone: No     Hurt or Threatened by Someone: No   Housing Stability: Unknown  (2025)    Nursing: Inadequate Housing Risk Classification     Has Housing: Not on file     Worried About Losing Housing: Not on file     Unable to Get Utilities: Not on file     Unable to Pay for Housing in the Last Year: No     Has Housin        Family History:    Family History   Problem Relation Age of Onset    Colon cancer Mother 51    Breast cancer Mother 53    Cancer Mother         two primary sites  breast and colon    Heart attack Father     Prostate cancer Father 70    Hypertension Father     Heart attack Sister     No Known Problems Sister     No Known Problems Daughter     No Known Problems Daughter     No Known Problems Maternal Grandmother     No Known Problems Maternal Grandfather     No Known Problems Paternal Grandmother     No Known Problems Paternal Grandfather     Stroke Brother     No Known Problems Son     No Known Problems Maternal Aunt     No Known Problems Maternal Aunt     No Known Problems Maternal Aunt          Medications:     Current Facility-Administered Medications:     acetaminophen (TYLENOL) tablet 650 mg, 650 mg, Oral, Q6H PRN, Cain Smith MD, 650 mg at 25 1507    amLODIPine (NORVASC) tablet 5 mg, 5 mg, Oral, Daily, Laurel Fairchild MD, 5 mg at 25 0954    aspirin chewable tablet 81 mg, 81 mg, Oral, Daily, Laurel Fairchild MD, 81 mg at 25 0955    atorvastatin (LIPITOR) tablet 40 mg, 40 mg, Oral, Daily With Dinner, Laurel Fairchild MD, 40 mg at 25 1727    heparin (porcine) subcutaneous injection 5,000 Units, 5,000 Units, Subcutaneous, Q8H EVON, 5,000 Units at 25 0548 **AND** [COMPLETED] Platelet count, , , Once, Laurel Fairchild MD    hydrALAZINE (APRESOLINE) tablet 25 mg, 25 mg, Oral, Q8H EVON, Cain Smith MD, 25 mg at 25 0548    labetalol (NORMODYNE) injection 10 mg, 10 mg, Intravenous, Q4H PRN, Cain Smith MD    metoclopramide (REGLAN) injection 10 mg, 10 mg, Intravenous, Q6H PRN, Laurel Fairchild MD, 10 mg at 25  "1826    multivitamin stress formula tablet 1 tablet, 1 tablet, Oral, Daily, Laurel Fairchild MD, 1 tablet at 01/06/25 0955    ondansetron (ZOFRAN) injection 4 mg, 4 mg, Intravenous, Q6H PRN, Laurel Fairchild MD, 4 mg at 01/03/25 1750    oxyCODONE (ROXICODONE) IR tablet 5 mg, 5 mg, Oral, Q4H PRN, Cain Smith MD, 5 mg at 01/04/25 2225    oxyCODONE (ROXICODONE) split tablet 2.5 mg, 2.5 mg, Oral, Q4H PRN, Cain Smith MD    potassium chloride (Klor-Con M20) CR tablet 20 mEq, 20 mEq, Oral, TID With Meals, Cain Smith MD, 20 mEq at 01/06/25 0955    Past Medical History:     Past Medical History:   Diagnosis Date    Allergic years ago    Iodine    Chronic leukopenia     Depression 04/22/2014    Hypercalcemia     Hyperlipidemia     Hypertension     labile    Hypertension     Migraines     Osteoarthritis     Osteoporosis     Vertigo         Past Surgical History:     Past Surgical History:   Procedure Laterality Date    EYE SURGERY      TOOTH EXTRACTION      TUBAL LIGATION           Allergies:     Allergies   Allergen Reactions    Iodine - Food Allergy Rash           LABORATORY RESULTS:      Lab Results   Component Value Date    HGB 13.5 01/02/2025    HCT 42.3 01/02/2025    WBC 10.78 (H) 01/02/2025     Lab Results   Component Value Date    BUN 25 01/05/2025    BUN 21 03/04/2023    K 3.1 (L) 01/05/2025    K 4.4 03/04/2023     (H) 01/05/2025     03/04/2023    CREATININE 1.08 01/05/2025    CREATININE 0.97 03/04/2023     No results found for: \"PROTIME\", \"INR\"     DIAGNOSTIC STUDIES: Reviewed  CT head wo contrast  Result Date: 1/3/2025  Impression: Evolving left pontine recent infarct, without associated hemorrhage. Additional small recent infarcts identified on brain MRI from 12/27/2024 is not visualized on this examination. Old lacunar infarcts involving the bilateral similar hemispheres. Mild chronic white matter microangiopathic changes. Workstation performed: [id number]     XR chest pa and " lateral  Result Date: 1/2/2025  Impression: No acute cardiopulmonary disease on this examination, which is somewhat limited secondary to low lung volumes. Resident: Angel Fallon I, the attending radiologist, have reviewed the images and agree with the final report above. Workstation performed: [id number]     CT head wo contrast  Result Date: 1/2/2025  Impression: The previously seen multiple small acute infarctions on the preceding brain MRI are largely occult on this CT with exception of ill-defined hypoattenuation in the left hemipons which likely corresponds to at least some of the recent ischemia. No territorial infarction. No intracranial hemorrhage. Workstation performed: [id number]

## 2025-01-06 NOTE — PLAN OF CARE
Problem: OCCUPATIONAL THERAPY ADULT  Goal: Performs self-care activities at highest level of function for planned discharge setting.  See evaluation for individualized goals.  Description: Treatment Interventions: ADL retraining, Functional transfer training, UE strengthening/ROM, Endurance training, Cognitive reorientation, Patient/family training, Equipment evaluation/education, Visual perceptual retraining, Fine motor coordination activities, Compensatory technique education, Continued evaluation, Activityengagement  Equipment Recommended:  (tbd)       See flowsheet documentation for full assessment, interventions and recommendations.   1/6/2025 1242 by Sunita Hanley OT  Note: Limitation: Decreased ADL status, Decreased UE ROM, Decreased Safe judgement during ADL, Decreased UE strength, Decreased cognition, Decreased endurance, Decreased self-care trans, Decreased high-level ADLs, Decreased fine motor control, Visual deficit  Prognosis: Good  Assessment: Pt is a 69 y.o. female who was admitted to [facility] on 1/2/2025 with Hypercalcemia, hypertensive emergency Tariq, senile osteoporosis, , history of CAV 12/24, CAROLINE. Patient  has a past medical history of Allergic (years ago), Chronic leukopenia, Depression (04/22/2014), Hypercalcemia, Hyperlipidemia, Hypertension, Hypertension, Migraines, Osteoarthritis, Osteoporosis, and Vertigo.  At baseline pt was completing I with ADL's/IaDL's prior to CVA no AD with functional mobility +drives. Pt lives with spouse in a bilevel home with 2 CARMITA. Currently pt requires min/mod a LB for overall ADLS and  min a with RW for functional mobility/transfers. Pt currently presents with impairments in the following categories -steps to enter environment, difficulty performing ADLS, difficulty performing IADLS , and limited insight into deficits activity tolerance, endurance, standing balance/tolerance, sitting balance/tolerance, UE strength, memory, insight, safety ,  judgement , and attention . These impairments, as well as pt's fatigue and risk for falls  limit pt's ability to safely engage in all baseline areas of occupation, includingeating, grooming, bathing, dressing, toileting, functional mobility/transfers, community mobility, driving, house maintenance, medication management, meal prep, cleaning, social participation , and leisure activities  From OT standpoint, recommend max level I upon D/C.  The patient's raw score on the -PAC Daily Activity Inpatient Short Form is 16. A raw score of less than 19 suggests the patient may benefit from discharge to post-acute rehabilitation services. Please refer to the recommendation of the Occupational Therapist for safe discharge planning. OT will continue to follow to address the below stated goals.     Rehab Resource Intensity Level, OT: I (Maximum Resource Intensity)     1/6/2025 1241 by Sunita Hanley OT

## 2025-01-06 NOTE — UTILIZATION REVIEW
"NOTIFICATION OF INPATIENT ADMISSION   AUTHORIZATION REQUEST   SERVICING FACILITY:   [facility]  Address: [address] [place] [address]  Tax ID: [id number]  NPI: [id number] ATTENDING PROVIDER:  Attending Name and NPI#: Cain Smith Md [[id number]]  Address: [address] [place] [address]  Phone: [phone]   ADMISSION INFORMATION:  Place of Service: Inpatient [facility]  Place of Service Code: 21  Inpatient Admission Date/Time: 1/2/25  1:22 PM  Discharge Date/Time: No discharge date for patient encounter.  Admitting Diagnosis Code/Description:  Hypercalcemia [E83.52]  Altered mental status [R41.82]  CVA (cerebral vascular accident) (HCC) [I63.9]     UTILIZATION REVIEW CONTACT:  Louisa Peters"Sammie\" Ever Utilization   Network Utilization Review Department  Phone: [phone]  Fax: [phone]  Email: [email or web address]  Contact for approvals/pending authorizations, clinical reviews, and discharge.     PHYSICIAN ADVISORY SERVICES:  Medical Necessity Denial & Idui-xr-Uneq Review  Phone: [phone]  Fax: [phone]  Email: [email or web address]     DISCHARGE SUPPORT TEAM:  For Patients Discharge Needs & Updates  Phone: [phone] opt. 2 Fax: [phone]  Email: [email or web address]     "

## 2025-01-06 NOTE — CASE MANAGEMENT
Case Management Discharge Planning Note    Patient name Mayra Echevarria  Location [facility] MRN [id number]  : 1955 Date 2025       Current Admission Date: 2025  Current Admission Diagnosis:Hypercalcemia   Patient Active Problem List    Diagnosis Date Noted Date Diagnosed    Hypertensive emergency 2025     CAROLINE (acute kidney injury) (HCC) 2025     History of CVA (cerebrovascular accident) 2024     Ambulatory dysfunction 2024     MATT (obstructive sleep apnea) 2024     Other sleep disorders 2024     Sleep apnea-like behavior 2024     Insomnia 2024     Memory impairment 2024     Encounter for screening mammogram for breast cancer 2023     Other osteoporosis without current pathological fracture 2023     Impingement syndrome of left shoulder 2023     Impingement syndrome of right shoulder 2023     History of rib fracture 2022     Hypercalcemia 2021     Primary generalized (osteo)arthritis 2021     Migraine with aura and without status migrainosus, not intractable 2021     Anxiety 2021     Labile hypertension 2021     Quadriceps tendonitis 2021     COVID-19 2021     Atrophic vaginitis 2019     Aseptic necrosis bone (HCC) 2019     Greater trochanteric bursitis of right hip 2017     It band syndrome, right 2017     Patellofemoral syndrome, right 2017     Senile osteoporosis 2015     Hyperlipidemia 2012     Essential hypertension 2009       LOS (days): 4  Geometric Mean LOS (GMLOS) (days):   Days to GMLOS:     OBJECTIVE:  Risk of Unplanned Readmission Score: 10.48         Current admission status: Inpatient   Preferred Pharmacy:   [address] ALVINO Naidu [address]  [place] [address]  Phone: [phone] Fax: [phone]    [facility] [address]  [address] [address]  Phone: [phone] Fax: [phone]    Primary Care Provider: Namita Melendez DO    Primary Insurance: [employer] RE[employer]  Secondary Insurance:     DISCHARGE DETAILS:    Recommendations for Level I requested a PMR

## 2025-01-06 NOTE — PLAN OF CARE
Problem: PHYSICAL THERAPY ADULT  Goal: Performs mobility at highest level of function for planned discharge setting.  See evaluation for individualized goals.  Description: Treatment/Interventions: Functional transfer training, LE strengthening/ROM, Therapeutic exercise, Endurance training, Cognitive reorientation, Patient/family training, Equipment eval/education, Bed mobility, Gait training, Elevations, Spoke to nursing, Spoke to case management, OT          See flowsheet documentation for full assessment, interventions and recommendations.  Note: Prognosis: Good  Problem List: Decreased strength, Decreased endurance, Impaired balance, Decreased mobility, Decreased coordination, Decreased cognition, Impaired judgement, Decreased safety awareness, Impaired vision  Assessment: Pt is a 69 y.o. female admitted to [facility] on 1/2/2025 with lethargy 24 hours. Pt received a primary medical dx of hypercalcemia likely from taking too much medication. +memory deficits noted last admission. Recent CVA late December 2024. Pt has the following comorbidities which affect their treatment: active problems listed above,  has a past medical history of Allergic, Chronic leukopenia, Depression, Hypercalcemia, Hyperlipidemia, Hypertension, Hypertension, Migraines, Osteoarthritis, Osteoporosis, and Vertigo.  , as well as personal factors including stairs to access home. Pt has a high complexity clinical presentation due to Ongoing medical management for primary dx, Increased reliance on more restrictive AD compared to baseline, Decreased activity tolerance compared to baseline, Fall risk, Increased assistance needed from caregiver at current time, Ongoing telemetry monitoring, Cog status, Trending lab values, Diagnostic imaging pending, Continuous pulse oximetry monitoring  , and PMH. PT was consulted to evaluate pt's functional mobility and discharge needs. Upon evaluation, patient required minAx1 for bed mobility, minAx1 for STS  transfers, minAx1 for ambulation, and modAx1 for stairs. Body system impairments include impaired balance, RUE strength and coordination, endurance, cognition, L eye vision. Pt's functional activity impairments include: impaired independence with ADLS, activity tolerance and mobility. Participation restrictions include inability to safely access home/community. At conclusion of eval, pt remained supine in bed with bed alarm, phone, call bell, and all other personal needs within reach. Pt would benefit from skilled PT to address their functional mobility limitations. The patient's [facility] Basic Mobility Inpatient Short Form Raw Score is 18. A Raw score of greater than 16 suggests the patient may benefit from discharge to home. Please also refer to the recommendation of the Physical Therapist for safe discharge planning.  Barriers to Discharge: Inaccessible home environment     Rehab Resource Intensity Level, PT: (S) I (Maximum Resource Intensity)    See flowsheet documentation for full assessment.

## 2025-01-06 NOTE — PLAN OF CARE
Problem: PAIN - ADULT  Goal: Verbalizes/displays adequate comfort level or baseline comfort level  Description: Interventions:  - Encourage patient to monitor pain and request assistance  - Assess pain using appropriate pain scale  - Administer analgesics based on type and severity of pain and evaluate response  - Implement non-pharmacological measures as appropriate and evaluate response  - Consider cultural and social influences on pain and pain management  - Notify physician/advanced practitioner if interventions unsuccessful or patient reports new pain  Outcome: Progressing     Problem: INFECTION - ADULT  Goal: Absence or prevention of progression during hospitalization  Description: INTERVENTIONS:  - Assess and monitor for signs and symptoms of infection  - Monitor lab/diagnostic results  - Monitor all insertion sites, i.e. indwelling lines, tubes, and drains  - Monitor endotracheal if appropriate and nasal secretions for changes in amount and color  - [place] appropriate cooling/warming therapies per order  - Administer medications as ordered  - Instruct and encourage patient and family to use good hand hygiene technique  - Identify and instruct in appropriate isolation precautions for identified infection/condition  Outcome: Progressing     Problem: SAFETY ADULT  Goal: Patient will remain free of falls  Description: INTERVENTIONS:  - Educate patient/family on patient safety including physical limitations  - Instruct patient to call for assistance with activity   - Consult OT/PT to assist with strengthening/mobility   - Keep Call bell within reach  - Keep bed low and locked with side rails adjusted as appropriate  - Keep care items and personal belongings within reach  - Initiate and maintain comfort rounds  - Make Fall Risk Sign visible to staff  - Offer Toileting every  Hours, in advance of need  - Initiate/Maintain alarm  - Obtain necessary fall risk management equipment:   - Apply yellow socks and  bracelet for high fall risk patients  - Consider moving patient to room near nurses station  Outcome: Progressing  Goal: Maintain or return to baseline ADL function  Description: INTERVENTIONS:  -  Assess patient's ability to carry out ADLs; assess patient's baseline for ADL function and identify physical deficits which impact ability to perform ADLs (bathing, care of mouth/teeth, toileting, grooming, dressing, etc.)  - Assess/evaluate cause of self-care deficits   - Assess range of motion  - Assess patient's mobility; develop plan if impaired  - Assess patient's need for assistive devices and provide as appropriate  - Encourage maximum independence but intervene and supervise when necessary  - Involve family in performance of ADLs  - Assess for home care needs following discharge   - Consider OT consult to assist with ADL evaluation and planning for discharge  - Provide patient education as appropriate  Outcome: Progressing  Goal: Maintains/Returns to pre admission functional level  Description: INTERVENTIONS:  - Perform AM-PAC 6 Click Basic Mobility/ Daily Activity assessment daily.  - Set and communicate daily mobility goal to care team and patient/family/caregiver.   - Collaborate with rehabilitation services on mobility goals if consulted  - Perform Range of Motion  times a day.  - Reposition patient every  hours.  - Dangle patient  times a day  - Stand patient  times a day  - Ambulate patient  times a day  - Out of bed to chair  times a day   - Out of bed for meals  times a day  - Out of bed for toileting  - Record patient progress and toleration of activity level   Outcome: Progressing     Problem: DISCHARGE PLANNING  Goal: Discharge to home or other facility with appropriate resources  Description: INTERVENTIONS:  - Identify barriers to discharge w/patient and caregiver  - Arrange for needed discharge resources and transportation as appropriate  - Identify discharge learning needs (meds, wound care, etc.)  -  Arrange for interpretive services to assist at discharge as needed  - Refer to Case Management Department for coordinating discharge planning if the patient needs post-hospital services based on physician/advanced practitioner order or complex needs related to functional status, cognitive ability, or social support system  Outcome: Progressing     Problem: Knowledge Deficit  Goal: Patient/family/caregiver demonstrates understanding of disease process, treatment plan, medications, and discharge instructions  Description: Complete learning assessment and assess knowledge base.  Interventions:  - Provide teaching at level of understanding  - Provide teaching via preferred learning methods  Outcome: Progressing     Problem: Prexisting or High Potential for Compromised Skin Integrity  Goal: Skin integrity is maintained or improved  Description: INTERVENTIONS:  - Identify patients at risk for skin breakdown  - Assess and monitor skin integrity  - Assess and monitor nutrition and hydration status  - Monitor labs   - Assess for incontinence   - Turn and reposition patient  - Assist with mobility/ambulation  - Relieve pressure over bony prominences  - Avoid friction and shearing  - Provide appropriate hygiene as needed including keeping skin clean and dry  - Evaluate need for skin moisturizer/barrier cream  - Collaborate with interdisciplinary team   - Patient/family teaching  - Consider wound care consult   Outcome: Progressing     Problem: NEUROSENSORY - ADULT  Goal: Achieves stable or improved neurological status  Description: INTERVENTIONS  - Monitor and report changes in neurological status  - Monitor vital signs such as temperature, blood pressure, glucose, and any other labs ordered   - Initiate measures to prevent increased intracranial pressure  - Monitor for seizure activity and implement precautions if appropriate      Outcome: Progressing  Goal: Achieves maximal functionality and self care  Description:  INTERVENTIONS  - Monitor swallowing and airway patency with patient fatigue and changes in neurological status  - Encourage and assist patient to increase activity and self care.   - Encourage visually impaired, hearing impaired and aphasic patients to use assistive/communication devices  Outcome: Progressing

## 2025-01-06 NOTE — PROGRESS NOTES
Progress Note - Hospitalist   Name: Mayra Echevarria 69 y.o. female I MRN: [id number]  Unit/Bed#: [facility] 726-01 I Date of Admission: 1/2/2025   Date of Service: 1/6/2025 I Hospital Day: 4    Assessment & Plan  Hypercalcemia  Severe, 17 on admission.  Recently hospitalized within past 2 weeks for multifocal strokes, was sent home with outpatient PT OT.  Was oral supplementation of milligram calcium tabs twice daily, but unfortunately was taking 1200 mg twice daily, double instructed dose.    resolved  Discontinued calcium supplements  completed calcitonin  BMP daily  D/c IV fluids    Hypertensive emergency  Noted on admission.BP initially 191/90, as high as 202/80 on manual.  And CAROLINE  Will hold losartan due to CAROLINE resume in 24-48h  continue PO hydralazine for now  Adjust BP meds PRN  Senile osteoporosis  On prolia outpatient, last injection Sept 2023    See hypercalcemia plan  Likely will hold off on calcium at time of discharge, if not reduced oral dosing such as 600 mg daily (vs previous 600 mg BID, as pt taking 1200 mg BID)    Plan:  Hold off on calcium as inpatient   Hold off on zolendronate due to recent prolia in Sept 2023   Follow up with endocrinology as an outpatient  Essential hypertension  Blood Pressure: 159/75    Patient is awake enough to tolerate oral medications    Plan:  As above  MATT (obstructive sleep apnea)  Previous sleep study is likely falsely negative  Upon [place] monitor, when sleeping, she has visible apneic events with associated hypoxia  D/w her  that she will likely need another sleep titration study  Due to her recent history of CVA will try to enroll her in the Sleep for Stroke Management And Recovery Trial  Awaiting overnight oximetry results  History of CVA (cerebrovascular accident)  Recently admitted with multiple small acute infarctions, visualized on 12/27/2024 MRI involving left occipital lobe, bilateral alex, left greater than right, and left cerebellar hemisphere with  cytotoxic edema but no hemorrhage.  Also has chronic mild white matter microangiopathic changes in bilateral cerebral hemispheres.  CT head Noncon from this admission is unremarkable for any new strokes or pathologies.    Plan:  Continue aspirin, statin  BP control as noted under hypertensive urgency  Ambulatory dysfunction  Noted after recent stroke at end of December 2024.  Was discharged home with PT OT.    Plan: PT/OT  Discharge planning to IP rehabilitation  CAROLINE (acute kidney injury) (HCC)  Recent Labs     01/04/25  0748 01/04/25 2036 01/05/25  0712   CREATININE 1.46* 1.28 1.08   EGFR 36 42 52     Estimated Creatinine Clearance: 43.8 mL/min (by C-G formula based on SCr of 1.08 mg/dL).    Noted on admission, no hx CKD. Likely from hypercalcemia.    Plan:  IV fluids as per plan for hypercalcemia along w diuresis   Holding losartan    VTE Pharmacologic Prophylaxis: VTE Score: 8 High Risk (Score >/= 5) - Pharmacological DVT Prophylaxis Ordered: heparin. Sequential Compression Devices Ordered.    Mobility:   Basic Mobility Inpatient Raw Score: 18  JH-HLM Goal: 6: Walk 10 steps or more  JH-HLM Achieved: 7: Walk 25 feet or more  JH-HLM Goal achieved. Continue to encourage appropriate mobility.    Patient Centered Rounds: I performed bedside rounds with nursing staff today.   Discussions with Specialists or Other Care Team Provider: nurse, CM    Education and Discussions with Family / Patient: Updated  () at bedside.    Current Length of Stay: 4 day(s)  Current Patient Status: Inpatient   Certification Statement: The patient will continue to require additional inpatient hospital stay due to discharge planning  Discharge Plan: Anticipate discharge tomorrow to rehab facility.    Code Status: Level 1 - Full Code    Subjective   Denies any new complaints.    Objective :  Temp:  [98.3 °F (36.8 °C)-98.9 °F (37.2 °C)] 98.5 °F (36.9 °C)  HR:  [73-92] 73  BP: (139-179)/(60-90) 159/75  Resp:  [18-19]  18  SpO2:  [90 %-94 %] 92 %    Body mass index is 27.38 kg/m².     Input and Output Summary (last 24 hours):     Intake/Output Summary (Last 24 hours) at 1/6/2025 1534  Last data filed at 1/5/2025 1801  Gross per 24 hour   Intake 118 ml   Output 200 ml   Net -82 ml       Physical Exam  Constitutional:       Appearance: Normal appearance.   HENT:      Head: Normocephalic and atraumatic.      Nose: Nose normal.   Eyes:      Extraocular Movements: Extraocular movements intact.   Cardiovascular:      Rate and Rhythm: Normal rate and regular rhythm.   Pulmonary:      Effort: Pulmonary effort is normal.      Breath sounds: No wheezing or rales.   Abdominal:      Palpations: Abdomen is soft.   Musculoskeletal:      Right lower leg: No edema.      Left lower leg: No edema.   Skin:     General: Skin is warm and dry.   Neurological:      Mental Status: She is alert and oriented to person, place, and time.   Psychiatric:         Mood and Affect: Mood normal.         Behavior: Behavior normal.           Lines/Drains:              Lab Results: I have reviewed the following results:   Results from last 7 days   Lab Units 01/02/25  1913 01/02/25  1107   WBC Thousand/uL  --  10.78*   HEMOGLOBIN g/dL  --  13.5   HEMATOCRIT %  --  42.3   PLATELETS Thousands/uL 384 379   SEGS PCT %  --  86*   LYMPHO PCT %  --  6*   MONO PCT %  --  7   EOS PCT %  --  1     Results from last 7 days   Lab Units 01/05/25  0712 01/03/25  0505 01/02/25  1913   SODIUM mmol/L 143   < > 140   POTASSIUM mmol/L 3.1*   < > 4.0   CHLORIDE mmol/L 113*   < > 100   CO2 mmol/L 23   < > 29   BUN mg/dL 25   < > 31*   CREATININE mg/dL 1.08   < > 1.59*   ANION GAP mmol/L 7   < > 11   CALCIUM mg/dL 9.4   < > 16.3*   ALBUMIN g/dL  --   --  4.4   TOTAL BILIRUBIN mg/dL  --   --  0.56   ALK PHOS U/L  --   --  31*   ALT U/L  --   --  30   AST U/L  --   --  39   GLUCOSE RANDOM mg/dL 87   < > 157*    < > = values in this interval not displayed.         Results from last 7 days    Lab Units 01/02/25  1504   POC GLUCOSE mg/dl 123               Recent Cultures (last 7 days):         Imaging Results Review: No pertinent imaging studies reviewed.  Other Study Results Review: No additional pertinent studies reviewed.    Last 24 Hours Medication List:     Current Facility-Administered Medications:     acetaminophen (TYLENOL) tablet 650 mg, Q6H PRN    amLODIPine (NORVASC) tablet 5 mg, Daily    aspirin chewable tablet 81 mg, Daily    atorvastatin (LIPITOR) tablet 40 mg, Daily With Dinner    heparin (porcine) subcutaneous injection 5,000 Units, Q8H EVON **AND** [COMPLETED] Platelet count, Once    hydrALAZINE (APRESOLINE) tablet 25 mg, Q8H EVON    labetalol (NORMODYNE) injection 10 mg, Q4H PRN    metoclopramide (REGLAN) injection 10 mg, Q6H PRN    multivitamin stress formula tablet 1 tablet, Daily    ondansetron (ZOFRAN) injection 4 mg, Q6H PRN    oxyCODONE (ROXICODONE) IR tablet 5 mg, Q4H PRN    oxyCODONE (ROXICODONE) split tablet 2.5 mg, Q4H PRN    potassium chloride (Klor-Con M20) CR tablet 20 mEq, TID With Meals    Administrative Statements   Today, Patient Was Seen By: Cain Smith MD  I have spent a total time of 40 minutes in caring for this patient on the day of the visit/encounter including Diagnostic results, Instructions for management, Patient and family education, Impressions, Counseling / Coordination of care, Documenting in the medical record, Reviewing / ordering tests, medicine, procedures  , Obtaining or reviewing history  , and Communicating with other healthcare professionals .    **Please Note: This note may have been constructed using a voice recognition system.**

## 2025-01-06 NOTE — ASSESSMENT & PLAN NOTE
On prolia outpatient, last injection Sept 2023    See hypercalcemia plan  Likely will hold off on calcium at time of discharge, if not reduced oral dosing such as 600 mg daily (vs previous 600 mg BID, as pt taking 1200 mg BID)    Plan:  Hold off on calcium as inpatient   Hold off on zolendronate due to recent prolia in Sept 2023   Follow up with endocrinology as an outpatient

## 2025-01-06 NOTE — PHYSICAL THERAPY NOTE
Physical Therapy Evaluation    Patient Name: Mayra Echevarria    Today's Date: 1/6/2025     Problem List  Principal Problem:    Hypercalcemia  Active Problems:    Senile osteoporosis    Essential hypertension    MATT (obstructive sleep apnea)    History of CVA (cerebrovascular accident)    Ambulatory dysfunction    Hypertensive emergency    CAROLINE (acute kidney injury) (HCC)       Past Medical History  Past Medical History:   Diagnosis Date    Allergic years ago    Iodine    Chronic leukopenia     Depression 04/22/2014    Hypercalcemia     Hyperlipidemia     Hypertension     labile    Hypertension     Migraines     Osteoarthritis     Osteoporosis     Vertigo         Past Surgical History  Past Surgical History:   Procedure Laterality Date    EYE SURGERY      TOOTH EXTRACTION      TUBAL LIGATION           01/06/25 1144   PT Last Visit   PT Visit Date 01/06/25   Note Type   Note type Evaluation   Pain Assessment   Pain Assessment Tool 0-10   Pain Score No Pain   Restrictions/Precautions   Weight Bearing Precautions Per Order No   Other Precautions Cognitive;Chair Alarm;Bed Alarm;Multiple lines;Telemetry;Fall Risk;Visual impairment  (L eye hypertropia (baseline))   Home Living   Type of Home House   Home Layout Multi-level  ((1 CARMITA, split level, full bath on main floor (doesnt use), 2 half flights to bed/bath))   Bathroom Shower/Tub Tub/shower unit   Bathroom Toilet Raised   Bathroom Equipment Grab bars in shower   Home Equipment Walker   Additional Comments RW since last admission. prior to that, completely independent with no AD use   Prior Function   Level of [place] Independent with ADLs;Independent with functional mobility;Independent with IADLS  (until recent admission. since DC last admission, she has required increased A for all tasks)   Lives With Spouse   Receives Help From Family   Comments spouse able to A   General   Additional Pertinent History per  "last admission \"pt has been having memory decline in the last year which has limited her ability to watch her grandchildren and has caused her to only drive to [place]\". this admission, when asked if driving has decreased, her and  state no   Family/Caregiver Present Yes  (-states she is alot weaker and would like ARC)   Cognition   Overall Cognitive Status Impaired   Arousal/Participation Cooperative   Orientation Level Oriented to person;Oriented to place;Disoriented to situation  (year only)   Memory Decreased recall of recent events;Decreased short term memory   Following Commands Follows one step commands without difficulty   Comments pleasant, emotional at times ( states this is baseline)   Subjective   Subjective agreeable   RLE Assessment   RLE Assessment WFL   LLE Assessment   LLE Assessment WFL   Vision-Basic Assessment   Patient Visual Report   (L eye impaired vision at baseline)   Coordination   Finger to Nose & Finger to Finger  Impaired  (RUE)   Light Touch   RLE Light Touch Grossly intact   LLE Light Touch Grossly intact   Bed Mobility   Supine to Sit 4  Minimal assistance   Additional items Assist x 1;HOB elevated;Increased time required;Verbal cues   Sit to Supine 4  Minimal assistance   Additional items Assist x 1;Increased time required;Verbal cues;LE management   Transfers   Sit to Stand 4  Minimal assistance   Additional items Assist x 1;Increased time required;Verbal cues   Stand to Sit 4  Minimal assistance   Additional items Assist x 1;Increased time required;Verbal cues   Additional Comments c RW   Ambulation/Elevation   Gait pattern Improper Weight shift;Decreased foot clearance;Short stride;Excessively slow   Gait Assistance 4  Minimal assist   Additional items Assist x 1;Verbal cues   Assistive Device Rolling walker   Distance 50'x2   Stair Management Assistance 3  Moderate assist   Additional items Assist x 1;Verbal cues   Stair Management Technique One rail " R;Step to pattern;Foreward;Nonreciprocal   Number of Stairs 2   Balance   Static Sitting Fair   Dynamic Sitting Fair -   Static Standing Poor +   Dynamic Standing Poor +   Ambulatory Poor +   Endurance Deficit   Endurance Deficit Yes   Activity Tolerance   Activity Tolerance Patient limited by fatigue   Medical Staff Made Aware OT   Nurse Made Aware yes-cleared   Assessment   Prognosis Good   Problem List Decreased strength;Decreased endurance;Impaired balance;Decreased mobility;Decreased coordination;Decreased cognition;Impaired judgement;Decreased safety awareness;Impaired vision   Assessment Pt is a 69 y.o. female admitted to [facility] on 1/2/2025 with lethargy 24 hours. Pt received a primary medical dx of hypercalcemia likely from taking too much medication. +memory deficits noted last admission. Recent CVA late December 2024. Pt has the following comorbidities which affect their treatment: active problems listed above,  has a past medical history of Allergic, Chronic leukopenia, Depression, Hypercalcemia, Hyperlipidemia, Hypertension, Hypertension, Migraines, Osteoarthritis, Osteoporosis, and Vertigo.  , as well as personal factors including stairs to access home. Pt has a high complexity clinical presentation due to Ongoing medical management for primary dx, Increased reliance on more restrictive AD compared to baseline, Decreased activity tolerance compared to baseline, Fall risk, Increased assistance needed from caregiver at current time, Ongoing telemetry monitoring, Cog status, Trending lab values, Diagnostic imaging pending, Continuous pulse oximetry monitoring  , and PMH. PT was consulted to evaluate pt's functional mobility and discharge needs. Upon evaluation, patient required minAx1 for bed mobility, minAx1 for STS transfers, minAx1 for ambulation, and modAx1 for stairs. Body system impairments include impaired balance, RUE strength and coordination, endurance, cognition, L eye vision. Pt's functional  activity impairments include: impaired independence with ADLS, activity tolerance and mobility. Participation restrictions include inability to safely access home/community. At conclusion of eval, pt remained supine in bed with bed alarm, phone, call bell, and all other personal needs within reach. Pt would benefit from skilled PT to address their functional mobility limitations. The patient's AM-PAC Basic Mobility Inpatient Short Form Raw Score is 18. A Raw score of greater than 16 suggests the patient may benefit from discharge to home. Please also refer to the recommendation of the Physical Therapist for safe discharge planning.   Barriers to Discharge Inaccessible home environment   Goals   Patient Goals to get stronger   STG Expiration Date 01/20/25   Short Term Goal #1 In 14 days, pt will: 1) perform bed mobility with mod I to promote independence 2) perform transfers to<>from all surfaces with mod I to promote safety 3) ambulate 300' with mod I and least restrictive device to promote safe return to community 4) negotiate 13 stairs with mod I to promote safe return to community 5) improve balance grades by 1/2 to promote safety with functional mobility.   PT Treatment Day 0   Plan   Treatment/Interventions Functional transfer training;LE strengthening/ROM;Therapeutic exercise;Endurance training;Cognitive reorientation;Patient/family training;Equipment eval/education;Bed mobility;Gait training;Elevations;Spoke to nursing;Spoke to case management;OT   PT Frequency 3-5x/wk   Discharge Recommendation   Rehab Resource Intensity Level, PT (S)  I (Maximum Resource Intensity)   Additional Comments pt was initially recommended for ARC s/p CVA late december 2024. did not go to arc upon dc due to progressing and feeling comfortable going home. family now returns and realizes pt is not as strong, requires A for all care, and needs more therapy to return to baseline   AM-PAC Basic Mobility Inpatient   Turning in Flat Bed  Without Bedrails 3   Lying on Back to Sitting on Edge of Flat Bed Without Bedrails 3   Moving Bed to Chair 3   Standing Up From Chair Using Arms 3   Walk in Room 3   Climb 3-5 Stairs With Railing 3   Basic Mobility Inpatient Raw Score 18   Basic Mobility Standardized Score 41.05   [facility] Highest Level Of Mobility   -[facility] Goal 6: Walk 10 steps or more   -HLM Achieved 7: Walk 25 feet or more   Modified Natali Scale   Modified Natali Scale 4   Barthel Index   Feeding 5   Bathing 0   Grooming Score 0   Dressing Score 5   Bladder Score 10   Bowels Score 10   Toilet Use Score 5   Transfers (Bed/Chair) Score 10   Mobility (Level Surface) Score 0   Stairs Score 5   Barthel Index Score 50   Elliot Stout, PT, DPT, NCS

## 2025-01-07 VITALS
RESPIRATION RATE: 18 BRPM | WEIGHT: 150.2 LBS | BODY MASS INDEX: 27.64 KG/M2 | SYSTOLIC BLOOD PRESSURE: 172 MMHG | TEMPERATURE: 98.2 F | OXYGEN SATURATION: 94 % | DIASTOLIC BLOOD PRESSURE: 78 MMHG | HEIGHT: 62 IN | HEART RATE: 74 BPM

## 2025-01-07 LAB
ALBUMIN UR ELPH-MCNC: 100 %
ALPHA1 GLOB MFR UR ELPH: 0 %
ALPHA2 GLOB MFR UR ELPH: 0 %
B-GLOBULIN MFR UR ELPH: 0 %
GAMMA GLOB MFR UR ELPH: 0 %
PROT PATTERN UR ELPH-IMP: NORMAL
PROT UR-MCNC: 4.8 MG/DL

## 2025-01-07 PROCEDURE — 84166 PROTEIN E-PHORESIS/URINE/CSF: CPT | Performed by: STUDENT IN AN ORGANIZED HEALTH CARE EDUCATION/TRAINING PROGRAM

## 2025-01-07 PROCEDURE — 99239 HOSP IP/OBS DSCHRG MGMT >30: CPT | Performed by: STUDENT IN AN ORGANIZED HEALTH CARE EDUCATION/TRAINING PROGRAM

## 2025-01-07 RX ADMIN — HEPARIN SODIUM 5000 UNITS: 5000 INJECTION, SOLUTION INTRAVENOUS; SUBCUTANEOUS at 05:28

## 2025-01-07 RX ADMIN — ATORVASTATIN CALCIUM 40 MG: 40 TABLET, FILM COATED ORAL at 16:31

## 2025-01-07 RX ADMIN — HYDRALAZINE HYDROCHLORIDE 25 MG: 25 TABLET ORAL at 13:40

## 2025-01-07 RX ADMIN — AMLODIPINE BESYLATE 5 MG: 5 TABLET ORAL at 09:03

## 2025-01-07 RX ADMIN — HEPARIN SODIUM 5000 UNITS: 5000 INJECTION, SOLUTION INTRAVENOUS; SUBCUTANEOUS at 13:40

## 2025-01-07 RX ADMIN — ASPIRIN 81 MG CHEWABLE TABLET 81 MG: 81 TABLET CHEWABLE at 09:03

## 2025-01-07 RX ADMIN — B-COMPLEX W/ C & FOLIC ACID TAB 1 TABLET: TAB at 09:03

## 2025-01-07 RX ADMIN — HYDRALAZINE HYDROCHLORIDE 25 MG: 25 TABLET ORAL at 05:28

## 2025-01-07 NOTE — ASSESSMENT & PLAN NOTE
Blood Pressure: (!) 172/78    Patient is awake enough to tolerate oral medications    Plan:  As above

## 2025-01-07 NOTE — PLAN OF CARE
Problem: PAIN - ADULT  Goal: Verbalizes/displays adequate comfort level or baseline comfort level  Description: Interventions:  - Encourage patient to monitor pain and request assistance  - Assess pain using appropriate pain scale  - Administer analgesics based on type and severity of pain and evaluate response  - Implement non-pharmacological measures as appropriate and evaluate response  - Consider cultural and social influences on pain and pain management  - Notify physician/advanced practitioner if interventions unsuccessful or patient reports new pain  Outcome: Progressing     Problem: SAFETY ADULT  Goal: Patient will remain free of falls  Description: INTERVENTIONS:  - Educate patient/family on patient safety including physical limitations  - Instruct patient to call for assistance with activity   - Consult OT/PT to assist with strengthening/mobility   - Keep Call bell within reach  - Keep bed low and locked with side rails adjusted as appropriate  - Keep care items and personal belongings within reach  - Initiate and maintain comfort rounds  - Make Fall Risk Sign visible to staff  - Offer Toileting every 2 Hours, in advance of need  - Initiate/Maintain bed/chair alarm  - Obtain necessary fall risk management equipment: gripper socks, call bell and fluids/ bedside table within the reach   - Apply yellow socks and bracelet for high fall risk patients  - Consider moving patient to room near nurses station  Outcome: Progressing     Problem: Knowledge Deficit  Goal: Patient/family/caregiver demonstrates understanding of disease process, treatment plan, medications, and discharge instructions  Description: Complete learning assessment and assess knowledge base.  Interventions:  - Provide teaching at level of understanding  - Provide teaching via preferred learning methods  Outcome: Progressing     Problem: NEUROSENSORY - ADULT  Goal: Achieves stable or improved neurological status  Description: INTERVENTIONS  -  Monitor and report changes in neurological status  - Monitor vital signs such as temperature, blood pressure, glucose, and any other labs ordered   - Initiate measures to prevent increased intracranial pressure  - Monitor for seizure activity and implement precautions if appropriate      Outcome: Progressing

## 2025-01-07 NOTE — DISCHARGE SUMMARY
Discharge Summary - Hospitalist   Name: Mayra Echevarria 69 y.o. female I MRN: [id number]  Unit/Bed#: [facility] 726-01 I Date of Admission: 1/2/2025   Date of Service: 1/7/2025 I Hospital Day: 5     Assessment & Plan  Hypercalcemia  Severe, 17 on admission.  Recently hospitalized within past 2 weeks for multifocal strokes, was sent home with outpatient PT OT.  Was oral supplementation of milligram calcium tabs twice daily, but unfortunately was taking 1200 mg twice daily, double instructed dose.  resolved  Discontinued calcium supplements  completed calcitonin  BMP daily  D/c IV fluids    Hypertensive emergency  Noted on admission.BP initially 191/90, as high as 202/80 on manual.  And CAROLINE  Will hold losartan due to CAROLINE resume in 24-48h  continue PO hydralazine for now  Adjust BP meds PRN  Senile osteoporosis  On prolia outpatient, last injection Sept 2023    See hypercalcemia plan  Likely will hold off on calcium at time of discharge, if not reduced oral dosing such as 600 mg daily (vs previous 600 mg BID, as pt taking 1200 mg BID)    Plan:  Hold off on calcium as inpatient   Hold off on zolendronate due to recent prolia in Sept 2023   Follow up with endocrinology as an outpatient  Essential hypertension  Blood Pressure: (!) 172/78    Patient is awake enough to tolerate oral medications    Plan:  As above  MATT (obstructive sleep apnea)  Previous sleep study is likely falsely negative  Upon Masimo monitor, when sleeping, she has visible apneic events with associated hypoxia  D/w her  that she will likely need another sleep titration study  Due to her recent history of CVA will try to enroll her in the Sleep for Stroke Management And Recovery Trial  Awaiting overnight oximetry results  History of CVA (cerebrovascular accident)  Recently admitted with multiple small acute infarctions, visualized on 12/27/2024 MRI involving left occipital lobe, bilateral alex, left greater than right, and left cerebellar hemisphere with  cytotoxic edema but no hemorrhage.  Also has chronic mild white matter microangiopathic changes in bilateral cerebral hemispheres.  CT head Noncon from this admission is unremarkable for any new strokes or pathologies.    Plan:  Continue aspirin, statin  BP control as noted under hypertensive urgency  Ambulatory dysfunction  Noted after recent stroke at end of December 2024.  Was discharged home with PT OT.    Plan: PT/OT  Discharge planning to IP rehabilitation  CAROLINE (acute kidney injury) (HCC)  Recent Labs     01/04/25 2036 01/05/25  0712   CREATININE 1.28 1.08   EGFR 42 52     Estimated Creatinine Clearance: 43.8 mL/min (by C-G formula based on SCr of 1.08 mg/dL).    Noted on admission, no hx CKD. Likely from hypercalcemia.    Plan:  IV fluids as per plan for hypercalcemia along w diuresis   Holding losartan     Medical Problems       Resolved Problems  Date Reviewed: 12/27/2024   None       Discharging Physician / Practitioner: Carlos Guerrero  PCP: Namita Melendez DO  Admission Date:   Admission Orders (From admission, onward)       Ordered        01/02/25 1322  INPATIENT ADMISSION  Once                          Discharge Date: 01/07/25    Consultations During Hospital Stay:  PMR  Endocrinology    Procedures Performed:   None    Significant Findings / Test Results:   Cre 1.9  Calcium 17.0  CT Head 1/02/24-Evolving left pontine recent infarct, without associated hemorrhage. Additional small recent infarcts identified on brain MRI from 12/27/2024 is not visualized on this examination. Old lacunar infarcts involving the bilateral similar hemispheres. Mild chronic white matter microangiopathic changes.    Incidental Findings:   N/A     Test Results Pending at Discharge (will require follow up):   None     Outpatient Tests Requested:  None    Complications:  None    Reason for Admission: Milk-alkalai syndrome    Hospital Course:   Mayra Echevarria is a 69 y.o. female patient who originally presented to the  "hospital on 1/2/2025 due to confusion, hypercalcemia, and CAROLINE.  She had a recent stroke in December 2024 and had been more fatigued prior to admission.  She was discharged on calcium supplementation however upon scrutiny of her medications, it appears as though she had been taking double the recommended dose.  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  Her ARB was held and she was given isotonic saline with improvement in her calcium and renal function.  Endocrinology recommended that she could continue with dietary calcium but should avoid supplementation for now.  Patient had improvement in her mental status as well and stabilized from a medical standpoint.  She was seen by the PMR team and recommended for acute rehab.  Ultimately, patient and her  did not wish to remain in the hospital for her rehab bed to become available and elected to take patient home and plan for outpatient rehab.  They were advised to return to the hospital and to discussed with PCP if patient's rehab needs became too great to manage at home.    Please see above list of diagnoses and related plan for additional information.     Condition at Discharge: fair    Discharge Day Visit / Exam:   Subjective:  Resting comfortably, no new complaints  Vitals: Blood Pressure: (!) 172/78 (01/07/25 1500)  Pulse: 74 (01/07/25 1500)  Temperature: 98.2 °F (36.8 °C) (01/07/25 1500)  Temp Source: Axillary (01/02/25 2226)  Respirations: 18 (01/07/25 0834)  Height: 5' 2\" (157.5 cm) (01/02/25 1453)  Weight - Scale: 68.1 kg (150 lb 3.2 oz) (01/07/25 0553)  SpO2: 94 % (01/07/25 1500)  Physical Exam  Vitals and nursing note reviewed.   Constitutional:       General: She is not in acute distress.     Appearance: She is well-developed.   HENT:      Head: Normocephalic and atraumatic.   Eyes:      Conjunctiva/sclera: Conjunctivae normal.   Cardiovascular:      Rate and Rhythm: Normal rate and regular rhythm.      " Heart sounds: No murmur heard.  Pulmonary:      Effort: Pulmonary effort is normal. No respiratory distress.      Breath sounds: Normal breath sounds.   Abdominal:      Palpations: Abdomen is soft.      Tenderness: There is no abdominal tenderness.   Musculoskeletal:         General: No swelling.      Cervical back: Neck supple.   Skin:     General: Skin is warm and dry.      Capillary Refill: Capillary refill takes less than 2 seconds.   Neurological:      Mental Status: She is alert.   Psychiatric:         Mood and Affect: Mood normal.         Discussion with Family: Updated  () at bedside.    Discharge instructions/Information to patient and family:   See after visit summary for information provided to patient and family.      Provisions for Follow-Up Care:  See after visit summary for information related to follow-up care and any pertinent home health orders.      Mobility at time of Discharge:   Basic Mobility Inpatient Raw Score: 18  JH-HLM Goal: 6: Walk 10 steps or more  JH-HLM Achieved: 6: Walk 10 steps or more  HLM Goal achieved. Continue to encourage appropriate mobility.     Disposition:   Home with VNA Services (Reminder: Complete face to face encounter)    Planned Readmission: No    Discharge Medications:  See after visit summary for reconciled discharge medications provided to patient and/or family.      Administrative Statements   Discharge Statement:  I have spent a total time of 45 minutes in caring for this patient on the day of the visit/encounter. .    **Please Note: This note may have been constructed using a voice recognition system**   no

## 2025-01-07 NOTE — ASSESSMENT & PLAN NOTE
Recent Labs     01/04/25 2036 01/05/25  0712   CREATININE 1.28 1.08   EGFR 42 52     Estimated Creatinine Clearance: 43.8 mL/min (by C-G formula based on SCr of 1.08 mg/dL).    Noted on admission, no hx CKD. Likely from hypercalcemia.    Plan:  IV fluids as per plan for hypercalcemia along w diuresis   Holding losartan

## 2025-01-07 NOTE — CASE MANAGEMENT
[facility] received request for authorization from Care Manager.  Authorization request submitted for: Acute Rehab  Facility Name:  [facility] NPI [id number]  Facility MD:   Dr. Ashley DePadua NPI [id number].  Authorization initiated by contacting insurance:  [facility]  Via: [employer]   Clinicals submitted via Portal attachment   Pending Reference #: [id number]     Care Manager notified: Gabby Chin          Updates to authorization status will be noted in chart. Please reach out to CM for updates on any clinical information.

## 2025-01-07 NOTE — DISCHARGE INSTR - AVS FIRST PAGE
Please reach out to your PCP if you need additional help in obtaining rehabilitation services  Visiting nursing referral made at discharge  You do not need to avoid dietary calcium, but please avoid calcium supplementation

## 2025-01-08 ENCOUNTER — TELEPHONE (OUTPATIENT)
Dept: NEUROLOGY | Facility: CLINIC | Age: 70
End: 2025-01-08

## 2025-01-08 ENCOUNTER — TRANSITIONAL CARE MANAGEMENT (OUTPATIENT)
Dept: FAMILY MEDICINE CLINIC | Facility: CLINIC | Age: 70
End: 2025-01-08

## 2025-01-08 LAB — VIT B6 SERPL-MCNC: 11.4 UG/L (ref 3.4–65.2)

## 2025-01-08 NOTE — TELEPHONE ENCOUNTER
Sooner appointment on 3/5/25 was confirmed with patient's spouse. Please refer to this RN's telephone note from today on 1/8/25 for more details.

## 2025-01-08 NOTE — CASE MANAGEMENT
Per ALIYAH. IRF auth need to be cancelled , called [facility] at [phone] spoke with Dina who stated that we need to fill out a form at [facility] website requesting to cancel the auth. Form was filled out   Cm notified  Gabby Chin    Update:  Rec'd Vm from Naomi at [facility] [phone] stated that she rec'd the modifying form stating that auth unable to be cancelled because it has already been approved.  Cm notified  Gabby Chin

## 2025-01-08 NOTE — TELEPHONE ENCOUNTER
Post CVA Discharge Follow Up  Hospitalization: 12/26/24-12/30/24, 1/2/25-1/7/25     Called patient to obtain an update and spoke with patient's spouse, Reggie. He is listed on the patient's medical communication consent form on file. He reports the patient is doing very well. Denies any new or worsening stroke-like symptoms since discharge.     Patient continues to have the following symptoms: dizziness and right sided weakness. He claims her symptoms has improved since discharge.    Patient mobilizes with a walker. Patient requires some assistance with ADLs.     Reviewed appointments - patient is scheduled for the following: PCP on 1/10/25, ortho on 1/23/25, neurology on 3/5/25 (confirmed appointment with him). He is aware of the cardiology referral and he will call the office to arrange an appointment. Reminded him of the recommendation for the CT C/A/P. Provided central scheduling's phone number. He will call to schedule the CT C/A/P. He understands the importance of this.     She is scheduled to start PT tomorrow on 1/9/25.     Reviewed neurology-related medications with him. Reports she is taking as prescribed with no medication side effects or signs of bleeding.     During this call, we reviewed stroke education.     As for risk factors, she is a non smoker.   Patient is trying to follow a well balanced diet.     All of his questions were addressed. At the conclusion of the conversation, he denies having any further questions or concerns.

## 2025-01-09 ENCOUNTER — EVALUATION (OUTPATIENT)
Dept: PHYSICAL THERAPY | Facility: CLINIC | Age: 70
End: 2025-01-09
Payer: COMMERCIAL

## 2025-01-09 DIAGNOSIS — R53.1 DECREASED STRENGTH, ENDURANCE, AND MOBILITY: ICD-10-CM

## 2025-01-09 DIAGNOSIS — R68.89 DECREASED STRENGTH, ENDURANCE, AND MOBILITY: ICD-10-CM

## 2025-01-09 DIAGNOSIS — Z74.09 DECREASED STRENGTH, ENDURANCE, AND MOBILITY: ICD-10-CM

## 2025-01-09 DIAGNOSIS — I63.9 ACUTE CVA (CEREBROVASCULAR ACCIDENT) (HCC): Primary | ICD-10-CM

## 2025-01-09 LAB — PTH RELATED PROT SERPL-SCNC: <2 PMOL/L

## 2025-01-09 PROCEDURE — 97162 PT EVAL MOD COMPLEX 30 MIN: CPT

## 2025-01-09 PROCEDURE — 97110 THERAPEUTIC EXERCISES: CPT

## 2025-01-09 NOTE — PROGRESS NOTES
PT Evaluation     Today's date: 2025  Patient name: Mayra Echevarria  : 1955  MRN: [id number]  Referring provider: Peter Vila MD  Dx:   Encounter Diagnosis     ICD-10-CM    1. Acute CVA (cerebrovascular accident) (HCC)  I63.9 Ambulatory referral to Physical Therapy      2. Decreased strength, endurance, and mobility  R53.1     Z74.09     R68.89                      Assessment  Impairments: abnormal gait, activity intolerance, impaired balance, impaired physical strength, lacks appropriate home exercise program, safety issue, visual perception, participation limitations, activity limitations and endurance    Assessment details: Mayra Echevarria is a pleasant 69 y.o. female who was referred to outpatient physical therapy with the chief complaint imbalance and R sided weakness following a CVA end of 2024. Currently, she utilizes a RW for mobility which she did not use prior to her CVA. She presents with the goals to improve her balance and not need the RW.    PT examination findings include: abnormal gait deviations such as reduced push off, flat foot initial contact, intermittent reduced foot clearance bilaterally; slow gait speed of 0.50 m/s compared to gender and age matched controls of 1.13 m/s; decreased LE strength and power shown by her 5x STS time; impaired balance demonstrated via her TUG time; and limited endurance given her 6 MWT distance of 480 ft compared to gender and age matched controls of 1545 ft. These finding classify her as a high falls risk, categorizes her as limited community ambulator, and decreases her safety with functional mobility.     Time was spent educating her on walking more often and regularly throughout the day at home. She verbalized understanding and is in agreement with this plan. The patient would benefit from skilled physical therapy to provide exercises, neuromuscular reeducation, gait training as deemed necessary in order to help her achieve her goals and  maximize her safety and independence to decrease her falls risk and improve her participation and reach her maximal functional capacity.          Understanding of Dx/Px/POC: good     Prognosis: good    Goals  STGs in 4 weeks  1. Patient will demonstrate independence with basic HEP.  2. Patient will improve her gait speed to at least 0.6 m/s with appropriate AD for improved community ambulation.  3. Patient will improve her 5x STS time to less than 20 seconds for improved LE strength.  4. Patient will improve her 6 MWT distance by at least 150 ft with appropriate AD for improved endurance.  5. Patient will complete FGA for further balance assessment.    LTGs in 12 weeks  1. Patient will improve her 5x STS time to less than 15 seconds for improved LE strength.  2. Patient will achieve a TUG time less than 13.5 secs with appropriate AD for improved safety during functional mobility (cut-off for falls = 13.5 secs)  3. Patient will improve her 6 MWT distance by at least 300 ft with appropriate AD for improved endurance.  4. Patient will improve her gait speed to at least 1.0 m/s with appropriate AD for improved community ambulation.  5. Patient will achieve a FGA score of at least 22/30 for decreased falls risk (cutoff for falls = 22/30).  6. Patient will make appropriate AD decision for mobility at home and in the community.          Plan  Patient would benefit from: PT eval and skilled physical therapy    Planned therapy interventions: abdominal trunk stabilization, balance, neuromuscular re-education, patient/caregiver education, postural training, strengthening, stretching, therapeutic activities, therapeutic exercise, gait training, graded exercise, group therapy, home exercise program and coordination    Frequency: 2-3x week  Duration in weeks: 12  Plan of Care beginning date: 1/9/2025  Plan of Care expiration date: 4/9/2025  Treatment plan discussed with: patient and family  Plan details: Start with 3x/week for 1  month and then consider decreasing frequency pending progress        Subjective Evaluation    History of Present Illness  Mechanism of injury: Juanis presents with her  Audie. They note that over the past few weeks she was in and out of the hospital. The first one was due to confusion, dizziness, and weakness--finding a stroke. This impacted her R side with slight weakness remaining. She notes that the dizziness has since resolved. The second hospitalization was for calcium issues due to medication issues. Prior to the stroke, she was not using any AD but now using a RW for safety. She finds that she is walking slower and when she attempts to walk without her RW--she is off balance and furniture surfs. They live in a split level and she is able to go up and down the stairs with the handrails. Currently, her  is helping her dress--mostly for the pants. Her goals are to be able to not need an AD and get back to driving.      Per epic review:   Hospital Course 12/26 to 12/30:   Mayra Echevarria is a 69 y.o. female patient who originally presented to the hospital on 12/26/2024 due to resenting with worsening dizziness and altered mental status. She was admitted for stroke rule out then had right sided droop and leg/arm weakness on next day for which RR called and she was sent for repeat CTH then eventually underwent MRI brain 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 Telemetry with no events. TTE showed small PFO. Started on ASA 81mg qd and high intensity atorvastatin by neurology. Cleared for discharge home. Outpatient referral to cardiology placed.     Hospital Course: 1/2 to 1/7  Mayar Echevarria is a 69 y.o. female patient who originally presented to the hospital on 1/2/2025 due to confusion, hypercalcemia, and CAROLINE.  She had a recent stroke in  2024 and had been more fatigued prior to admission.  She was discharged on calcium supplementation however upon scrutiny of her medications, it appears as though she had been taking double the recommended dose.  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  Her ARB was held and she was given isotonic saline with improvement in her calcium and renal function.  Endocrinology recommended that she could continue with dietary calcium but should avoid supplementation for now.  Patient had improvement in her mental status as well and stabilized from a medical standpoint.  She was seen by the PMR team and recommended for acute rehab.  Ultimately, patient and her  did not wish to remain in the hospital for her rehab bed to become available and elected to take patient home and plan for outpatient rehab.  They were advised to return to the hospital and to discussed with PCP if patient's rehab needs became too great to manage at home.      Patient Goals  Patient goals for therapy: improved balance and increased strength  Patient goal: not need an AD  Pain  No pain reported    Social Support  Stairs in house: yes (11 up to bed/bath (L handrail), 2 steps from rec room to kitchen (no HR))   Lives in: multiple-level home (split level)  Lives with: spouse          Objective  Vitals start of session: /78 mmHg; HR 71 bpm; SpO2 97%    Balance Test  IE    6 Minute Walk Test (ft):  480 ft with RW   Functional Gait Assessment:  NT   Gait Speed (m/s):  10m/20.2s = 0.50 m/s with RW   5x Sit To Stand (s):  25.5 (UEs crossed)   TU.2 with RW   23.4 with no AD   ABC Scale:  39.38%       Sensation Left Right   Light Touch Grossly intact Grossly intact       Manual Muscle Testing - Hip Left Right   Flexion 4+ 3+     Manual Muscle Testing - Knee Left Right   Flexion 4+ 4   Extension 5 4     Manual Muscle Testing - Ankle Left Right   Doriflexion 4+ 3+        Gait  Assessment:  With RW: reduced push off, flat foot initial contact, intermittent reduced foot clearance bilaterally           Short Term Goal Expiration Date:(2/9/2025)  Long Term Goal Expiration Date: (4/9/2025)  POC Expiration Date: (4/9/2025)      POC expires Unit limit Auth Expiration date PT/OT/ST + Visit Limit?   4/9/25  N/a BOMN                             Visit/Unit Tracking  AUTH Status:  Date 1/9             BOMN Used 1 IE              To PN  Of 10                   Precautions CVA; HTN; osteoporosis       Manuals 1/9/25                       Pt/family edu Walking frequently throughout the day--recommendation for RW use for all mobility               Neuro Re-Ed         STS        Step ups        HKM        Lat ambu        Bwd ambu        Hurdles                 Ther Ex        LE TE                                                                Ther Activity                        Gait Training        TM                Modalities

## 2025-01-10 ENCOUNTER — OFFICE VISIT (OUTPATIENT)
Dept: FAMILY MEDICINE CLINIC | Facility: CLINIC | Age: 70
End: 2025-01-10
Payer: COMMERCIAL

## 2025-01-10 VITALS
DIASTOLIC BLOOD PRESSURE: 80 MMHG | WEIGHT: 143.2 LBS | HEIGHT: 62 IN | OXYGEN SATURATION: 97 % | BODY MASS INDEX: 26.35 KG/M2 | SYSTOLIC BLOOD PRESSURE: 120 MMHG | HEART RATE: 63 BPM | TEMPERATURE: 97 F

## 2025-01-10 DIAGNOSIS — R26.81 GAIT INSTABILITY: ICD-10-CM

## 2025-01-10 DIAGNOSIS — E83.52 HYPERCALCEMIA: ICD-10-CM

## 2025-01-10 DIAGNOSIS — N17.9 ACUTE KIDNEY INJURY (HCC): ICD-10-CM

## 2025-01-10 DIAGNOSIS — G45.9 TIA (TRANSIENT ISCHEMIC ATTACK): Primary | ICD-10-CM

## 2025-01-10 DIAGNOSIS — Q21.12 PATENT FORAMEN OVALE WITH RIGHT TO LEFT SHUNT: ICD-10-CM

## 2025-01-10 DIAGNOSIS — R06.81 APNEA: ICD-10-CM

## 2025-01-10 PROCEDURE — 99496 TRANSJ CARE MGMT HIGH F2F 7D: CPT | Performed by: FAMILY MEDICINE

## 2025-01-13 ENCOUNTER — OFFICE VISIT (OUTPATIENT)
Dept: PHYSICAL THERAPY | Facility: CLINIC | Age: 70
End: 2025-01-13
Payer: COMMERCIAL

## 2025-01-13 DIAGNOSIS — R68.89 DECREASED STRENGTH, ENDURANCE, AND MOBILITY: ICD-10-CM

## 2025-01-13 DIAGNOSIS — I10 ESSENTIAL HYPERTENSION: ICD-10-CM

## 2025-01-13 DIAGNOSIS — I63.9 ACUTE CVA (CEREBROVASCULAR ACCIDENT) (HCC): Primary | ICD-10-CM

## 2025-01-13 DIAGNOSIS — R53.1 DECREASED STRENGTH, ENDURANCE, AND MOBILITY: ICD-10-CM

## 2025-01-13 DIAGNOSIS — Z74.09 DECREASED STRENGTH, ENDURANCE, AND MOBILITY: ICD-10-CM

## 2025-01-13 PROBLEM — R09.02 HYPOXIA: Status: ACTIVE | Noted: 2025-01-13

## 2025-01-13 PROCEDURE — 97112 NEUROMUSCULAR REEDUCATION: CPT

## 2025-01-13 PROCEDURE — 97116 GAIT TRAINING THERAPY: CPT

## 2025-01-13 NOTE — PROGRESS NOTES
Could you please check to see if Dr. Olivo is going to be in clinic this week?  I would prefer to defer this type of an answer for her as she is the expert.  I would encourage her however to use a product that she has.  Gently washing with non soap cleanser such as Cetaphil bar Vanicream bar as what I like.  Also sometimes using straight zinc oxide paste such as Desitin zinc oxide paste may help these areas as well.  Dr. Olivo was going to go to the [employer] meeting.  Hopefully this will help the patient.  I am sorry I can not do much more.   Daily Note     Today's date: 2025  Patient name: Mayra Echevarria  : 1955  MRN: [id number]  Referring provider: Peter Vila MD  Dx:   Encounter Diagnosis     ICD-10-CM    1. Acute CVA (cerebrovascular accident) (HCC)  I63.9       2. Decreased strength, endurance, and mobility  R53.1     Z74.09     R68.89                        Subjective: Juanis states that she is doing good today. Her  notes that she was going without the RW at home a bit over the weekend.      Objective: See treatment diary below      Assessment: Juanis tolerated treatment well. Observationally, she is ambulating faster with her RW today than previous session. Introduced treadmill to increase her endurance, speed of movement and incline with good foot clearance and step length observed. Dynamic balance was challenged with SPC today with proper sequencing throughout. She had minor difficulty with step length and step width for hurdles but no LOB and good ability to adjust. She had minor path deviation with walking with head turns compared to head nods. Continued to encourage use of RW around the house depending on how she is feeling and use of SPC rather than no AD. She verbalized understanding. She would benefit from continued skilled PT to progress her balance and safety.      Plan: Continue per plan of care.  Progress treatment as tolerated.       Short Term Goal Expiration Date:(2025)  Long Term Goal Expiration Date: (2025)  POC Expiration Date: (2025)      POC expires Unit limit Auth Expiration date PT/OT/ST + Visit Limit?   25  N/a BOMN                             Visit/Unit Tracking  AUTH Status:  Date             BOMN Used 1 IE 2             To PN  Of 10 Of 10                  Precautions CVA; HTN; osteoporosis       Manuals 25                      Pt/family edu Walking frequently throughout the day--recommendation for RW use for all mobility               Neuro Re-Ed         STS       "  Step ups        HKM  Solo 1/4 length  With R SPC    X3 laps      Lat ambu        Bwd ambu  Solo 1/4 length  With R SPC    X3 laps      Hurdles   Solo 1/4 length  With R SPC    6\" hurdles (6)    Fwd x3 laps (step to)  Lat x3 laps      Walk with HT/HN  Solo 1/4 length  With R SPC    X3 laps ea      Ther Ex        LE TE                                                                Ther Activity                        Gait Training        TM  Solo with BUE use    1.5 mph x5 min  1.5 mph 3% incline x5 min      With LRAD  Solo 1/4 length  With R SPC    X3 laps    Proper sequencing observed      Modalities                                       "

## 2025-01-14 ENCOUNTER — APPOINTMENT (OUTPATIENT)
Dept: LAB | Facility: CLINIC | Age: 70
End: 2025-01-14
Payer: COMMERCIAL

## 2025-01-14 DIAGNOSIS — M81.0 SENILE OSTEOPOROSIS: ICD-10-CM

## 2025-01-14 DIAGNOSIS — E55.9 VITAMIN D DEFICIENCY: ICD-10-CM

## 2025-01-14 DIAGNOSIS — E83.52 HYPERCALCEMIA: ICD-10-CM

## 2025-01-14 DIAGNOSIS — N17.9 ACUTE KIDNEY INJURY (HCC): ICD-10-CM

## 2025-01-14 PROBLEM — Q21.12 PATENT FORAMEN OVALE WITH RIGHT TO LEFT SHUNT: Status: ACTIVE | Noted: 2025-01-14

## 2025-01-14 PROBLEM — G45.9 TIA (TRANSIENT ISCHEMIC ATTACK): Status: ACTIVE | Noted: 2025-01-14

## 2025-01-14 LAB
ALBUMIN SERPL BCG-MCNC: 3.8 G/DL (ref 3.5–5)
ALP SERPL-CCNC: 44 U/L (ref 34–104)
ALT SERPL W P-5'-P-CCNC: 44 U/L (ref 7–52)
ANION GAP SERPL CALCULATED.3IONS-SCNC: 6 MMOL/L (ref 4–13)
AST SERPL W P-5'-P-CCNC: 24 U/L (ref 13–39)
BILIRUB SERPL-MCNC: 0.42 MG/DL (ref 0.2–1)
BUN SERPL-MCNC: 13 MG/DL (ref 5–25)
CALCIUM SERPL-MCNC: 10 MG/DL (ref 8.4–10.2)
CHLORIDE SERPL-SCNC: 111 MMOL/L (ref 96–108)
CO2 SERPL-SCNC: 27 MMOL/L (ref 21–32)
CREAT SERPL-MCNC: 0.81 MG/DL (ref 0.6–1.3)
GFR SERPL CREATININE-BSD FRML MDRD: 73 ML/MIN/1.73SQ M
GLUCOSE P FAST SERPL-MCNC: 94 MG/DL (ref 65–99)
POTASSIUM SERPL-SCNC: 4.8 MMOL/L (ref 3.5–5.3)
PROT SERPL-MCNC: 6.6 G/DL (ref 6.4–8.4)
SODIUM SERPL-SCNC: 144 MMOL/L (ref 135–147)

## 2025-01-14 PROCEDURE — 80053 COMPREHEN METABOLIC PANEL: CPT

## 2025-01-14 PROCEDURE — 36415 COLL VENOUS BLD VENIPUNCTURE: CPT

## 2025-01-14 RX ORDER — LOSARTAN POTASSIUM 100 MG/1
100 TABLET ORAL DAILY
Qty: 90 TABLET | Refills: 1 | Status: SHIPPED | OUTPATIENT
Start: 2025-01-14

## 2025-01-14 NOTE — ASSESSMENT & PLAN NOTE
Continue aspirin therapy; patient to follow-up with neurology and will finish workup including Zio patch and sleep apnea study.

## 2025-01-14 NOTE — PROGRESS NOTES
Transition of Care Visit  Name: Mayra Echevarria      : 1955      MRN: [id number]  Encounter Provider: Namita Melendez DO  Encounter Date: 1/10/2025   Encounter department: [facility]    Assessment & Plan  TIA (transient ischemic attack)  Continue aspirin therapy; patient to follow-up with neurology and will finish workup including Zio patch and sleep apnea study.       Patent foramen ovale with right to left shunt  Await input from cardiology regarding if this is contributory towards her TIAs.       Apnea  Patient has had nonconclusive sleep studies; will see what sleep specialist feels about apneic episodes.  Orders:    Ambulatory Referral to Sleep Medicine; Future    Hypercalcemia  Will repeat calcium level  Orders:    Comprehensive metabolic panel; Future    Acute kidney injury (HCC)  Will continue monitoring renal function  Orders:    Comprehensive metabolic panel; Future    Gait instability  Continue with physical therapy.            History of Present Illness     Transitional Care Management Review:   Mayra Echevarria is a 69 y.o. female here for TCM follow up.     During the TCM phone call patient stated:  TCM Call       Date and time call was made  2025  9:15 AM    Hospital care reviewed  Records reviewed    Patient was hospitialized at  [facility]    Date of Admission  25    Date of discharge  25    Diagnosis  Hypercalcemia    Disposition  Home    Were the patients medications reviewed and updated  Yes    Current Symptoms  None          TCM Call       Should patient be enrolled in anticoag monitoring?  No    Scheduled for follow up?  Yes    Patients specialists  Other (comment)    Other specialists names  Physical Therapy    Did you obtain your prescribed medications  Yes    Do you need help managing your prescriptions or medications  No    Is transportation to your appointment needed  No    I have advised the patient to call PCP with any  "new or worsening symptoms  Radha CarrionDEANNA manuel          Patient presents in follow-up from hospital for TIAs, was found to have a patent foramen ovale while undergoing an echo in the hospital, will be following up with cardiology to make a statement regarding this as well as place a ZIO monitor.  The patient had a witnessed apneic episodes down to 60% while she was sleeping in the hospital room even though in the past  she had inconclusive sleep studies.  We discussed her following up with the sleep doctor and she is in agreement.  She had mistakenly taking 1200 mg of calcium twice daily so now she knows to take 600 twice daily and we will recheck her calcium level as well as her renal function since she was in [facility] on admission.  She has not had any further TIAs and denies any chest pain, dizziness, visual changes, facial paresthesia or difficulty speaking.  She is still having a bit of gait instability but is undergoing physical therapy.      Review of Systems   Constitutional:  Negative for appetite change, chills and fever.   HENT:  Negative for ear pain, facial swelling, rhinorrhea, sinus pain, sore throat and trouble swallowing.    Eyes:  Negative for discharge and redness.   Respiratory:  Negative for chest tightness, shortness of breath and wheezing.    Cardiovascular:  Negative for chest pain and palpitations.   Gastrointestinal:  Negative for abdominal pain, diarrhea, nausea and vomiting.   Endocrine: Negative for polyuria.   Genitourinary:  Negative for dysuria and urgency.   Musculoskeletal:  Positive for gait problem. Negative for arthralgias and back pain.   Skin:  Negative for rash.   Neurological:  Negative for dizziness, weakness and headaches.   Hematological:  Negative for adenopathy.   Psychiatric/Behavioral:  Negative for behavioral problems, confusion and sleep disturbance.    All other systems reviewed and are negative.    Objective   /80   Pulse 63   Temp (!) 97 °F (36.1 °C)   Ht 5' 2\" " (1.575 m)   Wt 65 kg (143 lb 3.2 oz)   LMP  (LMP Unknown)   SpO2 97%   BMI 26.19 kg/m²     Physical Exam  Vitals and nursing note reviewed.   Constitutional:       General: She is not in acute distress.     Appearance: Normal appearance. She is well-developed. She is not ill-appearing or diaphoretic.   HENT:      Head: Normocephalic and atraumatic.      Right Ear: Tympanic membrane, ear canal and external ear normal.      Left Ear: Tympanic membrane, ear canal and external ear normal.      Nose: Nose normal. No congestion or rhinorrhea.      Mouth/Throat:      Mouth: Mucous membranes are moist.      Pharynx: Oropharynx is clear. No oropharyngeal exudate or posterior oropharyngeal erythema.   Eyes:      General: No scleral icterus.        Right eye: No discharge.         Left eye: No discharge.      Extraocular Movements: Extraocular movements intact.      Conjunctiva/sclera: Conjunctivae normal.      Pupils: Pupils are equal, round, and reactive to light.   Neck:      Thyroid: No thyromegaly.      Vascular: No carotid bruit or JVD.      Trachea: No tracheal deviation.   Cardiovascular:      Rate and Rhythm: Normal rate and regular rhythm.      Pulses: Normal pulses.      Heart sounds: Normal heart sounds. No murmur heard.  Pulmonary:      Effort: Pulmonary effort is normal. No respiratory distress.      Breath sounds: Normal breath sounds. No stridor. No wheezing, rhonchi or rales.   Abdominal:      General: Abdomen is flat. Bowel sounds are normal. There is no distension.      Palpations: Abdomen is soft. There is no mass.      Tenderness: There is no abdominal tenderness. There is no guarding or rebound.   Musculoskeletal:         General: No swelling, tenderness or deformity. Normal range of motion.      Cervical back: Normal range of motion and neck supple. No rigidity.      Right lower leg: No edema.      Left lower leg: No edema.   Lymphadenopathy:      Cervical: No cervical adenopathy.   Skin:      General: Skin is warm and dry.      Capillary Refill: Capillary refill takes less than 2 seconds.      Coloration: Skin is not jaundiced.      Findings: No bruising, erythema or rash.   Neurological:      General: No focal deficit present.      Mental Status: She is alert and oriented to person, place, and time.      Cranial Nerves: No cranial nerve deficit.      Sensory: No sensory deficit.      Motor: No abnormal muscle tone.      Coordination: Coordination normal.      Gait: Gait normal.      Deep Tendon Reflexes: Reflexes are normal and symmetric. Reflexes normal.   Psychiatric:         Mood and Affect: Mood normal.         Behavior: Behavior normal.         Thought Content: Thought content normal.         Judgment: Judgment normal.         Administrative Statements   I have spent a total time of 20 minutes in caring for this patient on the day of the visit/encounter including Diagnostic results, Prognosis, Risks and benefits of tx options, Instructions for management, and Patient and family education. Topics discussed with the patient / family include symptom assessment and management, medication review, medication adjustment, psychosocial support, advanced directives, and goals of care.

## 2025-01-15 ENCOUNTER — OFFICE VISIT (OUTPATIENT)
Dept: PHYSICAL THERAPY | Facility: CLINIC | Age: 70
End: 2025-01-15
Payer: COMMERCIAL

## 2025-01-15 ENCOUNTER — RESULTS FOLLOW-UP (OUTPATIENT)
Dept: FAMILY MEDICINE CLINIC | Facility: CLINIC | Age: 70
End: 2025-01-15

## 2025-01-15 DIAGNOSIS — E83.52 HYPERCALCEMIA: Primary | ICD-10-CM

## 2025-01-15 DIAGNOSIS — R53.1 DECREASED STRENGTH, ENDURANCE, AND MOBILITY: ICD-10-CM

## 2025-01-15 DIAGNOSIS — R68.89 DECREASED STRENGTH, ENDURANCE, AND MOBILITY: ICD-10-CM

## 2025-01-15 DIAGNOSIS — Z74.09 DECREASED STRENGTH, ENDURANCE, AND MOBILITY: ICD-10-CM

## 2025-01-15 DIAGNOSIS — I63.9 ACUTE CVA (CEREBROVASCULAR ACCIDENT) (HCC): Primary | ICD-10-CM

## 2025-01-15 PROCEDURE — 97112 NEUROMUSCULAR REEDUCATION: CPT

## 2025-01-15 PROCEDURE — 97116 GAIT TRAINING THERAPY: CPT

## 2025-01-15 NOTE — PROGRESS NOTES
"Daily Note     Today's date: 1/15/2025  Patient name: Mayra Echevarria  : 1955  MRN: [id number]  Referring provider: Peter Vila MD  Dx:   Encounter Diagnosis     ICD-10-CM    1. Acute CVA (cerebrovascular accident) (HCC)  I63.9       2. Decreased strength, endurance, and mobility  R53.1     Z74.09     R68.89                      Subjective: Juanis states that she is doing good today. She was a bit tired after last session but not too sore.      Objective: See treatment diary below      Assessment: Juanis tolerated treatment well. Speed was increased on treadmill without change to gait mechanics and good increase in subjective workload. Session was completed without any AD to increase balance challenge. She had more difficulty with sustained SLS on R side with cone taps compared to L. She had more difficulty with eccentric phase when stepping down from a 6\" step with no instances of toe catching. Plan to establish LE HEP next session.      Plan: Continue per plan of care.  Progress treatment as tolerated.       Short Term Goal Expiration Date:(2025)  Long Term Goal Expiration Date: (2025)  POC Expiration Date: (2025)      POC expires Unit limit Auth Expiration date PT/OT/ST + Visit Limit?   25  N/a BOMN                             Visit/Unit Tracking  AUTH Status:  Date 1/9 1/13 1/15           BOMN Used 1 IE 2 3            To PN  Of 10 Of 10 Of 10                 Precautions CVA; HTN; osteoporosis       Manuals 1/9/25 1/13 1/15                     Pt/family edu Walking frequently throughout the day--recommendation for RW use for all mobility               Neuro Re-Ed         STS   Holding 6# MB  2x10     Step ups   Solo 1/4 length  No AD    Stepping up and over: 4\" step, airex pad, 6\" step, airex pad    Fwd x3 laps  Lat x3 laps     HKM  Solo 1/4 length  With R SPC    X3 laps Solo 1/4 length  No AD    With alt cone taps    X3 laps     Lat ambu   Solo 1/4 length  No AD     With cone taps    X3 " "laps     Bwd ambu  Solo 1/4 length  With R SPC    X3 laps      Hurdles   Solo 1/4 length  With R SPC    6\" hurdles (6)    Fwd x3 laps (step to)  Lat x3 laps      Walk with HT/HN  Solo 1/4 length  With R SPC    X3 laps ea      Ther Ex        LE TE   NV                                                             Ther Activity                        Gait Training        TM  Solo with BUE use    1.5 mph x5 min  1.5 mph 3% incline x5 min Solo with BUE use    1.5 mph x5 min  1.8 mph x5 min    Total 10 min       With LRAD  Solo 1/4 length  With R SPC    X3 laps    Proper sequencing observed      Modalities                                         "

## 2025-01-16 ENCOUNTER — OFFICE VISIT (OUTPATIENT)
Dept: PHYSICAL THERAPY | Facility: CLINIC | Age: 70
End: 2025-01-16
Payer: COMMERCIAL

## 2025-01-16 DIAGNOSIS — R68.89 DECREASED STRENGTH, ENDURANCE, AND MOBILITY: ICD-10-CM

## 2025-01-16 DIAGNOSIS — Z74.09 DECREASED STRENGTH, ENDURANCE, AND MOBILITY: ICD-10-CM

## 2025-01-16 DIAGNOSIS — R53.1 DECREASED STRENGTH, ENDURANCE, AND MOBILITY: ICD-10-CM

## 2025-01-16 DIAGNOSIS — I63.9 ACUTE CVA (CEREBROVASCULAR ACCIDENT) (HCC): Primary | ICD-10-CM

## 2025-01-16 PROCEDURE — 97116 GAIT TRAINING THERAPY: CPT

## 2025-01-16 PROCEDURE — 97110 THERAPEUTIC EXERCISES: CPT

## 2025-01-16 NOTE — PROGRESS NOTES
Daily Note     Today's date: 2025  Patient name: Mayra Echevarria  : 1955  MRN: [id number]  Referring provider: Peter Vila MD  Dx:   Encounter Diagnosis     ICD-10-CM    1. Acute CVA (cerebrovascular accident) (HCC)  I63.9       2. Decreased strength, endurance, and mobility  R53.1     Z74.09     R68.89                      Subjective: Juanis reports being a bit tired after last nights session.       Objective: See treatment diary below      Assessment: Juanis tolerated treatment well. Focus of today's session was to establish a LE strengthening HEP that she can safely perform at home to target all major LE muscles. She reported appropriate muscle burn in correct location for all exercises. She needed rare verbal cues to minimize trunk compensations mostly with proximal hip exercises. Following cues, she was able to make proper adjustments and improve her overall form. She was provided with written and illustrated instructions for home reference. She was educated on dosage and modifications on dosage based on tolerance and soreness following today's session. She verbalized understanding of all recommendations and all her questions were answered. She would benefit from continued skilled PT to progress her balance and overall mobility to help return her to her PLOF.      Plan: Continue per plan of care.  Progress treatment as tolerated.       Short Term Goal Expiration Date:(2025)  Long Term Goal Expiration Date: (2025)  POC Expiration Date: (2025)      POC expires Unit limit Auth Expiration date PT/OT/ST + Visit Limit?   25  N/a BOMN                             Visit/Unit Tracking  AUTH Status:  Date 1/9 1/13 1/15 1/16          BOMN Used 1 IE 2 3 4           To PN  Of 10 Of 10 Of 10 Of 10                Precautions CVA; HTN; osteoporosis       Manuals 1/9/25 1/13 1/15 1/16                    Pt/family edu Walking frequently throughout the day--recommendation for RW use for all mobility    "HEP dosage and modification based on tolerance/fatigue/soreness            Neuro Re-Ed         STS   Holding 6# MB  2x10 2x10 with UE crossed    Step ups   Solo 1/4 length  No AD    Stepping up and over: 4\" step, airex pad, 6\" step, airex pad    Fwd x3 laps  Lat x3 laps     HKM  Solo 1/4 length  With R SPC    X3 laps Solo 1/4 length  No AD    With alt cone taps    X3 laps     Lat ambu   Solo 1/4 length  No AD     With cone taps    X3 laps     Bwd ambu  Solo 1/4 length  With R SPC    X3 laps      Hurdles   Solo 1/4 length  With R SPC    6\" hurdles (6)    Fwd x3 laps (step to)  Lat x3 laps      Walk with HT/HN  Solo 1/4 length  With R SPC    X3 laps ea      Ther Ex        LE TE   NV Stand march 2x10 ea LE    Stand hip ext 2x10 ea LE    Stand hip abd 2x10 ea LE    Stand knee flexion 2x10 ea LE    HR 2x10     TR 2x10 ea LE                                                            Ther Activity                        Gait Training        TM  Solo with BUE use    1.5 mph x5 min  1.5 mph 3% incline x5 min Solo with BUE use    1.5 mph x5 min  1.8 mph x5 min    Total 10 min   Solo with BUE use    1.5 mph x5 min  1.5 mph 5% incline x5 min    Total 10 min    With LRAD  Solo 1/4 length  With R SPC    X3 laps    Proper sequencing observed      Modalities                           Access Code: [email or web address]  URL: [email or web address]  Date: 01/16/2025  Prepared by: Naomi Brice    Exercises  - Sit to Stand with Arms Crossed  - 1 x daily - 3-4 x weekly - 2-3 sets - 10-15 reps  - Standing March with Counter Support  - 1 x daily - 3-4 x weekly - 2-3 sets - 10-15 reps  - Standing Hip Extension with Unilateral Counter Support  - 1 x daily - 3-4 x weekly - 2-3 sets - 10-15 reps  - Standing Hip Abduction with Unilateral Counter Support  - 1 x daily - 3-4 x weekly - 2-3 sets - 10-15 reps  - Standing Knee Flexion with Unilateral Counter Support  - 1 x daily - 3-4 x weekly - 2-3 sets - 10-15 reps  - Heel Toe Raises with " Unilateral Counter Support  - 1 x daily - 3-4 x weekly - 2-3 sets - 10-15 reps

## 2025-01-16 NOTE — TELEPHONE ENCOUNTER
Patients  Bill called back. RN reviewed provider response with .  has concerns and reservations over giving patient too much calcium. Is afraid that she will end back up on the high range with 2 tablets a day. Is wanting to know if they can try half the tablet (300 mg in AM and 300 mg in PM) and then recheck in one week and go from there. States patient has made a lot of progress and does not want to undo anything. Please reach out to  with provider response.

## 2025-01-17 ENCOUNTER — HOSPITAL ENCOUNTER (OUTPATIENT)
Dept: RADIOLOGY | Age: 70
Discharge: HOME | End: 2025-01-17
Payer: COMMERCIAL

## 2025-01-17 DIAGNOSIS — I63.9 CVA (CEREBRAL VASCULAR ACCIDENT) (HCC): ICD-10-CM

## 2025-01-17 DIAGNOSIS — R41.0 CONFUSION: ICD-10-CM

## 2025-01-17 DIAGNOSIS — R42 DIZZINESS: ICD-10-CM

## 2025-01-17 PROCEDURE — 71260 CT THORAX DX C+: CPT

## 2025-01-17 PROCEDURE — 74177 CT ABD & PELVIS W/CONTRAST: CPT

## 2025-01-17 RX ADMIN — IOHEXOL 70 ML: 350 INJECTION, SOLUTION INTRAVENOUS at 15:54

## 2025-01-20 ENCOUNTER — OFFICE VISIT (OUTPATIENT)
Dept: PHYSICAL THERAPY | Facility: CLINIC | Age: 70
End: 2025-01-20
Payer: COMMERCIAL

## 2025-01-20 DIAGNOSIS — I63.9 ACUTE CVA (CEREBROVASCULAR ACCIDENT) (HCC): Primary | ICD-10-CM

## 2025-01-20 DIAGNOSIS — R53.1 DECREASED STRENGTH, ENDURANCE, AND MOBILITY: ICD-10-CM

## 2025-01-20 DIAGNOSIS — Z74.09 DECREASED STRENGTH, ENDURANCE, AND MOBILITY: ICD-10-CM

## 2025-01-20 DIAGNOSIS — R68.89 DECREASED STRENGTH, ENDURANCE, AND MOBILITY: ICD-10-CM

## 2025-01-20 DIAGNOSIS — F32.A DEPRESSION, UNSPECIFIED DEPRESSION TYPE: ICD-10-CM

## 2025-01-20 PROCEDURE — 97112 NEUROMUSCULAR REEDUCATION: CPT

## 2025-01-20 PROCEDURE — 97150 GROUP THERAPEUTIC PROCEDURES: CPT

## 2025-01-20 RX ORDER — NORTRIPTYLINE HYDROCHLORIDE 10 MG/1
10 CAPSULE ORAL 3 TIMES DAILY
Qty: 270 CAPSULE | Refills: 3 | Status: SHIPPED | OUTPATIENT
Start: 2025-01-20

## 2025-01-20 NOTE — PROGRESS NOTES
Daily Note     Today's date: 2025  Patient name: Mayra Echevarria  : 1955  MRN: [id number]  Referring provider: Peter Vila MD  Dx:   Encounter Diagnosis     ICD-10-CM    1. Acute CVA (cerebrovascular accident) (HCC)  I63.9       2. Decreased strength, endurance, and mobility  R53.1     Z74.09     R68.89                    Group treatment: 1215 to 1225 = 10 min  1 on 1 treatment: 1225 to 1300 = 35 min    Subjective: Juanis states that she is doing well. Presents ambulatory with no AD. She said the HEP are doing well.       Objective: See treatment diary below      Assessment: Juanis tolerated treatment well. R ankle weight was added for increased proprioceptive input and error augmentation. She had more regular R heel scuff on the treadmill with fair ability to adjust and correct. She was able to complete hurdles in a reciprocal pattern with minor imbalance and increase in lateral trunk lean for stability. She had more difficulty with grading her step length laterally throughout session, needing to take correcting steps to fully make room for trailing LE. She had prominent path deviation with addition of head movements, more so turns compared to nods; however, reported minor dizziness with nods. This eased with brief rest. She would benefit from continued skilled PT to progress her balance and mobility.      Plan: Continue per plan of care.  Progress treatment as tolerated.       Short Term Goal Expiration Date:(2025)  Long Term Goal Expiration Date: (2025)  POC Expiration Date: (2025)      POC expires Unit limit Auth Expiration date PT/OT/ST + Visit Limit?   25  N/a BOMN                             Visit/Unit Tracking  AUTH Status:  Date 1/9 1/13 1/15 1/16 1/20         BOMN Used 1 IE 2 3 4 5          To PN  Of 10 Of 10 Of 10 Of 10 Of 10               Precautions CVA; HTN; osteoporosis       Manuals 1/9/25 1/13 1/15 1/16 1/20                   Pt/family edu Walking frequently throughout  "the day--recommendation for RW use for all mobility   HEP dosage and modification based on tolerance/fatigue/soreness            Neuro Re-Ed         STS   Holding 6# MB  2x10 2x10 with UE crossed    Step ups   Solo 1/4 length  No AD    Stepping up and over: 4\" step, airex pad, 6\" step, airex pad    Fwd x3 laps  Lat x3 laps     HKM  Solo 1/4 length  With R SPC    X3 laps Solo 1/4 length  No AD    With alt cone taps    X3 laps     Lat ambu   Solo 1/4 length  No AD     With cone taps    X3 laps  Solo 1/4 length  3# R AW    With self bball bounce     X3 laps   Bwd ambu  Solo 1/4 length  With R SPC    X3 laps      Hurdles   Solo 1/4 length  With R SPC    6\" hurdles (6)    Fwd x3 laps (step to)  Lat x3 laps   Solo 1/4 length  3# R AW    6\" hurdles (6)    Fwd x4 laps (reciprocal)  Lat x4 laps   Walk with HT/HN  Solo 1/4 length  With R SPC    X3 laps ea   Solo 1/4 length  3# R AW    X4 laps ea   Foam      Solo 1/4 length  3# R AW    Stepping on and off airex pads (4)     Fwd x4 laps  Lat x3 laps   Ther Ex        LE TE   NV Stand march 2x10 ea LE    Stand hip ext 2x10 ea LE    Stand hip abd 2x10 ea LE    Stand knee flexion 2x10 ea LE    HR 2x10     TR 2x10 ea LE                                                            Ther Activity                        Gait Training        TM  Solo with BUE use    1.5 mph x5 min  1.5 mph 3% incline x5 min Solo with BUE use    1.5 mph x5 min  1.8 mph x5 min    Total 10 min   Solo with BUE use    1.5 mph x5 min  1.5 mph 5% incline x5 min    Total 10 min Solo with BUE use  3# R AW    1.5 mph x5 min  1.5 mph 5% incline x5 min    Total 10 min   With LRAD  Solo 1/4 length  With R SPC    X3 laps    Proper sequencing observed      Modalities                           Access Code: [email or web address]  URL: [email or web address]  Date: 01/16/2025  Prepared by: Naomi Brice    Exercises  - Sit to Stand with Arms Crossed  - 1 x daily - 3-4 x weekly - 2-3 sets - 10-15 reps  - " Standing March with Counter Support  - 1 x daily - 3-4 x weekly - 2-3 sets - 10-15 reps  - Standing Hip Extension with Unilateral Counter Support  - 1 x daily - 3-4 x weekly - 2-3 sets - 10-15 reps  - Standing Hip Abduction with Unilateral Counter Support  - 1 x daily - 3-4 x weekly - 2-3 sets - 10-15 reps  - Standing Knee Flexion with Unilateral Counter Support  - 1 x daily - 3-4 x weekly - 2-3 sets - 10-15 reps  - Heel Toe Raises with Unilateral Counter Support  - 1 x daily - 3-4 x weekly - 2-3 sets - 10-15 reps

## 2025-01-22 ENCOUNTER — OFFICE VISIT (OUTPATIENT)
Dept: PHYSICAL THERAPY | Facility: CLINIC | Age: 70
End: 2025-01-22
Payer: COMMERCIAL

## 2025-01-22 DIAGNOSIS — R68.89 DECREASED STRENGTH, ENDURANCE, AND MOBILITY: ICD-10-CM

## 2025-01-22 DIAGNOSIS — I63.9 ACUTE CVA (CEREBROVASCULAR ACCIDENT) (HCC): Primary | ICD-10-CM

## 2025-01-22 DIAGNOSIS — Z74.09 DECREASED STRENGTH, ENDURANCE, AND MOBILITY: ICD-10-CM

## 2025-01-22 DIAGNOSIS — R53.1 DECREASED STRENGTH, ENDURANCE, AND MOBILITY: ICD-10-CM

## 2025-01-22 PROCEDURE — 97116 GAIT TRAINING THERAPY: CPT

## 2025-01-22 PROCEDURE — 97112 NEUROMUSCULAR REEDUCATION: CPT

## 2025-01-22 NOTE — PROGRESS NOTES
Daily Note     Today's date: 2025  Patient name: Mayra Echevarria  : 1955  MRN: [id number]  Referring provider: Peter Vila MD  Dx:   Encounter Diagnosis     ICD-10-CM    1. Acute CVA (cerebrovascular accident) (HCC)  I63.9       2. Decreased strength, endurance, and mobility  R53.1     Z74.09     R68.89                      Subjective: Juanis states that she is feeling pretty good today. No new issues reported.      Objective: See treatment diary below      Assessment: Juanis tolerated treatment well. Progressed her speed on treadmill with good tolerance and rare R heel scuff at faster speed towards end of interval. Blaze pods were used to increase speed of overground activity as well as environmental awareness. She was able to get more hits with second trial with increased step length observed. She had some difficulty with consistent step through pattern for backwards stepping. Was able to progress uneven surface to a more narrow NAHID on foam beams with good ankle strategies and rare reactive stepping. Minimal path deviation noted with cervical mobility today. She would benefit from continued skilled PT to progress her balance and mobility.      Plan: Continue per plan of care.  Progress treatment as tolerated.       Short Term Goal Expiration Date:(2025)  Long Term Goal Expiration Date: (2025)  POC Expiration Date: (2025)      POC expires Unit limit Auth Expiration date PT/OT/ST + Visit Limit?   25  N/a BOMN                             Visit/Unit Tracking  AUTH Status:  Date 1/9 1/13 1/15 1/16 1/20 1/22        BOMN Used 1 IE 2 3 4 5 6         To PN  Of 10 Of 10 Of 10 Of 10 Of 10 Of 10              Precautions CVA; HTN; osteoporosis       Manuals 1/22 1/13 1/15 1/16 1/20                   Pt/family edu    HEP dosage and modification based on tolerance/fatigue/soreness            Neuro Re-Ed         STS Holding 10# tidal tank    X10 reps  Holding 6# MB  2x10 2x10 with UE crossed    Step  "ups   Solo 1/4 length  No AD    Stepping up and over: 4\" step, airex pad, 6\" step, airex pad    Fwd x3 laps  Lat x3 laps     HKM  Solo 1/4 length  With R SPC    X3 laps Solo 1/4 length  No AD    With alt cone taps    X3 laps     Lat ambu Solo 1/4 length    Blaze pods    1 min x2 rds    Rd 1: 7 hits  Rd 2: 8 hits  Solo 1/4 length  No AD     With cone taps    X3 laps  Solo 1/4 length  3# R AW    With self bball bounce     X3 laps   Bwd ambu Solo 1/4 length    Blaze pods--fwd/bwd    1 min x2 rds    Rd 1: 9 hits  Rd 2: 11 hits Solo 1/4 length  With R SPC    X3 laps      Hurdles   Solo 1/4 length  With R SPC    6\" hurdles (6)    Fwd x3 laps (step to)  Lat x3 laps   Solo 1/4 length  3# R AW    6\" hurdles (6)    Fwd x4 laps (reciprocal)  Lat x4 laps   Walk with HT/HN Solo 1/4 length    X4 laps ea Solo 1/4 length  With R SPC    X3 laps ea   Solo 1/4 length  3# R AW    X4 laps ea   Foam  Solo 1/4 length    Foam beams    Lat x3 laps  Fwd x3 laps    Solo 1/4 length  3# R AW    Stepping on and off airex pads (4)     Fwd x4 laps  Lat x3 laps   Turning practice Solo 1/4 length    Blaze pods in square--turn to find    1 min x2 rds       Ther Ex        LE TE   NV Stand march 2x10 ea LE    Stand hip ext 2x10 ea LE    Stand hip abd 2x10 ea LE    Stand knee flexion 2x10 ea LE    HR 2x10     TR 2x10 ea LE                                                            Ther Activity                        Gait Training        TM Solo with BUE use    1.8 mph x5 min  2.2 mph x5 min    Total 10 mins Solo with BUE use    1.5 mph x5 min  1.5 mph 3% incline x5 min Solo with BUE use    1.5 mph x5 min  1.8 mph x5 min    Total 10 min   Solo with BUE use    1.5 mph x5 min  1.5 mph 5% incline x5 min    Total 10 min Solo with BUE use  3# R AW    1.5 mph x5 min  1.5 mph 5% incline x5 min    Total 10 min   With LRAD  Solo 1/4 length  With R SPC    X3 laps    Proper sequencing observed      Modalities                           Access Code: " [email or web address]  URL: [email or web address]  Date: 01/16/2025  Prepared by: Naomi Brice    Exercises  - Sit to Stand with Arms Crossed  - 1 x daily - 3-4 x weekly - 2-3 sets - 10-15 reps  - Standing March with Counter Support  - 1 x daily - 3-4 x weekly - 2-3 sets - 10-15 reps  - Standing Hip Extension with Unilateral Counter Support  - 1 x daily - 3-4 x weekly - 2-3 sets - 10-15 reps  - Standing Hip Abduction with Unilateral Counter Support  - 1 x daily - 3-4 x weekly - 2-3 sets - 10-15 reps  - Standing Knee Flexion with Unilateral Counter Support  - 1 x daily - 3-4 x weekly - 2-3 sets - 10-15 reps  - Heel Toe Raises with Unilateral Counter Support  - 1 x daily - 3-4 x weekly - 2-3 sets - 10-15 reps

## 2025-01-23 ENCOUNTER — OFFICE VISIT (OUTPATIENT)
Dept: PHYSICAL THERAPY | Facility: CLINIC | Age: 70
End: 2025-01-23
Payer: COMMERCIAL

## 2025-01-23 DIAGNOSIS — R53.1 DECREASED STRENGTH, ENDURANCE, AND MOBILITY: ICD-10-CM

## 2025-01-23 DIAGNOSIS — Z74.09 DECREASED STRENGTH, ENDURANCE, AND MOBILITY: ICD-10-CM

## 2025-01-23 DIAGNOSIS — I63.9 ACUTE CVA (CEREBROVASCULAR ACCIDENT) (HCC): Primary | ICD-10-CM

## 2025-01-23 DIAGNOSIS — R68.89 DECREASED STRENGTH, ENDURANCE, AND MOBILITY: ICD-10-CM

## 2025-01-23 PROCEDURE — 97112 NEUROMUSCULAR REEDUCATION: CPT

## 2025-01-23 PROCEDURE — 97116 GAIT TRAINING THERAPY: CPT

## 2025-01-23 NOTE — PROGRESS NOTES
Daily Note     Today's date: 2025  Patient name: Mayra Echevarria  : 1955  MRN: [id number]  Referring provider: Peter Vila MD  Dx:   Encounter Diagnosis     ICD-10-CM    1. Acute CVA (cerebrovascular accident) (HCC)  I63.9       2. Decreased strength, endurance, and mobility  R53.1     Z74.09     R68.89                      Subjective: Juanis states that she is doing ok today. Nothing new to report.      Objective: See treatment diary below      Assessment: Juanis tolerated treatment well. She had more instances of R heel scuff on treadmill with addition of ankle weight and good ability to correct following verbal cues. She had difficulty with cone taps, tending to knock several over with her R LE due to reduced foot clearance. This frequency increased with fatigue. Regino height was also increased and she had multiple instances of catching her R toe, both while leading and trailing. She was significantly challenged with tandem ambulation, having use of ankle and hip strategies with regular reactive stepping needed. She had 2-3 LOB throughout session, not falling into solo harness but needing multiple lateral steps to fully recover. She would benefit from continued skilled PT.      Plan: Continue per plan of care.  Progress treatment as tolerated.       Short Term Goal Expiration Date:(2025)  Long Term Goal Expiration Date: (2025)  POC Expiration Date: (2025)      POC expires Unit limit Auth Expiration date PT/OT/ST + Visit Limit?   25  N/a BOMN                             Visit/Unit Tracking  AUTH Status:  Date 1/9 1/13 1/15 1/16 1/20 1/22 1/23       BOMN Used 1 IE 2 3 4 5 6 7        To PN  Of 10 Of 10 Of 10 Of 10 Of 10 Of 10 Of 10             Precautions CVA; HTN; osteoporosis       Manuals 1/22 1/23 1/15 1/16 1/20                   Pt/family edu    HEP dosage and modification based on tolerance/fatigue/soreness            Neuro Re-Ed         STS Holding 10# tidal tank    X10 reps 2x10  "with UE crossed Holding 6# MB  2x10 2x10 with UE crossed    Step ups  Solo 1/4 length  3# R AW    Stepping up and over: 4\" step, airex pad, 6\" step, airex pad    Fwd x4 laps  Lat x4 laps Solo 1/4 length  No AD    Stepping up and over: 4\" step, airex pad, 6\" step, airex pad    Fwd x3 laps  Lat x3 laps     HKM  Solo 1/4 length  3# R AW    With alt cone taps    X4 laps Solo 1/4 length  No AD    With alt cone taps    X3 laps     Lat ambu Solo 1/4 length    Blaze pods    1 min x2 rds    Rd 1: 7 hits  Rd 2: 8 hits Solo 1/4 length  With 3# R AW    With cone taps     X4 laps Solo 1/4 length  No AD     With cone taps    X3 laps  Solo 1/4 length  3# R AW    With self bball bounce     X3 laps   Bwd ambu Solo 1/4 length    Blaze pods--fwd/bwd    1 min x2 rds    Rd 1: 9 hits  Rd 2: 11 hits       Hurdles   Solo 1/4 length  3# R AW    Alt 6\" & 8\" hurdles    Fwd x4 laps (step to)  Lat x4 laps   Solo 1/4 length  3# R AW    6\" hurdles (6)    Fwd x4 laps (reciprocal)  Lat x4 laps   Walk with HT/HN Solo 1/4 length    X4 laps ea    Solo 1/4 length  3# R AW    X4 laps ea   Foam  Solo 1/4 length    Foam beams    Lat x3 laps  Fwd x3 laps    Solo 1/4 length  3# R AW    Stepping on and off airex pads (4)     Fwd x4 laps  Lat x3 laps   Turning practice Solo 1/4 length    Blaze pods in square--turn to find    1 min x2 rds Solo 1/4 length  3# R AW    Weave thru cones    X4 laps    Vc to look straight ahead      Tandem ambu  Solo 1/4 length  3# R AW    X4 laps      Ther Ex        LE TE   NV Stand march 2x10 ea LE    Stand hip ext 2x10 ea LE    Stand hip abd 2x10 ea LE    Stand knee flexion 2x10 ea LE    HR 2x10     TR 2x10 ea LE                                                            Ther Activity                        Gait Training        TM Solo with BUE use    1.8 mph x5 min  2.2 mph x5 min    Total 10 mins Solo with BUE use  3# R AW    1.8 mph x5 min  1.8 mph 5% incline x3 min  1.8 mph 8% incline x2 min    Total 10 mins Solo " with BUE use    1.5 mph x5 min  1.8 mph x5 min    Total 10 min   Solo with BUE use    1.5 mph x5 min  1.5 mph 5% incline x5 min    Total 10 min Solo with BUE use  3# R AW    1.5 mph x5 min  1.5 mph 5% incline x5 min    Total 10 min   With LRAD        Modalities                           Access Code: [email or web address]  URL: [email or web address]  Date: 01/16/2025  Prepared by: Naomi Brice    Exercises  - Sit to Stand with Arms Crossed  - 1 x daily - 3-4 x weekly - 2-3 sets - 10-15 reps  - Standing March with Counter Support  - 1 x daily - 3-4 x weekly - 2-3 sets - 10-15 reps  - Standing Hip Extension with Unilateral Counter Support  - 1 x daily - 3-4 x weekly - 2-3 sets - 10-15 reps  - Standing Hip Abduction with Unilateral Counter Support  - 1 x daily - 3-4 x weekly - 2-3 sets - 10-15 reps  - Standing Knee Flexion with Unilateral Counter Support  - 1 x daily - 3-4 x weekly - 2-3 sets - 10-15 reps  - Heel Toe Raises with Unilateral Counter Support  - 1 x daily - 3-4 x weekly - 2-3 sets - 10-15 reps

## 2025-01-24 ENCOUNTER — TELEPHONE (OUTPATIENT)
Dept: NEUROLOGY | Facility: CLINIC | Age: 70
End: 2025-01-24

## 2025-01-24 NOTE — TELEPHONE ENCOUNTER
Post CVA Discharge Follow Up  Hospitalization: 12/26/24-12/30/24, 1/2/25-1/7/25     Called patient to obtain an update and spoke with Reggie, patient's spouse. He reports the patient is doing well. Since discharge, he denies the patient experiencing any new or worsening stroke-like symptoms.     Patient continues to have the following symptom: right sided weakness. He claims this has improved since discharge.     Notified Reggie of Dr. Herrmann's response regarding the CT C/A/P. He expressed understanding.     She is ambulating independently as well as preforming her own ADLs.    Reviewed appointments - patient successfully followed up with PCP. Patient scheduled for the following: neurology on 3/5/25, cardiology on 3/6/25.    Reviewed neurology-related medications with him. Reports she is taking as prescribed with no medication side effects or signs of bleeding.     During this call, we reviewed stroke education.    As for risk factors, he reports the patient's BP has been around 120s-150s for systolic and in the 70s for diastolic. They will continue to follow up with the PCP for BP management.  She is non smoker.   He reports the patient is following a well balanced diet.    All of his questions were addressed. At the conclusion of the conversation, he denies having any further questions or concerns.

## 2025-01-24 NOTE — TELEPHONE ENCOUNTER
Dr. Herrmann,    Patient completed CT C/A/P on 1/17/25. The CT C/A/P was ordered by the inpatient neurology team, Dr. Rainer Blanchard.     Can you please review? Please let me know if there are any recommendations.    Thank you,  Kasia

## 2025-01-27 ENCOUNTER — OFFICE VISIT (OUTPATIENT)
Dept: PHYSICAL THERAPY | Facility: CLINIC | Age: 70
End: 2025-01-27
Payer: COMMERCIAL

## 2025-01-27 DIAGNOSIS — R68.89 DECREASED STRENGTH, ENDURANCE, AND MOBILITY: ICD-10-CM

## 2025-01-27 DIAGNOSIS — I63.9 ACUTE CVA (CEREBROVASCULAR ACCIDENT) (HCC): Primary | ICD-10-CM

## 2025-01-27 DIAGNOSIS — R53.1 DECREASED STRENGTH, ENDURANCE, AND MOBILITY: ICD-10-CM

## 2025-01-27 DIAGNOSIS — Z74.09 DECREASED STRENGTH, ENDURANCE, AND MOBILITY: ICD-10-CM

## 2025-01-27 PROCEDURE — 97116 GAIT TRAINING THERAPY: CPT

## 2025-01-27 PROCEDURE — 97112 NEUROMUSCULAR REEDUCATION: CPT

## 2025-01-27 NOTE — PROGRESS NOTES
"Daily Note     Today's date: 2025  Patient name: Mayra Echevarria  : 1955  MRN: [id number]  Referring provider: Peter Vila MD  Dx:   Encounter Diagnosis     ICD-10-CM    1. Acute CVA (cerebrovascular accident) (HCC)  I63.9       2. Decreased strength, endurance, and mobility  R53.1     Z74.09     R68.89                      Subjective: Juanis states that things are going good.       Objective: See treatment diary below      Assessment: Juanis tolerated treatment well. Given high visual reliance noted in previous sessions, she was given a physioball to carry during majority of dynamic balance exercises. She had more prominent path deviations with this increased balance challenge but not noted increase in imbalance. She continues to have intermittent R heel scuff with overground ambulation with good ability to identify and self-correct. Decline on treadmill was introduced with minor difficulty maintaining her positioning on treadmill. She exhibited good fatigue by end of session. Progress note due next session.      Plan: Progress note during next visit.      Short Term Goal Expiration Date:(2025)  Long Term Goal Expiration Date: (2025)  POC Expiration Date: (2025)      POC expires Unit limit Auth Expiration date PT/OT/ST + Visit Limit?   25  N/a BOMN                             Visit/Unit Tracking  AUTH Status:  Date 1/9 1/13 1/15 1/16 1/20 1/22 1/23 1/27      BOMN Used 1 IE 2 3 4 5 6 7 8       To PN  Of 10 Of 10 Of 10 Of 10 Of 10 Of 10 Of 10 Of 10            Precautions CVA; HTN; osteoporosis       Manuals                    Pt/family edu    HEP dosage and modification based on tolerance/fatigue/soreness            Neuro Re-Ed         STS Holding 10# tidal tank    X10 reps 2x10 with UE crossed Holding 10# tidal tank    3x10 reps 2x10 with UE crossed    Step ups  Solo 1/ length  3# R AW    Stepping up and over: 4\" step, airex pad, 6\" step, airex pad    Fwd x4 " "laps  Lat x4 laps      HKM  Solo 1/4 length  3# R AW    With alt cone taps    X4 laps Solo 1/4 length  3# B AW    Holding red PB    X4 laps     Lat ambu Solo 1/4 length    Blaze pods    1 min x2 rds    Rd 1: 7 hits  Rd 2: 8 hits Solo 1/4 length  With 3# R AW    With cone taps     X4 laps Solo 1/4 length  3# B AW    Holding red PB    X4 laps  Solo 1/4 length  3# R AW    With self bball bounce     X3 laps   Bwd ambu Solo 1/4 length    Blaze pods--fwd/bwd    1 min x2 rds    Rd 1: 9 hits  Rd 2: 11 hits  Solo 1/4 length  3# B AW    Holding red PB    X4 laps     Hurdles   Solo 1/4 length  3# R AW    Alt 6\" & 8\" hurdles    Fwd x4 laps (step to)  Lat x4 laps   Solo 1/4 length  3# R AW    6\" hurdles (6)    Fwd x4 laps (reciprocal)  Lat x4 laps   Walk with HT/HN Solo 1/4 length    X4 laps ea  Solo 1/4 length  3# B AW    Holding red PB    X4 laps ea  Solo 1/4 length  3# R AW    X4 laps ea   Foam  Solo 1/4 length    Foam beams    Lat x3 laps  Fwd x3 laps    Solo 1/4 length  3# R AW    Stepping on and off airex pads (4)     Fwd x4 laps  Lat x3 laps   Turning practice Solo 1/4 length    Blaze pods in square--turn to find    1 min x2 rds Solo 1/4 length  3# R AW    Weave thru cones    X4 laps    Vc to look straight ahead      Tandem ambu  Solo 1/4 length  3# R AW    X4 laps Solo 1/4 length  3# B AW    X4 laps     Ther Ex        LE TE    Stand march 2x10 ea LE    Stand hip ext 2x10 ea LE    Stand hip abd 2x10 ea LE    Stand knee flexion 2x10 ea LE    HR 2x10     TR 2x10 ea LE                                                            Ther Activity                        Gait Training        TM Solo with BUE use    1.8 mph x5 min  2.2 mph x5 min    Total 10 mins Solo with BUE use  3# R AW    1.8 mph x5 min  1.8 mph 5% incline x3 min  1.8 mph 8% incline x2 min    Total 10 mins Solo with BUE use  3# B AW    2.2 mph x3 min  2.2 mph 5% incline x3 min    Brief rest    1.6 mph 6% decline x6 min     Solo with BUE use    1.5 mph x5 " min  1.5 mph 5% incline x5 min    Total 10 min Solo with BUE use  3# R AW    1.5 mph x5 min  1.5 mph 5% incline x5 min    Total 10 min   With LRAD        Modalities                           Access Code: [email or web address]  URL: [email or web address]  Date: 01/16/2025  Prepared by: Naomi Brice    Exercises  - Sit to Stand with Arms Crossed  - 1 x daily - 3-4 x weekly - 2-3 sets - 10-15 reps  - Standing March with Counter Support  - 1 x daily - 3-4 x weekly - 2-3 sets - 10-15 reps  - Standing Hip Extension with Unilateral Counter Support  - 1 x daily - 3-4 x weekly - 2-3 sets - 10-15 reps  - Standing Hip Abduction with Unilateral Counter Support  - 1 x daily - 3-4 x weekly - 2-3 sets - 10-15 reps  - Standing Knee Flexion with Unilateral Counter Support  - 1 x daily - 3-4 x weekly - 2-3 sets - 10-15 reps  - Heel Toe Raises with Unilateral Counter Support  - 1 x daily - 3-4 x weekly - 2-3 sets - 10-15 reps

## 2025-01-29 ENCOUNTER — EVALUATION (OUTPATIENT)
Dept: PHYSICAL THERAPY | Facility: CLINIC | Age: 70
End: 2025-01-29
Payer: COMMERCIAL

## 2025-01-29 DIAGNOSIS — R53.1 DECREASED STRENGTH, ENDURANCE, AND MOBILITY: ICD-10-CM

## 2025-01-29 DIAGNOSIS — I63.9 ACUTE CVA (CEREBROVASCULAR ACCIDENT) (HCC): Primary | ICD-10-CM

## 2025-01-29 DIAGNOSIS — Z74.09 DECREASED STRENGTH, ENDURANCE, AND MOBILITY: ICD-10-CM

## 2025-01-29 DIAGNOSIS — R68.89 DECREASED STRENGTH, ENDURANCE, AND MOBILITY: ICD-10-CM

## 2025-01-29 PROCEDURE — 97116 GAIT TRAINING THERAPY: CPT

## 2025-01-29 PROCEDURE — 97110 THERAPEUTIC EXERCISES: CPT

## 2025-01-29 PROCEDURE — 97112 NEUROMUSCULAR REEDUCATION: CPT

## 2025-01-29 NOTE — PROGRESS NOTES
PT Progress Note     Today's date: 2025  Patient name: Mayra Echevarria  : 1955  MRN: [id number]  Referring provider: Peter Vila MD  Dx:   Encounter Diagnosis     ICD-10-CM    1. Acute CVA (cerebrovascular accident) (HCC)  I63.9       2. Decreased strength, endurance, and mobility  R53.1     Z74.09     R68.89                      Subjective: Juanis states that she feels everything with PT is getting better. She is no longer using her RW and denies any falls or near falls. She finds that she can walk up and down the steps with increased ease. She is able to do what she needs to do in the kitchen currently. She has 2 steps into her living room with no railing and able to carry her drink down those steps. Overall, she estimates 80% back to baseline prior to the stroke.    Patient Goals  Patient goals for therapy: improved balance and increased strength  Patient goal: not need an AD    Pain  No pain reported      Objective: See treatment diary below      Balance Test  IE  PN    6 Minute Walk Test (ft):  480 ft with RW  850 ft no AD   Functional Gait Assessment:  NT Score 16/30   Gait Speed (m/s):  10m/20.2s = 0.50 m/s with RW 10m/13.8s = 0.72 m/s no AD   5x Sit To Stand (s):  25.5 (UEs crossed) 12.6 (UE crossed)   TU.2 with RW   23.4 with no AD 9.85 sec (no AD)   ABC Scale:  39.38% 57.5%         Assessment: Juanis was initially referred to outpatient physical therapy with the chief complaint imbalance and R sided weakness following a CVA end of 2024. She has attended a total of 9 PT sessions with focus on dynamic balance and mobility with least restrictive AD. Progress note was completed today. She demonstrated good improvements in all impairment regions. She was able to complete all testing with no AD and overall good stability. Her endurance significantly improved by 370 ft which is a huge improvement beyond significant improvement of 150 ft. Her gait speed also significantly  improved by 0.22 m/s compared to IE; however, continues to classify her as a limited community ambulator. Her leg strength and power significantly improved and is now below falls risk cut off. This is was similarly seen with TUG time of being below a falls risk. More in-depth balance assessment via FGA was assessed today and her score of 16/30 does put her at falls risk, particularly more imbalance when unable to rely on vision. At this point, she has met all STGs and 4/6 LTGs. New goals were established for endurance based on current mobility and overall status. She would benefit from continued skilled PT to progress her balance and endurance to help return her to her PLOF.    Goals  STGs in 4 weeks  1. Patient will demonstrate independence with basic HEP. - MET  2. Patient will improve her gait speed to at least 0.6 m/s with appropriate AD for improved community ambulation. - MET  3. Patient will improve her 5x STS time to less than 20 seconds for improved LE strength. - MET  4. Patient will improve her 6 MWT distance by at least 150 ft with appropriate AD for improved endurance. - MET  5. Patient will complete FGA for further balance assessment. - MET     LTGs in 12 weeks  1. Patient will improve her 5x STS time to less than 15 seconds for improved LE strength. - MET  2. Patient will achieve a TUG time less than 13.5 secs with appropriate AD for improved safety during functional mobility (cut-off for falls = 13.5 secs) - MET  3. Patient will improve her 6 MWT distance by at least 300 ft with appropriate AD for improved endurance. - MET  4. Patient will improve her gait speed to at least 1.0 m/s with appropriate AD for improved community ambulation. - PROGRESSING  5. Patient will achieve a FGA score of at least 22/30 for decreased falls risk (cutoff for falls = 22/30). - PROGRESSING  6. Patient will make appropriate AD decision for mobility at home and in the community. - MET    New goals as of 1/29/2025  Due by  "discharge  1. Patient will improve her 6 MWT distance to over 1000 ft for improved community endurance.  2. Patient will be able to resume cooking activities for return to [facility].      Plan: Continue per plan of care.  Progress treatment as tolerated.      Planned therapy interventions: abdominal trunk stabilization, balance, neuromuscular re-education, patient/caregiver education, postural training, strengthening, stretching, therapeutic activities, therapeutic exercise, gait training, graded exercise, group therapy, home exercise program and coordination     Frequency: 2-3x week  Duration in weeks: 9  Plan of Care beginning date: 1/9/2025  Plan of Care expiration date: 4/9/2025     Short Term Goal Expiration Date:(2/9/2025)  Long Term Goal Expiration Date: (4/9/2025)  POC Expiration Date: (4/9/2025)      POC expires Unit limit Auth Expiration date PT/OT/ST + Visit Limit?   4/9/25  N/a BOMN                             Visit/Unit Tracking  AUTH Status:  Date 1/9 1/13 1/15 1/16 1/20 1/22 1/23 1/27 1/29     BOMN Used 1 IE 2 3 4 5 6 7 8 9 PN       To PN  Of 10 Of 10 Of 10 Of 10 Of 10 Of 10 Of 10 Of 10 Of 10           Precautions CVA; HTN; osteoporosis       Manuals 1/22 1/23 1/27 1/29 1/20                   Pt/family edu    Functional outcome measures            Neuro Re-Ed         STS Holding 10# tidal tank    X10 reps 2x10 with UE crossed Holding 10# tidal tank    3x10 reps     Step ups  Solo 1/4 length  3# R AW    Stepping up and over: 4\" step, airex pad, 6\" step, airex pad    Fwd x4 laps  Lat x4 laps      HKM  Solo 1/4 length  3# R AW    With alt cone taps    X4 laps Solo 1/4 length  3# B AW    Holding red PB    X4 laps     Lat ambu Solo 1/4 length    Blaze pods    1 min x2 rds    Rd 1: 7 hits  Rd 2: 8 hits Solo 1/4 length  With 3# R AW    With cone taps     X4 laps Solo 1/4 length  3# B AW    Holding red PB    X4 laps  Solo 1/4 length  3# R AW    With self bball bounce     X3 laps   Bwd ambu Solo 1/4 length    Blaze " "pods--fwd/bwd    1 min x2 rds    Rd 1: 9 hits  Rd 2: 11 hits  Solo 1/4 length  3# B AW    Holding red PB    X4 laps     Hurdles   Solo 1/4 length  3# R AW    Alt 6\" & 8\" hurdles    Fwd x4 laps (step to)  Lat x4 laps   Solo 1/4 length  3# R AW    6\" hurdles (6)    Fwd x4 laps (reciprocal)  Lat x4 laps   Walk with HT/HN Solo 1/4 length    X4 laps ea  Solo 1/4 length  3# B AW    Holding red PB    X4 laps ea  Solo 1/4 length  3# R AW    X4 laps ea   Foam  Solo 1/4 length    Foam beams    Lat x3 laps  Fwd x3 laps    Solo 1/4 length  3# R AW    Stepping on and off airex pads (4)     Fwd x4 laps  Lat x3 laps   Turning practice Solo 1/4 length    Blaze pods in square--turn to find    1 min x2 rds Solo 1/4 length  3# R AW    Weave thru cones    X4 laps    Vc to look straight ahead      Tandem ambu  Solo 1/4 length  3# R AW    X4 laps Solo 1/4 length  3# B AW    X4 laps     Ther Ex        LE TE                                                                Ther Activity                        Gait Training        TM Solo with BUE use    1.8 mph x5 min  2.2 mph x5 min    Total 10 mins Solo with BUE use  3# R AW    1.8 mph x5 min  1.8 mph 5% incline x3 min  1.8 mph 8% incline x2 min    Total 10 mins Solo with BUE use  3# B AW    2.2 mph x3 min  2.2 mph 5% incline x3 min    Brief rest    1.6 mph 6% decline x6 min     No solo  BUE use    1.8 mph x10 min Solo with BUE use  3# R AW    1.5 mph x5 min  1.5 mph 5% incline x5 min    Total 10 min   With LRAD        Modalities                           Access Code: [email or web address]  URL: [email or web address]  Date: 01/16/2025  Prepared by: Naomi Brice    Exercises  - Sit to Stand with Arms Crossed  - 1 x daily - 3-4 x weekly - 2-3 sets - 10-15 reps  - Standing March with Counter Support  - 1 x daily - 3-4 x weekly - 2-3 sets - 10-15 reps  - Standing Hip Extension with Unilateral Counter Support  - 1 x daily - 3-4 x weekly - 2-3 sets - 10-15 reps  - Standing Hip " Abduction with Unilateral Counter Support  - 1 x daily - 3-4 x weekly - 2-3 sets - 10-15 reps  - Standing Knee Flexion with Unilateral Counter Support  - 1 x daily - 3-4 x weekly - 2-3 sets - 10-15 reps  - Heel Toe Raises with Unilateral Counter Support  - 1 x daily - 3-4 x weekly - 2-3 sets - 10-15 reps

## 2025-01-31 ENCOUNTER — OFFICE VISIT (OUTPATIENT)
Dept: PHYSICAL THERAPY | Facility: CLINIC | Age: 70
End: 2025-01-31
Payer: COMMERCIAL

## 2025-01-31 DIAGNOSIS — R68.89 DECREASED STRENGTH, ENDURANCE, AND MOBILITY: ICD-10-CM

## 2025-01-31 DIAGNOSIS — I63.9 ACUTE CVA (CEREBROVASCULAR ACCIDENT) (HCC): Primary | ICD-10-CM

## 2025-01-31 DIAGNOSIS — R53.1 DECREASED STRENGTH, ENDURANCE, AND MOBILITY: ICD-10-CM

## 2025-01-31 DIAGNOSIS — Z74.09 DECREASED STRENGTH, ENDURANCE, AND MOBILITY: ICD-10-CM

## 2025-01-31 PROCEDURE — 97116 GAIT TRAINING THERAPY: CPT

## 2025-01-31 PROCEDURE — 97112 NEUROMUSCULAR REEDUCATION: CPT

## 2025-01-31 PROCEDURE — 97110 THERAPEUTIC EXERCISES: CPT

## 2025-01-31 NOTE — PROGRESS NOTES
"Daily Note     Today's date: 2025  Patient name: Mayra Echevarria  : 1955  MRN: [id number]  Referring provider: Peter Vila MD  Dx:   Encounter Diagnosis     ICD-10-CM    1. Acute CVA (cerebrovascular accident) (HCC)  I63.9       2. Decreased strength, endurance, and mobility  R53.1     Z74.09     R68.89                        Subjective: Juanis states that things are going good. Nothing new to report.       Objective: See treatment diary below      Assessment: Juanis tolerated treatment well. Given high visual reliance noted in previous sessions, continued w/ ball carry during dynamic balance exercises. Tested each exercise w/o ball carry 1st and had more prominent path deviations and/or decreased speed with this increased balance challenge but not noted increase in imbalance. She exhibited good fatigue by end of session.        Plan: Continue per plan of care.      Short Term Goal Expiration Date:(2025)  Long Term Goal Expiration Date: (2025)  POC Expiration Date: (2025)      POC expires Unit limit Auth Expiration date PT/OT/ST + Visit Limit?   25  N/a BOMN                             Visit/Unit Tracking  AUTH Status:  Date 1/9 1/13 1/15 1/16 1/20 1/22 1/23 1/27 1/29 1/31    BOMN Used 1 IE 2 3 4 5 6 7 8 9 PN  1     To PN  Of 10 Of 10 Of 10 Of 10 Of 10 Of 10 Of 10 Of 10 Of 10 Of 10           Precautions CVA; HTN; osteoporosis       Manuals                    Pt/family edu    Functional outcome measures            Neuro Re-Ed         STS Holding 10# tidal tank    X10 reps 2x10 with UE crossed Holding 10# tidal tank    3x10 reps     Step ups  Solo 1/ length  3# R AW    Stepping up and over: 4\" step, airex pad, 6\" step, airex pad    Fwd x4 laps  Lat x4 laps      HKM  Solo 1/ length  3# R AW    With alt cone taps    X4 laps Solo 1/ length  3# B AW    Holding red PB    X4 laps     Lat ambu Solo 1/ length    Blaze pods    1 min x2 rds    Rd 1: 7 hits  Rd 2: 8 hits " "Solo 1/4 length  With 3# R AW    With cone taps     X4 laps Solo 1/4 length  3# B AW    Holding red PB    X4 laps  Solo 1/4 length  With 3# R AW    Holding 6# MB  With cone taps     X4 laps   Bwd ambu Solo 1/4 length    Blaze pods--fwd/bwd    1 min x2 rds    Rd 1: 9 hits  Rd 2: 11 hits  Solo 1/4 length  3# B AW    Holding red PB    X4 laps  Solo 1/4 length  3# B AW    Holding 6# MB    X4 laps   Hurdles   Solo 1/4 length  3# R AW    Alt 6\" & 8\" hurdles    Fwd x4 laps (step to)  Lat x4 laps   Solo 1/4 length  3# R AW    W/ 6# MB hold    Alt 6\" & 8\" hurdles    Fwd x4 laps (step to)  Lat x4 laps   Walk with HT/HN Solo 1/4 length    X4 laps ea  Solo 1/4 length  3# B AW    Holding red PB    X4 laps ea  Solo 1/4 length  3# B AW    Holding 6# MB    X4 laps ea   Foam  Solo 1/4 length    Foam beams    Lat x3 laps  Fwd x3 laps       Turning practice Solo 1/4 length    Blaze pods in square--turn to find    1 min x2 rds Solo 1/4 length  3# R AW    Weave thru cones    X4 laps    Vc to look straight ahead      Tandem ambu  Solo 1/4 length  3# R AW    X4 laps Solo 1/4 length  3# B AW    X4 laps  Solo 1/4 length  3# B AW    W/ 6# MB    X4 laps   Ther Ex        LE TE                                                                Ther Activity                        Gait Training        TM Solo with BUE use    1.8 mph x5 min  2.2 mph x5 min    Total 10 mins Solo with BUE use  3# R AW    1.8 mph x5 min  1.8 mph 5% incline x3 min  1.8 mph 8% incline x2 min    Total 10 mins Solo with BUE use  3# B AW    2.2 mph x3 min  2.2 mph 5% incline x3 min    Brief rest    1.6 mph 6% decline x6 min     No solo  BUE use    1.8 mph x10 min No SOLO BUE     1.8 mph   1.5% incline  x10 min    With LRAD        Modalities                           Access Code: [email or web address]  URL: [email or web address]  Date: 01/16/2025  Prepared by: Naomi Brice    Exercises  - Sit to Stand with Arms Crossed  - 1 x daily - 3-4 x weekly - 2-3 sets - 10-15 " reps  - Standing March with Counter Support  - 1 x daily - 3-4 x weekly - 2-3 sets - 10-15 reps  - Standing Hip Extension with Unilateral Counter Support  - 1 x daily - 3-4 x weekly - 2-3 sets - 10-15 reps  - Standing Hip Abduction with Unilateral Counter Support  - 1 x daily - 3-4 x weekly - 2-3 sets - 10-15 reps  - Standing Knee Flexion with Unilateral Counter Support  - 1 x daily - 3-4 x weekly - 2-3 sets - 10-15 reps  - Heel Toe Raises with Unilateral Counter Support  - 1 x daily - 3-4 x weekly - 2-3 sets - 10-15 reps

## 2025-02-03 ENCOUNTER — OFFICE VISIT (OUTPATIENT)
Dept: PHYSICAL THERAPY | Facility: CLINIC | Age: 70
End: 2025-02-03
Payer: COMMERCIAL

## 2025-02-03 ENCOUNTER — OFFICE VISIT (OUTPATIENT)
Dept: FAMILY MEDICINE CLINIC | Facility: CLINIC | Age: 70
End: 2025-02-03
Payer: COMMERCIAL

## 2025-02-03 VITALS
WEIGHT: 141.2 LBS | HEIGHT: 62 IN | TEMPERATURE: 97.4 F | OXYGEN SATURATION: 95 % | HEART RATE: 100 BPM | DIASTOLIC BLOOD PRESSURE: 80 MMHG | BODY MASS INDEX: 25.98 KG/M2 | SYSTOLIC BLOOD PRESSURE: 132 MMHG

## 2025-02-03 DIAGNOSIS — Z86.73 HISTORY OF CVA (CEREBROVASCULAR ACCIDENT): ICD-10-CM

## 2025-02-03 DIAGNOSIS — I10 ESSENTIAL HYPERTENSION: ICD-10-CM

## 2025-02-03 DIAGNOSIS — E78.00 HYPERCHOLESTEROLEMIA: ICD-10-CM

## 2025-02-03 DIAGNOSIS — Z74.09 DECREASED STRENGTH, ENDURANCE, AND MOBILITY: ICD-10-CM

## 2025-02-03 DIAGNOSIS — F32.A DEPRESSION, UNSPECIFIED DEPRESSION TYPE: Primary | ICD-10-CM

## 2025-02-03 DIAGNOSIS — R53.1 DECREASED STRENGTH, ENDURANCE, AND MOBILITY: ICD-10-CM

## 2025-02-03 DIAGNOSIS — I63.9 ACUTE CVA (CEREBROVASCULAR ACCIDENT) (HCC): Primary | ICD-10-CM

## 2025-02-03 DIAGNOSIS — R68.89 DECREASED STRENGTH, ENDURANCE, AND MOBILITY: ICD-10-CM

## 2025-02-03 PROCEDURE — 97110 THERAPEUTIC EXERCISES: CPT

## 2025-02-03 PROCEDURE — G2211 COMPLEX E/M VISIT ADD ON: HCPCS | Performed by: FAMILY MEDICINE

## 2025-02-03 PROCEDURE — 99214 OFFICE O/P EST MOD 30 MIN: CPT | Performed by: FAMILY MEDICINE

## 2025-02-03 PROCEDURE — 97116 GAIT TRAINING THERAPY: CPT

## 2025-02-03 PROCEDURE — 97112 NEUROMUSCULAR REEDUCATION: CPT

## 2025-02-03 RX ORDER — ESCITALOPRAM OXALATE 10 MG/1
10 TABLET ORAL DAILY
Qty: 30 TABLET | Refills: 5 | Status: SHIPPED | OUTPATIENT
Start: 2025-02-03 | End: 2025-08-02

## 2025-02-03 NOTE — PROGRESS NOTES
Daily Note     Today's date: 2/3/2025  Patient name: Mayra Echevarria  : 1955  MRN: [id number]  Referring provider: Peter Vila MD  Dx:   Encounter Diagnosis     ICD-10-CM    1. Acute CVA (cerebrovascular accident) (HCC)  I63.9       2. Decreased strength, endurance, and mobility  R53.1     Z74.09     R68.89                      Subjective: Patient noted no new no complaints.       Objective: See treatment diary below      Assessment: Tolerated treatment fair. Patient was able to perform listed exercises. Patient challenged with tandem ambulation but able to perform exercise. Performed exercises with appropriate level of challenge.Patient would benefit from continued PT      Plan: Continue per plan of care.      Short Term Goal Expiration Date:(2025)  Long Term Goal Expiration Date: (2025)  POC Expiration Date: (2025)      POC expires Unit limit Auth Expiration date PT/OT/ST + Visit Limit?   25  N/a BOMN                             Visit/Unit Tracking  AUTH Status:  Date 1/9 1/13 1/15 1/16 1/20 1/22 1/23 1/27 1/29 1/31 2/3   BOMN Used 1 IE 2 3 4 5 6 7 8 9 PN  1 2     To PN  Of 10 Of 10 Of 10 Of 10 Of 10 Of 10 Of 10 Of 10 Of 10 Of 10  Of 10         Precautions CVA; HTN; osteoporosis       Manuals /3  1/27 1/29 1/31                   Pt/family edu    Functional outcome measures            Neuro Re-Ed         STS   Holding 10# tidal tank    3x10 reps     Step ups        HKM Solo 1/4 length  3# B AW    Holding red PB    X4 laps  Solo 1/4 length  3# B AW    Holding red PB    X4 laps Solo 1/4 length  3# B AW    Holding red PB    X4 laps    Lat ambu Solo 1/4 length  With 3# R AW    Holding 6# MB  With cone taps     X4 laps  Solo 1/4 length  3# B AW    Holding red PB    X4 laps  Solo 1/4 length  With 3# R AW    Holding 6# MB  With cone taps     X4 laps   Bwd ambu Solo 1/4 length  3# B AW    Holding 6# MB    X4 laps  Solo 1/4 length  3# B AW    Holding red PB    X4 laps  Solo 1 length  3# B  "AW    Holding 6# MB    X4 laps   Hurdles  Solo 1/4 length  3# R AW    W/ 6# MB hold    Alt 6\" & 8\" hurdles    Fwd x4 laps (step to)  Lat x4 laps    Solo 1/4 length  3# R AW    W/ 6# MB hold    Alt 6\" & 8\" hurdles    Fwd x4 laps (step to)  Lat x4 laps   Walk with HT/HN Solo 1/4 length  3# B AW    Holding 6# MB    X4 laps ea  Solo 1/4 length  3# B AW    Holding red PB    X4 laps ea  Solo 1/4 length  3# B AW    Holding 6# MB    X4 laps ea   Foam         Turning practice        Tandem ambu Solo 1/4 length  3# B AW    W/ 6# MB    X4 laps  Solo 1/4 length  3# B AW    X4 laps  Solo 1/4 length  3# B AW    W/ 6# MB    X4 laps   Ther Ex        LE TE                                                                Ther Activity                        Gait Training        TM No SOLO BUE     1.8 mph   1.5% incline  x10 min   Solo with BUE use  3# B AW    2.2 mph x3 min  2.2 mph 5% incline x3 min    Brief rest    1.6 mph 6% decline x6 min     No solo  BUE use    1.8 mph x10 min No SOLO BUE     1.8 mph   1.5% incline  x10 min    With LRAD        Modalities                           Access Code: [email or web address]  URL: [email or web address]  Date: 01/16/2025  Prepared by: Naomi Brice    Exercises  - Sit to Stand with Arms Crossed  - 1 x daily - 3-4 x weekly - 2-3 sets - 10-15 reps  - Standing March with Counter Support  - 1 x daily - 3-4 x weekly - 2-3 sets - 10-15 reps  - Standing Hip Extension with Unilateral Counter Support  - 1 x daily - 3-4 x weekly - 2-3 sets - 10-15 reps  - Standing Hip Abduction with Unilateral Counter Support  - 1 x daily - 3-4 x weekly - 2-3 sets - 10-15 reps  - Standing Knee Flexion with Unilateral Counter Support  - 1 x daily - 3-4 x weekly - 2-3 sets - 10-15 reps  - Heel Toe Raises with Unilateral Counter Support  - 1 x daily - 3-4 x weekly - 2-3 sets - 10-15 reps                                 "

## 2025-02-05 ENCOUNTER — APPOINTMENT (OUTPATIENT)
Dept: LAB | Facility: CLINIC | Age: 70
End: 2025-02-05
Payer: COMMERCIAL

## 2025-02-05 ENCOUNTER — OFFICE VISIT (OUTPATIENT)
Dept: PHYSICAL THERAPY | Facility: CLINIC | Age: 70
End: 2025-02-05
Payer: COMMERCIAL

## 2025-02-05 DIAGNOSIS — R53.1 DECREASED STRENGTH, ENDURANCE, AND MOBILITY: ICD-10-CM

## 2025-02-05 DIAGNOSIS — Z74.09 DECREASED STRENGTH, ENDURANCE, AND MOBILITY: ICD-10-CM

## 2025-02-05 DIAGNOSIS — E83.52 HYPERCALCEMIA: ICD-10-CM

## 2025-02-05 DIAGNOSIS — R68.89 DECREASED STRENGTH, ENDURANCE, AND MOBILITY: ICD-10-CM

## 2025-02-05 DIAGNOSIS — E78.00 HYPERCHOLESTEROLEMIA: ICD-10-CM

## 2025-02-05 DIAGNOSIS — I63.9 ACUTE CVA (CEREBROVASCULAR ACCIDENT) (HCC): Primary | ICD-10-CM

## 2025-02-05 PROBLEM — M87.00: Status: RESOLVED | Noted: 2019-08-07 | Resolved: 2025-02-05

## 2025-02-05 PROBLEM — N17.9 AKI (ACUTE KIDNEY INJURY) (HCC): Status: RESOLVED | Noted: 2025-01-02 | Resolved: 2025-02-05

## 2025-02-05 LAB
25(OH)D3 SERPL-MCNC: 49.1 NG/ML (ref 30–100)
ANION GAP SERPL CALCULATED.3IONS-SCNC: 10 MMOL/L (ref 4–13)
BUN SERPL-MCNC: 21 MG/DL (ref 5–25)
CALCIUM SERPL-MCNC: 10.3 MG/DL (ref 8.4–10.2)
CHLORIDE SERPL-SCNC: 108 MMOL/L (ref 96–108)
CHOLEST SERPL-MCNC: 176 MG/DL (ref ?–200)
CO2 SERPL-SCNC: 26 MMOL/L (ref 21–32)
CREAT SERPL-MCNC: 0.87 MG/DL (ref 0.6–1.3)
GFR SERPL CREATININE-BSD FRML MDRD: 67 ML/MIN/1.73SQ M
GLUCOSE P FAST SERPL-MCNC: 102 MG/DL (ref 65–99)
HDLC SERPL-MCNC: 57 MG/DL
LDLC SERPL CALC-MCNC: 91 MG/DL (ref 0–100)
NONHDLC SERPL-MCNC: 119 MG/DL
POTASSIUM SERPL-SCNC: 5.5 MMOL/L (ref 3.5–5.3)
SODIUM SERPL-SCNC: 144 MMOL/L (ref 135–147)
TRIGL SERPL-MCNC: 140 MG/DL (ref ?–150)

## 2025-02-05 PROCEDURE — 97112 NEUROMUSCULAR REEDUCATION: CPT

## 2025-02-05 PROCEDURE — 80061 LIPID PANEL: CPT

## 2025-02-05 PROCEDURE — 80048 BASIC METABOLIC PNL TOTAL CA: CPT

## 2025-02-05 PROCEDURE — 97116 GAIT TRAINING THERAPY: CPT

## 2025-02-05 PROCEDURE — 82306 VITAMIN D 25 HYDROXY: CPT

## 2025-02-05 NOTE — PROGRESS NOTES
Name: Mayra Echevarria      : 1955      MRN: [id number]  Encounter Provider: Namita Melendez DO  Encounter Date: 2/3/2025   Encounter department: [facility]    Assessment & Plan  Depression, unspecified depression type  Lexapro initiated will recheck patient in 1 month's time.    Orders:    escitalopram (LEXAPRO) 10 mg tablet; Take 1 tablet (10 mg total) by mouth daily    History of CVA (cerebrovascular accident)  Continue with low-dose aspirin therapy as well as tight control of her blood pressure and cholesterol.       Essential hypertension  Stable on ARB therapy       Hypercholesterolemia  Continue statin therapy.  Orders:    Lipid panel; Future         History of Present Illness     The patient presents S/P she has done well physically and her symptoms have resolved except she has CVA.  Been experiencing emotional lability with mood swings easily falling into tears and not having control over this.  I spoke with her and her  in depth about the physiologic reasons depression and anxiety can occur after a CVA.  She is willing to go on SSRI therapy for treatment.  Her blood pressure and cholesterol are well-controlled at this time.  She denies any other symptoms currently.  She is still experiencing some minor issues with both short and long-term memory but usually if she stops and thinks the information she is trying to recall will come back.      Review of Systems   Constitutional:  Negative for appetite change, chills and fever.   HENT:  Negative for ear pain, facial swelling, rhinorrhea, sinus pain, sore throat and trouble swallowing.    Eyes:  Negative for discharge and redness.   Respiratory:  Negative for chest tightness, shortness of breath and wheezing.    Cardiovascular:  Negative for chest pain and palpitations.   Gastrointestinal:  Negative for abdominal pain, diarrhea, nausea and vomiting.   Endocrine: Negative for polyuria.   Genitourinary:  Negative  for dysuria and urgency.   Musculoskeletal:  Negative for arthralgias and back pain.   Skin:  Negative for rash.   Neurological:  Negative for dizziness, weakness and headaches.        Positive intermittent issues with short and long-term memory but she cannot recall what she has forgotten if she sits in things for a while.   Hematological:  Negative for adenopathy.   Psychiatric/Behavioral:  Positive for behavioral problems. Negative for agitation, confusion, decreased concentration, dysphoric mood, hallucinations, self-injury, sleep disturbance and suicidal ideas. The patient is nervous/anxious. The patient is not hyperactive.         Positive emotional lability  Positive mood swings  Positive tearfulness and anxiety     All other systems reviewed and are negative.    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    Chronic leukopenia     Depression 04/22/2014    Hypercalcemia     Hyperlipidemia     Hypertension     labile    Hypertension     Migraines     Osteoarthritis     Osteoporosis     Vertigo      Past Surgical History:   Procedure Laterality Date    EYE SURGERY      TOOTH EXTRACTION      TUBAL LIGATION       Family History   Problem Relation Age of Onset    Colon cancer Mother 51    Breast cancer Mother 53    Cancer Mother         two primary sites  breast and colon    Heart attack Father     Prostate cancer Father 70    Hypertension Father     Heart attack Sister     No Known Problems Sister     No Known Problems Daughter     No Known Problems Daughter     No Known Problems Maternal Grandmother     No Known Problems Maternal Grandfather     No Known Problems Paternal Grandmother     No Known Problems Paternal Grandfather     Stroke Brother     No Known Problems Son     No Known Problems Maternal Aunt     No Known Problems Maternal Aunt     No Known Problems Maternal Aunt      Social History     Tobacco Use    Smoking status: Never      "Passive exposure: Never    Smokeless tobacco: Never   Vaping Use    Vaping status: Never Used   Substance and Sexual Activity    Alcohol use: No    Drug use: No    Sexual activity: Yes     Partners: Male     Birth control/protection: Female Sterilization     Current Outpatient Medications on File Prior to Visit   Medication Sig    amLODIPine (NORVASC) 5 mg tablet TAKE 1 TABLET DAILY    aspirin 81 mg chewable tablet Chew 1 tablet (81 mg total) daily    atorvastatin (LIPITOR) 40 mg tablet Take 1 tablet (40 mg total) by mouth daily with dinner    losartan (COZAAR) 100 MG tablet TAKE 1 TABLET DAILY    meclizine (ANTIVERT) 25 mg tablet Take 1 tablet (25 mg total) by mouth 3 (three) times a day as needed for dizziness for up to 10 days    multivitamin (THERAGRAN) TABS Take 1 tablet by mouth daily    nortriptyline (PAMELOR) 10 mg capsule TAKE 1 CAPSULE THREE TIMES A DAY (Patient not taking: Reported on 2/3/2025)     Allergies   Allergen Reactions    Iodine - Food Allergy Rash     Immunization History   Administered Date(s) Administered    COVID-19 PFIZER VACCINE 0.3 ML IM 03/25/2021, 04/15/2021, 12/04/2021    INFLUENZA 09/26/2013    Influenza Quadrivalent, 6-35 Months IM 10/14/2014, 11/19/2015, 11/16/2016, 10/09/2017    Influenza, high dose seasonal 0.7 mL 10/24/2019, 10/21/2020, 11/10/2022, 11/14/2023    Influenza, injectable, quadrivalent, preservative free 0.5 mL 10/11/2018    Influenza, recombinant, quadrivalent,injectable, preservative free 11/17/2021    Influenza, seasonal, injectable 12/14/2012    Pneumococcal Conjugate 13-Valent 08/07/2019    Pneumococcal Conjugate Vaccine 20-valent (Pcv20), Polysace 07/26/2023    Pneumococcal Polysaccharide PPV23 12/09/2020    Tdap 07/18/2012    Tuberculin Skin Test-PPD Intradermal 11/15/2000    Zoster 11/22/2013     Objective   /80   Pulse 100   Temp (!) 97.4 °F (36.3 °C)   Ht 5' 2\" (1.575 m)   Wt 64 kg (141 lb 3.2 oz)   LMP  (LMP Unknown)   SpO2 95%   BMI 25.83 " kg/m²     Physical Exam  Vitals and nursing note reviewed.   Constitutional:       General: She is not in acute distress.     Appearance: Normal appearance. She is well-developed. She is not ill-appearing or diaphoretic.   HENT:      Head: Normocephalic and atraumatic.      Right Ear: Tympanic membrane, ear canal and external ear normal.      Left Ear: Tympanic membrane, ear canal and external ear normal.      Nose: Nose normal. No congestion or rhinorrhea.      Mouth/Throat:      Mouth: Mucous membranes are moist.      Pharynx: Oropharynx is clear. No oropharyngeal exudate or posterior oropharyngeal erythema.   Eyes:      General: No scleral icterus.        Right eye: No discharge.         Left eye: No discharge.      Extraocular Movements: Extraocular movements intact.      Conjunctiva/sclera: Conjunctivae normal.      Pupils: Pupils are equal, round, and reactive to light.   Neck:      Thyroid: No thyromegaly.      Vascular: No carotid bruit or JVD.      Trachea: No tracheal deviation.   Cardiovascular:      Rate and Rhythm: Normal rate and regular rhythm.      Pulses: Normal pulses.      Heart sounds: Normal heart sounds. No murmur heard.  Pulmonary:      Effort: Pulmonary effort is normal. No respiratory distress.      Breath sounds: Normal breath sounds. No stridor. No wheezing, rhonchi or rales.   Abdominal:      General: Abdomen is flat. Bowel sounds are normal. There is no distension.      Palpations: Abdomen is soft. There is no mass.      Tenderness: There is no abdominal tenderness. There is no guarding or rebound.   Musculoskeletal:         General: No swelling, tenderness or deformity. Normal range of motion.      Cervical back: Normal range of motion and neck supple. No rigidity.      Right lower leg: No edema.      Left lower leg: No edema.   Lymphadenopathy:      Cervical: No cervical adenopathy.   Skin:     General: Skin is warm and dry.      Capillary Refill: Capillary refill takes less than 2  seconds.      Coloration: Skin is not jaundiced.      Findings: No bruising, erythema or rash.   Neurological:      General: No focal deficit present.      Mental Status: She is alert and oriented to person, place, and time.      Cranial Nerves: No cranial nerve deficit.      Sensory: No sensory deficit.      Motor: No abnormal muscle tone.      Coordination: Coordination normal.      Gait: Gait normal.      Deep Tendon Reflexes: Reflexes are normal and symmetric. Reflexes normal.   Psychiatric:         Behavior: Behavior normal.         Thought Content: Thought content normal.         Judgment: Judgment normal.      Comments: Patient is tearful       Administrative Statements   I have spent a total time of 20 minutes in caring for this patient on the day of the visit/encounter including Diagnostic results, Prognosis, Risks and benefits of tx options, Instructions for management, Patient and family education, and Importance of tx compliance. Topics discussed with the patient / family include symptom assessment and management, medication review, medication adjustment, psychosocial support, advanced directives, and goals of care.

## 2025-02-05 NOTE — ASSESSMENT & PLAN NOTE
Continue with low-dose aspirin therapy as well as tight control of her blood pressure and cholesterol.

## 2025-02-05 NOTE — PROGRESS NOTES
Daily Note     Today's date: 2025  Patient name: Mayra Echevarria  : 1955  MRN: [id number]  Referring provider: Peter Vila MD  Dx:   Encounter Diagnosis     ICD-10-CM    1. Acute CVA (cerebrovascular accident) (HCC)  I63.9       2. Decreased strength, endurance, and mobility  R53.1     Z74.09     R68.89             Start Time: 1040  Stop Time: 1133  Total time in clinic (min): 53 minutes    Subjective: Patient noted no new no complaints.       Objective: See treatment diary below    BP end session LUE manual: 178/86 mmHg     Assessment: Tolerated treatment fair. Patient was able to perform listed exercises. Patient challenged with tandem ambulation but able to perform exercise. Performed exercises with appropriate level of challenge. SOLO harnesses being used throughout session, CGA via GB throughout session. Added heavier ankle weights for further RPE. Patient would benefit from continued PT. Sequencing cuing needed for caricoa. Did well with resisted walking, good motivation throughout session - noted with timed trials as well. Given HEP for leg strengthening. Brought up same item in conversation in multiple times during session - unsure of baseline for patient. BP end of session sig elevated, would take pre and post vitals going forward.       Plan: Continue per plan of care.      Short Term Goal Expiration Date:(2025)  Long Term Goal Expiration Date: (2025)  POC Expiration Date: (2025)      POC expires Unit limit Auth Expiration date PT/OT/ST + Visit Limit?   25  N/a BOMN                             Visit/Unit Tracking  AUTH Status:  Date 1/9 1/13 1/15 1/16 1/20 1/22 1/23 1/27 1/29 1/31 2/3 2/5    BOMN Used 1 IE 2 3 4 5 6 7 8 9 PN  1 2  3     To PN  Of 10 Of 10 Of 10 Of 10 Of 10 Of 10 Of 10 Of 10 Of 10 Of 10  Of 10 Of 10          Precautions CVA; HTN; osteoporosis       Manuals /3 2/5   1/29 1/31                   Pt/family edu    Functional outcome measures            Neuro Re-Ed   " HEP demo       STS  River[place] elevated L 2x fatigue       Step ups        HKM Solo 1/4 length  3# B AW    Holding red PB    X4 laps #4 BAW 60' with one turn x3   Solo 1/4 length  3# B AW    Holding red PB    X4 laps    Car[place]a   60' #4 BAW cues for sequencing       Lat ambu Solo 1/4 length  With 3# R AW    Holding 6# MB  With cone taps     X4 laps    Solo 1/4 length  With 3# R AW    Holding 6# MB  With cone taps     X4 laps   Bwd ambu Solo 1/4 length  3# B AW    Holding 6# MB    X4 laps Fwd/bwd hallway to ea cone x1 #4 BAW    Solo 1/4 length  3# B AW    Holding 6# MB    X4 laps   Resisted amb   Purple TB hallway timed trials   X5 laps       Hurdles  Solo 1/4 length  3# R AW    W/ 6# MB hold    Alt 6\" & 8\" hurdles    Fwd x4 laps (step to)  Lat x4 laps    Solo 1/4 length  3# R AW    W/ 6# MB hold    Alt 6\" & 8\" hurdles    Fwd x4 laps (step to)  Lat x4 laps   Walk with HT/HN Solo 1/4 length  3# B AW    Holding 6# MB    X4 laps ea    Solo 1/4 length  3# B AW    Holding 6# MB    X4 laps ea   Foam         Turning practice        Tandem ambu Solo 1/4 length  3# B AW    W/ 6# MB    X4 laps SOLO 1/4 length #4 BAW     X3 laps    Solo 1/4 length  3# B AW    W/ 6# MB    X4 laps   Ther Ex        LE TE                                                                Ther Activity                        Gait Training        TM No SOLO BUE     1.8 mph   1.5% incline  x10 min  No SOLO BUE     1.8   1.5% incline   X10 min  No solo  BUE use    1.8 mph x10 min No SOLO BUE     1.8 mph   1.5% incline  x10 min    With LRAD        Modalities                           Access Code: [email or web address]  URL: [email or web address]  Date: 01/16/2025  Prepared by: Naomi Brice    Exercises  - Sit to Stand with Arms Crossed  - 1 x daily - 3-4 x weekly - 2-3 sets - 10-15 reps  - Standing March with Counter Support  - 1 x daily - 3-4 x weekly - 2-3 sets - 10-15 reps  - Standing Hip Extension with Unilateral Counter Support  - 1 x daily - " 3-4 x weekly - 2-3 sets - 10-15 reps  - Standing Hip Abduction with Unilateral Counter Support  - 1 x daily - 3-4 x weekly - 2-3 sets - 10-15 reps  - Standing Knee Flexion with Unilateral Counter Support  - 1 x daily - 3-4 x weekly - 2-3 sets - 10-15 reps  - Heel Toe Raises with Unilateral Counter Support  - 1 x daily - 3-4 x weekly - 2-3 sets - 10-15 reps

## 2025-02-06 ENCOUNTER — RESULTS FOLLOW-UP (OUTPATIENT)
Age: 70
End: 2025-02-06

## 2025-02-06 ENCOUNTER — RESULTS FOLLOW-UP (OUTPATIENT)
Dept: FAMILY MEDICINE CLINIC | Facility: CLINIC | Age: 70
End: 2025-02-06

## 2025-02-06 DIAGNOSIS — E83.52 HYPERCALCEMIA: Primary | ICD-10-CM

## 2025-02-07 ENCOUNTER — OFFICE VISIT (OUTPATIENT)
Dept: PHYSICAL THERAPY | Facility: CLINIC | Age: 70
End: 2025-02-07
Payer: COMMERCIAL

## 2025-02-07 DIAGNOSIS — I63.9 ACUTE CVA (CEREBROVASCULAR ACCIDENT) (HCC): Primary | ICD-10-CM

## 2025-02-07 DIAGNOSIS — R53.1 DECREASED STRENGTH, ENDURANCE, AND MOBILITY: ICD-10-CM

## 2025-02-07 DIAGNOSIS — Z74.09 DECREASED STRENGTH, ENDURANCE, AND MOBILITY: ICD-10-CM

## 2025-02-07 DIAGNOSIS — R68.89 DECREASED STRENGTH, ENDURANCE, AND MOBILITY: ICD-10-CM

## 2025-02-07 PROCEDURE — 97116 GAIT TRAINING THERAPY: CPT

## 2025-02-07 PROCEDURE — 97112 NEUROMUSCULAR REEDUCATION: CPT

## 2025-02-07 NOTE — PROGRESS NOTES
Daily Note     Today's date: 2025  Patient name: Mayra Echevarria  : 1955  MRN: [id number]  Referring provider: Peter Vila MD  Dx:   Encounter Diagnosis     ICD-10-CM    1. Acute CVA (cerebrovascular accident) (HCC)  I63.9       2. Decreased strength, endurance, and mobility  R53.1     Z74.09     R68.89           Start Time: 1045  Stop Time: 1130  Total time in clinic (min): 45 minutes    Subjective: Patient reports no new complaints.  Patient states she felt fine after last session.      Objective: See treatment diary below      Assessment: Tolerated treatment well.   Patient participated in skilled PT session focused on strength, balance, gait, and endurance.  Patient able to complete exercise program with no issues.  Patient is challenged on compliant surfaces with occasional LOB needing 1 UE support on bars to correct.  Patient at times unable to follow directions with task at hand.  Patient would continue to benefit from skilled PT interventions to address deficits with strength, balance, gait, and endurance. Patient demonstrated fatigue post treatment      Plan: Continue per plan of care.      Short Term Goal Expiration Date:(2025)  Long Term Goal Expiration Date: (2025)  POC Expiration Date: (2025)      POC expires Unit limit Auth Expiration date PT/OT/ST + Visit Limit?   25  N/a BOMN                             Visit/Unit Tracking  AUTH Status:  Date 1/9 1/13 1/15 1/16 1/20 1/22 1/23 1/27 1/29 1/31 2/3 2/5  2/7   BOMN Used 1 IE 2 3 4 5 6 7 8 9 PN  1 2  3  4    To PN  Of 10 Of 10 Of 10 Of 10 Of 10 Of 10 Of 10 Of 10 Of 10 Of 10  Of 10 Of 10  Of 10         Precautions CVA; HTN; osteoporosis       Manuals 2/3 2/5  2/7 1/29 1/31                   Pt/family edu    Functional outcome measures            Neuro Re-Ed   HEP demo       STS  Riverrock elevated L 2x fatigue  2x10 No UE     Step ups        HKM Solo  length  3# B AW    Holding red PB    X4 laps #4 BAW 60' with one turn x3   "4# B/L Aw Long //bars carrying 10# TT x 4 laps Solo 1/4 length  3# B AW    Holding red PB    X4 laps    Car[place]a   60' #4 BAW cues for sequencing       Lat ambu Solo 1/4 length  With 3# R AW    Holding 6# MB  With cone taps     X4 laps  4# B/L AW long //bars on foam beams cone taps (6)  x 5 laps  Solo 1/4 length  With 3# R AW    Holding 6# MB  With cone taps     X4 laps   Bwd ambu Solo 1/4 length  3# B AW    Holding 6# MB    X4 laps Fwd/bwd hallway to ea cone x1 #4 BAW  Long //bars Fwd/bwd tandem walk 4# B/L AW  No UE x 5 laps  Solo 1/4 length  3# B AW    Holding 6# MB    X4 laps   Resisted amb   Purple TB hallway timed trials   X5 laps       Hurdles  Solo 1/4 length  3# R AW    W/ 6# MB hold    Alt 6\" & 8\" hurdles    Fwd x4 laps (step to)  Lat x4 laps    Solo 1/4 length  3# R AW    W/ 6# MB hold    Alt 6\" & 8\" hurdles    Fwd x4 laps (step to)  Lat x4 laps   Walk with HT/HN Solo 1/4 length  3# B AW    Holding 6# MB    X4 laps ea    Solo 1/4 length  3# B AW    Holding 6# MB    X4 laps ea   Foam         Turning practice        Tandem ambu Solo 1/4 length  3# B AW    W/ 6# MB    X4 laps SOLO 1/4 length #4 BAW     X3 laps  See above  Solo 1/4 length  3# B AW    W/ 6# MB    X4 laps   Ther Ex        LE TE                                                                Ther Activity                        Gait Training        TM No SOLO BUE     1.8 mph   1.5% incline  x10 min  No SOLO BUE     1.8   1.5% incline   X10 min No SOLO B/L UE     1.8 mph 1.5% incline x 10 min  No solo  BUE use    1.8 mph x10 min No SOLO BUE     1.8 mph   1.5% incline  x10 min    With LRAD        Modalities                           Access Code: [email or web address]  URL: [email or web address]  Date: 01/16/2025  Prepared by: Naomi Brice    Exercises  - Sit to Stand with Arms Crossed  - 1 x daily - 3-4 x weekly - 2-3 sets - 10-15 reps  - Standing March with Counter Support  - 1 x daily - 3-4 x weekly - 2-3 sets - 10-15 reps  - Standing Hip " Extension with Unilateral Counter Support  - 1 x daily - 3-4 x weekly - 2-3 sets - 10-15 reps  - Standing Hip Abduction with Unilateral Counter Support  - 1 x daily - 3-4 x weekly - 2-3 sets - 10-15 reps  - Standing Knee Flexion with Unilateral Counter Support  - 1 x daily - 3-4 x weekly - 2-3 sets - 10-15 reps  - Heel Toe Raises with Unilateral Counter Support  - 1 x daily - 3-4 x weekly - 2-3 sets - 10-15 reps

## 2025-02-10 ENCOUNTER — TELEPHONE (OUTPATIENT)
Age: 70
End: 2025-02-10

## 2025-02-10 ENCOUNTER — OFFICE VISIT (OUTPATIENT)
Dept: PHYSICAL THERAPY | Facility: CLINIC | Age: 70
End: 2025-02-10
Payer: COMMERCIAL

## 2025-02-10 DIAGNOSIS — R53.1 DECREASED STRENGTH, ENDURANCE, AND MOBILITY: ICD-10-CM

## 2025-02-10 DIAGNOSIS — Z74.09 DECREASED STRENGTH, ENDURANCE, AND MOBILITY: ICD-10-CM

## 2025-02-10 DIAGNOSIS — I10 ESSENTIAL HYPERTENSION: ICD-10-CM

## 2025-02-10 DIAGNOSIS — I63.9 ACUTE CVA (CEREBROVASCULAR ACCIDENT) (HCC): Primary | ICD-10-CM

## 2025-02-10 DIAGNOSIS — R68.89 DECREASED STRENGTH, ENDURANCE, AND MOBILITY: ICD-10-CM

## 2025-02-10 PROCEDURE — 97110 THERAPEUTIC EXERCISES: CPT

## 2025-02-10 PROCEDURE — 97112 NEUROMUSCULAR REEDUCATION: CPT

## 2025-02-10 NOTE — TELEPHONE ENCOUNTER
Patients  called in about lab results.  Given recommendation on repeating lab work.  Verbalized understanding and will take her tomorrow.

## 2025-02-10 NOTE — PROGRESS NOTES
Daily Note     Today's date: 2/10/2025  Patient name: Mayra Echevarria  : 1955  MRN: [id number]  Referring provider: Peter Vila MD  Dx:   Encounter Diagnosis     ICD-10-CM    1. Acute CVA (cerebrovascular accident) (HCC)  I63.9       2. Decreased strength, endurance, and mobility  R53.1     Z74.09     R68.89                      Subjective: Juanis states that she is doing great.       Objective: See treatment diary below      Assessment: Juanis tolerated treatment well. She remains challenged on uneven surfaces including foam beams and river rocks. She had multiple instances of reactive stepping when completing hurdles forward on foam beams and the frequency reduced with repetition. She had difficulty grading step height to navigate the largest river rock and challenged with cross over stepping to navigate them laterally. She had difficulty sequencing tandem stepping while going backwards, regularly widening her NAHID. With eyes closed, she had prominent veering laterally both left and right with rare reactive stepping to fully recover her balance. She would benefit from continued skilled PT.      Plan: Continue per plan of care.  Progress treatment as tolerated.       Short Term Goal Expiration Date:(2025)  Long Term Goal Expiration Date: (2025)  POC Expiration Date: (2025)      POC expires Unit limit Auth Expiration date PT/OT/ST + Visit Limit?   25  N/a BOMN                             Visit/Unit Tracking  AUTH Status:  Date 2/10   1/16 1/20 1/22 1/23 1/27 1/29 1/31 2/3 2/5  2/7   BOMN Used 5   4 5 6 7 8 9 PN  1 2  3  4    To PN  Of 10   Of 10 Of 10 Of 10 Of 10 Of 10 Of 10 Of 10  Of 10 Of 10  Of 10         Precautions CVA; HTN; osteoporosis       Manuals 2/3 2/5  2/7 2/10 1/31                   Pt/family edu                Neuro Re-Ed   HEP demo       STS  Riverrock elevated L 2x fatigue  2x10 No UE     Step ups    Solo 1 length  5# R AW    River rocks   Fwd x4 laps (all sm, med, & 1 lrg)  "    Lat x3 laps (all sm & med)    HKM Solo 1/4 length  3# B AW    Holding red PB    X4 laps #4 BAW 60' with one turn x3  4# B/L Aw Long //bars carrying 10# TT x 4 laps     Car[place]a   60' #4 BAW cues for sequencing       Lat ambu Solo 1/4 length  With 3# R AW    Holding 6# MB  With cone taps     X4 laps  4# B/L AW long //bars on foam beams cone taps (6)  x 5 laps  Solo 1/4 length  With 3# R AW    Holding 6# MB  With cone taps     X4 laps   Bwd ambu Solo 1/4 length  3# B AW    Holding 6# MB    X4 laps Fwd/bwd hallway to ea cone x1 #4 BAW  Long //bars Fwd/bwd tandem walk 4# B/L AW  No UE x 5 laps  Solo 1/4 length  3# B AW    Holding 6# MB    X4 laps   Resisted amb   Purple TB hallway timed trials   X5 laps   Solo 1/4 length  5# R AW    Double purple TB x4 laps    Hurdles  Solo 1/4 length  3# R AW    W/ 6# MB hold    Alt 6\" & 8\" hurdles    Fwd x4 laps (step to)  Lat x4 laps   Solo 1/4 length  5# R AW    Foam beams with 6\" hurdles (6)    Lat x4 laps  Fwd x4 laps Solo 1/4 length  3# R AW    W/ 6# MB hold    Alt 6\" & 8\" hurdles    Fwd x4 laps (step to)  Lat x4 laps   Walk with HT/HN Solo 1/4 length  3# B AW    Holding 6# MB    X4 laps ea   Solo 1/4 length  5# R AW    Holding 10# tidal tank    X4 laps ea Solo 1/4 length  3# B AW    Holding 6# MB    X4 laps ea   Foam         Turning practice        Tandem ambu Solo 1/4 length  3# B AW    W/ 6# MB    X4 laps SOLO 1/4 length #4 BAW     X3 laps  See above Solo 1/4 length  5# R AW    Fwd/bwd by length    X4 laps Solo 1/4 length  3# B AW    W/ 6# MB    X4 laps   Eyes closed    Solo 1/4 length  5# R AW    X4 laps    Ther Ex        LE TE                                                                Ther Activity                        Gait Training        TM No SOLO BUE     1.8 mph   1.5% incline  x10 min  No SOLO BUE     1.8   1.5% incline   X10 min No SOLO B/L UE     1.8 mph 1.5% incline x 10 min  Solo with BUE use  5# R AW    1.8 mph x3 min  2.0 mph x3 min  2.0 mph 5% incline x4 " min    Total 10 min No SOLO BUE     1.8 mph   1.5% incline  x10 min    With LRAD        Modalities                           Access Code: [email or web address]  URL: [email or web address]  Date: 01/16/2025  Prepared by: Naomi Brice    Exercises  - Sit to Stand with Arms Crossed  - 1 x daily - 3-4 x weekly - 2-3 sets - 10-15 reps  - Standing March with Counter Support  - 1 x daily - 3-4 x weekly - 2-3 sets - 10-15 reps  - Standing Hip Extension with Unilateral Counter Support  - 1 x daily - 3-4 x weekly - 2-3 sets - 10-15 reps  - Standing Hip Abduction with Unilateral Counter Support  - 1 x daily - 3-4 x weekly - 2-3 sets - 10-15 reps  - Standing Knee Flexion with Unilateral Counter Support  - 1 x daily - 3-4 x weekly - 2-3 sets - 10-15 reps  - Heel Toe Raises with Unilateral Counter Support  - 1 x daily - 3-4 x weekly - 2-3 sets - 10-15 reps

## 2025-02-11 RX ORDER — AMLODIPINE BESYLATE 5 MG/1
5 TABLET ORAL DAILY
Qty: 90 TABLET | Refills: 1 | Status: SHIPPED | OUTPATIENT
Start: 2025-02-11

## 2025-02-13 ENCOUNTER — OFFICE VISIT (OUTPATIENT)
Dept: PHYSICAL THERAPY | Facility: CLINIC | Age: 70
End: 2025-02-13
Payer: COMMERCIAL

## 2025-02-13 DIAGNOSIS — R53.1 DECREASED STRENGTH, ENDURANCE, AND MOBILITY: ICD-10-CM

## 2025-02-13 DIAGNOSIS — I63.9 ACUTE CVA (CEREBROVASCULAR ACCIDENT) (HCC): Primary | ICD-10-CM

## 2025-02-13 DIAGNOSIS — Z74.09 DECREASED STRENGTH, ENDURANCE, AND MOBILITY: ICD-10-CM

## 2025-02-13 DIAGNOSIS — R68.89 DECREASED STRENGTH, ENDURANCE, AND MOBILITY: ICD-10-CM

## 2025-02-13 PROCEDURE — 97110 THERAPEUTIC EXERCISES: CPT

## 2025-02-13 PROCEDURE — 97112 NEUROMUSCULAR REEDUCATION: CPT

## 2025-02-13 NOTE — PROGRESS NOTES
"Daily Note     Today's date: 2025  Patient name: Mayra Echevarria  : 1955  MRN: [id number]  Referring provider: Peter Vila MD  Dx:   Encounter Diagnosis     ICD-10-CM    1. Acute CVA (cerebrovascular accident) (HCC)  I63.9       2. Decreased strength, endurance, and mobility  R53.1     Z74.09     R68.89                      Subjective: Juanis states that she is doing good. She feels that she is essentially back to normal.       Objective: See treatment diary below      Assessment: Juanis tolerated treatment well. She demonstrated good stability throughout session with primary use of ankle strategies to maintain her stability. Even on foam beams, she did not have any reactive stepping off but some difficulty grading step length with intermittent stepping on the claudio. With dual tasking, she was able to maintain step length and speed of movement with no errors in UE task. She would benefit from continued skilled PT with anticipated discharge next week.       Plan: Continue per plan of care.  Progress treatment as tolerated.       Short Term Goal Expiration Date:(2025)  Long Term Goal Expiration Date: (2025)  POC Expiration Date: (2025)      POC expires Unit limit Auth Expiration date PT/OT/ST + Visit Limit?   25  N/a BOMN                             Visit/Unit Tracking  AUTH Status:  Date 2/10 2/13  1/16 1/20 1/22 1/23 1/27 1/29 1/31 2/3 2/5  2/7   BOMN Used 5 6  4 5 6 7 8 9 PN  1 2  3  4    To PN  Of 10 Of 10  Of 10 Of 10 Of 10 Of 10 Of 10 Of 10 Of 10  Of 10 Of 10  Of 10         Precautions CVA; HTN; osteoporosis       Manuals 2/3 2/5  2/7 2/10 2/13                   Pt/family edu                Neuro Re-Ed   HEP demo       STS  Riverrock elevated L 2x fatigue  2x10 No UE     Step ups    Solo 1/ length  5# R AW    River rocks   Fwd x4 laps (all sm, med, & 1 lrg)     Lat x3 laps (all sm & med) Solo / length  5# R AW    Stepping up and over: 4\" step, 8\" step, 6\" step    Fwd x4 laps  Lat " "x4 laps   HKM Solo 1/4 length  3# B AW    Holding red PB    X4 laps #4 BAW 60' with one turn x3  4# B/L Aw Long //bars carrying 10# TT x 4 laps     [place]   60' #4 BAW cues for sequencing       Lat ambu Solo 1/4 length  With 3# R AW    Holding 6# MB  With cone taps     X4 laps  4# B/L AW long //bars on foam beams cone taps (6)  x 5 laps     Bwd ambu Solo 1/4 length  3# B AW    Holding 6# MB    X4 laps Fwd/bwd hallway to ea cone x1 #4 BAW  Long //bars Fwd/bwd tandem walk 4# B/L AW  No UE x 5 laps     Resisted amb   Purple TB hallway timed trials   X5 laps   Solo 1/4 length  5# R AW    Double purple TB x4 laps    Hurdles  Solo 1/4 length  3# R AW    W/ 6# MB hold    Alt 6\" & 8\" hurdles    Fwd x4 laps (step to)  Lat x4 laps   Solo 1/4 length  5# R AW    Foam beams with 6\" hurdles (6)    Lat x4 laps  Fwd x4 laps Solo 1/4 length  5# R AW    Foam beams with 6\" hurdles (6)    Lat x4 laps  Fwd x4 laps   Walk with HT/HN Solo 1/4 length  3# B AW    Holding 6# MB    X4 laps ea   Solo 1/4 length  5# R AW    Holding 10# tidal tank    X4 laps ea    Foam         Turning practice        Tandem ambu Solo 1/4 length  3# B AW    W/ 6# MB    X4 laps SOLO 1/4 length #4 BAW     X3 laps  See above Solo 1/4 length  5# R AW    Fwd/bwd by length    X4 laps Solo 1/4 length  5# R AW    Fwd/bwd by length    X4 laps   Eyes closed    Solo 1/4 length  5# R AW    X4 laps    Dual tasking     Solo 1/4 length  5# R Aw    HKM with foam ball pass between cones  X4 laps   Ther Ex        LE TE                                                                Ther Activity                        Gait Training        TM No SOLO BUE     1.8 mph   1.5% incline  x10 min  No SOLO BUE     1.8   1.5% incline   X10 min No SOLO B/L UE     1.8 mph 1.5% incline x 10 min  Solo with BUE use  5# R AW    1.8 mph x3 min  2.0 mph x3 min  2.0 mph 5% incline x4 min    Total 10 min Solo with BUE use  5# R AW    1.8 mph 5% incline x10 min    1.6 mph 5% decline x6 min   With LRAD "        Modalities                           Access Code: [email or web address]  URL: [email or web address]  Date: 01/16/2025  Prepared by: Naomi Brice    Exercises  - Sit to Stand with Arms Crossed  - 1 x daily - 3-4 x weekly - 2-3 sets - 10-15 reps  - Standing March with Counter Support  - 1 x daily - 3-4 x weekly - 2-3 sets - 10-15 reps  - Standing Hip Extension with Unilateral Counter Support  - 1 x daily - 3-4 x weekly - 2-3 sets - 10-15 reps  - Standing Hip Abduction with Unilateral Counter Support  - 1 x daily - 3-4 x weekly - 2-3 sets - 10-15 reps  - Standing Knee Flexion with Unilateral Counter Support  - 1 x daily - 3-4 x weekly - 2-3 sets - 10-15 reps  - Heel Toe Raises with Unilateral Counter Support  - 1 x daily - 3-4 x weekly - 2-3 sets - 10-15 reps

## 2025-02-14 ENCOUNTER — TELEPHONE (OUTPATIENT)
Age: 70
End: 2025-02-14

## 2025-02-14 ENCOUNTER — APPOINTMENT (OUTPATIENT)
Dept: LAB | Facility: CLINIC | Age: 70
End: 2025-02-14
Payer: COMMERCIAL

## 2025-02-14 DIAGNOSIS — E83.52 HYPERCALCEMIA: ICD-10-CM

## 2025-02-14 LAB
ANION GAP SERPL CALCULATED.3IONS-SCNC: 10 MMOL/L (ref 4–13)
BUN SERPL-MCNC: 22 MG/DL (ref 5–25)
CA-I BLD-SCNC: 1.23 MMOL/L (ref 1.12–1.32)
CALCIUM SERPL-MCNC: 9.9 MG/DL (ref 8.4–10.2)
CHLORIDE SERPL-SCNC: 105 MMOL/L (ref 96–108)
CO2 SERPL-SCNC: 27 MMOL/L (ref 21–32)
CREAT SERPL-MCNC: 0.75 MG/DL (ref 0.6–1.3)
GFR SERPL CREATININE-BSD FRML MDRD: 81 ML/MIN/1.73SQ M
GLUCOSE P FAST SERPL-MCNC: 108 MG/DL (ref 65–99)
POTASSIUM SERPL-SCNC: 4.6 MMOL/L (ref 3.5–5.3)
SODIUM SERPL-SCNC: 142 MMOL/L (ref 135–147)

## 2025-02-14 PROCEDURE — 80048 BASIC METABOLIC PNL TOTAL CA: CPT

## 2025-02-14 PROCEDURE — 36415 COLL VENOUS BLD VENIPUNCTURE: CPT

## 2025-02-14 PROCEDURE — 82330 ASSAY OF CALCIUM: CPT

## 2025-02-14 NOTE — TELEPHONE ENCOUNTER
Pts  called while they were at the lab asking what labs Dr Melendez wants done. I advised the only orders in her chart were placed by another provider. He states they got a call from us that additional labs were to be done. Warm transfer to the office.

## 2025-02-19 ENCOUNTER — OFFICE VISIT (OUTPATIENT)
Dept: PHYSICAL THERAPY | Facility: CLINIC | Age: 70
End: 2025-02-19
Payer: COMMERCIAL

## 2025-02-19 DIAGNOSIS — R68.89 DECREASED STRENGTH, ENDURANCE, AND MOBILITY: ICD-10-CM

## 2025-02-19 DIAGNOSIS — R53.1 DECREASED STRENGTH, ENDURANCE, AND MOBILITY: ICD-10-CM

## 2025-02-19 DIAGNOSIS — I63.9 ACUTE CVA (CEREBROVASCULAR ACCIDENT) (HCC): Primary | ICD-10-CM

## 2025-02-19 DIAGNOSIS — Z74.09 DECREASED STRENGTH, ENDURANCE, AND MOBILITY: ICD-10-CM

## 2025-02-19 PROCEDURE — 97112 NEUROMUSCULAR REEDUCATION: CPT

## 2025-02-19 PROCEDURE — 97110 THERAPEUTIC EXERCISES: CPT

## 2025-02-19 NOTE — PROGRESS NOTES
"Daily Note     Today's date: 2025  Patient name: Mayra Echevarria  : 1955  MRN: [id number]  Referring provider: Peter Vila MD  Dx:   Encounter Diagnosis     ICD-10-CM    1. Acute CVA (cerebrovascular accident) (HCC)  I63.9       2. Decreased strength, endurance, and mobility  R53.1     Z74.09     R68.89                      Subjective: Juanis states that she is doing good today. She is ready for Friday to be her last day of PT.      Objective: See treatment diary below      Assessment: Juanis tolerated treatment well. She continues to good ankle and hip strategies on foam with rare reactive balance. She had more errors of knocking cones over with her R foot than L. She had good balance challenge with carioca stepping but did need regular cues for sequencing. She had intermittent widening of her NAHID for tandem stepping, more so with backwards than forwards. Plan is for discharge next session and she is in agreement with this plan.      Plan: Progress note during next visit.      Short Term Goal Expiration Date:(2025)  Long Term Goal Expiration Date: (2025)  POC Expiration Date: (2025)      POC expires Unit limit Auth Expiration date PT/OT/ST + Visit Limit?   25  N/a BOMN                             Visit/Unit Tracking  AUTH Status:  Date 2/10 2/13 2/19   1/22 1/23 1/27 1/29 1/31 2/3 2/5  2/7   BOMN Used 5 6 7   6 7 8 9 PN  1 2  3  4    To PN  Of 10 Of 10 Of 10   Of 10 Of 10 Of 10 Of 10 Of 10  Of 10 Of 10  Of 10         Precautions CVA; HTN; osteoporosis       Manuals 2/19 2/5  2/7 2/10 2/13                   Pt/family edu                Neuro Re-Ed   HEP demo       STS  [place]iver[place] elevated L 2x fatigue  2x10 No UE     Step ups    Solo 1/ length  5# R AW    River rocks   Fwd x4 laps (all sm, med, & 1 lrg)     Lat x3 laps (all sm & med) Solo  length  5# R AW    Stepping up and over: 4\" step, 8\" step, 6\" step    Fwd x4 laps  Lat x4 laps   HKM Solo 1/ length  5# R AW    Foam beams  Alt " "cone taps    X4 laps #4 BAW 60' with one turn x3  4# B/L Aw Long //bars carrying 10# TT x 4 laps     [place]  Solo 1/4 length  5# R AW    X4 laps    Vc for sequencing 60' #4 BAW cues for sequencing       Lat ambu Solo 1/4 length  5# R AW    Foam beams   Cone taps    X3 laps  4# B/L AW long //bars on foam beams cone taps (6)  x 5 laps     Bwd ambu  Fwd/bwd hallway to ea cone x1 #4 BAW  Long //bars Fwd/bwd tandem walk 4# B/L AW  No UE x 5 laps     Resisted amb   Purple TB hallway timed trials   X5 laps   Solo 1/4 length  5# R AW    Double purple TB x4 laps    Hurdles     Solo 1/4 length  5# R AW    Foam beams with 6\" hurdles (6)    Lat x4 laps  Fwd x4 laps Solo 1/4 length  5# R AW    Foam beams with 6\" hurdles (6)    Lat x4 laps  Fwd x4 laps   Walk with HT/HN Solo 1/4 length  5# R AW    X4 laps ea   Solo 1/4 length  5# R AW    Holding 10# tidal tank    X4 laps ea    Foam         Turning practice Solo 1/4 length  5# R AW    Weave thru cones with bball bounce    X4 laps       Tandem ambu Solo 1/4 legn    Fwd/bwd by length    X4 laps SOLO 1/4 length #4 BAW     X3 laps  See above Solo 1/4 length  5# R AW    Fwd/bwd by length    X4 laps Solo 1/4 length  5# R AW    Fwd/bwd by length    X4 laps   Eyes closed    Solo 1/4 length  5# R AW    X4 laps    Dual tasking     Solo 1/4 length  5# R Aw    HKM with foam ball pass between cones  X4 laps   Ther Ex        LE TE                                                                Ther Activity                        Gait Training        TM Solo with BUE use  5# R AW    1.8 mph x2 min  1.8 mph 5% incline x5 min  1.8 mph 8% incline x3 min      1.8 mph 8% decline x5 min   No SOLO BUE     1.8   1.5% incline   X10 min No SOLO B/L UE     1.8 mph 1.5% incline x 10 min  Solo with BUE use  5# R AW    1.8 mph x3 min  2.0 mph x3 min  2.0 mph 5% incline x4 min    Total 10 min Solo with BUE use  5# R AW    1.8 mph 5% incline x10 min    1.6 mph 5% decline x6 min   With LRAD        Modalities    "                        Access Code: [email or web address]  URL: [email or web address]  Date: 01/16/2025  Prepared by: Naomi Brice    Exercises  - Sit to Stand with Arms Crossed  - 1 x daily - 3-4 x weekly - 2-3 sets - 10-15 reps  - Standing March with Counter Support  - 1 x daily - 3-4 x weekly - 2-3 sets - 10-15 reps  - Standing Hip Extension with Unilateral Counter Support  - 1 x daily - 3-4 x weekly - 2-3 sets - 10-15 reps  - Standing Hip Abduction with Unilateral Counter Support  - 1 x daily - 3-4 x weekly - 2-3 sets - 10-15 reps  - Standing Knee Flexion with Unilateral Counter Support  - 1 x daily - 3-4 x weekly - 2-3 sets - 10-15 reps  - Heel Toe Raises with Unilateral Counter Support  - 1 x daily - 3-4 x weekly - 2-3 sets - 10-15 reps

## 2025-02-21 ENCOUNTER — EVALUATION (OUTPATIENT)
Dept: PHYSICAL THERAPY | Facility: CLINIC | Age: 70
End: 2025-02-21
Payer: COMMERCIAL

## 2025-02-21 DIAGNOSIS — R53.1 DECREASED STRENGTH, ENDURANCE, AND MOBILITY: ICD-10-CM

## 2025-02-21 DIAGNOSIS — I63.9 ACUTE CVA (CEREBROVASCULAR ACCIDENT) (HCC): Primary | ICD-10-CM

## 2025-02-21 DIAGNOSIS — R68.89 DECREASED STRENGTH, ENDURANCE, AND MOBILITY: ICD-10-CM

## 2025-02-21 DIAGNOSIS — Z74.09 DECREASED STRENGTH, ENDURANCE, AND MOBILITY: ICD-10-CM

## 2025-02-21 PROCEDURE — 97110 THERAPEUTIC EXERCISES: CPT

## 2025-02-21 PROCEDURE — 97112 NEUROMUSCULAR REEDUCATION: CPT

## 2025-02-21 NOTE — PROGRESS NOTES
PT Discharge Note     Today's date: 2025  Patient name: Mayra Echevarria  : 1955  MRN: [id number]  Referring provider: Peter Vila MD  Dx:   Encounter Diagnosis     ICD-10-CM    1. Acute CVA (cerebrovascular accident) (HCC)  I63.9       2. Decreased strength, endurance, and mobility  R53.1     Z74.09     R68.89                      Subjective: Juanis states that she is doing really well. She feels that she is about 98% back to normal and only thing that she finds is that she gets a bit tired at times. She is back to doing all her cooking and laundry again. She doesn't feel that she drags her R foot as much anymore. She feels ready for discharge.    Patient Goals  Patient goals for therapy: improved balance and increased strength  Patient goal: not need an AD     Pain  No pain reported      Objective: See treatment diary below       Balance Test  IE  PN  DC    6 Minute Walk Test (ft):  480 ft with RW  850 ft no AD 1350 ft no AD   Functional Gait Assessment:  NT Score 16/30 Score 23/30   Gait Speed (m/s):  10m/20.2s = 0.50 m/s with RW 10m/13.8s = 0.72 m/s no AD Fast: 10m/7.8s = 1.28 m/s  SSGS: 10m/9.7s = 1.03 m/s   5x Sit To Stand (s):  25.5 (UEs crossed) 12.6 (UE crossed) 8.3 (UE crossed)   TU.2 with RW   23.4 with no AD 9.85 sec (no AD)    ABC Scale:  39.38% 57.5% 93.13%             Assessment:  Juanis was initially referred to outpatient physical therapy with the chief complaint imbalance and R sided weakness following a CVA end of 2024. She has attended a total of 17 PT sessions with focus on dynamic balance and mobility with least restrictive AD. Progress note was completed today secondary to anticipated discharge. She demonstrated significant improvements in all impairment regions. Her endurance more than significantly increased, walking 500 ft more today than about 3 weeks ago. Her balance via FGA score increased by 7 points, exceeding MCID of the test, and no lower  classify her as an increased falls risk. Her gait speed also improved with fast speed able to exceed 1.2 m/s needed to safely cross the street and self-selected speed now above falls risk cut off of 1.0 m/s. She has met all goals for PT. She has a good understanding of exercises to continue to maintain progress made. Updated HEP was provided with good understanding. She is a good candidate for discharged with ability to return in the future with a new referral. She verbalized understanding and agreement with this plan.    Goals  STGs in 4 weeks  1. Patient will demonstrate independence with basic HEP. - MET  2. Patient will improve her gait speed to at least 0.6 m/s with appropriate AD for improved community ambulation. - MET  3. Patient will improve her 5x STS time to less than 20 seconds for improved LE strength. - MET  4. Patient will improve her 6 MWT distance by at least 150 ft with appropriate AD for improved endurance. - MET  5. Patient will complete FGA for further balance assessment. - MET     LTGs in 12 weeks  1. Patient will improve her 5x STS time to less than 15 seconds for improved LE strength. - MET  2. Patient will achieve a TUG time less than 13.5 secs with appropriate AD for improved safety during functional mobility (cut-off for falls = 13.5 secs) - MET  3. Patient will improve her 6 MWT distance by at least 300 ft with appropriate AD for improved endurance. - MET  4. Patient will improve her gait speed to at least 1.0 m/s with appropriate AD for improved community ambulation. - MET  5. Patient will achieve a FGA score of at least 22/30 for decreased falls risk (cutoff for falls = 22/30). - MET  6. Patient will make appropriate AD decision for mobility at home and in the community. - MET     New goals as of 1/29/2025  Due by discharge  1. Patient will improve her 6 MWT distance to over 1000 ft for improved community endurance. - MET  2. Patient will be able to resume cooking activities for return  "to PLOF. - MET      Plan: Discharge to independent HEP         Short Term Goal Expiration Date:(2/9/2025)  Long Term Goal Expiration Date: (4/9/2025)  POC Expiration Date: (4/9/2025)      POC expires Unit limit Auth Expiration date PT/OT/ST + Visit Limit?   4/9/25  N/a BOMN                             Visit/Unit Tracking  AUTH Status:  Date 2/10 2/13 2/19 2/21  1/22 1/23 1/27 1/29 1/31 2/3 2/5  2/7   BOMN Used 5 6 7 8 DC  6 7 8 9 PN  1 2  3  4    To PN  Of 10 Of 10 Of 10 Of 10  Of 10 Of 10 Of 10 Of 10 Of 10  Of 10 Of 10  Of 10         Precautions CVA; HTN; osteoporosis       Manuals 2/19 2/21 2/7 2/10 2/13                   Pt/family edu  Functional outcome measures              Neuro Re-Ed         STS   2x10 No UE     Step ups    Solo 1/4 length  5# R AW    River rocks   Fwd x4 laps (all sm, med, & 1 lrg)     Lat x3 laps (all sm & med) Solo 1/4 length  5# R AW    Stepping up and over: 4\" step, 8\" step, 6\" step    Fwd x4 laps  Lat x4 laps   HKM Solo 1/4 length  5# R AW    Foam beams  Alt cone taps    X4 laps  4# B/L Aw Long //bars carrying 10# TT x 4 laps     [place]  Solo 1/4 length  5# R AW    X4 laps    Vc for sequencing       Lat ambu Solo 1/4 length  5# R AW    Foam beams   Cone taps    X3 laps  4# B/L AW long //bars on foam beams cone taps (6)  x 5 laps     Bwd ambu   Long //bars Fwd/bwd tandem walk 4# B/L AW  No UE x 5 laps     Resisted amb     Solo 1/4 length  5# R AW    Double purple TB x4 laps    Hurdles     Solo 1/4 length  5# R AW    Foam beams with 6\" hurdles (6)    Lat x4 laps  Fwd x4 laps Solo 1/4 length  5# R AW    Foam beams with 6\" hurdles (6)    Lat x4 laps  Fwd x4 laps   Walk with HT/HN Solo 1/4 length  5# R AW    X4 laps ea   Solo 1/4 length  5# R AW    Holding 10# tidal tank    X4 laps ea    Foam         Turning practice Solo 1/4 length  5# R AW    Weave thru cones with bball bounce    X4 laps       Tandem ambu Solo 1/4 legn    Fwd/bwd by length    X4 laps  See above Solo 1/4 length  5# R " AW    Fwd/bwd by length    X4 laps Solo 1/4 length  5# R AW    Fwd/bwd by length    X4 laps   Eyes closed    Solo 1/4 length  5# R AW    X4 laps    Dual tasking     Solo 1/4 length  5# R Aw    HKM with foam ball pass between cones  X4 laps   Ther Ex        LE TE                                                                Ther Activity                        Gait Training        TM Solo with BUE use  5# R AW    1.8 mph x2 min  1.8 mph 5% incline x5 min  1.8 mph 8% incline x3 min      1.8 mph 8% decline x5 min   No solo BUE use    1.8 mph x10 min No SOLO B/L UE     1.8 mph 1.5% incline x 10 min  Solo with BUE use  5# R AW    1.8 mph x3 min  2.0 mph x3 min  2.0 mph 5% incline x4 min    Total 10 min Solo with BUE use  5# R AW    1.8 mph 5% incline x10 min    1.6 mph 5% decline x6 min   With LRAD        Modalities                           Access Code: [email or web address]  URL: [email or web address]  Date: 02/21/2025  Prepared by: Naomi Brice    Exercises  - Sit to Stand with Arms Crossed  - 1 x daily - 3-4 x weekly - 2-3 sets - 10-15 reps  - Sidestepping  - 1 x daily - 3-4 x weekly - 3 sets - 10 reps  - Walking March  - 1 x daily - 3-4 x weekly - 3 sets - 10 reps  - Walking with Head Rotation  - 1 x daily - 3-4 x weekly - 3 sets - 10 reps  - Walking with Head Nod  - 1 x daily - 3-4 x weekly - 3 sets - 10 reps  - Backwards Walking  - 1 x daily - 3-4 x weekly - 3 sets - 10 reps

## 2025-02-24 NOTE — PROGRESS NOTES
Cardiology Consultation     Mayra Echevarria  [id number]  1955  HEART & VASCULAR [facility]  [address]  [place] [address]  1. Primary hypertension  Zio Monitor      2. Pure hypercholesterolemia  Zio Monitor      3. Acute CVA (cerebrovascular accident) (HCC)  Zio Monitor    Ambulatory Referral to Cardiology        Patient Active Problem List   Diagnosis   • Greater trochanteric bursitis of right hip   • Hyperlipidemia   • It band syndrome, right   • Senile osteoporosis   • Patellofemoral syndrome, right   • Essential hypertension   • Atrophic vaginitis   • COVID-19   • Quadriceps tendonitis   • Hypercalcemia   • Primary generalized (osteo)arthritis   • Migraine with aura and without status migrainosus, not intractable   • Anxiety   • Labile hypertension   • History of rib fracture   • Impingement syndrome of left shoulder   • Impingement syndrome of right shoulder   • Other osteoporosis without current pathological fracture   • Sleep apnea-like behavior   • Insomnia   • Memory impairment   • MATT (obstructive sleep apnea)   • Other sleep disorders   • History of CVA (cerebrovascular accident)   • Ambulatory dysfunction   • Hypertensive emergency   • Hypoxia   • TIA (transient ischemic attack)   • Patent foramen ovale with right to left shunt     HPI patient is here for cardiology evaluation.  Patient with history of CVA, HTN, HLD and depression.  Patient had issues with emotional lability after the CVA.  As well having issues with short and long-term memory.  Echocardiogram 12/29/2024 demonstrated LVEF of 60%.  There was mild LVH.  Small PFO noted.  Per my review of the study with saline contrast I see no obvious evidence of shunt with and without cough.  There was no significant valve disease.  Aortic root was 3.7 cm.  Patient admitted 12/2024 with multiple small acute infarctions involving the L occipital lobe.  During  admission patient developed right-sided weakness and right facial droop.  Zio patch was ordered and not done.  Patient was hospitalized 1/2025 with hypertensive urgency.  She was noted to have severe hypercalcemia related to inappropriate Calcium supplementation.  Lipid profile 2/5/2025 demonstrated TC of 176 with an HDL of 57 and a calculated LDL of 91.  EKG 1/2/2025 demonstrated NSR with LVH and nonspecific ST segment change.  Per Neurology note 12/30/2024 there was concern about embolic source for CVA.  PFO was felt to be an unlikely source of CVA.  Patient saw Neurology 3/5/2025 and  recommended ZIO with consideration for ILR if the ZIO was negative.  Patient follows with Pulmonary service in reference to nocturnal hypoxia.  She was started on 3 L NC at night.  She is having a sniff test to assess diaphragmatic function.  Doubt cardiac etiology in reference to hypoxia.  Patient is here today with her .  She has had no chest pain or significant dyspnea.    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   • Chronic leukopenia    • Depression 04/22/2014   • Hypercalcemia    • Hyperlipidemia    • Hypertension     labile   • Hypertension    • Migraines    • Osteoarthritis    • Osteoporosis    • Vertigo         SOCIAL HISTORY-  Social History     Socioeconomic History   • Marital status: /Civil Union     Spouse name: Not on file   • Number of children: Not on file   • Years of education: Not on file   • Highest education level: Not on file   Occupational History   • Not on file   Tobacco Use   • Smoking status: Never     Passive exposure: Never   • Smokeless tobacco: Never   Vaping Use   • Vaping status: Never Used   Substance and Sexual Activity   • Alcohol use: No   • Drug use: No   • Sexual activity: Yes     Partners: Male     Birth control/protection: Female Sterilization   Other Topics Concern   • Not on file   Social History  Narrative   • Not on file     Social Drivers of Health     Financial Resource Strain: Low Risk  (2023)    Overall Financial Resource Strain (CARDIA)    • Difficulty of Paying Living Expenses: Not hard at all   Food Insecurity: No Food Insecurity (2025)    Nursing - Inadequate Food Risk Classification    • Worried About Running Out of Food in the Last Year: Never true    • Ran Out of Food in the Last Year: Never true    • Ran Out of Food in the Last Year: Never true   Transportation Needs: No Transportation Needs (2025)    Nursing - Transportation Risk Classification    • Lack of Transportation: Not on file    • Lack of Transportation: No   Physical Activity: Not on file   Stress: Not on file   Social Connections: Not on file   Intimate Partner Violence: Unknown (2025)    Nursing IPS    • Feels Physically and Emotionally Safe: Not on file    • Physically Hurt by Someone: Not on file    • Humiliated or Emotionally Abused by Someone: Not on file    • Physically Hurt by Someone: No    • Hurt or Threatened by Someone: No   Housing Stability: Unknown (2025)    Nursing: Inadequate Housing Risk Classification    • Has Housing: Not on file    • Worried About Losing Housing: Not on file    • Unable to Get Utilities: Not on file    • Unable to Pay for Housing in the Last Year: No    • Has Housin        FAMILY HISTORY-  Family History   Problem Relation Age of Onset   • Colon cancer Mother 51   • Breast cancer Mother 53   • Cancer Mother         two primary sites  breast and colon   • Heart attack Father    • Prostate cancer Father 70   • Hypertension Father    • Heart attack Sister    • No Known Problems Sister    • No Known Problems Daughter    • No Known Problems Daughter    • No Known Problems Maternal Grandmother    • No Known Problems Maternal Grandfather    • No Known Problems Paternal Grandmother    • No Known Problems Paternal Grandfather    • Stroke Brother    • No Known Problems Son    • No  "Known Problems Maternal Aunt    • No Known Problems Maternal Aunt    • No Known Problems Maternal Aunt        SURGICAL HISTORY-  Past Surgical History:   Procedure Laterality Date   • EYE SURGERY     • TOOTH EXTRACTION     • TUBAL LIGATION           Current Outpatient Medications:   •  amLODIPine (NORVASC) 5 mg tablet, TAKE 1 TABLET DAILY, Disp: 90 tablet, Rfl: 1  •  aspirin 81 mg chewable tablet, Chew 1 tablet (81 mg total) daily, Disp: 30 tablet, Rfl: 5  •  atorvastatin (LIPITOR) 40 mg tablet, Take 1 tablet (40 mg total) by mouth daily with dinner, Disp: 30 tablet, Rfl: 5  •  escitalopram (LEXAPRO) 10 mg tablet, Take 1 tablet (10 mg total) by mouth daily, Disp: 30 tablet, Rfl: 5  •  losartan (COZAAR) 100 MG tablet, TAKE 1 TABLET DAILY, Disp: 90 tablet, Rfl: 1  •  meclizine (ANTIVERT) 25 mg tablet, Take 1 tablet (25 mg total) by mouth 3 (three) times a day as needed for dizziness for up to 10 days, Disp: 30 tablet, Rfl: 0  •  multivitamin (THERAGRAN) TABS, Take 1 tablet by mouth daily, Disp: , Rfl:   •  nortriptyline (PAMELOR) 10 mg capsule, TAKE 1 CAPSULE THREE TIMES A DAY (Patient not taking: Reported on 2/3/2025), Disp: 270 capsule, Rfl: 3  Allergies   Allergen Reactions   • Iodine - Food Allergy Rash     Vitals:    03/06/25 1057   BP: 146/60   BP Location: Left arm   Patient Position: Sitting   Cuff Size: Standard   Pulse: 69   SpO2: 97%   Weight: 64.4 kg (142 lb)   Height: 5' 2\" (1.575 m)         Review of Systems:  Review of Systems   All other systems reviewed and are negative.      Physical Exam:  Physical Exam  Vitals reviewed.   Constitutional:       Appearance: She is well-developed.   HENT:      Head: Normocephalic and atraumatic.   Eyes:      Conjunctiva/sclera: Conjunctivae normal.      Pupils: Pupils are equal, round, and reactive to light.   Cardiovascular:      Rate and Rhythm: Normal rate.      Heart sounds: Normal heart sounds.   Pulmonary:      Effort: Pulmonary effort is normal.      Breath " sounds: Normal breath sounds.   Musculoskeletal:      Cervical back: Normal range of motion and neck supple.   Skin:     General: Skin is warm and dry.   Neurological:      Mental Status: She is alert and oriented to person, place, and time.       Discussion/Summary: Patient with CVA of unclear etiology.  There is concern that it was embolic.  Will check a 2-week Zio patch to assess for atrial fibrillation.  If this is negative I have told the patient that we will proceed to placement of an ILR.  She and her  agreed to this.  In reference to nocturnal hypoxia I do not see obvious cardiac etiology.  I have reviewed her echocardiogram and see a small PFO with no obvious evidence of R-L shunt with and without provocation.  I have asked the patient and her  to call if there is a problem in the interim otherwise I will see her in follow-up in 4 to 6 months and sooner as is necessary.

## 2025-02-25 ENCOUNTER — APPOINTMENT (OUTPATIENT)
Dept: PHYSICAL THERAPY | Facility: CLINIC | Age: 70
End: 2025-02-25
Payer: COMMERCIAL

## 2025-02-26 ENCOUNTER — TELEPHONE (OUTPATIENT)
Dept: NEUROLOGY | Facility: CLINIC | Age: 70
End: 2025-02-26

## 2025-02-26 NOTE — TELEPHONE ENCOUNTER
Tried calling PT to confirm upcoming appointment on 3/5 at 2:30pm with Dr. Aranda at the [address] [place] [address] location. Left Pt VM with call back number.

## 2025-02-28 ENCOUNTER — APPOINTMENT (OUTPATIENT)
Dept: PHYSICAL THERAPY | Facility: CLINIC | Age: 70
End: 2025-02-28
Payer: COMMERCIAL

## 2025-03-05 ENCOUNTER — OFFICE VISIT (OUTPATIENT)
Dept: NEUROLOGY | Facility: CLINIC | Age: 70
End: 2025-03-05
Payer: COMMERCIAL

## 2025-03-05 VITALS
HEART RATE: 87 BPM | WEIGHT: 143 LBS | SYSTOLIC BLOOD PRESSURE: 158 MMHG | BODY MASS INDEX: 26.31 KG/M2 | TEMPERATURE: 98 F | OXYGEN SATURATION: 94 % | DIASTOLIC BLOOD PRESSURE: 88 MMHG | HEIGHT: 62 IN

## 2025-03-05 DIAGNOSIS — Z86.73 HISTORY OF CVA (CEREBROVASCULAR ACCIDENT): Primary | ICD-10-CM

## 2025-03-05 PROBLEM — Z12.31 ENCOUNTER FOR SCREENING MAMMOGRAM FOR BREAST CANCER: Status: RESOLVED | Noted: 2023-12-12 | Resolved: 2025-03-05

## 2025-03-05 PROCEDURE — 99215 OFFICE O/P EST HI 40 MIN: CPT | Performed by: PSYCHIATRY & NEUROLOGY

## 2025-03-05 PROCEDURE — G2211 COMPLEX E/M VISIT ADD ON: HCPCS | Performed by: PSYCHIATRY & NEUROLOGY

## 2025-03-05 NOTE — ASSESSMENT & PLAN NOTE
Juanis is a 70-year-old female with history of anxiety, hypertension, hypertension, hyperlipidemia, shoulder impingement, insomnia, MATT , PFO with right-to-left shunt, osteoarthritis and quadriceps tendinitis who presents as a hospital follow-up. Patient was seen by inpatient neurology team in December 2024 after presenting with confusion and dizziness.  The symptoms resolved and during admission developed right sided weakness and right facial droop.  Imaging with stroke affecting multiple vascular territories including the left occipital lobe, bilateral alex, left greater than right and the left cerebellar hemisphere.  Echo with a small PFO not thought to be a likely source of her stroke.  Patient was discharged on aspirin 81 mg and atorvastatin with a cardiology referral.    Today patient denies any new strokelike symptoms.  She has an appointment with cardiology tomorrow.They report that she has been sleeping with 3 L NC. It was reported she has nocturnal hypoxemia, possible false negative for MATT.     Plan:  Will focus on stroke risk prevention  Recommend cardiac monitoring with Zio to evaluate for cardioembolic source. Would consider loop implant if negative.   Antiplatelet therapy: Continue aspirin 81 mg  Goal LDL < 70    Continue atorvastatin 40 mg  Monitor blood pressure with goal <130/80  Counseled on lifestyle measures to reduce stroke burden if applicable, such as:  Smoking cessation   Reducing alcohol intake  Encouraging physical activity  Encouraging weight loss  A low-carbohydrate diet reducing the intake of foods rich in carbohydrates, such as bread, pasta, rice, and sugary snacks.Emphasizes foods that are high in protein, healthy fats, and non-starchy vegetables  Regular follow-up with their PCP  If they have any stroke-like symptoms including sudden painless loss of vision or double vision, difficulty speaking or swallowing, vertigo/room spinning that does not quickly resolve, or weakness/numbness  affecting 1 side of the face or body he should proceed by ambulance to the nearest emergency room immediately.  Follow-up in 3 months

## 2025-03-05 NOTE — PROGRESS NOTES
Name: Mayra Echevarria      : 1955      MRN: [id number]  Encounter Provider: Lotus Aranda MD  Encounter Date: 3/5/2025   Encounter department: [facility] [place]  :  Assessment & Plan  History of CVA (cerebrovascular accident)  Juanis is a 70-year-old female with history of anxiety, hypertension, hypertension, hyperlipidemia, shoulder impingement, insomnia, MATT , PFO with right-to-left shunt, osteoarthritis and quadriceps tendinitis who presents as a hospital follow-up. Patient was seen by inpatient neurology team in 2024 after presenting with confusion and dizziness.  The symptoms resolved and during admission developed right sided weakness and right facial droop.  Imaging with stroke affecting multiple vascular territories including the left occipital lobe, bilateral alex, left greater than right and the left cerebellar hemisphere.  Echo with a small PFO not thought to be a likely source of her stroke.  Patient was discharged on aspirin 81 mg and atorvastatin with a cardiology referral.    Today patient denies any new strokelike symptoms.  She has an appointment with cardiology tomorrow.They report that she has been sleeping with 3 L NC. It was reported she has nocturnal hypoxemia, possible false negative for MATT.     Plan:  Will focus on stroke risk prevention  Recommend cardiac monitoring with Zio to evaluate for cardioembolic source. Would consider loop implant if negative.   Antiplatelet therapy: Continue aspirin 81 mg  Goal LDL < 70    Continue atorvastatin 40 mg  Monitor blood pressure with goal <130/80  Counseled on lifestyle measures to reduce stroke burden if applicable, such as:  Smoking cessation   Reducing alcohol intake  Encouraging physical activity  Encouraging weight loss  A low-carbohydrate diet reducing the intake of foods rich in carbohydrates, such as bread, pasta, rice, and sugary snacks.Emphasizes foods that are high in protein, healthy fats,  and non-starchy vegetables  Regular follow-up with their PCP  If they have any stroke-like symptoms including sudden painless loss of vision or double vision, difficulty speaking or swallowing, vertigo/room spinning that does not quickly resolve, or weakness/numbness affecting 1 side of the face or body he should proceed by ambulance to the nearest emergency room immediately.  Follow-up in 3 months               History of Present Illness   HPI     Juanis is a 70-year-old female with history of anxiety, hypertension, hypertension, hyperlipidemia, shoulder impingement, insomnia, MATT , PFO with right-to-left shunt, osteoarthritis and quadriceps tendinitis who presents as a hospital follow-up.    Patient was seen by inpatient neurology team in December 2024 after presenting with confusion and dizziness.  1 day after admission patient started having difficulty moving her right side and developed a right facial droop.  Rapid response was called.  Imaging with strokes affecting multiple vascular territories.    Overall her stroke thought to be embolic stroke of undetermined source.  Patient was discharged on aspirin 81 mg and with a cardiology referral.      Workup:  CTH: no acute infarct    CTA: no LVO, stenosis of left inferior M3    MRI BRAIN WO CONTRAST (Final) 12/27/2024  7:01 PM    Impression  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    Mild chronic white matter microangiopathic changes involving both cerebral hemispheres.    Echo: EF 60%. Normal left atrium.     CT C/A/P: No signs of malignancy in the chest, abdomen or pelvis.      Interval history:  No new stroke like sx.   Appt with cardiology tomorrow   On 3 L nasal canula   Taking Lipitor   Does not recall hospital stay     Review of Systems   Constitutional:  Negative for appetite change, fatigue  "and fever.   HENT: Negative.  Negative for hearing loss, tinnitus, trouble swallowing and voice change.    Eyes: Negative.  Negative for photophobia, pain and visual disturbance.   Respiratory: Negative.  Negative for shortness of breath.    Cardiovascular: Negative.  Negative for palpitations.   Gastrointestinal: Negative.  Negative for nausea and vomiting.   Endocrine: Negative.  Negative for cold intolerance.   Genitourinary: Negative.  Negative for dysuria, frequency and urgency.   Musculoskeletal:  Negative for back pain, gait problem, myalgias, neck pain and neck stiffness.   Skin: Negative.  Negative for rash.   Allergic/Immunologic: Negative.    Neurological:  Negative for dizziness, tremors, seizures, syncope, facial asymmetry, speech difficulty, weakness, light-headedness, numbness and headaches.        Pt presents with frequent forgetfulness.   Hematological: Negative.  Does not bruise/bleed easily.   Psychiatric/Behavioral: Negative.  Negative for confusion, hallucinations and sleep disturbance.    All other systems reviewed and are negative.   I have personally reviewed the MA's review of systems and made changes as necessary.         Objective   Ht 5' 2\" (1.575 m)   LMP  (LMP Unknown)   BMI 26.26 kg/m²     Physical Exam  Constitutional:       General: She is awake.   Eyes:      General: Lids are normal.      Extraocular Movements: Extraocular movements intact.      Pupils: Pupils are equal, round, and reactive to light.   Neurological:      Mental Status: She is alert.      Motor: Motor strength is normal.      Neurological Exam  Mental Status  Awake and alert.  [place] Cognitive Assessment Exam:    Visuospatial/executive function   4/5  Identification   3/3  Attention        Digits   2/2       Letters   1/1       Serial 7s   3/3  Language          Repetition   2/2       Fluency   1/1  Abstraction   0/2  Delayed recall   2/5  Orientation   5/6  Total   24/30      .    Cranial Nerves  CN II: Visual " fields full to confrontation.  CN III, IV, VI: Extraocular movements intact bilaterally. Normal lids and orbits bilaterally. Pupils equal round and reactive to light bilaterally.  CN VII: Full and symmetric facial movement.  CN VIII: Hearing is normal.  CN XII: Tongue midline without atrophy or fasciculations.    Motor   Strength is 5/5 throughout all four extremities.    Coordination  Right: Finger-to-nose normal.Left: Finger-to-nose normal.    Gait  Casual gait is normal including stance, stride, and arm swing.

## 2025-03-06 ENCOUNTER — OFFICE VISIT (OUTPATIENT)
Dept: CARDIOLOGY CLINIC | Facility: CLINIC | Age: 70
End: 2025-03-06
Payer: COMMERCIAL

## 2025-03-06 VITALS
OXYGEN SATURATION: 97 % | DIASTOLIC BLOOD PRESSURE: 60 MMHG | WEIGHT: 142 LBS | HEART RATE: 69 BPM | HEIGHT: 62 IN | BODY MASS INDEX: 26.13 KG/M2 | SYSTOLIC BLOOD PRESSURE: 146 MMHG

## 2025-03-06 DIAGNOSIS — I10 PRIMARY HYPERTENSION: Primary | ICD-10-CM

## 2025-03-06 DIAGNOSIS — E78.00 PURE HYPERCHOLESTEROLEMIA: ICD-10-CM

## 2025-03-06 DIAGNOSIS — I63.9 ACUTE CVA (CEREBROVASCULAR ACCIDENT) (HCC): ICD-10-CM

## 2025-03-06 PROCEDURE — 99204 OFFICE O/P NEW MOD 45 MIN: CPT | Performed by: INTERNAL MEDICINE

## 2025-04-03 ENCOUNTER — RESULTS FOLLOW-UP (OUTPATIENT)
Dept: CARDIOLOGY CLINIC | Facility: CLINIC | Age: 70
End: 2025-04-03

## 2025-04-03 LAB
CV ZIO BASELINE AVG BPM: 67 BPM
CV ZIO BASELINE BPM HIGH: 169 BPM
CV ZIO BASELINE BPM LOW: 45 BPM
CV ZIO DEVICE ANALYSIS TIME: NORMAL
CV ZIO ECT SVE COUNT: 431 EPISODES
CV ZIO ECT SVE CPLT COUNT: 26 EPISODES
CV ZIO ECT SVE CPLT FREQ: NORMAL
CV ZIO ECT SVE FREQ: NORMAL
CV ZIO ECT SVE TPLT COUNT: 6 EPISODES
CV ZIO ECT SVE TPLT FREQ: NORMAL
CV ZIO ECT VE COUNT: 58 EPISODES
CV ZIO ECT VE CPLT COUNT: 0 EPISODES
CV ZIO ECT VE CPLT FREQ: 0
CV ZIO ECT VE FREQ: NORMAL
CV ZIO ECT VE TPLT COUNT: 0 EPISODES
CV ZIO ECT VE TPLT FREQ: 0
CV ZIO ECTOPIC SVE COUPLET RAW PERCENT: 0 %
CV ZIO ECTOPIC SVE ISOLATED PERCENT: 0.03 %
CV ZIO ECTOPIC SVE TRIPLET RAW PERCENT: 0 %
CV ZIO ECTOPIC VE COUPLET RAW PERCENT: 0 %
CV ZIO ECTOPIC VE ISOLATED PERCENT: 0 %
CV ZIO ECTOPIC VE TRIPLET RAW PERCENT: 0 %
CV ZIO ENROLLMENT END: NORMAL
CV ZIO ENROLLMENT START: NORMAL
CV ZIO PATIENT EVENTS DIARIES: 0
CV ZIO PATIENT EVENTS TRIGGERS: 0
CV ZIO PAUSE COUNT: 0
CV ZIO PRESCRIPTION STATUS: NORMAL
CV ZIO SVT AVG BPM: 133 BPM
CV ZIO SVT BPM HIGH: 169 BPM
CV ZIO SVT BPM LOW: 82 BPM
CV ZIO SVT COUNT: 5
CV ZIO SVT F EPI AVG BPM: 139 BPM
CV ZIO SVT F EPI BEATS: 4 BEATS
CV ZIO SVT F EPI BPM HIGH: 169 BPM
CV ZIO SVT F EPI BPM LOW: 110 BPM
CV ZIO SVT F EPI DUR: 1.9 SEC
CV ZIO SVT F EPI END: NORMAL
CV ZIO SVT F EPI START: NORMAL
CV ZIO SVT L EPI AVG BPM: 161 BPM
CV ZIO SVT L EPI BEATS: 10 BEATS
CV ZIO SVT L EPI BPM HIGH: 169 BPM
CV ZIO SVT L EPI BPM LOW: 138 BPM
CV ZIO SVT L EPI DUR: 3.8 SEC
CV ZIO SVT L EPI END: NORMAL
CV ZIO SVT L EPI START: NORMAL
CV ZIO TOTAL  ENROLLMENT PERIOD: NORMAL
CV ZIO VT COUNT: 0

## 2025-04-03 NOTE — TELEPHONE ENCOUNTER
----- Message from Alex Quintanilla MD sent at 4/3/2025 10:41 AM EDT -----  Please call patient.  Zio patch showed no evidence of atrial fibrillation.  We had discussed placing a loop if Zio patch was negative.  Neurology had recommended this.  If she wants to proceed with this let me know and I will place an order.  Thank you.

## 2025-04-03 NOTE — TELEPHONE ENCOUNTER
Spoke with Reggie and Juanis and relayed message as given per Dr. Quintanilla, Juanis wishes to not proceed with having a Loop Recorder at this time and is having no issues.     Asked pt to call us if she is having any new or worsening symptoms, if not we will see her at her next ov.    Reggie and Juanis are aware and understood.

## 2025-04-10 ENCOUNTER — OFFICE VISIT (OUTPATIENT)
Age: 70
End: 2025-04-10
Payer: COMMERCIAL

## 2025-04-10 VITALS
HEIGHT: 62 IN | DIASTOLIC BLOOD PRESSURE: 66 MMHG | BODY MASS INDEX: 26.13 KG/M2 | WEIGHT: 142 LBS | SYSTOLIC BLOOD PRESSURE: 140 MMHG

## 2025-04-10 DIAGNOSIS — M81.0 SENILE OSTEOPOROSIS: Primary | ICD-10-CM

## 2025-04-10 PROCEDURE — 99214 OFFICE O/P EST MOD 30 MIN: CPT | Performed by: PHYSICIAN ASSISTANT

## 2025-04-10 PROCEDURE — 96372 THER/PROPH/DIAG INJ SC/IM: CPT | Performed by: PHYSICIAN ASSISTANT

## 2025-04-10 NOTE — PROGRESS NOTES
Name: Mayra Echevarria      : 1955      MRN: [id number]  Encounter Provider: Mee Centeno PA-C  Encounter Date: 4/10/2025   [facility]  :  Assessment & Plan  Senile osteoporosis  Osteoporosis currently being treated with Prolia.  She is due for dose #5 today.  She is up-to-date with her DEXA scan and will be due for an updated DEXA in 2026.  Will continue to monitor her bone density every 2 years.  Labs as ordered to monitor for medication side effects and toxicity.  Follow-up in 6 months or sooner if needed.    Orders:    denosumab (PROLIA) subcutaneous injection 60 mg    Basic metabolic panel; Future        History of Present Illness   Mayra Echevarria is a 70 y.o. female.  She is here for follow-up of her osteoporosis.  She has a history of a low trauma rib fracture as well.    She is on Prolia and is due for dose #5 today.  She had a DEXA scan done on 2024.  Lumbar spine T-score -0.8.  This has increased 7.7% as compared to her previous scan.  Left total hip T-score -2.0.  This has decreased 6.8% as compared to her previous scan.  Left femoral neck T-score -2.7.  Of note, when reviewing images it does appear as though she may have been lined up differently making a hip comparison difficult.     She has a history of osteoarthritis.  She is feeling well.  She has minimal stiffness in the morning.  She has no swelling in her joints.     She had labs done on 2025.  Creatinine 0.87, GFR 67.  Calcium 10.3.  Vitamin D 49.1.  She had a repeat BMP done on 2025.  Calcium within normal limits.            Review of Systems   Constitutional:  Negative for chills, fatigue and fever.   HENT:  Negative for hearing loss, sore throat and tinnitus.    Eyes:  Negative for pain and visual disturbance.   Respiratory:  Negative for cough and shortness of breath.    Cardiovascular:  Negative for chest pain and palpitations.  "  Gastrointestinal:  Negative for abdominal pain, nausea and vomiting.   Genitourinary:  Negative for difficulty urinating.   Musculoskeletal:  Positive for arthralgias. Negative for back pain, gait problem, joint swelling, myalgias, neck pain and neck stiffness.   Skin:  Negative for rash.   Neurological:  Negative for dizziness, seizures, weakness, numbness and headaches.   Psychiatric/Behavioral:  Negative for confusion, decreased concentration and sleep disturbance.      Current Outpatient Medications on File Prior to Visit   Medication Sig Dispense Refill    amLODIPine (NORVASC) 5 mg tablet TAKE 1 TABLET DAILY 90 tablet 1    aspirin 81 mg chewable tablet Chew 1 tablet (81 mg total) daily 30 tablet 5    atorvastatin (LIPITOR) 40 mg tablet Take 1 tablet (40 mg total) by mouth daily with dinner 30 tablet 5    escitalopram (LEXAPRO) 10 mg tablet Take 1 tablet (10 mg total) by mouth daily 30 tablet 5    losartan (COZAAR) 100 MG tablet TAKE 1 TABLET DAILY 90 tablet 1    meclizine (ANTIVERT) 25 mg tablet Take 1 tablet (25 mg total) by mouth 3 (three) times a day as needed for dizziness for up to 10 days 30 tablet 0    multivitamin (THERAGRAN) TABS Take 1 tablet by mouth daily      nortriptyline (PAMELOR) 10 mg capsule TAKE 1 CAPSULE THREE TIMES A DAY (Patient not taking: Reported on 2/3/2025) 270 capsule 3     No current facility-administered medications on file prior to visit.         Objective   /66   Ht 5' 2\" (1.575 m)   Wt 64.4 kg (142 lb)   LMP  (LMP Unknown)   BMI 25.97 kg/m²      Physical Exam  Constitutional:       Appearance: Normal appearance.   Cardiovascular:      Rate and Rhythm: Normal rate and regular rhythm.   Pulmonary:      Breath sounds: Normal breath sounds.   Musculoskeletal:         General: No swelling or tenderness.      Comments: Neck decreased lateral flexion.  Shoulders, elbows, and wrists full range of motion.  Tinel's negative bilaterally.  Hands squaring 1st carpometacarpal " joints bilaterally with early Heberden's nodes.  No synovitis noted.  Straight leg raising negative.  Hips full range of motion.  Knees full range of motion with patellofemoral crepitus.  Ankles full range of motion.  Feet rearfoot hyperpronation with hallux valgus deformities and hammertoe deformities.  Varicosities lower extremities with spider veins.  No edema appreciated.     Skin:     General: Skin is warm and dry.   Neurological:      General: No focal deficit present.      Mental Status: She is alert.             Dragon Dictation software was used to dictate this note. It may contain errors with dictating incorrect words/spelling. Please contact provider directly for any questions.

## 2025-04-10 NOTE — ASSESSMENT & PLAN NOTE
Osteoporosis currently being treated with Prolia.  She is due for dose #5 today.  She is up-to-date with her DEXA scan and will be due for an updated DEXA in February 2026.  Will continue to monitor her bone density every 2 years.  Labs as ordered to monitor for medication side effects and toxicity.  Follow-up in 6 months or sooner if needed.    Orders:    denosumab (PROLIA) subcutaneous injection 60 mg    Basic metabolic panel; Future

## 2025-04-10 NOTE — PROGRESS NOTES
"Assessment/Plan:    Mayra Echevarria came into the [facility] today 04/10/25 to receive Prolia injection.      Verbal consent obtained.  Consent given by: patient    patient states patient has been medically healthy with no underlining concerns/complications.      Mayra Echevarria presents with no symptoms today.       All insturctions were reviewed with the patient.    If the patient should have any questions/concerns, advised patient to contacted [facility].       Subjective:     History provided by: patient    Patient ID: Mayra Echevarria is a 70 y.o. female      Objective:    Vitals:    04/10/25 1127   Weight: 64.4 kg (142 lb)   Height: 5' 2\" (1.575 m)       Patient tolerated the injection well without any complications.  Injection site/s left arm.  Medication was provided by provider stock    Patient signed consent form no   Patient signed ABN form no (If no patient is not a medicare patient).   Patient waited 15 minutes after injection no (This only applies to patient's receiving first time injection).       Last Visit: Visit date not found  Next visit:Visit date not found     "

## 2025-05-04 NOTE — PROGRESS NOTES
Cardiology Follow Up    Mayra Echevarria  1955  [id number]  [facility] [place]  [address]  [place] [address] [phone]    1. Primary hypertension        2. Pure hypercholesterolemia        3. Acute CVA (cerebrovascular accident) (HCC)          Interval History: patient is here for   Patient with history of CVA, HTN, HLD and depression.  Patient had issues with emotional lability after the CVA.  As well having issues with short and long-term memory.  Echocardiogram 12/29/2024 demonstrated LVEF of 60%.  There was mild LVH.  Small PFO noted.  Per my review of the study with saline contrast I see no obvious evidence of shunt w/wo cough.  There was no significant valve disease.  Aortic root was 3.7 cm.  Patient admitted 12/2024 with multiple small acute infarctions involving the L occipital lobe.  During admission patient developed right-sided weakness and right facial droop.  Zio patch was ordered and not done.  Patient was hospitalized 1/2025 with hypertensive urgency.  She was noted to have severe hypercalcemia related to inappropriate Calcium supplementation.  Lipid profile 2/5/2025 demonstrated TC of 176 with an HDL of 57 and a calculated LDL of 91.  EKG 1/2/2025 demonstrated NSR with LVH and nonspecific ST segment change.  Per Neurology note 12/30/2024 there was concern about embolic source for CVA.  PFO was felt to be an unlikely source of CVA.  Patient saw Neurology 3/5/2025 and  recommended ZIO with consideration for ILR if the ZIO was negative.  Patient follows with Pulmonary service in reference to nocturnal hypoxia.  She was started on 3 L NC at night.   Doubt cardiac etiology in reference to hypoxia.  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  Patient is here today with her .  She has had no CP or significant dyspnea.  Zio patch 3/6/2025 demonstrated NSR with average heart rate of 67.   There was no evidence of afib/flutter.  I recommended ILR and patient deferred.  Her systolic BP is elevated today.  Her  feels she has whitecoat HTN.  He will check her blood pressure at home and call me.  I will adjust her medication as necessary.    Patient Active Problem List   Diagnosis   • Greater trochanteric bursitis of right hip   • Hyperlipidemia   • It band syndrome, right   • Senile osteoporosis   • Patellofemoral syndrome, right   • Essential hypertension   • Atrophic vaginitis   • COVID-19   • Quadriceps tendonitis   • Hypercalcemia   • Primary generalized (osteo)arthritis   • Migraine with aura and without status migrainosus, not intractable   • Anxiety   • Labile hypertension   • History of rib fracture   • Impingement syndrome of left shoulder   • Impingement syndrome of right shoulder   • Other osteoporosis without current pathological fracture   • Sleep apnea-like behavior   • Insomnia   • Memory impairment   • MATT (obstructive sleep apnea)   • Other sleep disorders   • History of CVA (cerebrovascular accident)   • Ambulatory dysfunction   • Hypertensive emergency   • Hypoxia   • TIA (transient ischemic attack)   • Patent foramen ovale with right to left shunt     Past Medical History:   Diagnosis Date   • Allergic years ago    Iodine   • 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   • Chronic leukopenia    • Depression 04/22/2014   • Hypercalcemia    • Hyperlipidemia    • Hypertension     labile   • Hypertension    • Migraines    • Osteoarthritis    • Osteoporosis    • Vertigo      Social History     Socioeconomic History   • Marital status: /Civil Union     Spouse name: Not on file   • Number of children: Not on file   • Years of education: Not on file   • Highest education level: Not on file   Occupational History   • Not on file   Tobacco Use   • Smoking status: Never     Passive exposure: Never   • Smokeless tobacco: Never   Vaping Use   • Vaping status: Never Used   Substance and  Sexual Activity   • Alcohol use: No   • Drug use: No   • Sexual activity: Yes     Partners: Male     Birth control/protection: Female Sterilization   Other Topics Concern   • Not on file   Social History Narrative   • Not on file     Social Drivers of Health     Financial Resource Strain: Low Risk  (2023)    Overall Financial Resource Strain (CARDIA)    • Difficulty of Paying Living Expenses: Not hard at all   Food Insecurity: No Food Insecurity (2025)    Nursing - Inadequate Food Risk Classification    • Worried About Running Out of Food in the Last Year: Never true    • Ran Out of Food in the Last Year: Never true    • Ran Out of Food in the Last Year: Never true   Transportation Needs: No Transportation Needs (2025)    Nursing - Transportation Risk Classification    • Lack of Transportation: Not on file    • Lack of Transportation: No   Physical Activity: Not on file   Stress: Not on file   Social Connections: Not on file   Intimate Partner Violence: Unknown (2025)    Nursing IPS    • Feels Physically and Emotionally Safe: Not on file    • Physically Hurt by Someone: Not on file    • Humiliated or Emotionally Abused by Someone: Not on file    • Physically Hurt by Someone: No    • Hurt or Threatened by Someone: No   Housing Stability: Unknown (2025)    Nursing: Inadequate Housing Risk Classification    • Has Housing: Not on file    • Worried About Losing Housing: Not on file    • Unable to Get Utilities: Not on file    • Unable to Pay for Housing in the Last Year: No    • Has Housin      Family History   Problem Relation Age of Onset   • Colon cancer Mother 51   • Breast cancer Mother 53   • Cancer Mother         two primary sites  breast and colon   • Heart attack Father    • Prostate cancer Father 70   • Hypertension Father    • Heart attack Sister    • No Known Problems Sister    • No Known Problems Daughter    • No Known Problems Daughter    • No Known Problems Maternal Grandmother   "  • No Known Problems Maternal Grandfather    • No Known Problems Paternal Grandmother    • No Known Problems Paternal Grandfather    • Stroke Brother    • No Known Problems Son    • No Known Problems Maternal Aunt    • No Known Problems Maternal Aunt    • No Known Problems Maternal Aunt      Past Surgical History:   Procedure Laterality Date   • EYE SURGERY     • TOOTH EXTRACTION     • TUBAL LIGATION         Current Outpatient Medications:   •  amLODIPine (NORVASC) 5 mg tablet, TAKE 1 TABLET DAILY, Disp: 90 tablet, Rfl: 1  •  aspirin 81 mg chewable tablet, Chew 1 tablet (81 mg total) daily, Disp: 30 tablet, Rfl: 5  •  atorvastatin (LIPITOR) 40 mg tablet, Take 1 tablet (40 mg total) by mouth daily with dinner, Disp: 30 tablet, Rfl: 5  •  escitalopram (LEXAPRO) 10 mg tablet, Take 1 tablet (10 mg total) by mouth daily, Disp: 30 tablet, Rfl: 5  •  losartan (COZAAR) 100 MG tablet, TAKE 1 TABLET DAILY, Disp: 90 tablet, Rfl: 1  •  multivitamin (THERAGRAN) TABS, Take 1 tablet by mouth in the morning., Disp: , Rfl:   •  meclizine (ANTIVERT) 25 mg tablet, Take 1 tablet (25 mg total) by mouth 3 (three) times a day as needed for dizziness for up to 10 days, Disp: 30 tablet, Rfl: 0  •  nortriptyline (PAMELOR) 10 mg capsule, TAKE 1 CAPSULE THREE TIMES A DAY (Patient not taking: Reported on 2/3/2025), Disp: 270 capsule, Rfl: 3  Allergies   Allergen Reactions   • Iodine - Food Allergy Rash       Labs:not applicable  Imaging: No results found.    Review of Systems:  Review of Systems   All other systems reviewed and are negative.      Physical Exam:  /68 (BP Location: Left arm, Patient Position: Sitting, Cuff Size: Standard)   Pulse 86   Ht 5' 2\" (1.575 m)   Wt 66.2 kg (146 lb)   LMP  (LMP Unknown)   SpO2 93%   BMI 26.70 kg/m²   Physical Exam  Vitals reviewed.   Constitutional:       Appearance: She is well-developed.   HENT:      Head: Normocephalic and atraumatic.     Eyes:      Conjunctiva/sclera: Conjunctivae " normal.       Cardiovascular:      Rate and Rhythm: Normal rate.      Heart sounds: Normal heart sounds.   Pulmonary:      Effort: Pulmonary effort is normal.      Breath sounds: Normal breath sounds.     Musculoskeletal:      Cervical back: Normal range of motion and neck supple.     Skin:     General: Skin is warm and dry.     Neurological:      Mental Status: She is alert and oriented to person, place, and time.       Discussion/Summary:I will continue the patient's present medical regimen.  I have asked the patient to call if there is a problem in the interim otherwise I will see the patient in six months time.  Patient's  will call me with follow-up BPs.

## 2025-05-06 ENCOUNTER — HOSPITAL ENCOUNTER (OUTPATIENT)
Dept: RADIOLOGY | Facility: HOSPITAL | Age: 70
Discharge: HOME | End: 2025-05-06
Attending: INTERNAL MEDICINE
Payer: COMMERCIAL

## 2025-05-06 DIAGNOSIS — R09.02 HYPOXIA: ICD-10-CM

## 2025-05-06 PROCEDURE — 76000 FLUOROSCOPY <1 HR PHYS/QHP: CPT

## 2025-05-14 ENCOUNTER — OFFICE VISIT (OUTPATIENT)
Dept: CARDIOLOGY CLINIC | Facility: CLINIC | Age: 70
End: 2025-05-14
Payer: COMMERCIAL

## 2025-05-14 VITALS
WEIGHT: 146 LBS | SYSTOLIC BLOOD PRESSURE: 154 MMHG | OXYGEN SATURATION: 93 % | DIASTOLIC BLOOD PRESSURE: 68 MMHG | BODY MASS INDEX: 26.87 KG/M2 | HEART RATE: 86 BPM | HEIGHT: 62 IN

## 2025-05-14 DIAGNOSIS — I10 PRIMARY HYPERTENSION: Primary | ICD-10-CM

## 2025-05-14 DIAGNOSIS — I63.9 ACUTE CVA (CEREBROVASCULAR ACCIDENT) (HCC): ICD-10-CM

## 2025-05-14 DIAGNOSIS — E78.00 PURE HYPERCHOLESTEROLEMIA: ICD-10-CM

## 2025-05-14 PROCEDURE — 99214 OFFICE O/P EST MOD 30 MIN: CPT | Performed by: INTERNAL MEDICINE

## 2025-06-02 ENCOUNTER — RA CDI HCC (OUTPATIENT)
Dept: OTHER | Facility: HOSPITAL | Age: 70
End: 2025-06-02

## 2025-06-11 ENCOUNTER — TELEPHONE (OUTPATIENT)
Dept: FAMILY MEDICINE CLINIC | Facility: CLINIC | Age: 70
End: 2025-06-11

## 2025-06-11 ENCOUNTER — OFFICE VISIT (OUTPATIENT)
Dept: FAMILY MEDICINE CLINIC | Facility: CLINIC | Age: 70
End: 2025-06-11
Payer: COMMERCIAL

## 2025-06-11 VITALS
OXYGEN SATURATION: 98 % | BODY MASS INDEX: 26.98 KG/M2 | WEIGHT: 146.6 LBS | SYSTOLIC BLOOD PRESSURE: 128 MMHG | DIASTOLIC BLOOD PRESSURE: 68 MMHG | TEMPERATURE: 97.8 F | HEIGHT: 62 IN | HEART RATE: 76 BPM

## 2025-06-11 DIAGNOSIS — I10 ESSENTIAL HYPERTENSION: Primary | ICD-10-CM

## 2025-06-11 DIAGNOSIS — E78.00 HYPERCHOLESTEROLEMIA: ICD-10-CM

## 2025-06-11 DIAGNOSIS — F41.9 ANXIETY: ICD-10-CM

## 2025-06-11 DIAGNOSIS — Z86.73 HISTORY OF CVA (CEREBROVASCULAR ACCIDENT): ICD-10-CM

## 2025-06-11 PROCEDURE — G2211 COMPLEX E/M VISIT ADD ON: HCPCS | Performed by: FAMILY MEDICINE

## 2025-06-11 PROCEDURE — 99214 OFFICE O/P EST MOD 30 MIN: CPT | Performed by: FAMILY MEDICINE

## 2025-06-11 NOTE — TELEPHONE ENCOUNTER
Patient has AWV scheduled for 8/4/25, but also scheduled a 6 month follow up from today's visit for December. Patient requested a message be sent to provider to clarify if she still needs to come for the AWV in August. Please advise.

## 2025-06-12 ENCOUNTER — APPOINTMENT (OUTPATIENT)
Dept: LAB | Age: 70
End: 2025-06-12
Payer: COMMERCIAL

## 2025-06-12 DIAGNOSIS — I10 ESSENTIAL HYPERTENSION: ICD-10-CM

## 2025-06-12 DIAGNOSIS — E78.00 HYPERCHOLESTEROLEMIA: ICD-10-CM

## 2025-06-12 LAB
ALBUMIN SERPL BCG-MCNC: 4.3 G/DL (ref 3.5–5)
ALP SERPL-CCNC: 286 U/L (ref 34–104)
ALT SERPL W P-5'-P-CCNC: 57 U/L (ref 7–52)
ANION GAP SERPL CALCULATED.3IONS-SCNC: 10 MMOL/L (ref 4–13)
AST SERPL W P-5'-P-CCNC: 40 U/L (ref 13–39)
BILIRUB SERPL-MCNC: 0.77 MG/DL (ref 0.2–1)
BUN SERPL-MCNC: 17 MG/DL (ref 5–25)
CALCIUM SERPL-MCNC: 9.8 MG/DL (ref 8.4–10.2)
CHLORIDE SERPL-SCNC: 105 MMOL/L (ref 96–108)
CHOLEST SERPL-MCNC: 161 MG/DL (ref ?–200)
CO2 SERPL-SCNC: 26 MMOL/L (ref 21–32)
CREAT SERPL-MCNC: 0.74 MG/DL (ref 0.6–1.3)
GFR SERPL CREATININE-BSD FRML MDRD: 81 ML/MIN/1.73SQ M
GLUCOSE P FAST SERPL-MCNC: 89 MG/DL (ref 65–99)
HDLC SERPL-MCNC: 68 MG/DL
LDLC SERPL CALC-MCNC: 69 MG/DL (ref 0–100)
NONHDLC SERPL-MCNC: 93 MG/DL
POTASSIUM SERPL-SCNC: 4.7 MMOL/L (ref 3.5–5.3)
PROT SERPL-MCNC: 7.2 G/DL (ref 6.4–8.4)
SODIUM SERPL-SCNC: 141 MMOL/L (ref 135–147)
TRIGL SERPL-MCNC: 118 MG/DL (ref ?–150)

## 2025-06-12 PROCEDURE — 36415 COLL VENOUS BLD VENIPUNCTURE: CPT

## 2025-06-12 PROCEDURE — 80053 COMPREHEN METABOLIC PANEL: CPT

## 2025-06-12 PROCEDURE — 80061 LIPID PANEL: CPT

## 2025-06-12 NOTE — PROGRESS NOTES
Name: Mayra Echevarria      : 1955      MRN: [id number]  Encounter Provider: Namita Melendez DO  Encounter Date: 2025   Encounter department: [facility]    Assessment & Plan  Essential hypertension  Stable on amlodipine and ARB therapy.  Orders:    Comprehensive metabolic panel; Future    Hypercholesterolemia  Stable on statin therapy  Orders:    Lipid panel; Future    Anxiety  Stable on Lexapro therapy       History of CVA (cerebrovascular accident)  Continue with aspirin therapy and if patient has any breakthrough symptoms she is to immediately notify me.  We reviewed symptoms of possible TIA/stroke in the future            History of Present Illness     The patient presents for follow-up of hypertension, hypercholesterolemia, depression/anxiety and history of CVA.  She is doing well and denies any neurologic symptoms such as paresthesia or weakness or aphasia.  She denies headache palpitation or peripheral edema.      Review of Systems   Constitutional:  Negative for appetite change, chills and fever.   HENT:  Negative for ear pain, facial swelling, rhinorrhea, sinus pain, sore throat and trouble swallowing.    Eyes:  Negative for discharge and redness.   Respiratory:  Negative for chest tightness, shortness of breath and wheezing.    Cardiovascular:  Negative for chest pain and palpitations.   Gastrointestinal:  Negative for abdominal pain, diarrhea, nausea and vomiting.   Endocrine: Negative for polyuria.   Genitourinary:  Negative for dysuria and urgency.   Musculoskeletal:  Negative for arthralgias and back pain.   Skin:  Negative for rash.   Neurological:  Negative for dizziness, weakness and headaches.   Hematological:  Negative for adenopathy.   Psychiatric/Behavioral:  Negative for behavioral problems, confusion and sleep disturbance.    All other systems reviewed and are negative.    Past Medical History[1]  Past Surgical History[2]  Family History[3]  Social  "History[4]  Medications[5]  Allergies   Allergen Reactions    Iodine - Food Allergy Rash     Immunization History   Administered Date(s) Administered    COVID-19 PFIZER VACCINE 0.3 ML IM 03/25/2021, 04/15/2021, 12/04/2021    INFLUENZA 09/26/2013    Influenza Quadrivalent, 6-35 Months IM 10/14/2014, 11/19/2015, 11/16/2016, 10/09/2017    Influenza, high dose seasonal 0.7 mL 10/24/2019, 10/21/2020, 11/10/2022, 11/14/2023    Influenza, injectable, quadrivalent, preservative free 0.5 mL 10/11/2018    Influenza, recombinant, quadrivalent,injectable, preservative free 11/17/2021    Influenza, seasonal, injectable 12/14/2012    Pneumococcal Conjugate 13-Valent 08/07/2019    Pneumococcal Conjugate Vaccine 20-valent (Pcv20), Polysace 07/26/2023    Pneumococcal Polysaccharide PPV23 12/09/2020    Tdap 07/18/2012    Tuberculin Skin Test-PPD Intradermal 11/15/2000    Zoster 11/22/2013     Objective   /68 (BP Location: Right arm, Patient Position: Sitting, Cuff Size: Large)   Pulse 76   Temp 97.8 °F (36.6 °C) (Temporal)   Ht 5' 2\" (1.575 m)   Wt 66.5 kg (146 lb 9.6 oz)   LMP  (LMP Unknown)   SpO2 98%   BMI 26.81 kg/m²     Physical Exam  Vitals and nursing note reviewed.   Constitutional:       General: She is not in acute distress.     Appearance: Normal appearance. She is well-developed. She is not ill-appearing or diaphoretic.   HENT:      Head: Normocephalic and atraumatic.      Right Ear: Tympanic membrane, ear canal and external ear normal.      Left Ear: Tympanic membrane, ear canal and external ear normal.      Nose: Nose normal. No congestion or rhinorrhea.      Mouth/Throat:      Mouth: Mucous membranes are moist.      Pharynx: Oropharynx is clear. No oropharyngeal exudate or posterior oropharyngeal erythema.     Eyes:      General: No scleral icterus.        Right eye: No discharge.         Left eye: No discharge.      Extraocular Movements: Extraocular movements intact.      Conjunctiva/sclera: Conjunctivae " normal.      Pupils: Pupils are equal, round, and reactive to light.     Neck:      Thyroid: No thyromegaly.      Vascular: No carotid bruit or JVD.      Trachea: No tracheal deviation.     Cardiovascular:      Rate and Rhythm: Normal rate and regular rhythm.      Pulses: Normal pulses.      Heart sounds: Normal heart sounds. No murmur heard.  Pulmonary:      Effort: Pulmonary effort is normal. No respiratory distress.      Breath sounds: Normal breath sounds. No stridor. No wheezing, rhonchi or rales.   Abdominal:      General: Abdomen is flat. Bowel sounds are normal. There is no distension.      Palpations: Abdomen is soft. There is no mass.      Tenderness: There is no abdominal tenderness. There is no guarding or rebound.     Musculoskeletal:         General: No swelling, tenderness or deformity. Normal range of motion.      Cervical back: Normal range of motion and neck supple. No rigidity.      Right lower leg: No edema.      Left lower leg: No edema.   Lymphadenopathy:      Cervical: No cervical adenopathy.     Skin:     General: Skin is warm and dry.      Capillary Refill: Capillary refill takes less than 2 seconds.      Coloration: Skin is not jaundiced.      Findings: No bruising, erythema or rash.     Neurological:      General: No focal deficit present.      Mental Status: She is alert and oriented to person, place, and time.      Cranial Nerves: No cranial nerve deficit.      Sensory: No sensory deficit.      Motor: No abnormal muscle tone.      Coordination: Coordination normal.      Gait: Gait normal.      Deep Tendon Reflexes: Reflexes are normal and symmetric. Reflexes normal.     Psychiatric:         Mood and Affect: Mood normal.         Behavior: Behavior normal.         Thought Content: Thought content normal.         Judgment: Judgment normal.              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    Chronic leukopenia      Depression 04/22/2014    Hypercalcemia     Hyperlipidemia     Hypertension     labile    Hypertension     Migraines     Osteoarthritis     Osteoporosis     Vertigo    [2]   Past Surgical History:  Procedure Laterality Date    EYE SURGERY      TOOTH EXTRACTION      TUBAL LIGATION     [3]   Family History  Problem Relation Name Age of Onset    Colon cancer Mother Melissa Villa 51    Breast cancer Mother Melissa Villa 53    Cancer Mother Melissa Villa         two primary sites  breast and colon    Heart attack Father Pa Villa     Prostate cancer Father Pa Villa 70    Hypertension Father Pa Villa     Heart attack Sister jair     No Known Problems Sister varghese     No Known Problems Daughter kirit     No Known Problems Daughter nate     No Known Problems Maternal Grandmother      No Known Problems Maternal Grandfather      No Known Problems Paternal Grandmother      No Known Problems Paternal Grandfather      Stroke Brother      No Known Problems Son      No Known Problems Maternal Aunt ciara     No Known Problems Maternal Aunt catrachita     No Known Problems Maternal Aunt bruna    [4]   Social History  Tobacco Use    Smoking status: Never     Passive exposure: Never    Smokeless tobacco: Never   Vaping Use    Vaping status: Never Used   Substance and Sexual Activity    Alcohol use: No    Drug use: No    Sexual activity: Yes     Partners: Male     Birth control/protection: Female Sterilization   [5]   Current Outpatient Medications on File Prior to Visit   Medication Sig    amLODIPine (NORVASC) 5 mg tablet TAKE 1 TABLET DAILY    aspirin 81 mg chewable tablet Chew 1 tablet (81 mg total) daily    atorvastatin (LIPITOR) 40 mg tablet Take 1 tablet (40 mg total) by mouth daily with dinner    escitalopram (LEXAPRO) 10 mg tablet Take 1 tablet (10 mg total) by mouth daily    losartan (COZAAR) 100 MG tablet TAKE 1 TABLET DAILY    meclizine (ANTIVERT) 25 mg tablet Take 1 tablet (25 mg total) by mouth 3 (three)  times a day as needed for dizziness for up to 10 days    multivitamin (THERAGRAN) TABS Take 1 tablet by mouth in the morning.    nortriptyline (PAMELOR) 10 mg capsule TAKE 1 CAPSULE THREE TIMES A DAY (Patient not taking: Reported on 2/3/2025)

## 2025-06-12 NOTE — ASSESSMENT & PLAN NOTE
Continue with aspirin therapy and if patient has any breakthrough symptoms she is to immediately notify me.  We reviewed symptoms of possible TIA/stroke in the future

## 2025-06-16 ENCOUNTER — RESULTS FOLLOW-UP (OUTPATIENT)
Dept: FAMILY MEDICINE CLINIC | Facility: CLINIC | Age: 70
End: 2025-06-16

## 2025-06-27 ENCOUNTER — TELEPHONE (OUTPATIENT)
Age: 70
End: 2025-06-27

## 2025-06-27 DIAGNOSIS — F32.A DEPRESSION, UNSPECIFIED DEPRESSION TYPE: ICD-10-CM

## 2025-06-27 DIAGNOSIS — I63.9 ACUTE CVA (CEREBROVASCULAR ACCIDENT) (HCC): ICD-10-CM

## 2025-06-27 RX ORDER — ATORVASTATIN CALCIUM 40 MG/1
40 TABLET, FILM COATED ORAL
Qty: 30 TABLET | Refills: 5 | Status: SHIPPED | OUTPATIENT
Start: 2025-06-27

## 2025-06-27 RX ORDER — ESCITALOPRAM OXALATE 10 MG/1
10 TABLET ORAL DAILY
Qty: 30 TABLET | Refills: 5 | Status: SHIPPED | OUTPATIENT
Start: 2025-06-27 | End: 2025-12-24

## 2025-06-27 NOTE — TELEPHONE ENCOUNTER
The patient called to ask Dr. Melendez if she should continue taking the medications.    atorvastatin (LIPITOR) 40 mg tablet     escitalopram (LEXAPRO) 10 mg tablet     Please Advice and contact

## 2025-07-14 DIAGNOSIS — I10 ESSENTIAL HYPERTENSION: ICD-10-CM

## 2025-07-15 RX ORDER — LOSARTAN POTASSIUM 100 MG/1
100 TABLET ORAL DAILY
Qty: 90 TABLET | Refills: 1 | Status: SHIPPED | OUTPATIENT
Start: 2025-07-15

## 2025-07-21 ENCOUNTER — TELEPHONE (OUTPATIENT)
Age: 70
End: 2025-07-21

## 2025-07-21 NOTE — TELEPHONE ENCOUNTER
Pt called. She expressed some confusion as to whether or not the Zio patch was done. Made her aware that this was completed and that she does follow up with Dr. Quintanilla in cardiology. Pt verbalized understanding. Reviewed pt's next scheduled OV with her.